# Patient Record
Sex: MALE | Race: WHITE | NOT HISPANIC OR LATINO | Employment: FULL TIME | URBAN - METROPOLITAN AREA
[De-identification: names, ages, dates, MRNs, and addresses within clinical notes are randomized per-mention and may not be internally consistent; named-entity substitution may affect disease eponyms.]

---

## 2018-04-16 ENCOUNTER — HISTORICAL (OUTPATIENT)
Dept: RADIOLOGY | Facility: HOSPITAL | Age: 38
End: 2018-04-16

## 2018-04-16 LAB
ABS NEUT (OLG): 3.68 X10(3)/MCL (ref 2.1–9.2)
ALBUMIN SERPL-MCNC: 3.8 GM/DL (ref 3.4–5)
ALBUMIN/GLOB SERPL: 1 {RATIO}
ALP SERPL-CCNC: 162 UNIT/L (ref 45–117)
ALT SERPL-CCNC: 80 UNIT/L (ref 16–61)
AST SERPL-CCNC: 50 UNIT/L (ref 15–37)
BASOPHILS NFR BLD MANUAL: 0 %
BILIRUB SERPL-MCNC: 0.5 MG/DL (ref 0.2–1)
BILIRUBIN DIRECT+TOT PNL SERPL-MCNC: 0.1 MG/DL (ref 0–0.2)
BILIRUBIN DIRECT+TOT PNL SERPL-MCNC: 0.4 MG/DL (ref 0–1)
BUN SERPL-MCNC: 22 MG/DL (ref 7–18)
CALCIUM SERPL-MCNC: 9.7 MG/DL (ref 8.5–10.1)
CHLORIDE SERPL-SCNC: 103 MMOL/L (ref 98–107)
CHOLEST SERPL-MCNC: 241 MG/DL (ref 0–200)
CHOLEST/HDLC SERPL: 4.3 {RATIO} (ref 0–5)
CO2 SERPL-SCNC: 26 MMOL/L (ref 21–32)
CREAT SERPL-MCNC: 1.34 MG/DL (ref 0.7–1.3)
CREAT UR-MCNC: 99 MG/DL
EOSINOPHIL NFR BLD MANUAL: 11 % (ref 0–5)
ERYTHROCYTE [DISTWIDTH] IN BLOOD BY AUTOMATED COUNT: 14.1 % (ref 11.5–17)
EST. AVERAGE GLUCOSE BLD GHB EST-MCNC: 232 MG/DL
GLOBULIN SER-MCNC: 4 GM/DL (ref 2–4)
GLUCOSE SERPL-MCNC: 223 MG/DL (ref 74–106)
GRANULOCYTES NFR BLD MANUAL: 39 %
HBA1C MFR BLD: 9.7 % (ref 4.2–6.3)
HCT VFR BLD AUTO: 43.1 % (ref 42–52)
HDLC SERPL-MCNC: 56 MG/DL (ref 35–60)
HGB BLD-MCNC: 14.4 GM/DL (ref 14–18)
LDLC SERPL CALC-MCNC: 150 MG/DL (ref 0–129)
LYMPHOCYTES NFR BLD MANUAL: 46 % (ref 24–44)
MAGNESIUM SERPL-MCNC: 1.6 MG/DL (ref 1.8–2.4)
MCH RBC QN AUTO: 28.2 PG (ref 27–31)
MCHC RBC AUTO-ENTMCNC: 33.4 GM/DL (ref 33–36)
MCV RBC AUTO: 84.5 FL (ref 80–94)
MICROALBUMIN UR-MCNC: 2.4 MG/DL
MICROALBUMIN/CREAT RATIO PNL UR: 24.2 MG/GM CR (ref 0–30)
MONOCYTES NFR BLD MANUAL: 4 % (ref 4–8)
PHOSPHATE SERPL-MCNC: 1.8 MG/DL (ref 2.5–4.9)
PLATELET # BLD AUTO: 252 X10(3)/MCL (ref 130–400)
PLATELET # BLD EST: ADEQUATE 10*3/UL
PMV BLD AUTO: 8.9 FL (ref 7.4–10.4)
POTASSIUM SERPL-SCNC: 3.8 MMOL/L (ref 3.5–5.1)
PROT SERPL-MCNC: 7.7 GM/DL (ref 6.4–8.2)
PTH-INTACT SERPL-MCNC: 30.6 PG/ML (ref 18.4–80.1)
RBC # BLD AUTO: 5.1 X10(6)/MCL (ref 4.7–6.1)
RBC MORPH BLD: NORMAL
SODIUM SERPL-SCNC: 138 MMOL/L (ref 136–145)
TRIGL SERPL-MCNC: 174 MG/DL (ref 30–150)
VLDLC SERPL CALC-MCNC: 35 MG/DL
WBC # SPEC AUTO: 7.9 X10(3)/MCL (ref 4.5–11.5)

## 2018-04-17 ENCOUNTER — HISTORICAL (OUTPATIENT)
Dept: LAB | Facility: HOSPITAL | Age: 38
End: 2018-04-17

## 2018-04-17 LAB
CREAT SERPL-MCNC: 1.3 MG/DL (ref 0.7–1.3)
CREAT UR-MCNC: 128 MG/DL
PROT 24H UR-MCNC: 257.4 MG/24HR (ref 0–165)

## 2018-10-19 ENCOUNTER — HISTORICAL (OUTPATIENT)
Dept: LAB | Facility: HOSPITAL | Age: 38
End: 2018-10-19

## 2018-10-19 LAB
EST. AVERAGE GLUCOSE BLD GHB EST-MCNC: 243 MG/DL
HBA1C MFR BLD: 10.1 % (ref 4.2–6.3)

## 2018-12-19 ENCOUNTER — HISTORICAL (OUTPATIENT)
Dept: ADMINISTRATIVE | Facility: HOSPITAL | Age: 38
End: 2018-12-19

## 2019-01-24 ENCOUNTER — HISTORICAL (OUTPATIENT)
Dept: LAB | Facility: HOSPITAL | Age: 39
End: 2019-01-24

## 2019-01-24 LAB
ABS NEUT (OLG): 2.13 X10(3)/MCL (ref 2.1–9.2)
ALBUMIN SERPL-MCNC: 3.8 GM/DL (ref 3.4–5)
ALBUMIN/GLOB SERPL: 1 {RATIO}
ALP SERPL-CCNC: 208 UNIT/L (ref 45–117)
ALT SERPL-CCNC: 34 UNIT/L (ref 16–61)
AST SERPL-CCNC: 18 UNIT/L (ref 15–37)
BILIRUB SERPL-MCNC: 0.5 MG/DL (ref 0.2–1)
BILIRUBIN DIRECT+TOT PNL SERPL-MCNC: 0.1 MG/DL (ref 0–0.2)
BILIRUBIN DIRECT+TOT PNL SERPL-MCNC: 0.4 MG/DL (ref 0–1)
BUN SERPL-MCNC: 25 MG/DL (ref 7–18)
CALCIUM SERPL-MCNC: 9 MG/DL (ref 8.5–10.1)
CHLORIDE SERPL-SCNC: 99 MMOL/L (ref 98–107)
CO2 SERPL-SCNC: 32 MMOL/L (ref 21–32)
CREAT SERPL-MCNC: 1.22 MG/DL (ref 0.7–1.3)
CREAT UR-MCNC: 239 MG/DL
ERYTHROCYTE [DISTWIDTH] IN BLOOD BY AUTOMATED COUNT: 13.5 % (ref 11.5–17)
GLOBULIN SER-MCNC: 4 GM/DL (ref 2–4)
GLUCOSE SERPL-MCNC: 311 MG/DL (ref 74–106)
HCT VFR BLD AUTO: 44.4 % (ref 42–52)
HGB BLD-MCNC: 14.8 GM/DL (ref 14–18)
MCH RBC QN AUTO: 26.4 PG (ref 27–31)
MCHC RBC AUTO-ENTMCNC: 33.3 GM/DL (ref 33–36)
MCV RBC AUTO: 79.1 FL (ref 80–94)
PLATELET # BLD AUTO: 285 X10(3)/MCL (ref 130–400)
PMV BLD AUTO: 9 FL (ref 7.4–10.4)
POTASSIUM SERPL-SCNC: 4.3 MMOL/L (ref 3.5–5.1)
PROT SERPL-MCNC: 7.8 GM/DL (ref 6.4–8.2)
PROT UR STRIP-MCNC: 47.5 MG/DL
RBC # BLD AUTO: 5.61 X10(6)/MCL (ref 4.7–6.1)
SODIUM SERPL-SCNC: 134 MMOL/L (ref 136–145)
WBC # SPEC AUTO: 6 X10(3)/MCL (ref 4.5–11.5)

## 2020-07-23 ENCOUNTER — HISTORICAL (OUTPATIENT)
Dept: ADMINISTRATIVE | Facility: HOSPITAL | Age: 40
End: 2020-07-23

## 2020-07-23 LAB
ABS NEUT (OLG): 3.41 X10(3)/MCL (ref 2.1–9.2)
ALBUMIN SERPL-MCNC: 3.2 GM/DL (ref 3.4–5)
ALBUMIN/GLOB SERPL: 0.8 RATIO (ref 1.1–2)
ALP SERPL-CCNC: 168 UNIT/L (ref 45–117)
ALT SERPL-CCNC: 52 UNIT/L (ref 12–78)
AST SERPL-CCNC: 38 UNIT/L (ref 15–37)
BASOPHILS # BLD AUTO: 0.1 X10(3)/MCL (ref 0–0.2)
BASOPHILS NFR BLD AUTO: 2 %
BILIRUB SERPL-MCNC: 0.4 MG/DL (ref 0.2–1)
BILIRUBIN DIRECT+TOT PNL SERPL-MCNC: 0.2 MG/DL
BILIRUBIN DIRECT+TOT PNL SERPL-MCNC: 0.2 MG/DL (ref 0–0.2)
BUN SERPL-MCNC: 24 MG/DL (ref 7–18)
CALCIUM SERPL-MCNC: 9.1 MG/DL (ref 8.5–10.1)
CHLORIDE SERPL-SCNC: 101 MMOL/L (ref 98–107)
CHOLEST SERPL-MCNC: 188 MG/DL
CHOLEST/HDLC SERPL: 3.1 {RATIO} (ref 0–5)
CO2 SERPL-SCNC: 28 MMOL/L (ref 21–32)
CREAT SERPL-MCNC: 1.1 MG/DL (ref 0.6–1.3)
EOSINOPHIL # BLD AUTO: 1 X10(3)/MCL (ref 0–0.9)
EOSINOPHIL NFR BLD AUTO: 13 %
ERYTHROCYTE [DISTWIDTH] IN BLOOD BY AUTOMATED COUNT: 13.5 % (ref 11.5–14.5)
EST. AVERAGE GLUCOSE BLD GHB EST-MCNC: 249 MG/DL
GLOBULIN SER-MCNC: 3.8 GM/ML (ref 2.3–3.5)
GLUCOSE SERPL-MCNC: 353 MG/DL (ref 74–106)
HBA1C MFR BLD: 10.3 % (ref 4.2–6.3)
HCT VFR BLD AUTO: 41.3 % (ref 40–51)
HDLC SERPL-MCNC: 60 MG/DL (ref 40–59)
HGB BLD-MCNC: 13.5 GM/DL (ref 13.5–17.5)
IMM GRANULOCYTES # BLD AUTO: 0.01 10*3/UL
IMM GRANULOCYTES NFR BLD AUTO: 0 %
LDLC SERPL CALC-MCNC: 88 MG/DL
LYMPHOCYTES # BLD AUTO: 2.5 X10(3)/MCL (ref 0.6–4.6)
LYMPHOCYTES NFR BLD AUTO: 34 %
MCH RBC QN AUTO: 29.2 PG (ref 26–34)
MCHC RBC AUTO-ENTMCNC: 32.7 GM/DL (ref 31–37)
MCV RBC AUTO: 89.2 FL (ref 80–100)
MONOCYTES # BLD AUTO: 0.4 X10(3)/MCL (ref 0.1–1.3)
MONOCYTES NFR BLD AUTO: 5 %
NEUTROPHILS # BLD AUTO: 3.41 X10(3)/MCL (ref 2.1–9.2)
NEUTROPHILS NFR BLD AUTO: 47 %
PLATELET # BLD AUTO: 247 X10(3)/MCL (ref 130–400)
PMV BLD AUTO: 9.7 FL (ref 7.4–10.4)
POTASSIUM SERPL-SCNC: 5 MMOL/L (ref 3.5–5.1)
PROT SERPL-MCNC: 7 GM/DL (ref 6.4–8.2)
RBC # BLD AUTO: 4.63 X10(6)/MCL (ref 4.5–5.9)
SODIUM SERPL-SCNC: 135 MMOL/L (ref 136–145)
TRIGL SERPL-MCNC: 199 MG/DL
VLDLC SERPL CALC-MCNC: 40 MG/DL
WBC # SPEC AUTO: 7.3 X10(3)/MCL (ref 4.5–11)

## 2021-03-02 ENCOUNTER — HISTORICAL (OUTPATIENT)
Dept: ADMINISTRATIVE | Facility: HOSPITAL | Age: 41
End: 2021-03-02

## 2021-03-02 LAB
ABS NEUT (OLG): 3.16 X10(3)/MCL (ref 2.1–9.2)
ALBUMIN SERPL-MCNC: 3.2 GM/DL (ref 3.5–5)
ALBUMIN/GLOB SERPL: 1 RATIO (ref 1.1–2)
ALP SERPL-CCNC: 126 UNIT/L (ref 40–150)
ALT SERPL-CCNC: 48 UNIT/L (ref 0–55)
APPEARANCE, UA: CLEAR
AST SERPL-CCNC: 34 UNIT/L (ref 5–34)
BACTERIA #/AREA URNS AUTO: ABNORMAL /HPF
BASOPHILS # BLD AUTO: 0.1 X10(3)/MCL (ref 0–0.2)
BASOPHILS NFR BLD AUTO: 1 %
BILIRUB SERPL-MCNC: 0.4 MG/DL
BILIRUB UR QL STRIP: NEGATIVE
BILIRUBIN DIRECT+TOT PNL SERPL-MCNC: 0.1 MG/DL (ref 0–0.5)
BILIRUBIN DIRECT+TOT PNL SERPL-MCNC: 0.3 MG/DL (ref 0–0.8)
BUN SERPL-MCNC: 21.3 MG/DL (ref 8.9–20.6)
CALCIUM SERPL-MCNC: 8.6 MG/DL (ref 8.4–10.2)
CHLORIDE SERPL-SCNC: 100 MMOL/L (ref 98–107)
CHOLEST SERPL-MCNC: 192 MG/DL
CHOLEST/HDLC SERPL: 3 {RATIO} (ref 0–5)
CO2 SERPL-SCNC: 27 MMOL/L (ref 22–29)
COLOR UR: ABNORMAL
CREAT SERPL-MCNC: 1.07 MG/DL (ref 0.73–1.18)
CREAT UR-MCNC: 149.1 MG/DL (ref 58–161)
DEPRECATED CALCIDIOL+CALCIFEROL SERPL-MC: 13.8 NG/ML (ref 30–80)
EOSINOPHIL # BLD AUTO: 1 X10(3)/MCL (ref 0–0.9)
EOSINOPHIL NFR BLD AUTO: 15 %
ERYTHROCYTE [DISTWIDTH] IN BLOOD BY AUTOMATED COUNT: 13.7 % (ref 11.5–14.5)
EST. AVERAGE GLUCOSE BLD GHB EST-MCNC: 292 MG/DL
GLOBULIN SER-MCNC: 3.1 GM/DL (ref 2.4–3.5)
GLUCOSE (UA): >1000 MG/DL
GLUCOSE SERPL-MCNC: 357 MG/DL (ref 74–100)
HAV IGM SERPL QL IA: NONREACTIVE
HBA1C MFR BLD: 11.8 %
HBV CORE IGM SERPL QL IA: NONREACTIVE
HBV SURFACE AG SERPL QL IA: NONREACTIVE
HCT VFR BLD AUTO: 40.1 % (ref 40–51)
HCV AB SERPL QL IA: NONREACTIVE
HDLC SERPL-MCNC: 59 MG/DL (ref 35–60)
HGB BLD-MCNC: 13 GM/DL (ref 13.5–17.5)
HGB UR QL STRIP: NEGATIVE
HYALINE CASTS #/AREA URNS LPF: ABNORMAL /LPF
IMM GRANULOCYTES # BLD AUTO: 0.02 10*3/UL
IMM GRANULOCYTES NFR BLD AUTO: 0 %
KETONES UR QL STRIP: NEGATIVE
LDLC SERPL CALC-MCNC: 101 MG/DL (ref 50–140)
LEUKOCYTE ESTERASE UR QL STRIP: NEGATIVE
LYMPHOCYTES # BLD AUTO: 2.3 X10(3)/MCL (ref 0.6–4.6)
LYMPHOCYTES NFR BLD AUTO: 33 %
MCH RBC QN AUTO: 28.8 PG (ref 26–34)
MCHC RBC AUTO-ENTMCNC: 32.4 GM/DL (ref 31–37)
MCV RBC AUTO: 88.7 FL (ref 80–100)
MONOCYTES # BLD AUTO: 0.4 X10(3)/MCL (ref 0.1–1.3)
MONOCYTES NFR BLD AUTO: 6 %
NEUTROPHILS # BLD AUTO: 3.16 X10(3)/MCL (ref 2.1–9.2)
NEUTROPHILS NFR BLD AUTO: 45 %
NITRITE UR QL STRIP: NEGATIVE
PH UR STRIP: 5.5 [PH] (ref 4.5–8)
PLATELET # BLD AUTO: 233 X10(3)/MCL (ref 130–400)
PMV BLD AUTO: 9.6 FL (ref 7.4–10.4)
POTASSIUM SERPL-SCNC: 5.3 MMOL/L (ref 3.5–5.1)
PROT SERPL-MCNC: 6.3 GM/DL (ref 6.4–8.3)
PROT UR QL STRIP: 20 MG/DL
PROT UR STRIP-MCNC: 16.3 MG/DL
PROT/CREAT UR-RTO: 109.3 MG/GM CR
RBC # BLD AUTO: 4.52 X10(6)/MCL (ref 4.5–5.9)
RBC #/AREA URNS AUTO: ABNORMAL /HPF
SODIUM SERPL-SCNC: 136 MMOL/L (ref 136–145)
SP GR UR STRIP: 1.03 (ref 1–1.03)
SQUAMOUS #/AREA URNS LPF: ABNORMAL /LPF
TRIGL SERPL-MCNC: 158 MG/DL (ref 34–140)
UROBILINOGEN UR STRIP-ACNC: NORMAL
VLDLC SERPL CALC-MCNC: 32 MG/DL
WBC # SPEC AUTO: 7 X10(3)/MCL (ref 4.5–11)
WBC #/AREA URNS AUTO: ABNORMAL /HPF

## 2021-11-30 ENCOUNTER — HOSPITAL ENCOUNTER (OUTPATIENT)
Dept: INTENSIVE CARE | Facility: HOSPITAL | Age: 41
End: 2021-12-02
Attending: INTERNAL MEDICINE | Admitting: INTERNAL MEDICINE

## 2021-11-30 LAB
% SATURATION: 82.2 %
ABS NEUT (OLG): 8.22 X10(3)/MCL (ref 2.1–9.2)
ALBUMIN SERPL-MCNC: 4 GM/DL (ref 3.5–5)
ALBUMIN/GLOB SERPL: 1 RATIO (ref 1.1–2)
ALP SERPL-CCNC: 225 UNIT/L (ref 40–150)
ALT SERPL-CCNC: 81 UNIT/L (ref 0–55)
ANION GAP SERPL CALC-SCNC: 25 MMOL/L
APPEARANCE, UA: CLEAR
AST SERPL-CCNC: 36 UNIT/L (ref 5–34)
B-OH-BUTYR SERPL-MCNC: 5.02 MMOL/L
BACTERIA #/AREA URNS AUTO: ABNORMAL /HPF
BASOPHILS # BLD AUTO: 0.1 X10(3)/MCL (ref 0–0.2)
BASOPHILS NFR BLD AUTO: 1 %
BILIRUB SERPL-MCNC: 0.7 MG/DL
BILIRUB UR QL STRIP: NEGATIVE
BILIRUBIN DIRECT+TOT PNL SERPL-MCNC: 0.3 MG/DL (ref 0–0.5)
BILIRUBIN DIRECT+TOT PNL SERPL-MCNC: 0.4 MG/DL (ref 0–0.8)
BUN SERPL-MCNC: 18 MG/DL (ref 8.9–20.6)
BUN SERPL-MCNC: 18.7 MG/DL (ref 8.9–20.6)
BUN SERPL-MCNC: 23.5 MG/DL (ref 8.9–20.6)
BUN SERPL-MCNC: 31.3 MG/DL (ref 8.9–20.6)
BUN SERPL-MCNC: 38 MG/DL (ref 8–26)
CALCIUM SERPL-MCNC: 11.5 MG/DL (ref 8.7–10.5)
CALCIUM SERPL-MCNC: 8.8 MG/DL (ref 8.7–10.5)
CALCIUM SERPL-MCNC: 8.9 MG/DL (ref 8.7–10.5)
CALCIUM SERPL-MCNC: 9 MG/DL (ref 8.7–10.5)
CHLORIDE SERPL-SCNC: 105 MMOL/L (ref 98–107)
CHLORIDE SERPL-SCNC: 106 MMOL/L (ref 98–107)
CHLORIDE SERPL-SCNC: 106 MMOL/L (ref 98–107)
CHLORIDE SERPL-SCNC: 95 MMOL/L (ref 98–107)
CHLORIDE SERPL-SCNC: 97 MMOL/L (ref 98–109)
CO HGB VEN: 2.9 % (ref 0.5–1.5)
CO2 SERPL-SCNC: 15 MMOL/L (ref 22–29)
CO2 SERPL-SCNC: 23 MMOL/L (ref 22–29)
CO2 SERPL-SCNC: 24 MMOL/L (ref 22–29)
CO2 SERPL-SCNC: 25 MMOL/L (ref 22–29)
COLOR UR: ABNORMAL
CREAT SERPL-MCNC: 1 MG/DL (ref 0.73–1.18)
CREAT SERPL-MCNC: 1 MG/DL (ref 0.73–1.18)
CREAT SERPL-MCNC: 1.17 MG/DL (ref 0.73–1.18)
CREAT SERPL-MCNC: 1.2 MG/DL (ref 0.6–1.3)
CREAT SERPL-MCNC: 1.95 MG/DL (ref 0.73–1.18)
CREAT/UREA NIT SERPL: 18
CREAT/UREA NIT SERPL: 19
CREAT/UREA NIT SERPL: 20
D BASE VENOUS: 0.3 (ref -2–2)
EOSINOPHIL # BLD AUTO: 0.1 X10(3)/MCL (ref 0–0.9)
EOSINOPHIL NFR BLD AUTO: 1 %
ERYTHROCYTE [DISTWIDTH] IN BLOOD BY AUTOMATED COUNT: 13.5 % (ref 11.5–14.5)
FLUAV AG UPPER RESP QL IA.RAPID: NEGATIVE
FLUBV AG UPPER RESP QL IA.RAPID: NEGATIVE
GLOBULIN SER-MCNC: 3.9 GM/DL (ref 2.4–3.5)
GLUCOSE (UA): >1000 MG/DL
GLUCOSE SERPL-MCNC: 114 MG/DL (ref 74–100)
GLUCOSE SERPL-MCNC: 159 MG/DL (ref 74–100)
GLUCOSE SERPL-MCNC: 168 MG/DL (ref 74–100)
GLUCOSE SERPL-MCNC: 665 MG/DL (ref 70–105)
GLUCOSE SERPL-MCNC: 685 MG/DL (ref 74–100)
HCO3 VENOUS: 25 MMOL/L (ref 25–40)
HCT VFR BLD AUTO: 48.2 % (ref 40–51)
HCT VFR BLD CALC: 52 % (ref 38–51)
HGB BLD-MCNC: 15.6 GM/DL (ref 13.5–17.5)
HGB BLD-MCNC: 17.7 MG/DL (ref 12–17)
HGB UR QL STRIP: NEGATIVE
HYALINE CASTS #/AREA URNS LPF: ABNORMAL /LPF
IMM GRANULOCYTES # BLD AUTO: 0.04 10*3/UL
IMM GRANULOCYTES NFR BLD AUTO: 0 %
KETONES UR QL STRIP: 60 MG/DL
LEUKOCYTE ESTERASE UR QL STRIP: NEGATIVE
LYMPHOCYTES # BLD AUTO: 1.2 X10(3)/MCL (ref 0.6–4.6)
LYMPHOCYTES NFR BLD AUTO: 12 %
MAGNESIUM SERPL-MCNC: 1.5 MG/DL (ref 1.6–2.6)
MAGNESIUM SERPL-MCNC: 1.7 MG/DL (ref 1.6–2.6)
MAGNESIUM SERPL-MCNC: 1.9 MG/DL (ref 1.6–2.6)
MAGNESIUM SERPL-MCNC: 2.1 MG/DL (ref 1.6–2.6)
MAGNESIUM SERPL-MCNC: 2.9 MG/DL (ref 1.6–2.6)
MCH RBC QN AUTO: 28.6 PG (ref 26–34)
MCHC RBC AUTO-ENTMCNC: 32.4 GM/DL (ref 31–37)
MCV RBC AUTO: 88.3 FL (ref 80–100)
MET HGB VEN: 0.8 % (ref 0–1.5)
MONOCYTES # BLD AUTO: 0.2 X10(3)/MCL (ref 0.1–1.3)
MONOCYTES NFR BLD AUTO: 2 %
NEUTROPHILS # BLD AUTO: 8.22 X10(3)/MCL (ref 2.1–9.2)
NEUTROPHILS NFR BLD AUTO: 84 %
NITRITE UR QL STRIP: NEGATIVE
NRBC BLD AUTO-RTO: 0 % (ref 0–0.2)
O2 HGB VENOUS: 79.7 % (ref 40–85)
PCO2 VENOUS: 42 MMHG (ref 41–51)
PH UR STRIP: 5 [PH] (ref 4.5–8)
PH VENOUS: 7.39 (ref 7.32–7.42)
PHOSPHATE SERPL-MCNC: 2.5 MG/DL (ref 2.3–4.7)
PHOSPHATE SERPL-MCNC: 2.6 MG/DL (ref 2.3–4.7)
PHOSPHATE SERPL-MCNC: 2.6 MG/DL (ref 2.3–4.7)
PHOSPHATE SERPL-MCNC: 2.9 MG/DL (ref 2.3–4.7)
PHOSPHATE SERPL-MCNC: 3.3 MG/DL (ref 2.3–4.7)
PLATELET # BLD AUTO: 381 X10(3)/MCL (ref 130–400)
PMV BLD AUTO: 10 FL (ref 7.4–10.4)
PO2 VENOUS: 43 MMHG (ref 30–100)
POC IONIZED CALCIUM: 1.28 MMOL/L (ref 1.12–1.32)
POC TCO2: 17 MMOL/L (ref 24–29)
POTASSIUM BLD-SCNC: 4.6 MMOL/L (ref 3.5–4.9)
POTASSIUM SERPL-SCNC: 3.9 MMOL/L (ref 3.5–5.1)
POTASSIUM SERPL-SCNC: 4 MMOL/L (ref 3.5–5.1)
POTASSIUM SERPL-SCNC: 4 MMOL/L (ref 3.5–5.1)
POTASSIUM SERPL-SCNC: 4.5 MMOL/L (ref 3.5–5.1)
PROT SERPL-MCNC: 7.9 GM/DL (ref 6.4–8.3)
PROT UR QL STRIP: NEGATIVE
RBC # BLD AUTO: 5.46 X10(6)/MCL (ref 4.5–5.9)
RBC #/AREA URNS AUTO: ABNORMAL /HPF
SAMPLE VEN: ABNORMAL
SARS-COV-2 RNA RESP QL NAA+PROBE: NOT DETECTED
SITE VEN: ABNORMAL
SODIUM BLD-SCNC: 133 MMOL/L (ref 138–146)
SODIUM SERPL-SCNC: 130 MMOL/L (ref 136–145)
SODIUM SERPL-SCNC: 135 MMOL/L (ref 136–145)
SODIUM SERPL-SCNC: 137 MMOL/L (ref 136–145)
SODIUM SERPL-SCNC: 137 MMOL/L (ref 136–145)
SP GR UR STRIP: 1.03 (ref 1–1.03)
SQUAMOUS #/AREA URNS LPF: <1 /LPF
THB VEN: 13.1 GM/DL (ref 12–18)
TREATMENT VEN: ABNORMAL
TROPONIN I SERPL-MCNC: <0.01 NG/ML (ref 0–0.04)
TROPONIN I SERPL-MCNC: <0.01 NG/ML (ref 0–0.04)
UROBILINOGEN UR STRIP-ACNC: NORMAL
WBC # SPEC AUTO: 9.8 X10(3)/MCL (ref 4.5–11)
WBC #/AREA URNS AUTO: ABNORMAL /HPF

## 2021-12-01 LAB
ABS NEUT (OLG): 4.96 X10(3)/MCL (ref 2.1–9.2)
ALBUMIN SERPL-MCNC: 2.7 GM/DL (ref 3.5–5)
ALBUMIN/GLOB SERPL: 1 RATIO (ref 1.1–2)
ALP SERPL-CCNC: 136 UNIT/L (ref 40–150)
ALT SERPL-CCNC: 46 UNIT/L (ref 0–55)
AST SERPL-CCNC: 23 UNIT/L (ref 5–34)
BASOPHILS # BLD AUTO: 0.1 X10(3)/MCL (ref 0–0.2)
BASOPHILS NFR BLD AUTO: 1 %
BILIRUB SERPL-MCNC: 0.4 MG/DL
BILIRUBIN DIRECT+TOT PNL SERPL-MCNC: 0.2 MG/DL (ref 0–0.5)
BILIRUBIN DIRECT+TOT PNL SERPL-MCNC: 0.2 MG/DL (ref 0–0.8)
BUN SERPL-MCNC: 16.5 MG/DL (ref 8.9–20.6)
CALCIUM SERPL-MCNC: 8.5 MG/DL (ref 8.7–10.5)
CHLORIDE SERPL-SCNC: 106 MMOL/L (ref 98–107)
CO2 SERPL-SCNC: 25 MMOL/L (ref 22–29)
CREAT SERPL-MCNC: 0.99 MG/DL (ref 0.73–1.18)
EOSINOPHIL # BLD AUTO: 0.8 X10(3)/MCL (ref 0–0.9)
EOSINOPHIL NFR BLD AUTO: 8 %
ERYTHROCYTE [DISTWIDTH] IN BLOOD BY AUTOMATED COUNT: 13.4 % (ref 11.5–14.5)
EST. AVERAGE GLUCOSE BLD GHB EST-MCNC: 286.2 MG/DL
FLUAV AG UPPER RESP QL IA.RAPID: NEGATIVE
FLUBV AG UPPER RESP QL IA.RAPID: NEGATIVE
GLOBULIN SER-MCNC: 2.6 GM/DL (ref 2.4–3.5)
GLUCOSE SERPL-MCNC: 103 MG/DL (ref 74–100)
HBA1C MFR BLD: 11.6 %
HCT VFR BLD AUTO: 36.9 % (ref 40–51)
HGB BLD-MCNC: 12.2 GM/DL (ref 13.5–17.5)
IMM GRANULOCYTES # BLD AUTO: 0.03 10*3/UL
IMM GRANULOCYTES NFR BLD AUTO: 0 %
LYMPHOCYTES # BLD AUTO: 3.5 X10(3)/MCL (ref 0.6–4.6)
LYMPHOCYTES NFR BLD AUTO: 35 %
MAGNESIUM SERPL-MCNC: 1.8 MG/DL (ref 1.6–2.6)
MCH RBC QN AUTO: 28.9 PG (ref 26–34)
MCHC RBC AUTO-ENTMCNC: 33.1 GM/DL (ref 31–37)
MCV RBC AUTO: 87.4 FL (ref 80–100)
MONOCYTES # BLD AUTO: 0.5 X10(3)/MCL (ref 0.1–1.3)
MONOCYTES NFR BLD AUTO: 5 %
NEUTROPHILS # BLD AUTO: 4.96 X10(3)/MCL (ref 2.1–9.2)
NEUTROPHILS NFR BLD AUTO: 50 %
NRBC BLD AUTO-RTO: 0 % (ref 0–0.2)
PHOSPHATE SERPL-MCNC: 2.4 MG/DL (ref 2.3–4.7)
PLATELET # BLD AUTO: 306 X10(3)/MCL (ref 130–400)
PMV BLD AUTO: 9.9 FL (ref 7.4–10.4)
POTASSIUM SERPL-SCNC: 3.9 MMOL/L (ref 3.5–5.1)
PROT SERPL-MCNC: 5.3 GM/DL (ref 6.4–8.3)
RBC # BLD AUTO: 4.22 X10(6)/MCL (ref 4.5–5.9)
SODIUM SERPL-SCNC: 137 MMOL/L (ref 136–145)
WBC # SPEC AUTO: 10 X10(3)/MCL (ref 4.5–11)

## 2021-12-02 LAB
ABS NEUT (OLG): 2.54 X10(3)/MCL (ref 2.1–9.2)
ALBUMIN SERPL-MCNC: 2.5 GM/DL (ref 3.5–5)
ALBUMIN/GLOB SERPL: 1 RATIO (ref 1.1–2)
ALP SERPL-CCNC: 135 UNIT/L (ref 40–150)
ALT SERPL-CCNC: 54 UNIT/L (ref 0–55)
AST SERPL-CCNC: 54 UNIT/L (ref 5–34)
BASOPHILS # BLD AUTO: 0.1 X10(3)/MCL (ref 0–0.2)
BASOPHILS NFR BLD AUTO: 1 %
BILIRUB SERPL-MCNC: 0.3 MG/DL
BILIRUBIN DIRECT+TOT PNL SERPL-MCNC: 0.1 MG/DL (ref 0–0.5)
BILIRUBIN DIRECT+TOT PNL SERPL-MCNC: 0.2 MG/DL (ref 0–0.8)
BUN SERPL-MCNC: 10.9 MG/DL (ref 8.9–20.6)
CALCIUM SERPL-MCNC: 8.3 MG/DL (ref 8.7–10.5)
CHLORIDE SERPL-SCNC: 102 MMOL/L (ref 98–107)
CHOLEST SERPL-MCNC: 137 MG/DL
CHOLEST/HDLC SERPL: 3 {RATIO} (ref 0–5)
CO2 SERPL-SCNC: 24 MMOL/L (ref 22–29)
CREAT SERPL-MCNC: 0.96 MG/DL (ref 0.73–1.18)
EOSINOPHIL # BLD AUTO: 0.5 X10(3)/MCL (ref 0–0.9)
EOSINOPHIL NFR BLD AUTO: 8 %
ERYTHROCYTE [DISTWIDTH] IN BLOOD BY AUTOMATED COUNT: 13.3 % (ref 11.5–14.5)
EST CREAT CLEARANCE SER (OHS): 115.05 ML/MIN
GLOBULIN SER-MCNC: 2.6 GM/DL (ref 2.4–3.5)
GLUCOSE SERPL-MCNC: 133 MG/DL (ref 74–100)
HCT VFR BLD AUTO: 35.3 % (ref 40–51)
HDLC SERPL-MCNC: 46 MG/DL (ref 35–60)
HGB BLD-MCNC: 11.6 GM/DL (ref 13.5–17.5)
IMM GRANULOCYTES # BLD AUTO: 0.01 10*3/UL
IMM GRANULOCYTES NFR BLD AUTO: 0 %
LDLC SERPL CALC-MCNC: 61 MG/DL (ref 50–140)
LYMPHOCYTES # BLD AUTO: 3 X10(3)/MCL (ref 0.6–4.6)
LYMPHOCYTES NFR BLD AUTO: 46 %
MAGNESIUM SERPL-MCNC: 1.7 MG/DL (ref 1.6–2.6)
MCH RBC QN AUTO: 29.3 PG (ref 26–34)
MCHC RBC AUTO-ENTMCNC: 32.9 GM/DL (ref 31–37)
MCV RBC AUTO: 89.1 FL (ref 80–100)
MONOCYTES # BLD AUTO: 0.3 X10(3)/MCL (ref 0.1–1.3)
MONOCYTES NFR BLD AUTO: 5 %
NEUTROPHILS # BLD AUTO: 2.54 X10(3)/MCL (ref 2.1–9.2)
NEUTROPHILS NFR BLD AUTO: 39 %
NRBC BLD AUTO-RTO: 0 % (ref 0–0.2)
PHOSPHATE SERPL-MCNC: 2.1 MG/DL (ref 2.3–4.7)
PLATELET # BLD AUTO: 246 X10(3)/MCL (ref 130–400)
PMV BLD AUTO: 9.9 FL (ref 7.4–10.4)
POTASSIUM SERPL-SCNC: 4.1 MMOL/L (ref 3.5–5.1)
PROT SERPL-MCNC: 5.1 GM/DL (ref 6.4–8.3)
RBC # BLD AUTO: 3.96 X10(6)/MCL (ref 4.5–5.9)
SODIUM SERPL-SCNC: 133 MMOL/L (ref 136–145)
TRIGL SERPL-MCNC: 152 MG/DL (ref 34–140)
VLDLC SERPL CALC-MCNC: 30 MG/DL
WBC # SPEC AUTO: 6.4 X10(3)/MCL (ref 4.5–11)

## 2021-12-03 ENCOUNTER — HISTORICAL (OUTPATIENT)
Dept: RADIOLOGY | Facility: HOSPITAL | Age: 41
End: 2021-12-03

## 2021-12-05 LAB
FINAL CULTURE: NORMAL
FINAL CULTURE: NORMAL

## 2022-04-04 ENCOUNTER — HISTORICAL (OUTPATIENT)
Dept: ADMINISTRATIVE | Facility: HOSPITAL | Age: 42
End: 2022-04-04

## 2022-04-10 ENCOUNTER — HISTORICAL (OUTPATIENT)
Dept: ADMINISTRATIVE | Facility: HOSPITAL | Age: 42
End: 2022-04-10
Payer: MEDICAID

## 2022-04-28 VITALS
SYSTOLIC BLOOD PRESSURE: 129 MMHG | HEIGHT: 73 IN | BODY MASS INDEX: 24.08 KG/M2 | DIASTOLIC BLOOD PRESSURE: 82 MMHG | WEIGHT: 181.69 LBS | OXYGEN SATURATION: 98 %

## 2022-04-29 NOTE — H&P
Patient:   Jose Francisco Romero            MRN: [id number]            FIN: [id number]               Age:   41 years     Sex:  Male     :  1980   Associated Diagnoses:   None   Author:   Lang Husain MD      Subjective    Background:  Mr. Jose Francisco Romero is a 41-year-old male with a PMH of DM 1, asthma, chronic lower back pain who presents with symptoms of fatigue, myalgias for about 1 day.  Says today around time of coming to ED he also had some chest pain, SOB that he usually gets when he goes into DKA. Chest pain has resolved. Also has had a slight cough since coming to ED. Denies any fever/chills, persistent cough, runny nose, sore throat, nausea, abdominal pain, diarrhea, numbness, weakness, dysuria/urgency/frequency.  He has type 1 diabetes and takes long-acting and mealtime insulin at home and does not skip doses other than yesterday evening when he skipped 1 dose.  He has frequent admissions for DKA, about once every 5 months or so.  Glucose usually runs 200-250 at home.    PMH: Asthma, type I DM.  Patient endorses having a massive MI about 5 years ago 2/2 drug use and admitted to [facility], however upon chart review it looks like he had sustained V. tach in the 2016 requiring intubation and cardioversion.  No mention of prior MI seen in chart.  Home Meds reviewed with patient: Sliding scale lispro (12 to 16 units 3 times daily with meals), Lantus 45, trazodone, buspirone  Past social history: Is a single father with 5 children.  Has a history of prior meth and heroin abuse but not currently.  Occasionally drinks alcohol but not heavily.  Smokes 1 pack/day of cigarettes (28-pack-year history).  PFH: Heart problems, diabetes, HTN    Review of Systems:  Constitutional: no fever/chills  Eye: no vision loss, eye redness, drainage, or pain  ENMT: no sore throat, ear pain, sinus pain/congestion, nasal congestion/drainage  Respiratory: no cough, no wheezing, endorses shortness of  breath  Cardiovascular: endorsed chest pain, no palpitations, no edema  Gastrointestinal: no nausea, vomiting, or diarrhea. No abdominal pain  Genitourinary: no dysuria, no urinary frequency or urgency, no hematuria  Hema/Lymph: no abnormal bruising or bleeding  Endocrine: no heat or cold intolerance, no excessive thirst or excessive urination  Integumentary: no skin rash or abnormal lesion  Neurologic: no headache, no dizziness, no weakness or numbness      Objective:    Vitals:   on ED presentation.  Currently 94  RR 18-24  Otherwise vitals normal    Physical Exam  General - Appears comfortable, appropriatley conversive, somewhat sleepy  Mental Status - alert and oriented x 3, speaking in logical, relevant sentences  HEENT - No pre/post-auricular, anterior/posterior cervical, or supraclavicular lymph nodes appreciated; no rhinorrhea  Cardiac - RRR, no murmurs, rubs, or gallops; no edema in LE  Respiratory - breathing comfortably; clear to ascultation bilaterally; good air movement; minimal expiratory wheezing  Abdominal - nondistended, soft, nontender to palpation  Extremities - LE, UE, and joints are nonerythematous and nonswollen  Skin - no rashes or bruises seen on skin     Labs:    HCO3 15  Anion gap 20  BHOB 5    UAketones, glucose  Glucose greater than 600 on presentation  Troponin x1 negative    EKGnormal      Assessment/Plan    Diabetic Ketoacidosis  -There are no obvious inciting factors to cause his DKA. Mentions SOB, chest pain that he usually gets when going into DKA but suspicion is low for ACS. Chest pain resolved. SOB likely due to acidosis. Unlikely that missing one dose of insulin yesterday caused the DKA.  -Will order second troponin.  -placed on insulin gtt by ED. Currently gap has closed and HCO3 is >18. Will give Lantus 30U now (less than home dose b/c current glucose is in 90's) and 12U Lispro with upcoming dinner. Stop insulin gtt in 2 hours. Will resume his home Lantus 45U  tomorrow night.  -continue NS @ 150ml/h after insulin gtt  -last A1C 11. Ordering repeat    Hx Asthma  -will give prn DuoNeb  -mentions mild SOB but has good air movement. Minimal wheeze. Does not appear to be in asthma exacerbation    History of sustained V-tach, 4/2016  -mentions history of massive MI due to drug use and being admitted to [facility] several years ago. No hx MI found in chart, but found hx of sustained V-tach in 2016 in setting of DKA-was admitted to [facility] and required intubation, cardioversion  -currently in NSR  -had CP, troponin negative, ordering repeat troponin    Hx polysubstance abuse  -only currently using marijuana, cigarettes  -no longer using heroin or methamphetamine     Anxiety  -continue home trazodone, buspirone    Hx lower back pain  -radiating pain down legs  -will give prn tylenol, Toradol    Can likely be downgraded in AM    Lang Husain PGY 2  Staff addendum: Patient seen this a.m. and agree with above.  Anion gap has closed he stable for downgrade to the floor.  He states he sees Dr. Heath who is his internal medicine doctor.  Hemoglobin A1c is 11 so he has had very poor control of his diabetes.  I counseled and strongly stressed to him the need for close follow-up and close tight control of his diabetes to avoid endorgan damage.  He voiced understanding

## 2022-04-29 NOTE — ED PROVIDER NOTES
"   Patient:   Jose Francisco Romero            MRN: [id number]            FIN: [id number]               Age:   41 years     Sex:  Male     :  1980   Associated Diagnoses:   DKA (diabetic ketoacidosis)   Author:   Lang Barbour MD      Basic Information   Time seen: Immediately upon arrival.   Additional information: Chief Complaint from Nursing Triage Note : Chief Complaint   2021 10:08 CST     Chief Complaint           PT TYPE 1 DM W REPORT OF "HI" GLUCOSE THIS AM. CO DEHYDRATION,  BODY ACHES W CP/SOB/ABD PAIN.  DENIES NV.  EKG OBTAINED.  CBG IN TRIAGE "HI" X 2  EM MORROW.  .   Provider/Visit info:   Time Seen:  Lang Barbour MD / 2021 10:16  .   History of Present Illness   41-year-old male with past medical history of diabetes presenting today with hyperglycemia.  Says he has been feeling generally unwell for the past week.  When he arrived his blood sugar read as high.  He has a history of DKA and is supposed to be taking insulin at home.  He denies any preceding symptoms of this weakness and general unwell feeling.   The patient presents with hyperglycemia.  The onset was unknown.  The course/duration of symptoms is constant.  The blood sugar was 600 mg/dL, today and method of detection: doctor's office.  Risk factors consist of diabetes mellitus.  Prior episodes: rare.  Therapy today: none.  Associated symptoms: none.        Review of Systems   Constitutional symptoms:  Weakness, fatigue, no fever, no chills.    Skin symptoms:  No jaundice,    Eye symptoms:  Vision unchanged.   Respiratory symptoms:  No shortness of breath, no cough.    Cardiovascular symptoms:  No chest pain, no syncope.    Gastrointestinal symptoms:  No abdominal pain, no nausea, no vomiting, no diarrhea.    Genitourinary symptoms:  No dysuria,    Neurologic symptoms:  No altered level of consciousness,              Additional review of systems information: All other systems reviewed and otherwise negative.    "   Health Status   Allergies:    Allergic Reactions (Selected)  Severity Not Documented  Penicillins- No reactions were documented.  Sulfa drugs- No reactions were documented.,    Allergies (2) Active Reaction  penicillins None Documented  sulfa drugs None Documented  .   Medications:  (Selected)   Inpatient Medications  Ordered  Dextrose 5% with 0.45% NaCl intravenous solution 1,000 mL: 1,000 mL, 1,000 mL, IV, 150 mL/hr, start date 11/30/21 11:50:00 CST, administer as maintenance fluid when CBG < 200 mg/dL and discontinue previous maintenance fluid until further orders, 1.98, m2  Dextrose 50% abboject syringe: 25 mL, 12.5 gm =, form: Injection, IV Push, q15min PRN for blood glucose, first dose 11/30/21 11:50:00 CST, refer to hypoglycemia guideline listed in associated titration scale  Lactated Ringers 1000ml 1,000 mL: 1,000 mL, 1,000 mL, IV, 1,000 mL/hr, start date 11/30/21 10:09:00 CST  Magnesium Sulfate 2gm/50ml IVPB (Premix): 2 gm, form: Infusion, IV Piggyback, Once PRN for other (see comment), Infuse over: 1 hr, first dose 11/30/21 11:50:00 CST, STAT, for magensium level < 1.8 mEq/L  Milk of Magnesia: 30 mL, form: Susp, Oral, Daily PRN for constipation, first dose 11/30/21 11:50:00 CST  Sodium Chloride 0.45% intravenous solution 1,000 mL: 1,000 mL, 1,000 mL, IV, 150 mL/hr, start date 11/30/21 11:50:00 CST, administer as maintenance fluid if serum sodium > 150 mEq until CBG < 200 mg/dL, then administer Dextrose 5% and 0.45% Sodium Chloride @ 150 mL/hr, 1.98, m2  Sodium Chloride 0.9% intravenous solution 1,000 mL: 1,000 mL, 1,000 mL, IV, 1,000 mL/hr, order duration: 2 dose(s), start date 11/30/21 11:50:00 CST, stop date 11/30/21 13:49:00 CST, Initial fluid dosing, followed by Sodium Chloride 0.9% @ 500 mL/hr X1 dose, 1.98, m2  Sodium Chloride 0.9% intravenous solution 1,000 mL: 1,000 mL, 1,000 mL, IV, 150 mL/hr, start date 11/30/21 11:50:00 CST, administer as maintenance fluid if serum sodium < 150 mEq until CBG <  200 mg/dL, then administer Dextrose 5% and 0.45% Sodium Chloride @ 150 mL/hr, 1.98, m2  Sodium Chloride 0.9% intravenous solution 1,000 mL: 1,000 mL, 1,000 mL, IV, 500 mL/hr, order duration: 1 dose(s), start date 11/30/21 11:50:00 CST, stop date 11/30/21 13:49:00 CST, to be administered following intial fluid dosing X1 dose, then administer maintenance fluid based on stated protocol, 1.98...  Tylenol: 650 mg, form: Tab, Oral, q4hr PRN for fever, first dose 11/30/21 11:50:00 CST, STAT, temp > 38.6 degrees Celsius  Tylenol: 650 mg, form: Tab, Oral, q4hr PRN for headache, first dose 11/30/21 11:50:00 CST, STAT  Zofran: 4 mg, form: Injection, IV Push, q4hr PRN for nausea, first dose 11/30/21 11:50:00 CST, STAT, mild  Zofran: 8 mg, form: Injection, IV Push, q4hr PRN for nausea, first dose 11/30/21 11:50:00 CST, STAT, severe  insulin regular 100 units/mL human recombinant injectable solution 100 units + Sodium Chloride 0.9%...: 100 mL, 100 mL, IV, Per Protocol, start date 11/30/21 11:50:00 CST, titration instructions: Start infusion and titrate per associated algorithm., 1.98, m2  insulin regular: 7.5 units, form: Injection, IV Push, Once, first dose 11/30/21 11:50:00 CST, stop date 11/30/21 11:50:00 CST, STAT, as IV bolus prior to initiation of insulin infusion  sodium phosphate (for IVPB) + Sodium Chloride 0.9% 250ml 250 mL: 20 mmol, form: Injection, IV Piggyback, As Directed PRN for other (see comment), Infuse over: 4.1 hr, first dose 11/30/21 11:50:00 CST, for phosphorous level 1.5 to 2 mg/dL  sodium phosphate (for IVPB) + Sodium Chloride 0.9% 250ml 250 mL: 30 mmol, form: Injection, IV Piggyback, As Directed PRN for other (see comment), Infuse over: 6.2 hr, first dose 11/30/21 11:50:00 CST, for phosphorous level 1 to 1.4 mg/dL  Prescriptions  Prescribed  Diabetic syringe: Diabetic syringe, See Instructions, Use with your NovoLog 3 times a day based on  sliding scale, # 1 box(es), 3 Refill(s), Pharmacy: [facility] [id number], 186, cm, Height/Length Dosing, 07/19/21 10:35:00 CDT, 76.2, kg, Weight Dosing, 07/19/21 10:35:0...  Glucometer: Glucometer, See Instructions, Glucometer   Dx: Diabetes (E11.9)    Use to check blood sugar TIDAC, # 1 EA, 0 Refill(s), 185.42, cm, Height/Length Dosing, 03/02/21 7:47:00 CST, 90.9, kg, Weight Dosing, 03/02/21 7:47:00 CST  Insulin needles: Insulin needles, See Instructions, to use to draw up insulin    dx: diabetes mellitus, # 100 EA, 11 Refill(s), Pharmacy: [place] [facility] [address] [facility], 186, cm, Height/Length Dosing, 07/19/21 10:35:00 CDT, 76.2, kg, Weight Dosing, 07/19/21 10:35:00 CDT  Insulin syringes: Insulin syringes, See Instructions, Insulin syringes  Dx: Diabetes (E11.9)    Use to administer insulin TIDAC, # 100 EA, 11 Refill(s), Pharmacy: [place] [facility] [id number] [facility], 186, cm, Height/Length Dosing, 07/19/21 10:35:00 CDT, 76.2, kg, Weight Dosing, 07/1...  Lancets and test strips: Lancets and test strips, See Instructions, Lancetes and Test Strips  Dx: Diabetes (E11.9)  for testing CBGs 4 times per day, # 150 EA, 11 Refill(s), Pharmacy: [facility] [id number] [facility], 186, cm, Height/Length Dosing, 07/19/21 10:35:00 CDT, 76.2, kg, Weigh...  NovoLOG 100 units/mL injectable solution: 8 units, Subcutaneous, TIDAC, # 15 mL, 11 Refill(s), 185.42, cm, Height/Length Dosing, 03/02/21 7:47:00 CST, 90.9, kg, Weight Dosing, 03/02/21 7:47:00 CST  busPIRone 10 mg oral tablet: 10 mg = 1 tab(s), Oral, BID, # 60 tab(s), 11 Refill(s), Pharmacy: [place] [facility] [address] [facility], 186, cm, Height/Length Dosing, 07/19/21 10:35:00 CDT, 76.2, kg, Weight Dosing, 07/19/21 10:35:00 CDT  escitalopram 20 mg oral tablet: 20 mg = 1 tab(s), Oral, Daily, # 30 tab(s), 6 Refill(s), Pharmacy: [place] [facility] [address] [facility], 186, cm, Height/Length Dosing, 07/19/21 10:35:00 CDT, 76.2, kg, Weight Dosing, 07/19/21 10:35:00 CDT  insulin glargine 100 units/mL subcutaneous solution: 45 units, Subcutaneous, Once a day (at bedtime), # 12 mL, 11 Refill(s), 186, cm,  Height/Length Dosing, 07/19/21 10:35:00 CDT, 76.2, kg, Weight Dosing, 07/19/21 10:35:00 CDT  lansoprazole 30 mg oral DR capsule (do not sub.): 30 mg = 1 cap(s), Oral, Daily, # 30 cap(s), 4 Refill(s), Pharmacy: [place] [facility] [id number] [facility], 186, cm, Height/Length Dosing, 07/19/21 10:35:00 CDT, 76.2, kg, Weight Dosing, 07/19/21 10:35:00 CDT  traZODONE 50 mg oral tablet ( Desyrel ): 50 mg = 1 tab(s), Oral, Once a day (at bedtime), # 30 tab(s), 6 Refill(s), Pharmacy: [place] [facility] [id number] [facility], 186, cm, Height/Length Dosing, 07/19/21 10:35:00 CDT, 76.2, kg, Weight Dosing, 07/19/21 10:35:00 CDT.      Past Medical/ Family/ Social History   Medical history: Reviewed as documented in chart.   Surgical history: Reviewed as documented in chart.   Family history: Not significant.   Social history: Not significant.      Physical Examination               Vital Signs   Vital Signs   11/30/2021 10:54 CST     Peripheral Pulse Rate     108 bpm  HI                             Respiratory Rate          19 br/min                             SpO2                      100 %                             Systolic Blood Pressure   126 mmHg                             Diastolic Blood Pressure  104 mmHg  HI                             Mean Arterial Pressure, Cuff              111 mmHg    11/30/2021 10:08 CST     Temperature Oral          36.6 DegC                             Temperature Oral (calculated)             97.88 DegF                             Peripheral Pulse Rate     124 bpm  HI                             Respiratory Rate          18 br/min                             SpO2                      100 %                             Oxygen Therapy            Room air                             Systolic Blood Pressure   138 mmHg                             Diastolic Blood Pressure  88 mmHg  .   Measurements   11/30/2021 10:08 CST     Weight Dosing             75 kg                             Weight Measured           75 kg                              Weight Measured and Calculated in Lbs     165.34 lb                             Height/Length Dosing      185 cm                             Height/Length Measured    185 cm                             Body Mass Index Measured  21.91 kg/m2  .   Basic Oxygen Information   11/30/2021 10:54 CST     SpO2                      100 %    11/30/2021 10:08 CST     SpO2                      100 %                             Oxygen Therapy            Room air  .   General:  Alert, moderate distress.    Skin:  Warm, dry.    Head:  Normocephalic, atraumatic.    Neck:  Supple, no tenderness.    Cardiovascular:  Regular rate and rhythm, No murmur, Tachycardia.    Respiratory:  Lungs are clear to auscultation, Respirations: Tachypneic.    Back:  Nontender, Normal range of motion.    Musculoskeletal:  Normal ROM.   Chest wall   Gastrointestinal:  Soft, Nontender, Non distended.    Neurological:  Alert and oriented to person, place, time, and situation.   Lymphatics   Psychiatric:  Cooperative.      Medical Decision Making   Pt presents to the ED with complaints of High blood sugar     Differential considerations include: DKA, HHS, Medication noncompliance, hyperglycemia   Evaluated the patient in the emergency department.  He was in mild distress and was tachycardic.  He appeared incredibly dehydrated.  His blood sugar was 660.  He had an anion gap of 20.  His beta hydroxybutyrate was 5.  He was started on insulin infusion in the emergency department.  Spoke with hospital medicine at this point for admission.  He is being admitted to the hospital with DKA in a serious condition.   Electrocardiogram:  Time 11/30/2021 10:17:00, rate 123, No ST-T changes, no ectopy, normal [place] & QRS intervals, EP Interp, The Rhythm is sinus tachycardia.  .       Procedure   Critical care note   Total time: 45 minutes spent engaged in work directly related to patient care and/ or available for direct patient care.   Critical condition(s) addressed  for impending deterioration include: metabolic.   Associated risk factors: dysrhythmia, metabolic changes, acidosis.   Management: Interpretation electrocardiogram, Interventions Insulin management..   Performed by: self.      Impression and Plan   Diagnosis   DKA (diabetic ketoacidosis) ([id number] E11.10)      Calls-Consults   -  11/30/2021 14:32:00 , hospital medicine, phone call.    Plan   Condition: Guarded.    Disposition: Admit time  11/30/2021 14:31:00, Admit to Intensive Care Unit.

## 2022-05-03 NOTE — HISTORICAL [facility]ER
This is a historical note converted from Cersukhjinder. Formatting and pictures may have been removed.  Please reference Cersukhjinder for original formatting and attached multimedia. Chief Complaint  Estab. PCP, needs refills on DM supplies, c/o rash to BLE x months, nodule behind R ear x 2 days believes to be insect bite  History of Present Illness  Mr Romero is a 40yo WM with PMH DM1, HTN, diabetic neuropathy, depression,?history of MI?who comes to clinic today to establish care. Pt has not seen primary care doctor in some time. He reports stopping Lantus 18u BID around 5 months ago and only taking Novolog sliding scale. He stopped due to  and below and feeling bad. He also reports previous episodes of DKA. He also has history of HTN but has stopped Lisinopril 30mg due to making him feel dizzy. He c/o pain in his feet as well as numbness/tingling in his toes as well. He also reports depression lately due to his babys mom being incarcerated and having to take care of his?3 children as well as working. ?He also states his mother?has cancer and they are trying to deal with that as well.? He?reports a good appetite, but is fatigued, having trouble sleeping, has lost interest in things he used to like to do.? 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 He also tells me he has?history of IV drug use?in which he had an?MI?3 years ago, but has been?sober?since then. ?Reports a 0.5 pack/day cigarettes?but denies alcohol use and illicit drug use.? He works as a ?and is?outside very often.? He does also get some muscle cramps at times?and has a drink pickle juice which does seem to help.  Review of Systems  Constitutional:?no fever, fatigue, weakness  Eye:?no vision loss, eye redness, drainage, or pain  ENMT:?no sore throat, ear pain, sinus pain/congestion, nasal  congestion/drainage  Respiratory:?no cough, no wheezing, no shortness of breath  Cardiovascular:?no chest pain, no palpitations, no edema  Gastrointestinal:?no nausea, vomiting, or diarrhea. No abdominal pain  Genitourinary:?no dysuria, no urinary frequency or urgency, no hematuria  Hema/Lymph:?no abnormal bruising or bleeding  Endocrine:?no heat or cold intolerance, no excessive thirst or excessive urination  Musculoskeletal:?no muscle or joint pain, no joint swelling  Integumentary:?no skin rash or abnormal lesion  Neurologic: no headache, no dizziness, no weakness or numbness  Physical Exam  Vitals & Measurements  T:?36.8? ?C (Oral)? HR:?76(Peripheral)? RR:?18? BP:?124/85?  HT:?182?cm? WT:?87.7?kg? BMI:?26.48?  General: well-nourished, well-developed in no acute distress  HEENT: Atraumatic, normocephalic.  Neck: No JVD or carotid bruits. No lymphadenopathy.  Heart: RRR, no murmurs, gallops, clicks or rubs. S1, S2 present.?  Lungs: CTAB without rales, wheezes or rhonchi. Normal work of breathing. Chest rise symmetrical on inspiration.  Abd: Soft, non-tender, non-distended and without guarding. Bowel sounds present.  Extremities: Radial and pedal pulses 2+ bilaterally, no LE edema.  MSK: Moves all extremities purposefully.  Skin: Warm, dry and without rashes.  Assessment/Plan  Diabetes Mellitus, type 1  -Last A1c 11.1 in 2018. Will recheck today  -Has not taken Lantus in 5 months. only taking Novolog sliding scale and requiring 10-20u with sugars running consistently in 200s  -Will prescribe Lantus 12u at bedtime?and?NovoLog 5 units 3 times daily AC  -Asked patient to keep?blood sugar log and bring to next visit  ?  HTN  -/85 today.  -Has not taken lisinopril 30 mg due to feeling dizzy.?Prescribed lisinopril 5 mg?today?for?kidney protection  ?  Diabetic neuropathy  -Has tried Gabapentin in the past with no relief  -Prescribed Duloxetine 30mg daily. Instructed patient can increase if pain is not  better  ?  Depression  -Prescribed Duloxetine 30mg daily. Educated patient that this may not work right away but does need around 4 weeks to take effect. We also can increase if depression is not better  ?  Rash on legs  -likely due to bug bites that he has scratched  -Prescribed mupirocin cream as needed for spots  ?  H/o MI  -Pt states 2/2 drug use  -Currently not on statin  -Ordered lipid panel, will likely start statin after getting results  ?  ?  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  )  DKA, type 1  Fall from ladder(  Confirmed  )  Procedure/Surgical History  Fluoroscopy of Left Toe(s) (11/24/2018)  Pinning Percutaneous Foot (Left) (11/24/2018)  Reposition Left Toe Phalangeal Joint with Internal Fixation Device, Percutaneous Approach (11/24/2018)  Reposition Left Toe Phalanx with Internal Fixation Device, Percutaneous Approach (11/24/2018)  Drainage of Left Pleural Cavity with Drainage Device, Percutaneous Approach (11/23/2018)  Detoxification Services for Substance Abuse Treatment (10/12/2017)  Individual Counseling for Substance Abuse Treatment, Continuing Care (07/11/2016)  Drainage of Spinal Canal, Percutaneous Approach, Diagnostic (05/07/2016)  Insertion of Endotracheal Airway into Trachea, Via Natural or Artificial Opening Endoscopic  (05/07/2016)  Respiratory Ventilation, 24-96 Consecutive Hours (05/07/2016)  Insertion of Endotracheal Airway into Trachea, Via Natural or Artificial Opening (04/25/2016)  Insertion of Infusion Device into Superior Vena Cava, Percutaneous Approach (04/25/2016)  Respiratory Ventilation, Less than 24 Consecutive Hours (04/25/2016)  Restoration of Cardiac Rhythm, Single (04/25/2016)  Ultrasonography of Superior Vena Cava, Guidance (04/25/2016)  Clavicle fracture  collar bone sx   Medications  Diabetic syringe, See Instructions, 3 refills  DULoxetine 30 mg oral delayed release capsule, 30 mg= 1 cap(s), Oral, Daily, 4 refills  insulin glargine 100 units/mL subcutaneous solution, 12 units, Subcutaneous, Once a day (at bedtime), 6 refills  Insulin needles, See Instructions, 1 refills  Insulin syringes, See Instructions  Lancets and test strips, See Instructions, 11 refills  lisinopril 5 mg oral tablet, 5 mg= 1 tab(s), Oral, Daily, 6 refills  mupirocin 2% topical oint, 1 roni, TOP, TID, 1 refills  NovoLOG 100 units/mL injectable solution, 5 units, Subcutaneous, TIDAC, 6 refills  Allergies  penicillins  sulfa drugs  Social History  Abuse/Neglect  No, No, Yes, 07/23/2020  Alcohol - Denies Alcohol Use, 06/01/2014  Past, 03/05/2018  Employment/School  Unemployed, 10/11/2017  Part time, 08/19/2016  Exercise  Home/Environment  Lives with Father, Mother. Living situation: Home/Independent. Home equipment: Glucose monitoring. Alcohol abuse in household: No. Substance abuse in household: No. Smoker in household: No. Injuries/Abuse/Neglect in household: No. Feels unsafe at home: No. Safe place to go: Yes. Family/Friends available for support: Yes. Concern for family members at home: No. Major illness in household: No. Financial concerns: No., 08/04/2016  Nutrition/Health  Regular, Wants to lose weight: No. Sleeping concerns: Yes. Feels highly stressed: Yes., 08/04/2016  Spiritual/Cultural  Anglican, Yes, 07/23/2020  Substance Use -  Denies Substance Abuse, 06/01/2014  Past, Amphetamines, Cocaine, Heroin, Started age 17 Years. Stopped age 36 Years. IV drug use: Yes. Drug use interferes with work/home: Yes., 03/05/2018  Tobacco - High Risk, 12/28/2013  10 or more cigarettes (1/2 pack or more)/day in last 30 days, Cigarettes, No, 10 per day. Household tobacco concerns: No. Smokeless Tobacco Use: Never., 07/23/2020  Family History  Diabetes mellitus type 2: Mother and Father.  Primary malignant neoplasm of prostate: Father.  Seizure: Mother.  Immunizations  Vaccine Date Status   tetanus/diphtheria/pertussis, acel(Tdap) 11/22/2018 Given   pneumococcal vacc 02/25/2014 Recorded   influenza virus vaccine, inactivated 02/25/2014 Recorded   pneumococcal 23-polyvalent vaccine 12/30/2013 Given   influenza virus vaccine, inactivated 12/30/2013 Given   influenza virus vaccine, inactivated 02/01/2011 Recorded   hepatitis A-hepatitis B vaccine 02/01/2011 Recorded   tetanus/diphtheria/pertussis, acel(Tdap) 02/01/2011 Recorded   Health Maintenance  Health Maintenance  ???Pending?(in the next year)  ??? ??OverDue  ??? ? ? ?Diabetes Maintenance-Microalbumin due??and every?  ??? ? ? ?Diabetes Maintenance-Fasting Lipid Profile due??04/16/19??and every 1??year(s)  ??? ? ? ?Diabetes Maintenance-HgbA1c due??11/23/19??and every 1??year(s)  ??? ? ? ?Diabetes Maintenance-Foot Exam due??11/28/19??and every 1??year(s)  ??? ? ? ?Smoking Cessation due??01/01/20??and every 1??year(s)  ??? ? ? ?Alcohol Misuse Screening due??01/02/20??and every 1??year(s)  ??? ??Due?  ??? ? ? ?Diabetes Maintenance-Eye Exam due??07/23/20??and every?  ??? ? ? ?Hypertension Management-Education due??07/23/20??and every 1??year(s)  ??? ? ? ?Influenza Vaccine due??07/23/20??and every?  ??? ??Due In Future?  ??? ? ? ?Obesity Screening not due until??01/01/21??and every 1??year(s)  ??? ? ? ?Hypertension Management-BMP not due until??04/06/21??and every 1??year(s)  ??? ? ? ?Diabetes Maintenance-Serum  Creatinine not due until??04/06/21??and every 1??year(s)  ???Satisfied?(in the past 1 year)  ??? ??Satisfied?  ??? ? ? ?ADL Screening on??07/23/20.??Satisfied by Jessica Vincent LPN  ??? ? ? ?Blood Pressure Screening on??07/23/20.??Satisfied by Jessica Vincent LPN  ??? ? ? ?Body Mass Index Check on??07/23/20.??Satisfied by Jessica Vincent LPN  ??? ? ? ?Depression Screening on??07/23/20.??Satisfied by Jessica Vincent LPN  ??? ? ? ?Diabetes Maintenance-Serum Creatinine on??04/06/20.??Satisfied by Lupe Hernandez  ??? ? ? ?Diabetes Screening on??04/06/20.??Satisfied by Lupe Hernandez  ??? ? ? ?Hypertension Management-Blood Pressure on??07/23/20.??Satisfied by Jessica Vincent LPN  ??? ? ? ?Influenza Vaccine on??02/18/20.??Satisfied by Aicha Vaughn LPN  ??? ? ? ?Obesity Screening on??07/23/20.??Satisfied by Jessica Vincent LPN  ?      Discussed in clinic with medicine resident  Care provided is appropriate  Well continue to follow blood sugar levels closely-agree with long and short acting insulin  Agree with remaining assessment and plan

## 2022-05-03 NOTE — HISTORICAL [facility]
This is a historical note converted from Eva. Formatting and pictures may have been removed.  Please reference Eva for original formatting and attached multimedia. Chief Complaint  4 week s/p CRPP Lt foot and Left scapula Non- Op here for eval and Xrays. Doing good having some pain, sx 11/24/18 gl. 2/22/19.  History of Present Illness  11/24/2018: Close reduction pertains pinning of the left great toe and?second toe.? Left scapular fracture, nonoperative treatment  ?  He returns today.? His been angling [place] shoe.? His pin sites are clean and dry.? He does have some continued numbness and tingling to bilateral lower extremities which is associated with his diabetes. ?He feels like he did have good control of his diabetes nor appropriate care.  Physical Exam  Vitals & Measurements  BP:?128/86?  HT:?185.4?cm? HT:?185.4?cm? HT:?185.4?cm? WT:?88?kg? WT:?88?kg? WT:?88?kg? BMI:?25.6?  His foot?pin sites are clean and dry. ?There is no signs of infection. ?Is got some expected stiffness. ?He is got some tenderness over his scapula with pretty good range of motion forward flexion 130 degrees and abduction to 100 degrees.  Assessment/Plan  Fracture of scapula?S42.109A  Doing well status post above. ?He can remove the sling and begin range of motion. ?He can return to work as tolerated  Ordered:  Post-Op follow-up visit [id number] PC, Fracture of scapula  Toe dislocation, [facility], 12/19/18 8:50:00 CST  ?  Toe dislocation?S93.106A  We will get rid of the pins today. ?He can work on range of motion. ?Weight-bear as tolerated. ?I get him over to a diabetic clinic at [facility] for a treatment.  Ordered:  Post-Op follow-up visit [id number] PC, Fracture of scapula  Toe dislocation, [facility], 12/19/18 8:50:00 CST  ?  Orders:  Clinic Follow-up PRN, 12/19/18 8:50:00 CST, Future Order, [facility]   Problem List/Past Medical History  Ongoing  Anxiety state(  Confirmed  )  Constipation(  Confirmed   )  Depressive disorder(  Confirmed  )  Diabetes(  Confirmed  )  DKA, type 1  Heroin addiction  HTN (hypertension)(  Confirmed  )  Hyperlipemia(  Confirmed  )  Idiopathic scoliosis(  Confirmed  )  IVDU - Intravenous drug user  Methamphetamine abuse  Pure hypercholesterolemia(  Confirmed  )  Tobacco use disorder(  Confirmed  )  Tobacco user  Tobacco user  Historical  Accident in home(  Confirmed  )  Asthma  Back contusion(  Confirmed  )  Cannabis abuse(  Confirmed  )  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(  Confirmed  )  DKA, type 1  Fall from ladder(  Confirmed  )  Procedure/Surgical History  Pinning Percutaneous Foot (Left) (11/24/2018)  Clavicle fracture  collar bone sx   Medications  beclomethasone 0.042 mg/inh nasal spray, 2 puff(s), Nasal, BID  Carisoprodol 350 Mg Tab, 350 mg= 1 tab(s), Oral, qPM  CYCLOBENZAPR TAB 10MG  DICLOFENAC TAB 75MG DR, 75 mg= 1 tab(s), Oral, BID  ESCITALOPRAM TAB 10MG, 10 mg= 1 tab(s), Oral, Daily  GLYBURIDE TAB 5MG  insulin glargine 100 U/mL (per 1 unit), 15units, BID  lisinopril 30 mg oral tablet, 30 mg= 1 tab(s), Oral, Daily, 11 refills  Norco 5 mg-325 mg oral tablet, 1 tab(s), Oral, q6hr  NOVOLOG /ML, See Instructions  NOVOLOG MIX INJ 70/30  OXYCOD/APAP TAB 10-325MG  ProAir HFA 90 mcg/inh inhalation aerosol with adapter, 2 puff(s), INH, q4hr, PRN  traZODONE 150 mg oral tab ( Desyrel ), 150 mg= 1 tab(s), Oral, Once a day (at bedtime)  Allergies  penicillins  sulfa drugs  Social History  Alcohol - Denies Alcohol Use, 06/01/2014  Past, 12/19/2018  Past, 03/05/2018  Employment/School  Unemployed, 10/11/2017  Part time, 08/19/2016  Exercise  Home/Environment  Lives with Father, Mother. Living situation: Home/Independent. Home equipment: Glucose monitoring. Alcohol abuse in household: No. Substance abuse in household: No. Smoker in household: No. Injuries/Abuse/Neglect in household: No. Feels unsafe at home: No. Safe place to go:  Yes. Family/Friends available for support: Yes. Concern for family members at home: No. Major illness in household: No. Financial concerns: No., 08/04/2016  Nutrition/Health  Regular, Wants to lose weight: No. Sleeping concerns: Yes. Feels highly stressed: Yes., 08/04/2016  Substance Abuse - Denies Substance Abuse, 06/01/2014  Past, Heroin, Methamphetamines, Several times per day, IV drug use: Yes., 03/14/2018  Past, Amphetamines, Cocaine, Heroin, Started age 17 Years. Stopped age 36 Years. IV drug use: Yes. Drug use interferes with work/home: Yes., 03/05/2018  Past, Marijuana, 1-2 times per week, 08/04/2016  Past, Cocaine, Marijuana, 06/02/2016  Tobacco - High Risk, 12/28/2013  Current every day smoker, Cigarettes, Yes, 10 per day., 12/19/2018  Current every day smoker, Cigarettes, No, 1 per day. Started age 17 Years. Stopped age 38 Years. Previous treatment: None. Ready to change: No. Household tobacco concerns: No., 11/23/2018  Current every day smoker, Cigarettes, 10 per day. Started age 16.0 Years. Ready to change: No., 08/04/2016  Family History  Diabetes mellitus type 2: Mother and Father.  Primary malignant neoplasm of prostate: Father.  Seizure: Mother.  Immunizations  Vaccine Date Status   tetanus/diphtheria/pertussis, acel(Tdap) 11/22/2018 Given   pneumococcal vacc 02/25/2014 Recorded   influenza virus vaccine, inactivated 02/25/2014 Recorded   pneumococcal 23-polyvalent vaccine 12/30/2013 Given   influenza virus vaccine, inactivated 12/30/2013 Given   Health Maintenance  Health Maintenance  ???Pending?(in the next year)  ??? ??OverDue  ??? ? ? ?Diabetes Maintenance-Eye Exam due??09/15/17??and every 1??year(s)  ??? ? ? ?Blood Pressure Screening due??01/05/18??and every 1??year(s)  ??? ? ? ?Hypertension Management-Blood Pressure due??01/05/18??and every 1??year(s)  ??? ? ? ?ADL Screening due??10/17/18??and every 1??year(s)  ??? ??Due?  ??? ? ? ?Alcohol Misuse Screening due??12/19/18??and every  1??year(s)  ??? ? ? ?Coronary Artery Disease Maintenance-Antiplatelet Agent Prescribed due??12/19/18??and every?  ??? ? ? ?Coronary Artery Disease Maintenance-Lipid Lowering Therapy due??12/19/18??and every?  ??? ? ? ?Diabetes Maintenance-Foot Exam due??12/19/18??and every?  ??? ? ? ?Hypertension Management-Education due??12/19/18??and every 1??year(s)  ??? ? ? ?Influenza Vaccine due??12/19/18??and every?  ??? ??Due In Future?  ??? ? ? ?Smoking Cessation not due until??03/14/19??and every 1??year(s)  ??? ? ? ?Diabetes Maintenance-Fasting Lipid Profile not due until??04/16/19??and every 1??year(s)  ??? ? ? ?Diabetes Maintenance-Microalbumin not due until??04/16/19??and every 1??year(s)  ??? ? ? ?Diabetes Maintenance-Urine Dipstick not due until??11/22/19??and every 1??year(s)  ??? ? ? ?Body Mass Index Check not due until??11/23/19??and every 1??year(s)  ??? ? ? ?Diabetes Maintenance-HgbA1c not due until??11/23/19??and every 1??year(s)  ??? ? ? ?Hypertension Management-BMP not due until??11/28/19??and every 1??year(s)  ??? ? ? ?Diabetes Maintenance-Serum Creatinine not due until??11/28/19??and every 1??year(s)  ???Satisfied?(in the past 1 year)  ??? ??Satisfied?  ??? ? ? ?Blood Pressure Screening on??12/19/18.??Satisfied by Maribeth Jovel L. L.  ??? ? ? ?Body Mass Index Check on??12/19/18.??Satisfied by Maribeth Jovel L. L.  ??? ? ? ?Depression Screening on??12/19/18.??Satisfied by Maribeth Jovel L. L.  ??? ? ? ?Diabetes Maintenance-Serum Creatinine on??11/28/18.??Satisfied by Duc Hunter.  ??? ? ? ?Diabetes Screening on??11/28/18.??Satisfied by Duc Hunter.  ??? ? ? ?Hypertension Management-Blood Pressure on??12/19/18.??Satisfied by Maribeth Jovel L. L.  ??? ? ? ?Influenza Vaccine on??12/19/18.??Satisfied by Maribeth Jovel L. L.  ??? ? ? ?Lipid Screening on??04/16/18.??Satisfied by Amanda Swain MT  ??? ? ? ?Obesity Screening on??12/19/18.??Satisfied by Maribeth Jovel L. L.  ??? ? ? ?Smoking Cessation  on??03/14/18.??Satisfied by Jc BLOOM, [place]  ??? ? ? ?Tetanus Vaccine on??11/22/18.??Satisfied by Magda BLOOM, Karen SRINIVASAN  ?  ?  Diagnostic Results  Foot radiographs 12/19/2018:?3 views of the foot show a reduced?great toe?and some interval healing of her second toe fracture.  Scapular radiographs 12/19/2018: 2 views of the scapula show interval healing

## 2022-05-03 NOTE — HISTORICAL [facility] CERNER
This is a historical note converted from [employer]. Formatting and pictures may have been removed.  Please reference [employer] for original formatting and attached multimedia. Admit and Discharge Dates  Admit Date: 11/30/2021  Discharge Date: 12/02/2021  Physicians  Attending Physician - Nohemy ARNOLD, AJAY Branham  Admitting Physician - Nohemy ARNOLD, AJAY Branham  Primary Care Physician - Kumar ARNOLD, Chad MCGEE  Primary Care Physician - Neisha ARNOLD, Adam  Discharge Diagnosis  Anxiety?F41.9  Asthma?J45.909  Chronic chest pain?R07.9  DKA (diabetic ketoacidosis)?E11.10  Hyperglycemia?[id number]  Surgical Procedures  No procedures recorded for this visit.  Immunizations  No immunizations recorded for this visit.  Admission Information  Mr. Jose Francisco Romero is a 41-year-old male with a PMH of DM 1, asthma, chronic lower back pain, hx V-tach (patient mentions?hx massive MI 2/2 drugs in past but this isnt seen in chart; just hospitalization for V-tach in setting of DKA 4/2016)?who presents with symptoms of fatigue, myalgias for about 1 day. ?Says today around time of coming to ED he also had some chest pain, SOB that he usually gets when he goes into DKA. Chest pain has resolved. Also has had a slight cough since coming to ED. Denies any fever/chills, persistent cough, runny nose, sore throat, nausea, abdominal pain, diarrhea, numbness, weakness, dysuria/urgency/frequency. ?He has type 1 diabetes and takes long-acting and mealtime insulin at home and does not skip doses other than yesterday evening when he skipped 1 dose. ?He has frequent admissions for DKA, about once every 5 months or so. ?Glucose usually runs 200-250 at home.  ?  ?  Hospital Course  He was admitted to the ICU with DKA, and was placed on the insulin drip. There was no obvious inciting factor for his DKA, other than an upper respiratory viral infection that he seemed to be developing (soar throat, stuffy nose). He also mentioned some chest pain on  initial ED presentation which he ussually gets when he goes into DKA, but troponins and EKG were unremarkable.?His gap closed and insulin gtt discontinued that same night of admission. His glucose the following day was well-controlled on his home insulin regimen of Lantus 45U nightly and Lispro 12U TIDWM (at home was taking sliding scale Lispro 12-16U with meals), which leads me to suspect either medication noncompliance at home or poor diet. He insists he takes his insulin regularly which I believe, so encouraged him to work on his diet. Discharged without any changes to home insulin regimen. Referred to cardiology for his chronic chest pain and outpatient lexiscan was ordered. He also mentions chronic?low back pain with cramping and radiculopathy down legs and also mentions new hand numbness/swelling. Ordering outpatient?XRs and MRI of lumbar and cervical spine. His hand swelling may have just been from the IV fluids he was given. Also switched his buspirone to venlafaxine for his anxiety and neuropathy/radiculopathy.  Time Spent on discharge  > 45 minutes  Objective  Physical Exam  General - Appears comfortable, appropriately conversive  Mental Status - alert and oriented x 3, speaking in logical, relevant sentences  HEENT - no rhinorrhea  Cardiac - RRR, no murmurs, rubs, or gallops; no edema in LE  Respiratory - breathing comfortably; clear to auscultation bilaterally  Abdominal - nondistended,?soft, nontender to palpation  Extremities - LE, UE, and joints are nonerythematous and non-swollen  Skin - no rashes or bruises seen on skin  Patient Discharge Condition  stable  Discharge Disposition  home   Discharge Medication Reconciliation  Prescribed  lisinopril (lisinopril 5 mg oral tablet)?5 mg, Oral, Daily  venlafaxine (venlafaxine 75 mg oral capsule, extended release)?75 mg, Oral, Daily  Continue  Misc Prescription (Diabetic syringe)?See Instructions  Misc Prescription (Glucometer)?See Instructions  Misc  Prescription (Insulin needles)?See Instructions  Misc Prescription (Insulin syringes)?See Instructions  Misc Prescription (Lancets and test strips)?See Instructions  insulin aspart (NovoLOG 100 units/mL injectable solution)?12 units, Subcutaneous, TIDAC  insulin aspart (NovoLOG 100 units/mL injectable solution)?8 units, Subcutaneous, TIDAC  insulin glargine (insulin glargine 100 units/mL subcutaneous solution)?45 units, Subcutaneous, Once a day (at bedtime)  lansoprazole (lansoprazole 30 mg oral DR capsule (do not sub.))?30 mg, Oral, Daily  traZODone (traZODONE 50 mg oral tablet ( Desyrel ))?50 mg, Oral, Once a day (at bedtime)  Discontinue  busPIRone (busPIRone 10 mg oral tablet)?10 mg, Oral, BID  escitalopram (escitalopram 20 mg oral tablet)?20 mg, Oral, Daily  losartan (losartan 25 mg oral tablet)?25 mg, Oral, Daily  Education and Orders Provided  Diabetes Mellitus and Nutrition, Adult  Diabetes Mellitus and Foot Care  Blood Glucose Monitoring, Adult  Discharge - 12/02/21 18:23:00 CST, Home?  Follow up  Report to Emergency Department if symptoms return or worsen  Follow up with Dr. Heath in [facility], Monday 12/6/21  Referred patient to [facility]  Car Seat Challenge  No Qualifying Data      Patient seen and examined,?reviewed with the resident,?agree with?assessment?and?plan.

## 2022-05-03 NOTE — HISTORICAL [facility]ER
This is a historical note converted from Eva. Formatting and pictures may have been removed.  Please reference Eva for original formatting and attached multimedia. Chief Complaint  f/u, needs refills on all meds, new glucometer, all DM testing supplies, labs , Urology Referral pending, refused Flu vaccine  History of Present Illness  Mr Romero is a 41yo WM with PMH DM1, HTN, diabetic neuropathy, depression,?history of MI?who comes to clinic today for follow up appt. patient?states he has been?doing pretty well overall however still feels slightly depressed. ?He did?get custody of his kids recently?however and is very happy about that. ?He also?states he recently had his stool stolen?and has not been working for the last 2 weeks.? He states his blood sugars run around 250 with a high into the 300s.? He reports compliance all his medications. ?He denies chest pain, shortness of breath, abdominal pain, hematochezia,?melena,?dizziness, lightheadedness,?dysuria, hematuria.? He also reports?that he has been having an ingrown toenail?that is been bothering him with some pain he is unable to cut it because it is so thick.  Review of Systems  12 point review of systems negative unless otherwise stated in HPI  Physical Exam  Vitals & Measurements  T:?36.7? ?C (Oral)? HR:?71(Peripheral)? RR:?18? BP:?122/77?  HT:?185.42?cm? WT:?90.900?kg? BMI:?26.44?  General: well-nourished, well-developed in no acute distress  HEENT: Atraumatic, normocephalic.  Neck: No JVD or carotid bruits. No lymphadenopathy.  Heart: RRR, no murmurs, gallops, clicks or rubs. S1, S2 present.?  Lungs: CTAB without rales, wheezes or rhonchi.  Abd: Soft, non-tender, non-distended and without guarding. Bowel sounds present.  Extremities: Radial and pedal pulses 2+ bilaterally, no LE edema.  MSK: Moves all extremities purposefully.  Skin: Warm, dry and without rashes.? Ingrown, thick toenail on?right great toe  Assessment/Plan  Diabetes Mellitus,  type 1  -POC A1c 11.6 today. Will order A1c on labs  -Starting Lantus?40 units at bedtime today?and?NovoLog?8 units 3 times daily AC (up from 5u)  -Asked patient to keep?blood sugar log and bring to next visit  ?   Ingrown toenail/onychomycosis  -We will send referral to podiatry today  ?   HTN  -/77 today.  -Continue lisinopril 5 mg for?kidney protection. ?Will order urine protein/creatinine  ?   Diabetic neuropathy  -Has tried Gabapentin in the past with no relief  -Neuropathy should improve with better?glycemic control  ?   Depression/Anxiety  -Has not been taking?duloxetine. ?Starting escitalopram 10 mg?today and?asked patient to?let me know how it is working in?4-6 weeks, there is room to?increase as needed.  -Increased?Buspar to?10mg BID today.  ?   H/o MI  -Pt states 2/2 drug use  -Continue atorvastatin 40 mg daily  -Ordered lipid panel today  ?   Vasectomy?request  -Will refer to?urology?today  ?   Health Maintenance/ Wellness  Pneumococcal vaccine:?UTD  TDAP:?UTD  Influenza vaccine:?refused  Hepatitis Panel:?Ordered  ?  ?  Counseling:  - Patient instructed to limit alcohol use  - Patient counselled on smoking cessation  - Educated on diet (portion control) and exercise (at least 30 minutes per day)  - Relevant educational materials provided  ?  Labs ordered: CMP, CBC, A1c, Vit D, UA, Urine Prot/Creat  Imaging: none  Medications: reconciled, discussed and refills given.  RTC in?1 month.   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  )  DKA, type 1  Fall from ladder(  Confirmed  )  Procedure/Surgical History  Fluoroscopy of Left Toe(s) (11/24/2018)  Pinning Percutaneous Foot (Left) (11/24/2018)  Reposition Left Toe Phalangeal Joint with Internal Fixation Device, Percutaneous Approach (11/24/2018)  Reposition Left Toe Phalanx with Internal Fixation Device, Percutaneous Approach (11/24/2018)  Drainage of Left Pleural Cavity with Drainage Device, Percutaneous Approach (11/23/2018)  Detoxification Services for Substance Abuse Treatment (10/12/2017)  Individual Counseling for Substance Abuse Treatment, Continuing Care (07/11/2016)  Drainage of Spinal Canal, Percutaneous Approach, Diagnostic (05/07/2016)  Insertion of Endotracheal Airway into Trachea, Via Natural or Artificial Opening Endoscopic (05/07/2016)  Respiratory Ventilation, 24-96 Consecutive Hours (05/07/2016)  Insertion of Endotracheal Airway into Trachea, Via Natural or Artificial Opening (04/25/2016)  Insertion of Infusion Device into Superior Vena Cava, Percutaneous Approach (04/25/2016)  Respiratory Ventilation, Less than 24 Consecutive Hours (04/25/2016)  Restoration of Cardiac Rhythm, Single (04/25/2016)  Ultrasonography of Superior Vena Cava, Guidance (04/25/2016)  Clavicle fracture  collar bone sx   Medications  atorvastatin 40 mg oral tablet, 40 mg= 1 tab(s), Oral, Daily, 6 refills  BuSpar 5 mg oral tablet, 5 mg= 1 tab(s), Oral, BID, 6 refills  Diabetic syringe, See Instructions, 3 refills  DULoxetine 60 mg oral delayed release capsule, 60 mg= 1 cap(s), Oral, Daily,? ?Not taking  insulin glargine 100 units/mL subcutaneous solution, 18 units, Subcutaneous, BID, 11 refills  Insulin needles, See Instructions, 11 refills  Insulin syringes, See Instructions, 11 refills  Lancets and test strips, See Instructions, 11  refills  lisinopril 5 mg oral tablet, 5 mg= 1 tab(s), Oral, Daily, 6 refills,? ?Still taking, not as prescribed  NovoLOG 100 units/mL injectable solution, 5 units, Subcutaneous, TIDAC, 6 refills  Allergies  penicillins  sulfa drugs  Social History  Abuse/Neglect  No, No, Yes, 07/23/2020  Alcohol - Denies Alcohol Use, 06/01/2014  Current, daiquiri, 1-2 times per month, Alcohol use interferes with work or home: No. Others hurt by drinking: No. Household alcohol concerns: No., 11/09/2020  Employment/School  Unemployed, 10/11/2017  Part time, 08/19/2016  Exercise  Exercise duration: 120. Exercise frequency: 5-6 times/week. Exercise type: Walking., 11/09/2020  Home/Environment  Lives with Alone, Children, Significant other. Living situation: Home/Independent. Glucose monitoring, Single family house, 03/02/2021    Never in , 03/02/2021  Nutrition/Health  Regular, Wants to lose weight: No. Sleeping concerns: Yes. Feels highly stressed: Yes., 08/04/2016  Spiritual/Cultural  Latter-day, Yes, 07/23/2020  Substance Use - Denies Substance Abuse, 06/01/2014  Past, Amphetamines, Cocaine, Heroin, Started age 17 Years. Stopped age 36 Years. IV drug use: Yes. Drug use interferes with work/home: Yes., 03/05/2018  Tobacco - High Risk, 12/28/2013  Former smoker, quit more than 30 days ago, Cigarettes, No, 03/02/2021  Family History  Diabetes mellitus type 2: Mother and Father.  Primary malignant neoplasm of prostate: Father.  Seizure: Mother.  Immunizations  Vaccine Date Status   tetanus/diphtheria/pertussis, acel(Tdap) 11/22/2018 Given   pneumococcal vacc 02/25/2014 Recorded   influenza virus vaccine, inactivated 02/25/2014 Recorded   pneumococcal 23-polyvalent vaccine 12/30/2013 Given   influenza virus vaccine, inactivated 12/30/2013 Given   tetanus/diphtheria/pertussis, acel(Tdap) 02/01/2011 Recorded   influenza virus vaccine, inactivated 02/01/2011 Recorded   hepatitis A-hepatitis B vaccine 02/01/2011 Recorded    Health Maintenance  Health Maintenance  ???Pending?(in the next year)  ??? ??OverDue  ??? ? ? ?Smoking Cessation due??01/01/21??and every 1??year(s)  ??? ??Due?  ??? ? ? ?Alcohol Misuse Screening due??01/02/21??and every 1??year(s)  ??? ??Due In Future?  ??? ? ? ?Diabetes Maintenance-Fasting Lipid Profile not due until??07/23/21??and every 1??year(s)  ??? ? ? ?Hypertension Management-Education not due until??07/23/21??and every 1??year(s)  ??? ? ? ?Diabetes Maintenance-HgbA1c not due until??10/27/21??and every 1??year(s)  ??? ? ? ?Hypertension Management-BMP not due until??10/28/21??and every 1??year(s)  ??? ? ? ?Diabetes Maintenance-Serum Creatinine not due until??10/28/21??and every 1??year(s)  ??? ? ? ?Diabetes Maintenance-Foot Exam not due until??11/09/21??and every 1??year(s)  ??? ? ? ?Obesity Screening not due until??01/01/22??and every 1??year(s)  ???Satisfied?(in the past 1 year)  ??? ??Satisfied?  ??? ? ? ?ADL Screening on??03/02/21.??Satisfied by Jessica Vincent LPN  ??? ? ? ?Alcohol Misuse Screening on??07/23/20.??Satisfied by Chad Heath MD  ??? ? ? ?Blood Pressure Screening on??03/02/21.??Satisfied by Jessica Vincent LPN  ??? ? ? ?Body Mass Index Check on??03/02/21.??Satisfied by Jessica Vincent LPN  ??? ? ? ?Coronary Artery Disease Maintenance-Lipid Lowering Therapy on??11/09/20.??Satisfied by Chad Heath MD  ??? ? ? ?Depression Screening on??03/02/21.??Satisfied by Jessica Vincent LPN  ??? ? ? ?Diabetes Maintenance-Foot Exam on??11/09/20.??Satisfied by Kumar ARNOLD, Chad MCGEE  ??? ? ? ?Diabetes Maintenance-Serum Creatinine on??10/28/20.??Satisfied by Anita Wolf  ??? ? ? ?Diabetes Maintenance-HgbA1c on??10/26/20.??Satisfied by Mandie Lopez  ??? ? ? ?Diabetes Maintenance-Eye Exam on??07/25/20.??Satisfied by Manny ARNOLD, Alex DELGADO  ??? ? ? ?Diabetes Maintenance-Fasting Lipid Profile on??07/23/20.??Satisfied by Shanthi Goddard  ??? ? ?  ?Diabetes Screening on??10/28/20.??Satisfied by Anita Wolf  ??? ? ? ?Hypertension Management-Blood Pressure on??03/02/21.??Satisfied by Jessica Vincent LPN  ??? ? ? ?Hypertension Management-BMP on??10/28/20.??Satisfied by Anita Wolf  ??? ? ? ?Hypertension Management-Education on??07/23/20.??Satisfied by Chad Heath MD  ??? ? ? ?Influenza Vaccine on??03/02/21.??Satisfied by Jessica Vincent LPN  ??? ? ? ?Lipid Screening on??07/23/20.??Satisfied by Shanthi Goddard  ??? ? ? ?Obesity Screening on??03/02/21.??Satisfied by Jessica Vincent LPN  ??? ? ? ?Smoking Cessation on??07/23/20.??Satisfied by Chad Heath MD  ?      Patient seen and discussed with medicine resident in clinic  Unfortunately his blood sugar levels continue?to be poorly controlled  We will change his insulin regimen and do a quick follow-up in one month  Agree with referral to podiatry  Agree with above assessment and plan  ?  ?  ?

## 2022-08-19 ENCOUNTER — HOSPITAL ENCOUNTER (EMERGENCY)
Facility: HOSPITAL | Age: 42
Discharge: HOME | End: 2022-08-20
Attending: FAMILY MEDICINE
Payer: MEDICAID

## 2022-08-19 DIAGNOSIS — R60.0 BILATERAL LOWER EXTREMITY EDEMA: Primary | ICD-10-CM

## 2022-08-19 DIAGNOSIS — R06.02 SOB (SHORTNESS OF BREATH): ICD-10-CM

## 2022-08-19 DIAGNOSIS — E10.65 UNCONTROLLED TYPE 1 DIABETES MELLITUS WITH HYPERGLYCEMIA: ICD-10-CM

## 2022-08-19 DIAGNOSIS — R74.01 TRANSAMINITIS: ICD-10-CM

## 2022-08-19 PROCEDURE — 84443 ASSAY THYROID STIM HORMONE: CPT | Performed by: PHYSICIAN ASSISTANT

## 2022-08-19 PROCEDURE — 36415 COLL VENOUS BLD VENIPUNCTURE: CPT | Performed by: PHYSICIAN ASSISTANT

## 2022-08-19 PROCEDURE — 82550 ASSAY OF CK (CPK): CPT | Performed by: PHYSICIAN ASSISTANT

## 2022-08-19 PROCEDURE — 82553 CREATINE MB FRACTION: CPT | Performed by: PHYSICIAN ASSISTANT

## 2022-08-19 PROCEDURE — 83735 ASSAY OF MAGNESIUM: CPT | Performed by: PHYSICIAN ASSISTANT

## 2022-08-19 PROCEDURE — 84484 ASSAY OF TROPONIN QUANT: CPT | Performed by: PHYSICIAN ASSISTANT

## 2022-08-19 PROCEDURE — 83880 ASSAY OF NATRIURETIC PEPTIDE: CPT | Performed by: PHYSICIAN ASSISTANT

## 2022-08-19 PROCEDURE — 80053 COMPREHEN METABOLIC PANEL: CPT | Performed by: PHYSICIAN ASSISTANT

## 2022-08-19 PROCEDURE — 93005 ELECTROCARDIOGRAM TRACING: CPT

## 2022-08-19 PROCEDURE — 94761 N-INVAS EAR/PLS OXIMETRY MLT: CPT

## 2022-08-19 PROCEDURE — 99285 EMERGENCY DEPT VISIT HI MDM: CPT | Mod: 25

## 2022-08-19 PROCEDURE — 85025 COMPLETE CBC W/AUTO DIFF WBC: CPT | Performed by: PHYSICIAN ASSISTANT

## 2022-08-19 PROCEDURE — 85379 FIBRIN DEGRADATION QUANT: CPT | Performed by: PHYSICIAN ASSISTANT

## 2022-08-19 PROCEDURE — 87636 SARSCOV2 & INF A&B AMP PRB: CPT | Performed by: PHYSICIAN ASSISTANT

## 2022-08-20 VITALS
TEMPERATURE: 98 F | DIASTOLIC BLOOD PRESSURE: 78 MMHG | BODY MASS INDEX: 24.49 KG/M2 | HEART RATE: 87 BPM | OXYGEN SATURATION: 99 % | RESPIRATION RATE: 18 BRPM | SYSTOLIC BLOOD PRESSURE: 132 MMHG | WEIGHT: 184.75 LBS | HEIGHT: 73 IN

## 2022-08-20 LAB
ALBUMIN SERPL-MCNC: 3 GM/DL (ref 3.5–5)
ALBUMIN/GLOB SERPL: 1 RATIO (ref 1.1–2)
ALP SERPL-CCNC: 299 UNIT/L (ref 40–150)
ALT SERPL-CCNC: 177 UNIT/L (ref 0–55)
AST SERPL-CCNC: 118 UNIT/L (ref 5–34)
B-OH-BUTYR SERPL-MCNC: 0.17 MMOL/L
BASOPHILS # BLD AUTO: 0.1 X10(3)/MCL (ref 0–0.2)
BASOPHILS NFR BLD AUTO: 1.5 %
BILIRUBIN DIRECT+TOT PNL SERPL-MCNC: 0.3 MG/DL
BNP BLD-MCNC: 32.2 PG/ML
BUN SERPL-MCNC: 28.6 MG/DL (ref 8.9–20.6)
CALCIUM SERPL-MCNC: 9.6 MG/DL (ref 8.4–10.2)
CHLORIDE SERPL-SCNC: 96 MMOL/L (ref 98–107)
CK MB SERPL-MCNC: 2.2 NG/ML
CK SERPL-CCNC: 92 U/L (ref 30–200)
CO2 SERPL-SCNC: 24 MMOL/L (ref 22–29)
CREAT SERPL-MCNC: 1.43 MG/DL (ref 0.73–1.18)
D DIMER PPP IA.FEU-MCNC: 0.52 UG/ML FEU (ref 0–0.5)
EOSINOPHIL # BLD AUTO: 0.53 X10(3)/MCL (ref 0–0.9)
EOSINOPHIL NFR BLD AUTO: 8.1 %
ERYTHROCYTE [DISTWIDTH] IN BLOOD BY AUTOMATED COUNT: 13.1 % (ref 11.5–17)
FLUAV AG UPPER RESP QL IA.RAPID: NOT DETECTED
FLUBV AG UPPER RESP QL IA.RAPID: NOT DETECTED
GFR SERPLBLD CREATININE-BSD FMLA CKD-EPI: >60 MLS/MIN/1.73/M2
GLOBULIN SER-MCNC: 3.1 GM/DL (ref 2.4–3.5)
GLUCOSE SERPL-MCNC: 398 MG/DL (ref 74–100)
HAV IGM SERPL QL IA: NONREACTIVE
HBV CORE IGM SERPL QL IA: NONREACTIVE
HBV SURFACE AG SERPL QL IA: NONREACTIVE
HCT VFR BLD AUTO: 37 % (ref 42–52)
HCV AB SERPL QL IA: NONREACTIVE
HGB BLD-MCNC: 12.5 GM/DL (ref 14–18)
IMM GRANULOCYTES # BLD AUTO: 0.02 X10(3)/MCL (ref 0–0.04)
IMM GRANULOCYTES NFR BLD AUTO: 0.3 %
LYMPHOCYTES # BLD AUTO: 2.82 X10(3)/MCL (ref 0.6–4.6)
LYMPHOCYTES NFR BLD AUTO: 42.9 %
MAGNESIUM SERPL-MCNC: 1.7 MG/DL (ref 1.6–2.6)
MCH RBC QN AUTO: 30.4 PG (ref 27–31)
MCHC RBC AUTO-ENTMCNC: 33.8 MG/DL (ref 33–36)
MCV RBC AUTO: 90 FL (ref 80–94)
MONOCYTES # BLD AUTO: 0.4 X10(3)/MCL (ref 0.1–1.3)
MONOCYTES NFR BLD AUTO: 6.1 %
NEUTROPHILS # BLD AUTO: 2.7 X10(3)/MCL (ref 2.1–9.2)
NEUTROPHILS NFR BLD AUTO: 41.1 %
NRBC BLD AUTO-RTO: 0 %
PLATELET # BLD AUTO: 266 X10(3)/MCL (ref 130–400)
PMV BLD AUTO: 10.1 FL (ref 7.4–10.4)
POCT GLUCOSE: 145 MG/DL (ref 70–110)
POTASSIUM SERPL-SCNC: 4.1 MMOL/L (ref 3.5–5.1)
PROT SERPL-MCNC: 6.1 GM/DL (ref 6.4–8.3)
RBC # BLD AUTO: 4.11 X10(6)/MCL (ref 4.7–6.1)
SARS-COV-2 RNA RESP QL NAA+PROBE: NOT DETECTED
SODIUM SERPL-SCNC: 135 MMOL/L (ref 136–145)
TROPONIN I SERPL-MCNC: <0.01 NG/ML (ref 0–0.04)
TSH SERPL-ACNC: 1.89 UIU/ML (ref 0.35–4.94)
WBC # SPEC AUTO: 6.6 X10(3)/MCL (ref 4.5–11.5)

## 2022-08-20 PROCEDURE — 63600175 PHARM REV CODE 636 W HCPCS: Performed by: PHYSICIAN ASSISTANT

## 2022-08-20 PROCEDURE — 96374 THER/PROPH/DIAG INJ IV PUSH: CPT | Mod: 59

## 2022-08-20 PROCEDURE — 80074 ACUTE HEPATITIS PANEL: CPT | Performed by: PHYSICIAN ASSISTANT

## 2022-08-20 PROCEDURE — 82962 GLUCOSE BLOOD TEST: CPT

## 2022-08-20 PROCEDURE — 36415 COLL VENOUS BLD VENIPUNCTURE: CPT | Performed by: PHYSICIAN ASSISTANT

## 2022-08-20 PROCEDURE — 94761 N-INVAS EAR/PLS OXIMETRY MLT: CPT

## 2022-08-20 PROCEDURE — 96361 HYDRATE IV INFUSION ADD-ON: CPT

## 2022-08-20 PROCEDURE — 25500020 PHARM REV CODE 255: Performed by: PHYSICIAN ASSISTANT

## 2022-08-20 PROCEDURE — 82010 KETONE BODYS QUAN: CPT | Performed by: PHYSICIAN ASSISTANT

## 2022-08-20 RX ORDER — GABAPENTIN 100 MG/1
100 CAPSULE ORAL 3 TIMES DAILY
Qty: 90 CAPSULE | Refills: 0 | OUTPATIENT
Start: 2022-08-20 | End: 2023-06-04

## 2022-08-20 RX ORDER — LISINOPRIL 30 MG/1
30 TABLET ORAL DAILY
Qty: 30 TABLET | Refills: 0 | Status: SHIPPED | OUTPATIENT
Start: 2022-08-20 | End: 2023-07-26 | Stop reason: ALTCHOICE

## 2022-08-20 RX ADMIN — HUMAN INSULIN 4 UNITS: 100 INJECTION, SOLUTION SUBCUTANEOUS at 01:08

## 2022-08-20 RX ADMIN — SODIUM CHLORIDE, POTASSIUM CHLORIDE, SODIUM LACTATE AND CALCIUM CHLORIDE 1000 ML: 600; 310; 30; 20 INJECTION, SOLUTION INTRAVENOUS at 01:08

## 2022-08-20 RX ADMIN — IOPAMIDOL 100 ML: 755 INJECTION, SOLUTION INTRAVENOUS at 01:08

## 2022-08-20 NOTE — ED PROVIDER NOTES
Encounter Date: 8/19/2022       History     Chief Complaint   Patient presents with    Leg Swelling     SOB, BLE edema.      42 yo M w/ PMHx significant for CAD, MI, HTN, T1DM, smoking & distant hx of IVDU presents to ED c/o 2 day hx of edema to b/l feet & RICO. Patient reports SOB always improve w/ rest. Does reports CP intermittently w/ episodes of RICO, but this also improves w/ rest. Reports returned from road trip to [place] 1 week ago. Denies palpitations, diaphoresis, syncope, orthopnea, cough, hemoptysis, calf pain, unilateral LE swelling, wheezing, abdominal distension, jaundice. Reports compliance w/ DM meds, but reports he recently ran out of HTN meds. VSS on arrival w/ NAD.        Review of patient's allergies indicates:   Allergen Reactions    Penicillins     Sulfa (sulfonamide antibiotics)      Past Medical History:   Diagnosis Date    Diabetes mellitus type I     Hypertension      No past surgical history on file.  No family history on file.     Review of Systems   Constitutional: Negative for chills, diaphoresis, fatigue and fever.   HENT: Negative for congestion, rhinorrhea and sore throat.    Eyes: Negative for visual disturbance.   Respiratory: Positive for shortness of breath. Negative for cough, wheezing and stridor.    Cardiovascular: Positive for chest pain and leg swelling. Negative for palpitations.   Gastrointestinal: Negative for abdominal distention, abdominal pain, blood in stool, constipation, diarrhea, nausea and vomiting.   Endocrine: Negative for polydipsia, polyphagia and polyuria.   Genitourinary: Negative for dysuria, hematuria and scrotal swelling.   Musculoskeletal: Negative for arthralgias, back pain, joint swelling, myalgias and neck pain.   Skin: Negative for color change, pallor, rash and wound.   Allergic/Immunologic: Negative for immunocompromised state.   Neurological: Negative for dizziness, syncope, weakness, light-headedness, numbness and headaches.   Hematological:  Does not bruise/bleed easily.   Psychiatric/Behavioral: Negative for confusion.   All other systems reviewed and are negative.      Physical Exam     Initial Vitals [22 2258]   BP Pulse Resp Temp SpO2   (!) 156/90 102 20 97.5 °F (36.4 °C) 99 %      MAP       --         Physical Exam    Nursing note and vitals reviewed.  Constitutional: He appears well-developed and well-nourished. He is not diaphoretic. No distress.   HENT:   Head: Normocephalic and atraumatic.   Eyes: Conjunctivae are normal. Pupils are equal, round, and reactive to light.   Neck: Neck supple. No thyromegaly present. No tracheal deviation present. No JVD present.   Normal range of motion.  Cardiovascular: Normal rate, regular rhythm, normal heart sounds and intact distal pulses. Exam reveals no gallop and no friction rub.    No murmur heard.  Pulmonary/Chest: Breath sounds normal. No respiratory distress. He has no wheezes. He has no rhonchi. He has no rales.   Abdominal: Abdomen is soft. Bowel sounds are normal. He exhibits no distension. There is no abdominal tenderness. There is no rebound and no guarding.   Musculoskeletal:         General: No tenderness. Normal range of motion.      Cervical back: Normal range of motion and neck supple.      Right lower le+ Pitting Edema present.      Left lower le+ Pitting Edema present.      Right foot: Normal range of motion and normal capillary refill. Swelling present. No tenderness. Normal pulse.      Left foot: Normal range of motion and normal capillary refill. Swelling present. No tenderness. Normal pulse.      Comments: No calf TTP, negative krish's sign b/l. No unilateral size discrepancy b/w LEs.     Neurological: He is alert and oriented to person, place, and time. No cranial nerve deficit or sensory deficit.   Skin: Skin is warm and dry. Capillary refill takes less than 2 seconds. No rash noted. No erythema. No pallor.   Psychiatric: He has a normal mood and affect.         ED  Course   Procedures  Labs Reviewed   COMPREHENSIVE METABOLIC PANEL - Abnormal; Notable for the following components:       Result Value    Sodium Level 135 (*)     Chloride 96 (*)     Glucose Level 398 (*)     Blood Urea Nitrogen 28.6 (*)     Creatinine 1.43 (*)     Protein Total 6.1 (*)     Albumin Level 3.0 (*)     Albumin/Globulin Ratio 1.0 (*)     Alkaline Phosphatase 299 (*)     Alanine Aminotransferase 177 (*)     Aspartate Aminotransferase 118 (*)     All other components within normal limits   CBC WITH DIFFERENTIAL - Abnormal; Notable for the following components:    RBC 4.11 (*)     Hgb 12.5 (*)     Hct 37.0 (*)     All other components within normal limits   D DIMER, QUANTITATIVE - Abnormal; Notable for the following components:    D-Dimer 0.52 (*)     All other components within normal limits   B-TYPE NATRIURETIC PEPTIDE - Normal   CK - Normal   CK-MB - Normal   MAGNESIUM - Normal   TROPONIN I - Normal   TSH - Normal   COVID/FLU A&B PCR - Normal   BETA - HYDROXYBUTYRATE, SERUM - Normal   HEPATITIS PANEL, ACUTE - Normal   CBC W/ AUTO DIFFERENTIAL    Narrative:     The following orders were created for panel order CBC Auto Differential.  Procedure                               Abnormality         Status                     ---------                               -----------         ------                     CBC with Differential[[id number]]        Abnormal            Final result                 Please view results for these tests on the individual orders.   EXTRA TUBES    Narrative:     The following orders were created for panel order EXTRA TUBES.  Procedure                               Abnormality         Status                     ---------                               -----------         ------                     Light Blue Top Hold[[id number]]                              In process                 Red Top Hold[[id number]]                                     In process                   Please view  results for these tests on the individual orders.   LIGHT BLUE TOP HOLD   RED TOP HOLD   PATHOLOGIST INTERPRETATION     EKG Readings: (Independently Interpreted)   Initial Reading: No STEMI. Rhythm: Normal Sinus Rhythm. Heart Rate: 95. Ectopy: No Ectopy. Conduction: Normal. ST Segments: Normal ST Segments. T Waves: Normal. Axis: Normal. Clinical Impression: Normal Sinus Rhythm       Imaging Results          X-Ray Chest PA And Lateral (In process)                CT Abdomen Pelvis With Contrast (Preliminary result)  Result time 08/20/22 01:23:34    Preliminary result by Interface, Rad Results In (08/20/22 01:23:34)                 Narrative:    START OF REPORT:  Technique: CT of the abdomen and pelvis was performed with axial images as well as sagittal and coronal reconstruction images with intravenous contrast.    Comparison: None available.    Clinical History: SOB, BLE edema, lft elevation.    Dosage Information: Automated Exposure Control was utilized.    Findings:  Thorax:  Lungs: The visualized lung bases appear unremarkable.  Pleura: No effusions or thickening.  Heart: The heart size is within normal limits.  Abdomen:  Abdominal Wall: There is mild diffuse stranding of the subcutaneous fat suggesting an element of anasarca edema of uncertain etiology.  Liver: There is pronounced enlargement of the liver. The liver otherwise appears unremarkable.  Biliary System: No intrahepatic or extrahepatic biliary duct dilatation is seen.  Gallbladder: The gallbladder appears unremarkable.  Pancreas: The pancreas appears unremarkable.  Spleen: The spleen appears unremarkable.  Adrenals: The adrenal glands appear unremarkable.  Kidneys: The kidneys appear unremarkable with no stones cysts masses or hydronephrosis.  Aorta: The abdominal aorta appears unremarkable.  IVC: Unremarkable.  Bowel:  Esophagus: The visualized esophagus appears unremarkable.  Stomach: The stomach appears unremarkable.  Duodenum: Unremarkable appearing  duodenum.  Small Bowel: The small bowel appears unremarkable.  Colon: Nondistended.  Appendix: The appendix appears unremarkable (series 2, images 64-67) and is partially seen on âImage 49, Series 6â âImage 59, Series 6â.  Peritoneum: No intraperitoneal free air or ascites is seen.    Pelvis:  Bladder: The bladder is nondistended however the bladder wall appears thickened after considering state of nondistension.  Male:  Prostate gland: The prostate gland appears unremarkable.    Bony structures: The bones are mildly osteopenic. There is mild levoscoliosis of the lumbar spine.  Dorsal Spine: The bones are mildly osteopenic.      Impression:  1. There is mild diffuse stranding of the subcutaneous fat suggesting an element of anasarca edema of uncertain etiology. Correlate with clinical and laboratory findings.  2. There is pronounced enlargement of the liver. The liver otherwise appears unremarkable.  3. The bladder is nondistended however the bladder wall appears thickened after considering state of nondistension which could reflect an element of cystitis.  4. No acute intraabdominal or pelvic solid organ or bowel pathology identified. Details and other findings as discussed above.                      Preliminary result by Abdi Aviles MD (08/20/22 01:23:34)                 Narrative:    START OF REPORT:  Technique: CT of the abdomen and pelvis was performed with axial images as well as sagittal and coronal reconstruction images with intravenous contrast.    Comparison: None available.    Clinical History: SOB, BLE edema, lft elevation.    Dosage Information: Automated Exposure Control was utilized.    Findings:  Thorax:  Lungs: The visualized lung bases appear unremarkable.  Pleura: No effusions or thickening.  Heart: The heart size is within normal limits.  Abdomen:  Abdominal Wall: There is mild diffuse stranding of the subcutaneous fat suggesting an element of anasarca edema of uncertain  etiology.  Liver: There is pronounced enlargement of the liver. The liver otherwise appears unremarkable.  Biliary System: No intrahepatic or extrahepatic biliary duct dilatation is seen.  Gallbladder: The gallbladder appears unremarkable.  Pancreas: The pancreas appears unremarkable.  Spleen: The spleen appears unremarkable.  Adrenals: The adrenal glands appear unremarkable.  Kidneys: The kidneys appear unremarkable with no stones cysts masses or hydronephrosis.  Aorta: The abdominal aorta appears unremarkable.  IVC: Unremarkable.  Bowel:  Esophagus: The visualized esophagus appears unremarkable.  Stomach: The stomach appears unremarkable.  Duodenum: Unremarkable appearing duodenum.  Small Bowel: The small bowel appears unremarkable.  Colon: Nondistended.  Appendix: The appendix appears unremarkable (series 2, images 64-67) and is partially seen on âImage 49, Series 6â âImage 59, Series 6â.  Peritoneum: No intraperitoneal free air or ascites is seen.    Pelvis:  Bladder: The bladder is nondistended however the bladder wall appears thickened after considering state of nondistension.  Male:  Prostate gland: The prostate gland appears unremarkable.    Bony structures: The bones are mildly osteopenic. There is mild levoscoliosis of the lumbar spine.  Dorsal Spine: The bones are mildly osteopenic.      Impression:  1. There is mild diffuse stranding of the subcutaneous fat suggesting an element of anasarca edema of uncertain etiology. Correlate with clinical and laboratory findings.  2. There is pronounced enlargement of the liver. The liver otherwise appears unremarkable.  3. The bladder is nondistended however the bladder wall appears thickened after considering state of nondistension which could reflect an element of cystitis.  4. No acute intraabdominal or pelvic solid organ or bowel pathology identified. Details and other findings as discussed above.                                 X-Ray Chest 1 View (In  process)                  Medications   lactated ringers bolus 1,000 mL (0 mLs Intravenous Stopped 8/20/22 0202)   insulin regular injection 4 Units 0.04 mL (4 Units Intravenous Given 8/20/22 0103)   iopamidoL (ISOVUE-370) injection 100 mL (100 mLs Intravenous Given 8/20/22 0127)     Medical Decision Making:   Clinical Tests:   Lab Tests: Ordered and Reviewed  Radiological Study: Ordered and Reviewed  Medical Tests: Ordered and Reviewed  Blood glucose elevated w/ AG of 15. BHOB wnl, VBG pending. Patient given 1 L IVF & 4 U of insulin. D-dimer just barely above upper limit of normal w/ low suspicion for DVT, no indication for US at this time. Cardiac workup unremarkable w/ normal EKG, Mirian & BNP. No evidence of widened mediastinum, effusion, pneumothorax or infectious process on CXR. CMP reveals mild RODOLFO & transaminitis. CT abdomen & hepatitis panel pending.              ED Course as of 08/20/22 0324   Sat Aug 20, 2022   0158 Patient care transitioned to Dr. Kendrick [NB]   0321 Patient currently no acute distress.  Discussed with lab again regarding the patient's hepatitis panel, report that unsure how long it will take as they are not familiar with running this test.  Patient stable for discharge to home.  Bilateral lower extremity swelling extended into the dorsal aspect of the feet, the patient is a poorly controlled diabetic with peripheral neuropathy.  Will refer to endocrine clinic as well as Internal Medicine Clinic.  Will begin on gabapentin.  Stable for discharge to home. [MW]   0323 Hep A IgM: Nonreactive [MW]   0323 Hep B C IgM: Nonreactive [MW]   0323 Hepatitis B Surface Antigen: Nonreactive [MW]   0323 Hepatitis C Ab: Nonreactive  Hepatitis Panel just reported and WNL.   [MW]      ED Course User Index  [MW] Sushil Kendrick MD  [NB] EM Ambrocio             Clinical Impression:   Final diagnoses:  [R06.02] SOB (shortness of breath)  [R06.02] SOB (shortness of breath) - only need lateral  view  [R60.0] Bilateral lower extremity edema (Primary)  [E10.65] Uncontrolled type 1 diabetes mellitus with hyperglycemia  [R74.01] Transaminitis          ED Disposition Condition    Discharge Stable        ED Prescriptions     Medication Sig Dispense Start Date End Date Auth. Provider    lisinopriL (PRINIVIL,ZESTRIL) 30 MG tablet Take 1 tablet (30 mg total) by mouth once daily. 30 tablet 8/20/2022 9/19/2022 EM Ambrocio    gabapentin (NEURONTIN) 100 MG capsule Take 1 capsule (100 mg total) by mouth 3 (three) times daily. 90 capsule 8/20/2022 9/19/2022 EM Ambrocio        Follow-up Information     Follow up With Specialties Details Why Contact Info Additional Information    [facility] Medicine Internal Medicine In 2 weeks  [address] [facility] [address] [phone] Internal Medicine Clinic Entrance #1    [facility] Endocrinology Endocrinology In 2 weeks  [address] [facility] [address] [phone]     [facility] Emergency Dept Emergency Medicine  If symptoms worsen [facility] [address] [phone]            Sushil Kendrick MD  08/20/22 0323       Sushil Kendrick MD  08/20/22 0324

## 2022-08-23 LAB — PATH REV: NORMAL

## 2022-08-31 ENCOUNTER — HOSPITAL ENCOUNTER (EMERGENCY)
Facility: HOSPITAL | Age: 42
Discharge: HOME | End: 2022-08-31
Attending: STUDENT IN AN ORGANIZED HEALTH CARE EDUCATION/TRAINING PROGRAM
Payer: MEDICAID

## 2022-08-31 VITALS
TEMPERATURE: 98 F | WEIGHT: 171.06 LBS | BODY MASS INDEX: 22.67 KG/M2 | OXYGEN SATURATION: 98 % | DIASTOLIC BLOOD PRESSURE: 86 MMHG | HEIGHT: 73 IN | RESPIRATION RATE: 18 BRPM | SYSTOLIC BLOOD PRESSURE: 132 MMHG | HEART RATE: 88 BPM

## 2022-08-31 DIAGNOSIS — R73.9 HYPERGLYCEMIA: ICD-10-CM

## 2022-08-31 DIAGNOSIS — U07.1 COVID-19: Primary | ICD-10-CM

## 2022-08-31 LAB
ALBUMIN SERPL-MCNC: 3.3 GM/DL (ref 3.5–5)
ALBUMIN/GLOB SERPL: 0.9 RATIO (ref 1.1–2)
ALP SERPL-CCNC: 297 UNIT/L (ref 40–150)
ALT SERPL-CCNC: 250 UNIT/L (ref 0–55)
AST SERPL-CCNC: 207 UNIT/L (ref 5–34)
B-OH-BUTYR SERPL-MCNC: 1 MMOL/L
BASOPHILS # BLD AUTO: 0.07 X10(3)/MCL (ref 0–0.2)
BASOPHILS NFR BLD AUTO: 1.8 %
BILIRUBIN DIRECT+TOT PNL SERPL-MCNC: 0.4 MG/DL
BUN SERPL-MCNC: 20.4 MG/DL (ref 8.9–20.6)
CALCIUM SERPL-MCNC: 9.7 MG/DL (ref 8.4–10.2)
CHLORIDE SERPL-SCNC: 94 MMOL/L (ref 98–107)
CK SERPL-CCNC: 77 U/L (ref 30–200)
CO2 SERPL-SCNC: 23 MMOL/L (ref 22–29)
CORRECTED TEMPERATURE (PCO2): 40 MMHG (ref 35–45)
CORRECTED TEMPERATURE (PH): 7.36 (ref 7.35–7.45)
CORRECTED TEMPERATURE (PO2): 71 MMHG (ref 75–100)
CREAT SERPL-MCNC: 1.31 MG/DL (ref 0.73–1.18)
EOSINOPHIL # BLD AUTO: 0.15 X10(3)/MCL (ref 0–0.9)
EOSINOPHIL NFR BLD AUTO: 3.8 %
ERYTHROCYTE [DISTWIDTH] IN BLOOD BY AUTOMATED COUNT: 13.1 % (ref 11.5–17)
FLUAV AG UPPER RESP QL IA.RAPID: NOT DETECTED
FLUBV AG UPPER RESP QL IA.RAPID: NOT DETECTED
GFR SERPLBLD CREATININE-BSD FMLA CKD-EPI: >60 MLS/MIN/1.73/M2
GLOBULIN SER-MCNC: 3.6 GM/DL (ref 2.4–3.5)
GLUCOSE SERPL-MCNC: 498 MG/DL (ref 74–100)
HCO3 UR-SCNC: 22.6 MMOL/L (ref 22–26)
HCT VFR BLD AUTO: 41.8 % (ref 42–52)
HGB BLD-MCNC: 11.5 G/DL (ref 12–18)
HGB BLD-MCNC: 14.3 GM/DL (ref 14–18)
IMM GRANULOCYTES # BLD AUTO: 0.01 X10(3)/MCL (ref 0–0.04)
IMM GRANULOCYTES NFR BLD AUTO: 0.3 %
LYMPHOCYTES # BLD AUTO: 1.57 X10(3)/MCL (ref 0.6–4.6)
LYMPHOCYTES NFR BLD AUTO: 40.2 %
MCH RBC QN AUTO: 29.7 PG (ref 27–31)
MCHC RBC AUTO-ENTMCNC: 34.2 MG/DL (ref 33–36)
MCV RBC AUTO: 86.7 FL (ref 80–94)
MONOCYTES # BLD AUTO: 0.36 X10(3)/MCL (ref 0.1–1.3)
MONOCYTES NFR BLD AUTO: 9.2 %
NEUTROPHILS # BLD AUTO: 1.8 X10(3)/MCL (ref 2.1–9.2)
NEUTROPHILS NFR BLD AUTO: 44.7 %
NRBC BLD AUTO-RTO: 0 %
PCO2 BLDA: 40 MMHG (ref 35–45)
PH SMN: 7.36 [PH] (ref 7.35–7.45)
PLATELET # BLD AUTO: 260 X10(3)/MCL (ref 130–400)
PMV BLD AUTO: 9.8 FL (ref 7.4–10.4)
PO2 BLDA: 71 MMHG (ref 75–100)
POC BASE DEFICIT: -2.7 MMOL/L (ref -2–2)
POC COHB: 4.8 % (ref 0.5–1.5)
POC METHB: 0.4 % (ref 0–1.5)
POC O2HB: 91 % (ref 94–100)
POC PERFORMED BY: ABNORMAL
POC SATURATED O2: 93.3 % (ref 90–100)
POC TEMPERATURE: 37 C
POCT GLUCOSE: 276 MG/DL (ref 70–110)
POCT GLUCOSE: 462 MG/DL (ref 70–110)
POTASSIUM SERPL-SCNC: 4.9 MMOL/L (ref 3.5–5.1)
PROT SERPL-MCNC: 6.9 GM/DL (ref 6.4–8.3)
RBC # BLD AUTO: 4.82 X10(6)/MCL (ref 4.7–6.1)
RSV A 5' UTR RNA NPH QL NAA+PROBE: NOT DETECTED
SARS-COV-2 RNA RESP QL NAA+PROBE: DETECTED
SODIUM SERPL-SCNC: 132 MMOL/L (ref 136–145)
SPECIMEN SOURCE: ABNORMAL
STREP A PCR (OHS): NOT DETECTED
WBC # SPEC AUTO: 3.9 X10(3)/MCL (ref 4.5–11.5)

## 2022-08-31 PROCEDURE — 82962 GLUCOSE BLOOD TEST: CPT | Mod: 59

## 2022-08-31 PROCEDURE — 80053 COMPREHEN METABOLIC PANEL: CPT | Performed by: PHYSICIAN ASSISTANT

## 2022-08-31 PROCEDURE — 99284 EMERGENCY DEPT VISIT MOD MDM: CPT | Mod: 25

## 2022-08-31 PROCEDURE — 87636 SARSCOV2 & INF A&B AMP PRB: CPT | Mod: 59 | Performed by: PHYSICIAN ASSISTANT

## 2022-08-31 PROCEDURE — 36415 COLL VENOUS BLD VENIPUNCTURE: CPT | Performed by: STUDENT IN AN ORGANIZED HEALTH CARE EDUCATION/TRAINING PROGRAM

## 2022-08-31 PROCEDURE — 87631 RESP VIRUS 3-5 TARGETS: CPT | Performed by: PHYSICIAN ASSISTANT

## 2022-08-31 PROCEDURE — 82803 BLOOD GASES ANY COMBINATION: CPT

## 2022-08-31 PROCEDURE — 82550 ASSAY OF CK (CPK): CPT | Performed by: PHYSICIAN ASSISTANT

## 2022-08-31 PROCEDURE — 36415 COLL VENOUS BLD VENIPUNCTURE: CPT | Performed by: PHYSICIAN ASSISTANT

## 2022-08-31 PROCEDURE — 85025 COMPLETE CBC W/AUTO DIFF WBC: CPT | Performed by: PHYSICIAN ASSISTANT

## 2022-08-31 PROCEDURE — 96374 THER/PROPH/DIAG INJ IV PUSH: CPT

## 2022-08-31 PROCEDURE — 63600175 PHARM REV CODE 636 W HCPCS: Performed by: PHYSICIAN ASSISTANT

## 2022-08-31 PROCEDURE — 99900035 HC TECH TIME PER 15 MIN (STAT)

## 2022-08-31 PROCEDURE — 82010 KETONE BODYS QUAN: CPT | Performed by: STUDENT IN AN ORGANIZED HEALTH CARE EDUCATION/TRAINING PROGRAM

## 2022-08-31 PROCEDURE — 96361 HYDRATE IV INFUSION ADD-ON: CPT

## 2022-08-31 PROCEDURE — 25000003 PHARM REV CODE 250: Performed by: PHYSICIAN ASSISTANT

## 2022-08-31 RX ORDER — IBUPROFEN 400 MG/1
800 TABLET ORAL
Status: COMPLETED | OUTPATIENT
Start: 2022-08-31 | End: 2022-08-31

## 2022-08-31 RX ADMIN — HUMAN INSULIN 10 UNITS: 100 INJECTION, SOLUTION SUBCUTANEOUS at 08:08

## 2022-08-31 RX ADMIN — IBUPROFEN 800 MG: 400 TABLET ORAL at 07:08

## 2022-08-31 RX ADMIN — SODIUM CHLORIDE 1000 ML: 9 INJECTION, SOLUTION INTRAVENOUS at 07:08

## 2022-08-31 RX ADMIN — SODIUM CHLORIDE 1000 ML: 9 INJECTION, SOLUTION INTRAVENOUS at 08:08

## 2022-08-31 RX ADMIN — SODIUM CHLORIDE, POTASSIUM CHLORIDE, SODIUM LACTATE AND CALCIUM CHLORIDE 1000 ML: 600; 310; 30; 20 INJECTION, SOLUTION INTRAVENOUS at 06:08

## 2022-08-31 NOTE — Clinical Note
"Jose Francisco Romero (Kyle) was seen and treated in our emergency department on 8/31/2022.  He may return to work on 09/05/2022.       If you have any questions or concerns, please don't hesitate to call.      Leann Fernandes RN RN    "

## 2022-08-31 NOTE — ED PROVIDER NOTES
Encounter Date: 8/31/2022       History     Chief Complaint   Patient presents with    COVID-19 Concerns     Sore throat, cough  body aches     Patient is a 41 year old male who presents to the ED with complaints of sore throat, cough, body aches x 2 days.  He has concerns for COVID and requesting testing.  He denies vomiting, abdominal pain, SOB, CP, dizziness.      The history is provided by the patient. No  was used.   Cough  Associated symptoms include sore throat and myalgias. Pertinent negatives include no chest pain and no chills.   Review of patient's allergies indicates:   Allergen Reactions    Penicillins     Sulfa (sulfonamide antibiotics)      Past Medical History:   Diagnosis Date    Diabetes mellitus type I     Hypertension      History reviewed. No pertinent surgical history.  History reviewed. No pertinent family history.     Review of Systems   Constitutional:  Negative for chills and fever.   HENT:  Positive for sore throat.    Eyes: Negative.    Respiratory:  Positive for cough.    Cardiovascular:  Negative for chest pain and palpitations.   Gastrointestinal:  Negative for abdominal pain, nausea and vomiting.   Genitourinary: Negative.    Musculoskeletal:  Positive for myalgias.   Skin: Negative.    Hematological: Negative.      Physical Exam     Initial Vitals [08/31/22 1531]   BP Pulse Resp Temp SpO2   132/83 104 18 98.4 °F (36.9 °C) 98 %      MAP       --         Physical Exam    Nursing note and vitals reviewed.  Constitutional: He appears well-developed and well-nourished.   HENT:   Nose: Nose normal.   Mouth/Throat: Uvula is midline. Posterior oropharyngeal erythema present.   Eyes: Conjunctivae are normal.   Neck: Neck supple.   Normal range of motion.  Cardiovascular:  Normal rate, normal heart sounds and intact distal pulses.           Pulmonary/Chest: Breath sounds normal.   Abdominal: Abdomen is soft. Bowel sounds are normal.   Musculoskeletal:         General:  Normal range of motion.      Cervical back: Normal range of motion and neck supple.     Lymphadenopathy:     He has no cervical adenopathy.   Neurological: He is alert.   Skin: Skin is warm. Capillary refill takes less than 2 seconds.       ED Course   Procedures  Labs Reviewed   COVID/RSV/FLU A&B PCR - Abnormal; Notable for the following components:       Result Value    SARS-CoV-2 PCR Detected (*)     All other components within normal limits   COMPREHENSIVE METABOLIC PANEL - Abnormal; Notable for the following components:    Sodium Level 132 (*)     Chloride 94 (*)     Glucose Level 498 (*)     Creatinine 1.31 (*)     Albumin Level 3.3 (*)     Globulin 3.6 (*)     Albumin/Globulin Ratio 0.9 (*)     Alkaline Phosphatase 297 (*)     Alanine Aminotransferase 250 (*)     Aspartate Aminotransferase 207 (*)     All other components within normal limits   CBC WITH DIFFERENTIAL - Abnormal; Notable for the following components:    WBC 3.9 (*)     Hct 41.8 (*)     Neut # 1.8 (*)     All other components within normal limits   BETA - HYDROXYBUTYRATE, SERUM - Abnormal; Notable for the following components:    Beta Hydroxybutyrate 1.00 (*)     All other components within normal limits   POCT GLUCOSE - Abnormal; Notable for the following components:    POCT Glucose 462 (*)     All other components within normal limits   POCT GLUCOSE - Abnormal; Notable for the following components:    POCT Glucose 276 (*)     All other components within normal limits   STREP GROUP A BY PCR - Normal   CK - Normal   CBC W/ AUTO DIFFERENTIAL    Narrative:     The following orders were created for panel order CBC Auto Differential.  Procedure                               Abnormality         Status                     ---------                               -----------         ------                     CBC with Differential[[id number]]        Abnormal            Final result                 Please view results for these tests on the individual  orders.   EXTRA TUBES    Narrative:     The following orders were created for panel order EXTRA TUBES.  Procedure                               Abnormality         Status                     ---------                               -----------         ------                     Light Blue Top Hold[[id number]]                              In process                 Red Top Hold[[id number]]                                     In process                 Lavender Top Hold[[id number]]                                In process                 Gold Top Hold[[id number]]                                    In process                   Please view results for these tests on the individual orders.   LIGHT BLUE TOP HOLD   RED TOP HOLD   LAVENDER TOP HOLD   GOLD TOP HOLD          Imaging Results    None          Medications   lactated ringers bolus 1,000 mL (0 mLs Intravenous Stopped 8/31/22 1933)   sodium chloride 0.9% bolus 1,000 mL (0 mLs Intravenous Stopped 8/31/22 2037)   ibuprofen tablet 800 mg (800 mg Oral Given 8/31/22 1937)   insulin regular injection 10 Units 0.1 mL (10 Units Intravenous Given 8/31/22 2049)   sodium chloride 0.9% bolus 1,000 mL (1,000 mLs Intravenous New Bag 8/31/22 2049)     Medical Decision Making:   ED Management:  Patient was signed out to me pending repeat blood sugar.  His beta hydroxybutyrate was mildly elevated.  His pH was normal.  He was not in DKA.  His blood sugar improved with some insulin.  He was instructed to follow-up with PCP for further management of his hyperglycemia verbalized understanding.  He is being discharged home stable condition at this time.           ED Course as of 10/13/22 1405   Wed Aug 31, 2022   1741 STREP A PCR ([facility]): Not Detected [ER]   1800 SARS-CoV2 (COVID-19) Qualitative PCR(!): Detected [ER]   1820 When I gave patient diagnosis of COVID before trying to DC, patient states his glucose is 300 and states he feels weak and needs fluids.   [ER]   1928 Glucose(!!): 498  [ER]   1928 Sodium(!): 132 [ER]   1928 Chloride(!): 94 [ER]   2039 POCT Glucose(!!): 462 [ER]   2123 I transitioned this patient to Dr. Barbour at this time.  [ER]      ED Course User Index  [ER] EM Foreman             Clinical Impression:   Final diagnoses:  [U07.1] COVID-19 (Primary)  [R73.9] Hyperglycemia      ED Disposition Condition    Discharge Stable          ED Prescriptions    None       Follow-up Information       Follow up With Specialties Details Why Contact Info    [facility] Emergency Dept Emergency Medicine  As needed, If symptoms worsen [address] [place] [id number] [phone]    [facility] Internal Medicine Internal Medicine Call in 1 day  [facility] [address] [phone]             Lang Barbour MD  08/31/22 2231       EM Foreman  10/13/22 5665

## 2022-09-30 DIAGNOSIS — Z79.4 TYPE 2 DIABETES MELLITUS WITHOUT COMPLICATION, WITH LONG-TERM CURRENT USE OF INSULIN: Primary | ICD-10-CM

## 2022-09-30 DIAGNOSIS — E11.9 TYPE 2 DIABETES MELLITUS WITHOUT COMPLICATION, WITH LONG-TERM CURRENT USE OF INSULIN: Primary | ICD-10-CM

## 2022-09-30 RX ORDER — PEN NEEDLE, DIABETIC 29 G X1/2"
NEEDLE, DISPOSABLE MISCELLANEOUS
COMMUNITY
Start: 2022-04-12 | End: 2022-09-30 | Stop reason: SDUPTHER

## 2022-10-02 RX ORDER — PEN NEEDLE, DIABETIC 29 G X1/2"
1 NEEDLE, DISPOSABLE MISCELLANEOUS 2 TIMES DAILY
Qty: 100 EACH | Refills: 2 | Status: SHIPPED | OUTPATIENT
Start: 2022-10-02 | End: 2023-05-22

## 2022-10-03 NOTE — TELEPHONE ENCOUNTER
Called and spoke to patient, verifying name and . Informed patient that needles where sent to pharmacy. Patient stated understanding 100%. Call ended.

## 2022-10-11 PROBLEM — E11.9 DIABETES: Status: ACTIVE | Noted: 2022-10-11

## 2023-01-11 DIAGNOSIS — E11.9 TYPE 2 DIABETES MELLITUS WITHOUT COMPLICATION, WITH LONG-TERM CURRENT USE OF INSULIN: Primary | ICD-10-CM

## 2023-01-11 DIAGNOSIS — Z79.4 TYPE 2 DIABETES MELLITUS WITHOUT COMPLICATION, WITH LONG-TERM CURRENT USE OF INSULIN: Primary | ICD-10-CM

## 2023-01-11 RX ORDER — CALCIUM CITRATE/VITAMIN D3 200MG-6.25
1 TABLET ORAL 4 TIMES DAILY
COMMUNITY
Start: 2022-09-24 | End: 2023-01-11 | Stop reason: SDUPTHER

## 2023-01-17 DIAGNOSIS — E10.9 TYPE 1 DIABETES MELLITUS WITHOUT COMPLICATION: Primary | ICD-10-CM

## 2023-01-17 RX ORDER — INSULIN ASPART 100 [IU]/ML
INJECTION, SOLUTION INTRAVENOUS; SUBCUTANEOUS
Qty: 10 ML | Refills: 11 | Status: SHIPPED | OUTPATIENT
Start: 2023-01-17 | End: 2023-05-22

## 2023-01-26 ENCOUNTER — TELEPHONE (OUTPATIENT)
Dept: INTERNAL MEDICINE | Facility: CLINIC | Age: 43
End: 2023-01-26
Payer: MEDICAID

## 2023-01-26 NOTE — TELEPHONE ENCOUNTER
Pharmacy is requesting a sliding scale and max daily dosage for pt Novolog. Please advise. Thanks!

## 2023-01-27 NOTE — TELEPHONE ENCOUNTER
Called the patient to ask him about his Novolog regimen as I have not seen the patient in clinic at this time. He reports taking 8 units of Novolog with meals. Prior chart review of the patient shows similar orders in [facility]. Will order this dosing at this time until patient can be further evaluated by myself in clinic.    Update: Call the [facility] But they had noted the prescription had [facility] on January 17, 2023. Unsure of who filled medication at this time but will confirm with patient at his later appointment.

## 2023-02-06 DIAGNOSIS — Z79.4 TYPE 2 DIABETES MELLITUS WITHOUT COMPLICATION, WITH LONG-TERM CURRENT USE OF INSULIN: ICD-10-CM

## 2023-02-06 DIAGNOSIS — E11.9 TYPE 2 DIABETES MELLITUS WITHOUT COMPLICATION, WITH LONG-TERM CURRENT USE OF INSULIN: ICD-10-CM

## 2023-04-03 ENCOUNTER — HOSPITAL ENCOUNTER (EMERGENCY)
Facility: HOSPITAL | Age: 43
Discharge: HOME | End: 2023-04-03
Attending: STUDENT IN AN ORGANIZED HEALTH CARE EDUCATION/TRAINING PROGRAM
Payer: MEDICAID

## 2023-04-03 VITALS
TEMPERATURE: 98 F | WEIGHT: 174.19 LBS | RESPIRATION RATE: 16 BRPM | HEIGHT: 73 IN | DIASTOLIC BLOOD PRESSURE: 92 MMHG | HEART RATE: 80 BPM | SYSTOLIC BLOOD PRESSURE: 130 MMHG | OXYGEN SATURATION: 98 % | BODY MASS INDEX: 23.09 KG/M2

## 2023-04-03 DIAGNOSIS — E10.65 TYPE 1 DIABETES MELLITUS WITH HYPERGLYCEMIA: ICD-10-CM

## 2023-04-03 DIAGNOSIS — K52.9 GASTROENTERITIS: Primary | ICD-10-CM

## 2023-04-03 DIAGNOSIS — R73.9 HYPERGLYCEMIA: ICD-10-CM

## 2023-04-03 LAB
ALBUMIN SERPL-MCNC: 3.6 G/DL (ref 3.5–5)
ALBUMIN/GLOB SERPL: 0.9 RATIO (ref 1.1–2)
ALP SERPL-CCNC: 224 UNIT/L (ref 40–150)
ALT SERPL-CCNC: 41 UNIT/L (ref 0–55)
ANION GAP SERPL CALC-SCNC: 9 MEQ/L
APPEARANCE UR: CLEAR
AST SERPL-CCNC: 29 UNIT/L (ref 5–34)
B-OH-BUTYR SERPL-MCNC: 4.01 MMOL/L
BACTERIA #/AREA URNS AUTO: ABNORMAL /HPF
BASOPHILS # BLD AUTO: 0.05 X10(3)/MCL (ref 0–0.2)
BASOPHILS NFR BLD AUTO: 0.6 %
BILIRUB UR QL STRIP.AUTO: NEGATIVE MG/DL
BILIRUBIN DIRECT+TOT PNL SERPL-MCNC: 0.4 MG/DL
BUN SERPL-MCNC: 22.4 MG/DL (ref 8.9–20.6)
BUN SERPL-MCNC: 25.7 MG/DL (ref 8.9–20.6)
CALCIUM SERPL-MCNC: 10 MG/DL (ref 8.4–10.2)
CALCIUM SERPL-MCNC: 8.9 MG/DL (ref 8.4–10.2)
CHLORIDE SERPL-SCNC: 101 MMOL/L (ref 98–107)
CHLORIDE SERPL-SCNC: 97 MMOL/L (ref 98–107)
CO2 SERPL-SCNC: 19 MMOL/L (ref 22–29)
CO2 SERPL-SCNC: 21 MMOL/L (ref 22–29)
COLOR UR AUTO: ABNORMAL
CORRECTED TEMPERATURE (PCO2): 34 MMHG (ref 35–45)
CORRECTED TEMPERATURE (PH): 7.38 (ref 7.35–7.45)
CORRECTED TEMPERATURE (PO2): 107 MMHG (ref 75–100)
CREAT SERPL-MCNC: 1.01 MG/DL (ref 0.73–1.18)
CREAT SERPL-MCNC: 1.51 MG/DL (ref 0.73–1.18)
CREAT/UREA NIT SERPL: 22
EOSINOPHIL # BLD AUTO: 0.78 X10(3)/MCL (ref 0–0.9)
EOSINOPHIL NFR BLD AUTO: 9.1 %
ERYTHROCYTE [DISTWIDTH] IN BLOOD BY AUTOMATED COUNT: 12.6 % (ref 11.5–17)
FLUAV AG UPPER RESP QL IA.RAPID: NOT DETECTED
FLUBV AG UPPER RESP QL IA.RAPID: NOT DETECTED
GFR SERPLBLD CREATININE-BSD FMLA CKD-EPI: 59 MLS/MIN/1.73/M2
GFR SERPLBLD CREATININE-BSD FMLA CKD-EPI: >60 MLS/MIN/1.73/M2
GLOBULIN SER-MCNC: 4.1 GM/DL (ref 2.4–3.5)
GLUCOSE SERPL-MCNC: 418 MG/DL (ref 74–100)
GLUCOSE SERPL-MCNC: 480 MG/DL (ref 74–100)
GLUCOSE UR QL STRIP.AUTO: ABNORMAL MG/DL
HCO3 UR-SCNC: 20.1 MMOL/L (ref 22–26)
HCT VFR BLD AUTO: 44.4 % (ref 42–52)
HGB BLD-MCNC: 12.5 G/DL (ref 12–18)
HGB BLD-MCNC: 15.2 G/DL (ref 14–18)
HYALINE CASTS #/AREA URNS LPF: ABNORMAL /LPF
IMM GRANULOCYTES # BLD AUTO: 0.02 X10(3)/MCL (ref 0–0.04)
IMM GRANULOCYTES NFR BLD AUTO: 0.2 %
KETONES UR QL STRIP.AUTO: ABNORMAL MG/DL
LEUKOCYTE ESTERASE UR QL STRIP.AUTO: NEGATIVE UNIT/L
LIPASE SERPL-CCNC: 8 U/L
LYMPHOCYTES # BLD AUTO: 3.14 X10(3)/MCL (ref 0.6–4.6)
LYMPHOCYTES NFR BLD AUTO: 36.8 %
MAGNESIUM SERPL-MCNC: 1.9 MG/DL (ref 1.6–2.6)
MCH RBC QN AUTO: 29.6 PG (ref 27–31)
MCHC RBC AUTO-ENTMCNC: 34.2 G/DL (ref 33–36)
MCV RBC AUTO: 86.4 FL (ref 80–94)
MONOCYTES # BLD AUTO: 0.53 X10(3)/MCL (ref 0.1–1.3)
MONOCYTES NFR BLD AUTO: 6.2 %
MUCOUS THREADS URNS QL MICRO: ABNORMAL /LPF
NEUTROPHILS # BLD AUTO: 4.02 X10(3)/MCL (ref 2.1–9.2)
NEUTROPHILS NFR BLD AUTO: 47.1 %
NITRITE UR QL STRIP.AUTO: NEGATIVE
NRBC BLD AUTO-RTO: 0 %
PCO2 BLDA: 34 MMHG (ref 35–45)
PH SMN: 7.38 [PH] (ref 7.35–7.45)
PH UR STRIP.AUTO: 5 [PH]
PLATELET # BLD AUTO: 301 X10(3)/MCL (ref 130–400)
PMV BLD AUTO: 9.7 FL (ref 7.4–10.4)
PO2 BLDA: 107 MMHG (ref 75–100)
POC BASE DEFICIT: -4.3 MMOL/L (ref -2–2)
POC COHB: 1.4 % (ref 0.5–1.5)
POC METHB: 0.7 % (ref 0–1.5)
POC O2HB: 96.2 % (ref 94–100)
POC PERFORMED BY: ABNORMAL
POC SATURATED O2: 98 % (ref 90–100)
POC TEMPERATURE: 37 C
POCT GLUCOSE: 321 MG/DL (ref 70–110)
POCT GLUCOSE: 377 MG/DL (ref 70–110)
POCT GLUCOSE: 424 MG/DL (ref 70–110)
POCT GLUCOSE: 442 MG/DL (ref 70–110)
POTASSIUM SERPL-SCNC: 4.2 MMOL/L (ref 3.5–5.1)
POTASSIUM SERPL-SCNC: 4.6 MMOL/L (ref 3.5–5.1)
PROT SERPL-MCNC: 7.7 GM/DL (ref 6.4–8.3)
PROT UR QL STRIP.AUTO: NEGATIVE MG/DL
RBC # BLD AUTO: 5.14 X10(6)/MCL (ref 4.7–6.1)
RBC #/AREA URNS AUTO: ABNORMAL /HPF
RBC UR QL AUTO: NEGATIVE UNIT/L
SARS-COV-2 RNA RESP QL NAA+PROBE: NOT DETECTED
SODIUM SERPL-SCNC: 131 MMOL/L (ref 136–145)
SODIUM SERPL-SCNC: 132 MMOL/L (ref 136–145)
SP GR UR STRIP.AUTO: 1.03
SPECIMEN SOURCE: ABNORMAL
SQUAMOUS #/AREA URNS LPF: ABNORMAL /HPF
STREP A PCR (OHS): NOT DETECTED
UROBILINOGEN UR STRIP-ACNC: NORMAL MG/DL
WBC # SPEC AUTO: 8.5 X10(3)/MCL (ref 4.5–11.5)
WBC #/AREA URNS AUTO: ABNORMAL /HPF

## 2023-04-03 PROCEDURE — 83690 ASSAY OF LIPASE: CPT | Performed by: PHYSICIAN ASSISTANT

## 2023-04-03 PROCEDURE — 80053 COMPREHEN METABOLIC PANEL: CPT | Performed by: PHYSICIAN ASSISTANT

## 2023-04-03 PROCEDURE — 63600175 PHARM REV CODE 636 W HCPCS: Performed by: PHYSICIAN ASSISTANT

## 2023-04-03 PROCEDURE — 81001 URINALYSIS AUTO W/SCOPE: CPT | Performed by: PHYSICIAN ASSISTANT

## 2023-04-03 PROCEDURE — 82010 KETONE BODYS QUAN: CPT | Performed by: STUDENT IN AN ORGANIZED HEALTH CARE EDUCATION/TRAINING PROGRAM

## 2023-04-03 PROCEDURE — 0240U COVID/FLU A&B PCR: CPT | Performed by: PHYSICIAN ASSISTANT

## 2023-04-03 PROCEDURE — 85025 COMPLETE CBC W/AUTO DIFF WBC: CPT | Performed by: PHYSICIAN ASSISTANT

## 2023-04-03 PROCEDURE — 87651 STREP A DNA AMP PROBE: CPT | Performed by: PHYSICIAN ASSISTANT

## 2023-04-03 PROCEDURE — 36600 WITHDRAWAL OF ARTERIAL BLOOD: CPT

## 2023-04-03 PROCEDURE — 83735 ASSAY OF MAGNESIUM: CPT | Performed by: STUDENT IN AN ORGANIZED HEALTH CARE EDUCATION/TRAINING PROGRAM

## 2023-04-03 PROCEDURE — 96374 THER/PROPH/DIAG INJ IV PUSH: CPT

## 2023-04-03 PROCEDURE — 99284 EMERGENCY DEPT VISIT MOD MDM: CPT

## 2023-04-03 PROCEDURE — 99900035 HC TECH TIME PER 15 MIN (STAT)

## 2023-04-03 PROCEDURE — 82962 GLUCOSE BLOOD TEST: CPT

## 2023-04-03 PROCEDURE — 96361 HYDRATE IV INFUSION ADD-ON: CPT

## 2023-04-03 PROCEDURE — 82803 BLOOD GASES ANY COMBINATION: CPT

## 2023-04-03 PROCEDURE — 63600175 PHARM REV CODE 636 W HCPCS: Performed by: STUDENT IN AN ORGANIZED HEALTH CARE EDUCATION/TRAINING PROGRAM

## 2023-04-03 RX ADMIN — HUMAN INSULIN 5 UNITS: 100 INJECTION, SOLUTION SUBCUTANEOUS at 10:04

## 2023-04-03 RX ADMIN — SODIUM CHLORIDE, POTASSIUM CHLORIDE, SODIUM LACTATE AND CALCIUM CHLORIDE 1000 ML: 600; 310; 30; 20 INJECTION, SOLUTION INTRAVENOUS at 07:04

## 2023-04-03 RX ADMIN — SODIUM CHLORIDE, POTASSIUM CHLORIDE, SODIUM LACTATE AND CALCIUM CHLORIDE 1000 ML: 600; 310; 30; 20 INJECTION, SOLUTION INTRAVENOUS at 08:04

## 2023-04-03 NOTE — FIRST PROVIDER EVALUATION
Medical screening examination initiated.  I have conducted a focused provider triage encounter, findings are as follows:    Brief history of present illness:  3 day hx of fatigue & body aches. CBGs elevated at home & in triage. Son recently sick w/ strep throat.    There were no vitals filed for this visit.    Pertinent physical exam:  Mild oropharyngeal erythema. No significant abdominal TTP.    Brief workup plan:  Preliminary workup initiated; this workup will be continued and followed by the physician or advanced practice provider that is assigned to the patient when roomed.

## 2023-04-03 NOTE — ED PROVIDER NOTES
Encounter Date: 4/3/2023       History     Chief Complaint   Patient presents with    Hyperglycemia    Headache    Nausea    Generalized Body Aches     C/o above symptoms since Friday stating that his blood sugar has been running high. Cbg 442 this am     HPI    42-year-old male with a past medical history of type 1 diabetes presents emergency department for generalized fatigue, body aches, diarrhea and mild sinus congestion.  Patient states everything started Friday, 3 days ago.  States that CBD have been running high.  Denies any abdominal pain nausea vomiting.  States that this does not feel like a typical DKA symptom.  He states he wants to make sure that he is not dehydrated as he easily gets dehydrated with his type 1 diabetes.  Denies any shortness breath or chest pain.  States he has intermittent headaches but currently does not have a headache.    Review of patient's allergies indicates:   Allergen Reactions    Penicillins     Sulfa (sulfonamide antibiotics)      Past Medical History:   Diagnosis Date    Diabetes mellitus type I     Hypertension      History reviewed. No pertinent surgical history.  History reviewed. No pertinent family history.  Social History     Tobacco Use    Smoking status: Every Day     Packs/day: 0.50     Types: Cigarettes    Smokeless tobacco: Never   Substance Use Topics    Alcohol use: Never    Drug use: Never     Review of Systems   Constitutional:  Positive for fatigue. Negative for fever.   HENT:  Positive for congestion. Negative for sore throat.    Respiratory:  Positive for cough (mild). Negative for shortness of breath.    Cardiovascular:  Negative for chest pain.   Gastrointestinal:  Positive for diarrhea. Negative for abdominal pain, constipation, nausea and vomiting.   Endocrine: Negative for polydipsia, polyphagia and polyuria.   Genitourinary:  Negative for difficulty urinating and dysuria.   Musculoskeletal:  Positive for myalgias. Negative for arthralgias.   All  other systems reviewed and are negative.    Physical Exam     Initial Vitals [04/03/23 0721]   BP Pulse Resp Temp SpO2   128/89 (!) 113 20 97.7 °F (36.5 °C) 100 %      MAP       --         Physical Exam    Nursing note and vitals reviewed.  Constitutional: He appears well-developed and well-nourished. No distress.   Cardiovascular:  Normal rate and regular rhythm.           Pulmonary/Chest: Breath sounds normal. No respiratory distress.   Abdominal: Abdomen is soft. Bowel sounds are increased. There is no abdominal tenderness. There is no rebound and no guarding.   Musculoskeletal:         General: No tenderness. Normal range of motion.     Neurological: He is alert and oriented to person, place, and time.   Skin: Skin is warm. Capillary refill takes less than 2 seconds. No rash noted. No erythema.   Psychiatric: He has a normal mood and affect. Thought content normal.       ED Course   Procedures  Labs Reviewed   COMPREHENSIVE METABOLIC PANEL - Abnormal; Notable for the following components:       Result Value    Sodium Level 132 (*)     Chloride 97 (*)     Carbon Dioxide 19 (*)     Glucose Level 480 (*)     Blood Urea Nitrogen 25.7 (*)     Creatinine 1.51 (*)     Globulin 4.1 (*)     Albumin/Globulin Ratio 0.9 (*)     Alkaline Phosphatase 224 (*)     All other components within normal limits   URINALYSIS, REFLEX TO URINE CULTURE - Abnormal; Notable for the following components:    Glucose, UA 4+ (*)     Ketones, UA 2+ (*)     Mucous, UA Trace (*)     All other components within normal limits   BETA - HYDROXYBUTYRATE, SERUM - Abnormal; Notable for the following components:    Beta Hydroxybutyrate 4.01 (*)     All other components within normal limits   BASIC METABOLIC PANEL - Abnormal; Notable for the following components:    Sodium Level 131 (*)     Carbon Dioxide 21 (*)     Glucose Level 418 (*)     Blood Urea Nitrogen 22.4 (*)     All other components within normal limits   POCT GLUCOSE - Abnormal; Notable for  the following components:    POCT Glucose 424 (*)     All other components within normal limits   POCT GLUCOSE - Abnormal; Notable for the following components:    POCT Glucose 377 (*)     All other components within normal limits   POCT GLUCOSE - Abnormal; Notable for the following components:    POCT Glucose 321 (*)     All other components within normal limits   STREP GROUP A BY PCR - Normal    Narrative:     The Xpert Xpress Strep A test is a rapid, qualitative in vitro diagnostic test for the detection of Streptococcus pyogenes (Group A ß-hemolytic Streptococcus, Strep A) in throat swab specimens from patients with signs and symptoms of pharyngitis.     COVID/FLU A&B PCR - Normal    Narrative:     The Xpert Xpress SARS-CoV-2/FLU/RSV plus is a rapid, multiplexed real-time PCR test intended for the simultaneous qualitative detection and differentiation of SARS-CoV-2, Influenza A, Influenza B, and respiratory syncytial virus (RSV) viral RNA in either nasopharyngeal swab or nasal swab specimens.         LIPASE - Normal   MAGNESIUM - Normal   CBC W/ AUTO DIFFERENTIAL    Narrative:     The following orders were created for panel order CBC Auto Differential.  Procedure                               Abnormality         Status                     ---------                               -----------         ------                     CBC with Differential[[id number]]                            Final result                 Please view results for these tests on the individual orders.   CBC WITH DIFFERENTIAL   EXTRA TUBES    Narrative:     The following orders were created for panel order EXTRA TUBES.  Procedure                               Abnormality         Status                     ---------                               -----------         ------                     Light Blue Top Hold[[id number]]                              In process                 Gold Top Hold[[id number]]                                    In  process                 Pink Top Hold[[id number]]                                    In process                   Please view results for these tests on the individual orders.   LIGHT BLUE TOP HOLD   GOLD TOP HOLD   PINK TOP HOLD   EXTRA TUBES    Narrative:     The following orders were created for panel order EXTRA TUBES.  Procedure                               Abnormality         Status                     ---------                               -----------         ------                     Red Top Hold[[id number]]                                     In process                   Please view results for these tests on the individual orders.   RED TOP HOLD   POCT GLUCOSE, HAND-HELD DEVICE          Imaging Results    None          Medications   lactated ringers bolus 1,000 mL (0 mLs Intravenous Stopped 4/3/23 0847)   lactated ringers bolus 1,000 mL (0 mLs Intravenous Stopped 4/3/23 1005)   insulin regular injection 5 Units 0.05 mL (5 Units Intravenous Given 4/3/23 1054)     Medical Decision Making:   Differential Diagnosis:   DKA, gastroenteritis, metabolic derangement, dehydration, viral syndrome  ED Management:  Will obtain CBC, CMP, Mag, lipase, viral testing as well as strep test.  IVF initiated.  Vital stable.    Medical Decision Making  Problems Addressed:  Gastroenteritis: self-limited or minor problem  Hyperglycemia: self-limited or minor problem  Type 1 diabetes mellitus with hyperglycemia: chronic illness or injury    Amount and/or Complexity of Data Reviewed  External Data Reviewed: labs and notes.  Labs: ordered. Decision-making details documented in ED Course.    Risk  OTC drugs.  Prescription drug management.  Decision regarding hospitalization.              ED Course as of 04/03/23 1143   Mon Apr 03, 2023   0819 Sodium(!): 132 [BS]   0819 Potassium: 4.2 [BS]   0819 Chloride(!): 97 [BS]   0819 CO2(!): 19 [BS]   0819 Glucose(!!): 480 [BS]   0819 BUN(!): 25.7 [BS]   0819 Creatinine(!): 1.51 [BS]   0819  Anion gap mildly elevated at 16.  Fluids initiated.  Will get an ABG.  Low likelihood for DKA. [BS]   0820 WBC: 8.5 [BS]   0820 Hemoglobin: 15.2 [BS]   0820 Hematocrit: 44.4 [BS]   0820 Platelets: 301 [BS]   0843 Lipase: 8 [BS]   0856 POC PH: 7.38 [BS]   0856 Influenza A, Molecular: Not Detected [BS]   0856 Influenza B, Molecular: Not Detected [BS]   0856 SARS-CoV2 (COVID-19) Qualitative PCR: Not Detected [BS]   0856 STREP A PCR ([facility]): Not Detected [BS]   0856 Magnesium: 1.90 [BS]   0939 Beta-Hydroxybutyrate(!): 4.01 [BS]   1043 Repeat bmp significantly improved.  Anion gap 9 now.  Will give some insulin IV and recheck CBG. [BS]   1140 POCT Glucose(!): 321 [BS]   1142 Patient resting on the bed in no acute distress.  Vital signs stable.  CBG improving although still elevated.  Feels much better after fluids.  Wanting to be discharged.  States he will check his sugar and take insulin as needed.  A requesting work note.  States he feels 100% better.  ER precautions given. [BS]      ED Course User Index  [BS] Roe Berrios MD                 Clinical Impression:   Final diagnoses:  [K52.9] Gastroenteritis (Primary)  [R73.9] Hyperglycemia  [E10.65] Type 1 diabetes mellitus with hyperglycemia        ED Disposition Condition    Discharge Stable          ED Prescriptions    None       Follow-up Information       Follow up With Specialties Details Why Contact Info    [facility] Emergency Medicine Go to  If symptoms worsen [address] [facility] [address] [phone]    Follow-up with your primary care                 Roe Berrios MD  04/03/23 3501

## 2023-04-03 NOTE — Clinical Note
"Jose Francisco Romero (Kyle) was seen and treated in our emergency department on 4/3/2023.  He may return to work on 04/05/2023.       If you have any questions or concerns, please don't hesitate to call.      KATE Pedersen RN    "

## 2023-04-12 ENCOUNTER — OFFICE VISIT (OUTPATIENT)
Dept: ENDOCRINOLOGY | Facility: CLINIC | Age: 43
End: 2023-04-12
Payer: MEDICAID

## 2023-04-12 VITALS
BODY MASS INDEX: 25.02 KG/M2 | DIASTOLIC BLOOD PRESSURE: 80 MMHG | TEMPERATURE: 98 F | SYSTOLIC BLOOD PRESSURE: 134 MMHG | RESPIRATION RATE: 20 BRPM | HEIGHT: 73 IN | WEIGHT: 188.81 LBS | HEART RATE: 81 BPM

## 2023-04-12 DIAGNOSIS — G62.9 NEUROPATHY: Primary | ICD-10-CM

## 2023-04-12 DIAGNOSIS — H35.00 RETINOPATHY: ICD-10-CM

## 2023-04-12 DIAGNOSIS — E10.65 UNCONTROLLED TYPE 1 DIABETES MELLITUS WITH HYPERGLYCEMIA: ICD-10-CM

## 2023-04-12 LAB
ALBUMIN SERPL-MCNC: 3.1 G/DL (ref 3.5–5)
ALBUMIN/GLOB SERPL: 0.9 RATIO (ref 1.1–2)
ALP SERPL-CCNC: 172 UNIT/L (ref 40–150)
ALT SERPL-CCNC: 50 UNIT/L (ref 0–55)
AST SERPL-CCNC: 35 UNIT/L (ref 5–34)
BILIRUBIN DIRECT+TOT PNL SERPL-MCNC: 0.3 MG/DL
BUN SERPL-MCNC: 17.2 MG/DL (ref 8.9–20.6)
CALCIUM SERPL-MCNC: 8.8 MG/DL (ref 8.4–10.2)
CHLORIDE SERPL-SCNC: 99 MMOL/L (ref 98–107)
CO2 SERPL-SCNC: 29 MMOL/L (ref 22–29)
CREAT SERPL-MCNC: 1.11 MG/DL (ref 0.73–1.18)
CREAT UR-MCNC: 128.1 MG/DL (ref 63–166)
GFR SERPLBLD CREATININE-BSD FMLA CKD-EPI: >60 MLS/MIN/1.73/M2
GLOBULIN SER-MCNC: 3.3 GM/DL (ref 2.4–3.5)
GLUCOSE SERPL-MCNC: 271 MG/DL (ref 74–100)
HBA1C MFR BLD: 10.7 %
MICROALBUMIN UR-MCNC: 275.6 UG/ML
MICROALBUMIN/CREAT RATIO PNL UR: 215.1 MG/GM CR (ref 0–30)
POTASSIUM SERPL-SCNC: 4.6 MMOL/L (ref 3.5–5.1)
PROT SERPL-MCNC: 6.4 GM/DL (ref 6.4–8.3)
SODIUM SERPL-SCNC: 136 MMOL/L (ref 136–145)

## 2023-04-12 PROCEDURE — 99204 OFFICE O/P NEW MOD 45 MIN: CPT | Mod: S$PBB,,, | Performed by: NURSE PRACTITIONER

## 2023-04-12 PROCEDURE — 3008F BODY MASS INDEX DOCD: CPT | Mod: CPTII,,, | Performed by: NURSE PRACTITIONER

## 2023-04-12 PROCEDURE — 3079F PR MOST RECENT DIASTOLIC BLOOD PRESSURE 80-89 MM HG: ICD-10-PCS | Mod: CPTII,,, | Performed by: NURSE PRACTITIONER

## 2023-04-12 PROCEDURE — 83036 HEMOGLOBIN GLYCOSYLATED A1C: CPT | Mod: PBBFAC | Performed by: NURSE PRACTITIONER

## 2023-04-12 PROCEDURE — 3075F PR MOST RECENT SYSTOLIC BLOOD PRESS GE 130-139MM HG: ICD-10-PCS | Mod: CPTII,,, | Performed by: NURSE PRACTITIONER

## 2023-04-12 PROCEDURE — 99215 OFFICE O/P EST HI 40 MIN: CPT | Mod: PBBFAC | Performed by: NURSE PRACTITIONER

## 2023-04-12 PROCEDURE — 82043 UR ALBUMIN QUANTITATIVE: CPT | Performed by: NURSE PRACTITIONER

## 2023-04-12 PROCEDURE — 3046F PR MOST RECENT HEMOGLOBIN A1C LEVEL > 9.0%: ICD-10-PCS | Mod: CPTII,,, | Performed by: NURSE PRACTITIONER

## 2023-04-12 PROCEDURE — 99204 PR OFFICE/OUTPT VISIT, NEW, LEVL IV, 45-59 MIN: ICD-10-PCS | Mod: S$PBB,,, | Performed by: NURSE PRACTITIONER

## 2023-04-12 PROCEDURE — 1159F MED LIST DOCD IN RCRD: CPT | Mod: CPTII,,, | Performed by: NURSE PRACTITIONER

## 2023-04-12 PROCEDURE — 1159F PR MEDICATION LIST DOCUMENTED IN MEDICAL RECORD: ICD-10-PCS | Mod: CPTII,,, | Performed by: NURSE PRACTITIONER

## 2023-04-12 PROCEDURE — 3079F DIAST BP 80-89 MM HG: CPT | Mod: CPTII,,, | Performed by: NURSE PRACTITIONER

## 2023-04-12 PROCEDURE — 80053 COMPREHEN METABOLIC PANEL: CPT | Performed by: NURSE PRACTITIONER

## 2023-04-12 PROCEDURE — 3046F HEMOGLOBIN A1C LEVEL >9.0%: CPT | Mod: CPTII,,, | Performed by: NURSE PRACTITIONER

## 2023-04-12 PROCEDURE — 84681 ASSAY OF C-PEPTIDE: CPT | Mod: 90 | Performed by: NURSE PRACTITIONER

## 2023-04-12 PROCEDURE — 3008F PR BODY MASS INDEX (BMI) DOCUMENTED: ICD-10-PCS | Mod: CPTII,,, | Performed by: NURSE PRACTITIONER

## 2023-04-12 PROCEDURE — 36415 COLL VENOUS BLD VENIPUNCTURE: CPT | Performed by: NURSE PRACTITIONER

## 2023-04-12 PROCEDURE — 86341 ISLET CELL ANTIBODY: CPT | Mod: 90 | Performed by: NURSE PRACTITIONER

## 2023-04-12 PROCEDURE — 3075F SYST BP GE 130 - 139MM HG: CPT | Mod: CPTII,,, | Performed by: NURSE PRACTITIONER

## 2023-04-12 PROCEDURE — 1160F RVW MEDS BY RX/DR IN RCRD: CPT | Mod: CPTII,,, | Performed by: NURSE PRACTITIONER

## 2023-04-12 PROCEDURE — 1160F PR REVIEW ALL MEDS BY PRESCRIBER/CLIN PHARMACIST DOCUMENTED: ICD-10-PCS | Mod: CPTII,,, | Performed by: NURSE PRACTITIONER

## 2023-04-12 RX ORDER — BUSPIRONE HYDROCHLORIDE 10 MG/1
10 TABLET ORAL
COMMUNITY
Start: 2022-01-03 | End: 2023-07-26 | Stop reason: ALTCHOICE

## 2023-04-12 RX ORDER — ALPRAZOLAM 2 MG/1
2 TABLET ORAL DAILY PRN
Status: ON HOLD | COMMUNITY
Start: 2023-04-01 | End: 2023-07-03 | Stop reason: HOSPADM

## 2023-04-12 RX ORDER — BUPROPION HYDROCHLORIDE 150 MG/1
150 TABLET ORAL EVERY MORNING
Status: ON HOLD | COMMUNITY
Start: 2022-11-30 | End: 2023-07-03 | Stop reason: HOSPADM

## 2023-04-12 RX ORDER — SYRING-NEEDL,DISP,INSUL,0.3 ML 30 GX5/16"
SYRINGE, EMPTY DISPOSABLE MISCELLANEOUS
COMMUNITY
Start: 2023-02-14 | End: 2023-05-22

## 2023-04-12 RX ORDER — FLUOXETINE HYDROCHLORIDE 40 MG/1
40 CAPSULE ORAL
Status: ON HOLD | COMMUNITY
Start: 2023-03-07 | End: 2023-07-03 | Stop reason: HOSPADM

## 2023-04-12 RX ORDER — BUPROPION HYDROCHLORIDE 300 MG/1
300 TABLET ORAL EVERY MORNING
COMMUNITY
Start: 2023-03-07 | End: 2023-07-26

## 2023-04-12 RX ORDER — LANSOPRAZOLE 30 MG/1
30 CAPSULE, DELAYED RELEASE ORAL
COMMUNITY
Start: 2022-01-03 | End: 2023-07-26 | Stop reason: ALTCHOICE

## 2023-04-12 RX ORDER — DICLOFENAC SODIUM 10 MG/G
GEL TOPICAL
COMMUNITY
Start: 2023-04-11 | End: 2023-06-03 | Stop reason: HOSPADM

## 2023-04-12 NOTE — PROGRESS NOTES
Subjective     Patient ID: Jose Francisco Romero is a 42 y.o. male.    Chief Complaint: Diabetes (New patient referral Type 1)    Endocrine clinic note 04/12/2023:  42 year male scheduled today as new patient referral to endocrine clinic for history of uncontrolled diabetes type 1 with multiple episodes of DKA.  Patient was recently hospitalized with DKA one-week ago and reports to clinic today as new patient referral.  Patient states he was diagnosed with type 1 diabetes about 5 years ago when he went in the hospital with DKA patient has never been establish with an endocrinologist in his currently on basal insulin with a sliding scale with prandial insulin that he checks his sugar after eating and gives insulin after blood glucoses elevated.  Patient's PCP had written a Dexcom patient did not know how to use patient has not with him today Dexcom was placed on patient educated on placement by Noemí bee LPN.  Patient has not had formal education on type 1 diabetes or insulin.  Patient states he checks his CBGS 3 to 4 times a day and states his lowest blood glucoses 80 in the morning blood sugars range as high as the 400s.  He states occasionally forgets his basal insulin at night.  Discussed with patient in depth today that we will check a C-peptide and becky 65 also be referring him to Diabetes Education for insulin medication will adjust his regimen today.     Review of Systems   Constitutional:  Negative for activity change, appetite change, chills and fatigue.   HENT: Negative.  Negative for dental problem, hearing loss, rhinorrhea, sneezing and goiter.    Eyes: Negative.  Negative for photophobia and visual disturbance.   Respiratory: Negative.  Negative for cough, chest tightness and shortness of breath.    Cardiovascular:  Negative for chest pain, palpitations and leg swelling.   Gastrointestinal: Negative.  Negative for abdominal distention, abdominal pain, change in bowel habit, constipation, diarrhea,  nausea, vomiting and change in bowel habit.   Endocrine: Negative.  Negative for cold intolerance, heat intolerance, polydipsia, polyphagia and polyuria.   Genitourinary: Negative.  Negative for difficulty urinating and erectile dysfunction.   Musculoskeletal:  Negative for back pain, joint swelling, leg pain, myalgias and joint deformity.   Integumentary:  Negative for color change, pallor and rash. Negative.   Allergic/Immunologic: Negative.  Negative for environmental allergies, food allergies and frequent infections.   Neurological: Negative.  Negative for tremors, syncope, headaches, coordination difficulties and coordination difficulties.   Hematological: Negative.  Does not bruise/bleed easily.   Psychiatric/Behavioral:  Negative for agitation and behavioral problems. The patient is not nervous/anxious and is not hyperactive.         Objective     Physical Exam  Constitutional:       General: He is not in acute distress.     Appearance: Normal appearance. He is normal weight. He is not ill-appearing.   HENT:      Head: Normocephalic.      Right Ear: External ear normal.      Left Ear: External ear normal.      Nose: No congestion or rhinorrhea.      Mouth/Throat:      Mouth: Mucous membranes are moist.      Pharynx: Oropharynx is clear.   Eyes:      Conjunctiva/sclera: Conjunctivae normal.      Pupils: Pupils are equal, round, and reactive to light.   Neck:      Thyroid: No thyroid mass, thyromegaly or thyroid tenderness.   Cardiovascular:      Rate and Rhythm: Normal rate and regular rhythm.      Pulses: Normal pulses.      Heart sounds: Normal heart sounds. No murmur heard.  Pulmonary:      Effort: Pulmonary effort is normal. No respiratory distress.      Breath sounds: Normal breath sounds.   Abdominal:      General: Abdomen is flat. Bowel sounds are normal. There is no distension.      Palpations: Abdomen is soft.      Tenderness: There is no abdominal tenderness.   Genitourinary:     Testes: Normal.    Musculoskeletal:         General: Normal range of motion.      Cervical back: Normal range of motion and neck supple.      Right lower leg: No edema.      Left lower leg: No edema.   Feet:      Right foot:      Skin integrity: Skin integrity normal.      Left foot:      Skin integrity: Skin integrity normal.   Lymphadenopathy:      Cervical: No cervical adenopathy.   Skin:     General: Skin is warm and dry.      Coloration: Skin is not jaundiced or pale.   Neurological:      General: No focal deficit present.      Mental Status: He is alert and oriented to person, place, and time.      Motor: No weakness.      Coordination: Coordination normal.      Gait: Gait normal.   Psychiatric:         Mood and Affect: Mood normal.         Behavior: Behavior normal.         Thought Content: Thought content normal.         Judgment: Judgment normal.          Assessment and Plan     Problem List Items Addressed This Visit    None  Visit Diagnoses       Neuropathy    -  Primary    Uncontrolled type 1 diabetes mellitus with hyperglycemia        Relevant Orders    Hemoglobin A1C, POCT (Completed)    Microalbumin/Creatinine Ratio, Urine    Glutamic Acid Decarboxylase    C-Peptide    Comprehensive Metabolic Panel    Ambulatory referral/consult to Diabetes Education    Retinopathy                Uncontrolled type 1 diabetes mellitus with hyperglycemia  Current A1C 10.7   A1C goal <7.0   Medications: Lantus 25 units,  Novolog sliding scale after eating changes:  Continue Lantus 25 units, start NovoLog 8 units with breakfast 12 units with lunch 12 units with dinner with a correction scale 1:50 > 150, Dexcom started today, DM Education and 1-2 weeks  Follow ADA diet, avoid soda, simple sweets, and limit rice, breads and starches  Do not skip meals, eat balanced ADA approved meals   Maintain a healthy weight, exercise 4-5 times a week for 30 minutes  Diet and medication adherence discussed on visit  Call Clinic to report Hypoglycemia  (CBG's <90)  Yearly flu shot, pneumonia shot as indicated   Return to clinic one-month for insulin titration  -     Ambulatory referral/consult to Endocrinology  -     Hemoglobin A1C, POCT  -     Microalbumin/Creatinine Ratio, Urine  -     Glutamic Acid Decarboxylase; Future; Expected date: 04/12/2023  -     C-Peptide; Future; Expected date: 04/12/2023  -     Comprehensive Metabolic Panel; Future; Expected date: 04/12/2023  -     Ambulatory referral/consult to Diabetes Education; Future; Expected date: 04/19/2023    Neuropathy  Discussed with patient controlling diabetes to prevent further symptoms of neuropathy    Retinopathy  Patient is currently followed by specialists for retinopathy discussed controlling diabetes to prevent further damage to his eyes      I spent a total of 45 minutes on the day of the visit.  This includes face to face time and non-face to face time preparing to see the patient (eg, review of tests), obtaining and/or reviewing separately obtained history, documenting clinical information in the electronic or other health record, independently interpreting results and communicating results to the patient/family/caregiver, or care coordinator.               General Sunscreen Counseling: I recommended a broad spectrum sunscreen with a SPF of 30 or higher.  I explained that SPF 30 sunscreens block approximately 97 percent of the sun's harmful rays.  Sunscreens should be applied at least 15 minutes prior to expected sun exposure and then every 2 hours after that as long as sun exposure continues. If swimming or exercising sunscreen should be reapplied every 45 minutes to an hour after getting wet or sweating.  One ounce, or the equivalent of a shot glass full of sunscreen, is adequate to protect the skin not covered by a bathing suit. I also recommended a lip balm with a sunscreen as well. Sun protective clothing can be used in lieu of sunscreen but must be worn the entire time you are exposed to the sun's rays. Detail Level: Generalized

## 2023-04-12 NOTE — LETTER
April 12, 2023      [facility]  [address] [facility] [address]  Phone: [phone]       Patient: Jose Francisco Romero   YOB: 1980  Date of Visit: 04/12/2023    To Whom It May Concern:    Paco Romero  was at [facility] on 04/12/2023. The patient may return to work/school on 4/13/2023 without restrictions. If you have any questions or concerns, or if I can be of further assistance, please do not hesitate to contact me.    Sincerely,    Noemí Rodas LPN

## 2023-04-13 ENCOUNTER — TELEPHONE (OUTPATIENT)
Dept: ENDOCRINOLOGY | Facility: CLINIC | Age: 43
End: 2023-04-13
Payer: MEDICAID

## 2023-04-13 LAB — C PEPTIDE P FAST SERPL-MCNC: <0.1 NG/ML (ref 1.1–4.4)

## 2023-04-19 LAB — GAD65 AB SER-SCNC: 0.79 NMOL/L

## 2023-04-21 ENCOUNTER — TELEPHONE (OUTPATIENT)
Dept: DIABETES | Facility: CLINIC | Age: 43
End: 2023-04-21

## 2023-04-21 ENCOUNTER — CLINICAL SUPPORT (OUTPATIENT)
Dept: DIABETES | Facility: CLINIC | Age: 43
End: 2023-04-21
Payer: MEDICAID

## 2023-04-21 VITALS — BODY MASS INDEX: 24.3 KG/M2 | HEIGHT: 73 IN | WEIGHT: 183.38 LBS

## 2023-04-21 DIAGNOSIS — E10.65 UNCONTROLLED TYPE 1 DIABETES MELLITUS WITH HYPERGLYCEMIA: ICD-10-CM

## 2023-04-21 PROCEDURE — G0108 DIAB MANAGE TRN  PER INDIV: HCPCS | Mod: PBBFAC

## 2023-04-21 PROCEDURE — 99212 OFFICE O/P EST SF 10 MIN: CPT | Mod: PBBFAC

## 2023-04-21 NOTE — TELEPHONE ENCOUNTER
Janiya,    I met with Jose rFancisco nathan for diabetes education & assisted him in changing his Dexcom sensor.  We downloaded his Dexcom information & his reports is attached.  I noted significant post-prandial hyperglycemia & pt admits that he is still taking Novlog after meals.  His main goal at this time is to work on taking his Novolog before meals.      Thanks,    Vanessa Moore RD, LDN  Diabetes Care Specialist

## 2023-04-21 NOTE — PROGRESS NOTES
Diabetes Care Specialist Progress Note  Author: Vanessa Moore RD  Date: 4/21/2023    Program Intake  Reason for Diabetes Program Visit:: Initial Diabetes Assessment  Current diabetes risk level:: moderate (score 1.5)  In the last 12 months, have you:: been admitted to a hospital (multiple episodes of DKA, most recent ~ 1 month ago per pt)  Was the ER or hospital admission related to diabetes?: Yes    Lab Results   Component Value Date    HGBA1C 10.7 (H) 04/12/2023       Clinical    Clinical Assessment  Current Diabetes Treatment: Insulin (Lantus 25 units qHS (~9PM), Novolog 12 units w/ meals + correction 1:50 > 150)  Have you ever experienced hypoglycemia (low blood sugar)?: no (lowest 80s in AM, symptoms dizzy, grouchy, tired)  Have you ever experienced hyperglycemia (high blood sugar)?: yes  In the last month, how often have you experienced high blood sugar?: more than once a day  Are you able to tell when your blood sugar is high?: Yes  What are your symptoms?: blurry vision, thirst, frequent urination  Have you ever been hospitalized because your blood sugar was high?: yes (see comments) (most recently ~ 1 month ago)    Medication Information  How many days a week do you miss your medications?: 2  Medication adherence impacting ability to self-manage diabetes?: Yes    Nutritional Status  Diet: Regular (skips breakfast most days or sometimes eats just fruit, eats lunch & supper daily)    Additional Social History    Support  Does anyone support you with your diabetes care?: yes  Who supports you?: self  Who takes you to your medical appointments?: self  Does the current support meet the patient's needs?: Yes  Is Support an area impacting ability to self-manage diabetes?: No    Access to [employer]  Media or technology needs impacting ability to self-manage diabetes?: No    Cognitive/Behavioral Health  Alert and Oriented: Yes  Difficulty Thinking: No  Requires Prompting: No  Requires assistance for  routine expression?: No  Cognitive or behavioral barriers impacting ability to self-manage diabetes?: No      Communication  Language preference: English  Hearing Problems: No  Vision Problems: Yes  Vision problem type:: Decreased Vision  Vision Assistance: Glasses  Communication needs impacting ability to self-manage diabetes?: No    Health Literacy  Preferred Learning Method: Face to Face, Reading Materials  Health literacy needs impacting ability to self-manage diabetes?: No      Diabetes Self-Management Skills Assessment    Diabetes Disease Process/Treatment Options  Diabetes Disease Process/Treatment Options: Skills Assessment Completed: No  Deferred due to:: Time    Nutrition/Healthy Eating  Nutrition/Healthy Eating Skills Assessment Completed:: No  Deffered due to:: Time    Physical Activity/Exercise  Physical Activity/Exercise Skills Assessment Completed: : No  Deffered due to:: Time    Medications  Patient is able to describe current diabetes management routine.: yes  Diabetes management routine:: insulin  Patient is able to identify current diabetes medications, dosages, and appropriate timing of medications.: yes  Patient reports problems or concerns with current medication regimen.: no  Medication Skills Assessment Completed:: Yes  Assessment indicates:: Instruction Needed  Area of need?: Yes    Home Blood Glucose Monitoring  Patient states that blood sugar is checked at home daily.: yes  Monitoring Method:: personal continuous glucose monitor  Personal CGM type:: Dexcom G6 (samples)  Patient is able to use personal CGM appropriately.: yes  CGM Report reviewed?: yes    Dexcom DOWNLOAD 4/12/23 - 4/21/23: See media file for details. Noted pattern of significant post-prandial hyperglycemia followed by large dips in glucose level.  Pt admits that he has still been taking Novolog after meals.  Noted 1 episode of hypoglycemia on 4/17 early AM.  Pt was sleeping & was not aware.    Sensor usage: 71%  Average  glucose: 239mg/dL    46% CGM readings greater than 250 mg/dL  18% CGM readings between 181-250 mg/dL  35% CGM readings in target glucose range of  mg/dL   <1% CGM readings between 54-79%  0% CGM readings less than 54 mg/dL     Home Blood Glucose Monitoring Skills Assessment Completed: : Yes  Assessment indicates:: Instruction Needed  Area of need?: Yes    Acute Complications  Patient is able to identify types of acute complications: No  Acute Complications Skills Assessment Completed: : Yes  Assessment indicates:: Instruction Needed  Area of need?: Yes    Chronic Complications  Chronic Complications Skills Assessment Completed: : No  Deferred due to:: Time    Psychosocial/Coping  Psychosocial/Coping Skills Assessment Completed: : No  Deffered due to:: Time      Assessment Summary and Plan    Based on today's diabetes care assessment, the following areas of need were identified:      Social 4/21/2023   Support No   Access to [employer] No   Cognitive/Behavioral Health No   Communication No   Health Literacy No        Clinical 4/21/2023   Medication Adherence Yes - see care plan        Diabetes Self-Management Skills 4/21/2023   Medication Yes - see care plan   Home Blood Glucose Monitoring Yes - see care plan   Acute Complications Yes - Reviewed hyperglycemia/hypoglycemia/DKA, s/s, & treatment.  Reviewed use of urine ketone strips which pt states he has at home.          Today's interventions were provided through individual discussion, instruction, and written materials were provided.      Patient verbalized understanding of instruction and written materials.  Pt was able to return back demonstration of instructions today. Patient understood key points, needs reinforcement and further instruction.     Diabetes Self-Management Care Plan:    Today's Diabetes Self-Management Care Plan was developed with Jose Francisco's input. Jose Francisco has agreed to work toward the following goal(s) to improve his/her overall diabetes  control.      Care Plan: Diabetes Management   Updates made since 3/22/2023 12:00 AM        Problem: Medications         Goal: Patient Agrees to take Novolog before meals & to take Lantus at consistent time every day.    Start Date: 4/21/2023   Expected End Date: 5/23/2023   Priority: High   Barriers: No Barriers Identified   Note:    4/21/23: Discussed insulin storage, injection sites, importance of rotating sites, priming insulin pens, sharps disposal.  Pt using both insulin pens & vial & syringe.  Pt reports still taking Novolog after meals.  Explained importance of taking ~15 minutes before meals.  Encouraged pt to set alarms on phone for earliest possible meal times (11AM & 6PM) as reminders to take Novolog before meals.  Pt reports skipping Lantus ~ twice weekly.  Also encouraged pt to set alarm for 9PM as a reminder to take Lantus.  Reviewed use of insulin correction 1:50 > 150 & correction sheet provided.  Pt using correction to treat hyperglycemia.  Encouraged pt to do so no more often than once q4h.  Pt taking 12 units Novolog with each meal at this time without hypoglycemia occurring after breakfast but eats breakfast rarely.          Task: Discussed guidelines for preventing, detecting and treating hypoglycemia and hyperglycemia and reviewed the importance of meal and medication timing with diabetes mediations for prevention of hypoglycemia and maximum drug benefit. Completed 4/21/2023        Problem: Blood Glucose Self-Monitoring         Goal: Patient will maintain active Dexcom.    Start Date: 4/21/2023   Expected End Date: 5/23/2023   Priority: Medium   Barriers: No Barriers Identified   Note:    4/21/23: Pt started on Dexcom G6 sample in endo clinic on 4/12 & further samples given to last ~ 6 months.  Pt reports that his phone is not compatible with G6 roin but will be compatible with G7 roni.  G7 ordered by endo NP for after pt runs out of G6 supplies.  Pt using Dexcom G6 reciever for now.  Reviewed  setting high/low alarms.  Discussed glucose readings & trend arrows.  Reviewed how to change a sensor & instructed pt that sensor must be changed every 10 days but transmitter is good for 90 days.  Provided pt with Dexcom customer support number.  Pt changed sensor in clinic today & applied new sensor to left abdomen.             Follow Up Plan     Briefly discussed quick breakfast ideas such as protein shakes/bars with fruit such as a banana.  Provided Glucerna sample & coupons.  Follow up in about 1 month (around 5/23/2023) for review of Dexcom report, health rating, distress scale, disease process, nutrition, exercise, chronic complications, coping, & goal progress.    Today's care plan and follow up schedule was discussed with patient.  Jose Francisco verbalized understanding of the care plan, goals, and agrees to follow up plan.        The patient was encouraged to communicate with his/her health care provider/physician and care team regarding his/her condition(s) and treatment.  I provided the patient with my contact information today and encouraged to contact me via phone or [facility] Portal as needed.     Length of Visit   Total Time: 60 Minutes

## 2023-05-10 ENCOUNTER — TELEPHONE (OUTPATIENT)
Dept: ENDOCRINOLOGY | Facility: CLINIC | Age: 43
End: 2023-05-10
Payer: MEDICAID

## 2023-05-10 NOTE — TELEPHONE ENCOUNTER
----- Message from Khushboo Tran sent at 5/10/2023 11:32 AM CDT -----  Regarding: Chart Notes  TOÑO Bella from [facility] (Christie Tamayo's office) is requesting the chart notes from the visit on 4/12/23.   Fax # [phone]   J[place] # [phone]

## 2023-05-22 ENCOUNTER — OFFICE VISIT (OUTPATIENT)
Dept: ENDOCRINOLOGY | Facility: CLINIC | Age: 43
End: 2023-05-22
Payer: MEDICAID

## 2023-05-22 VITALS
RESPIRATION RATE: 16 BRPM | DIASTOLIC BLOOD PRESSURE: 89 MMHG | SYSTOLIC BLOOD PRESSURE: 130 MMHG | BODY MASS INDEX: 24.19 KG/M2 | TEMPERATURE: 98 F | HEIGHT: 73 IN | WEIGHT: 182.5 LBS | HEART RATE: 108 BPM

## 2023-05-22 DIAGNOSIS — E16.2 HYPOGLYCEMIA: ICD-10-CM

## 2023-05-22 DIAGNOSIS — E10.9 TYPE 1 DIABETES MELLITUS WITHOUT COMPLICATION: ICD-10-CM

## 2023-05-22 DIAGNOSIS — E10.65 UNCONTROLLED TYPE 1 DIABETES MELLITUS WITH HYPERGLYCEMIA: Primary | ICD-10-CM

## 2023-05-22 PROCEDURE — 3046F HEMOGLOBIN A1C LEVEL >9.0%: CPT | Mod: CPTII,,, | Performed by: NURSE PRACTITIONER

## 2023-05-22 PROCEDURE — 3066F NEPHROPATHY DOC TX: CPT | Mod: CPTII,,, | Performed by: NURSE PRACTITIONER

## 2023-05-22 PROCEDURE — 3066F PR DOCUMENTATION OF TREATMENT FOR NEPHROPATHY: ICD-10-PCS | Mod: CPTII,,, | Performed by: NURSE PRACTITIONER

## 2023-05-22 PROCEDURE — 3046F PR MOST RECENT HEMOGLOBIN A1C LEVEL > 9.0%: ICD-10-PCS | Mod: CPTII,,, | Performed by: NURSE PRACTITIONER

## 2023-05-22 PROCEDURE — 3075F PR MOST RECENT SYSTOLIC BLOOD PRESS GE 130-139MM HG: ICD-10-PCS | Mod: CPTII,,, | Performed by: NURSE PRACTITIONER

## 2023-05-22 PROCEDURE — 95251 CONT GLUC MNTR ANALYSIS I&R: CPT | Mod: ,,, | Performed by: NURSE PRACTITIONER

## 2023-05-22 PROCEDURE — 95251 PR GLUCOSE MONITOR, 72 HOUR, PHYS INTERP: ICD-10-PCS | Mod: ,,, | Performed by: NURSE PRACTITIONER

## 2023-05-22 PROCEDURE — 3079F DIAST BP 80-89 MM HG: CPT | Mod: CPTII,,, | Performed by: NURSE PRACTITIONER

## 2023-05-22 PROCEDURE — 3060F PR POS MICROALBUMINURIA RESULT DOCUMENTED/REVIEW: ICD-10-PCS | Mod: CPTII,,, | Performed by: NURSE PRACTITIONER

## 2023-05-22 PROCEDURE — 3075F SYST BP GE 130 - 139MM HG: CPT | Mod: CPTII,,, | Performed by: NURSE PRACTITIONER

## 2023-05-22 PROCEDURE — 99214 OFFICE O/P EST MOD 30 MIN: CPT | Mod: PBBFAC | Performed by: NURSE PRACTITIONER

## 2023-05-22 PROCEDURE — 1160F RVW MEDS BY RX/DR IN RCRD: CPT | Mod: CPTII,,, | Performed by: NURSE PRACTITIONER

## 2023-05-22 PROCEDURE — 1160F PR REVIEW ALL MEDS BY PRESCRIBER/CLIN PHARMACIST DOCUMENTED: ICD-10-PCS | Mod: CPTII,,, | Performed by: NURSE PRACTITIONER

## 2023-05-22 PROCEDURE — 3008F PR BODY MASS INDEX (BMI) DOCUMENTED: ICD-10-PCS | Mod: CPTII,,, | Performed by: NURSE PRACTITIONER

## 2023-05-22 PROCEDURE — 1159F MED LIST DOCD IN RCRD: CPT | Mod: CPTII,,, | Performed by: NURSE PRACTITIONER

## 2023-05-22 PROCEDURE — 99214 PR OFFICE/OUTPT VISIT, EST, LEVL IV, 30-39 MIN: ICD-10-PCS | Mod: S$PBB,,, | Performed by: NURSE PRACTITIONER

## 2023-05-22 PROCEDURE — 1159F PR MEDICATION LIST DOCUMENTED IN MEDICAL RECORD: ICD-10-PCS | Mod: CPTII,,, | Performed by: NURSE PRACTITIONER

## 2023-05-22 PROCEDURE — 99214 OFFICE O/P EST MOD 30 MIN: CPT | Mod: S$PBB,,, | Performed by: NURSE PRACTITIONER

## 2023-05-22 PROCEDURE — 3060F POS MICROALBUMINURIA REV: CPT | Mod: CPTII,,, | Performed by: NURSE PRACTITIONER

## 2023-05-22 PROCEDURE — 3079F PR MOST RECENT DIASTOLIC BLOOD PRESSURE 80-89 MM HG: ICD-10-PCS | Mod: CPTII,,, | Performed by: NURSE PRACTITIONER

## 2023-05-22 PROCEDURE — 3008F BODY MASS INDEX DOCD: CPT | Mod: CPTII,,, | Performed by: NURSE PRACTITIONER

## 2023-05-22 RX ORDER — INSULIN GLARGINE 100 [IU]/ML
30 INJECTION, SOLUTION SUBCUTANEOUS DAILY
Qty: 15 ML | Refills: 11 | Status: ON HOLD | OUTPATIENT
Start: 2023-05-22 | End: 2023-07-03 | Stop reason: SDUPTHER

## 2023-05-22 RX ORDER — LIDOCAINE 50 MG/G
2 OINTMENT TOPICAL 2 TIMES DAILY PRN
COMMUNITY
Start: 2023-05-08 | End: 2023-07-26 | Stop reason: ALTCHOICE

## 2023-05-22 RX ORDER — PEN NEEDLE, DIABETIC 30 GX3/16"
NEEDLE, DISPOSABLE MISCELLANEOUS
Qty: 120 EACH | Refills: 11 | Status: SHIPPED | OUTPATIENT
Start: 2023-05-22

## 2023-05-22 RX ORDER — INSULIN ASPART 100 [IU]/ML
INJECTION, SOLUTION INTRAVENOUS; SUBCUTANEOUS
Qty: 15 ML | Refills: 11 | Status: SHIPPED | OUTPATIENT
Start: 2023-05-22

## 2023-05-22 NOTE — LETTER
May 22, 2023      [facility]  [address] [address]  Phone: [phone]       Patient: Jose Francisco Romero   YOB: 1980  Date of Visit: 05/22/2023    To Whom It May Concern:    Paco Romero  was at [facility] on 05/22/2023. The patient may return to work on 5/23/2023 without restrictions. If you have any questions or concerns, or if I can be of further assistance, please do not hesitate to contact me.    Sincerely,    Noemí Rodas LPN

## 2023-05-22 NOTE — PROGRESS NOTES
Subjective     Patient ID: Jose Francisco Romero is a 42 y.o. male.    Chief Complaint: No chief complaint on file.    Endocrine clinic note 04/12/2023:  42 year male scheduled today as new patient referral to endocrine clinic for history of uncontrolled diabetes type 1 with multiple episodes of DKA.  Patient was recently hospitalized with DKA one-week ago and reports to clinic today as new patient referral.  Patient states he was diagnosed with type 1 diabetes about 5 years ago when he went in the hospital with DKA patient has never been establish with an endocrinologist in his currently on basal insulin with a sliding scale with prandial insulin that he checks his sugar after eating and gives insulin after blood glucoses elevated.  Patient's PCP had written a Dexcom patient did not know how to use patient has not with him today Dexcom was placed on patient educated on placement by Noemí bee LPN.  Patient has not had formal education on type 1 diabetes or insulin.  Patient states he checks his CBGS 3 to 4 times a day and states his lowest blood glucoses 80 in the morning blood sugars range as high as the 400s.  He states occasionally forgets his basal insulin at night.  Discussed with patient in depth today that we will check a C-peptide and becky 65 also be referring him to Diabetes Education for insulin medication will adjust his regimen today.     Endocrine clinic note 05/22/2023:  42 year male scheduled today as one-month follow-up for endocrine clinic for insulin titration for history of uncontrolled type 1 diabetes with multiple episodes of DKA.  Previously patient's regimen was adjusted and Dexcom was restarted.    Dexcom interpretation 05/06/2023-05/19/2023.  Days with CGM data 100% 14/14 day.  Insulin data is lacking limiting interpretation.  Previously patient was having volatile blood sugars and treating glucose after eating patient's since has been giving insulin before eating and has had more stable  blood sugars at times he has prandial spikes at night patient states he is eating higher carb meals with rice and potatoes at night.  Patient has 2 episodes of hypoglycemia around lunchtime he states he does not eat a large meal like at nighttime.      Decrease lunchtime insulin to 10 units patient has appointment with Diabetes Education tomorrow will start carb counting and will work towards a pump.     Review of Systems   Constitutional:  Negative for activity change, appetite change, chills and fatigue.   HENT: Negative.  Negative for dental problem, hearing loss, rhinorrhea, sneezing and goiter.    Eyes: Negative.  Negative for photophobia and visual disturbance.   Respiratory: Negative.  Negative for cough, chest tightness and shortness of breath.    Cardiovascular:  Negative for chest pain, palpitations and leg swelling.   Gastrointestinal: Negative.  Negative for abdominal distention, abdominal pain, change in bowel habit, constipation, diarrhea, nausea, vomiting and change in bowel habit.   Endocrine: Negative.  Negative for cold intolerance, heat intolerance, polydipsia, polyphagia and polyuria.   Genitourinary: Negative.  Negative for difficulty urinating and erectile dysfunction.   Musculoskeletal:  Negative for back pain, joint swelling, leg pain, myalgias and joint deformity.   Integumentary:  Negative for color change, pallor and rash. Negative.   Allergic/Immunologic: Negative.  Negative for environmental allergies, food allergies and frequent infections.   Neurological: Negative.  Negative for tremors, syncope, headaches, coordination difficulties and coordination difficulties.   Hematological: Negative.  Does not bruise/bleed easily.   Psychiatric/Behavioral:  Negative for agitation and behavioral problems. The patient is not nervous/anxious and is not hyperactive.         Objective     Physical Exam  Constitutional:       General: He is not in acute distress.     Appearance: Normal appearance. He  is normal weight. He is not ill-appearing.   HENT:      Head: Normocephalic.      Right Ear: External ear normal.      Left Ear: External ear normal.      Nose: No congestion or rhinorrhea.      Mouth/Throat:      Mouth: Mucous membranes are moist.      Pharynx: Oropharynx is clear.   Eyes:      Conjunctiva/sclera: Conjunctivae normal.      Pupils: Pupils are equal, round, and reactive to light.   Neck:      Thyroid: No thyroid mass, thyromegaly or thyroid tenderness.   Cardiovascular:      Rate and Rhythm: Normal rate and regular rhythm.      Pulses: Normal pulses.      Heart sounds: Normal heart sounds. No murmur heard.  Pulmonary:      Effort: Pulmonary effort is normal. No respiratory distress.      Breath sounds: Normal breath sounds.   Abdominal:      General: Abdomen is flat. Bowel sounds are normal. There is no distension.      Palpations: Abdomen is soft.      Tenderness: There is no abdominal tenderness.   Genitourinary:     Testes: Normal.   Musculoskeletal:         General: Normal range of motion.      Cervical back: Normal range of motion and neck supple.      Right lower leg: No edema.      Left lower leg: No edema.   Feet:      Right foot:      Skin integrity: Skin integrity normal.      Left foot:      Skin integrity: Skin integrity normal.   Lymphadenopathy:      Cervical: No cervical adenopathy.   Skin:     General: Skin is warm and dry.      Coloration: Skin is not jaundiced or pale.   Neurological:      General: No focal deficit present.      Mental Status: He is alert and oriented to person, place, and time.      Motor: No weakness.      Coordination: Coordination normal.      Gait: Gait normal.   Psychiatric:         Mood and Affect: Mood normal.         Behavior: Behavior normal.         Thought Content: Thought content normal.         Judgment: Judgment normal.          Assessment and Plan     Problem List Items Addressed This Visit          Endocrine    Diabetes    Relevant Medications     "insulin (LANTUS SOLOSTAR U-100 INSULIN) glargine 100 units/mL SubQ pen    insulin aspart U-100 (NOVOLOG FLEXPEN U-100 INSULIN) 100 unit/mL (3 mL) InPn pen     Other Visit Diagnoses       Uncontrolled type 1 diabetes mellitus with hyperglycemia    -  Primary    Relevant Medications    insulin (LANTUS SOLOSTAR U-100 INSULIN) glargine 100 units/mL SubQ pen    insulin aspart U-100 (NOVOLOG FLEXPEN U-100 INSULIN) 100 unit/mL (3 mL) InPn pen    pen needle, diabetic 31 gauge x 3/16" Ndle    Other Relevant Orders    Diabetic Eye Screening Photo        Uncontrolled type 1 diabetes mellitus with hyperglycemia  Current A1C 10.7   A1C goal <7.0   Medications: Lantus 25 units, NovoLog 8 units with breakfast 12 units with lunch 12 units with dinner with a correction scale 1:50 > 150 Decrease lunch insulin 10 units at lunchtime  Follow ADA diet, avoid soda, simple sweets, and limit rice, breads and starches  Do not skip meals, eat balanced ADA approved meals   Maintain a healthy weight, exercise 4-5 times a week for 30 minutes  Diet and medication adherence discussed on visit  Call Clinic to report Hypoglycemia (CBG's <90)  Yearly flu shot, pneumonia shot as indicated   Return to clinic 3 months   Component Ref Range & Units 1 mo ago   C-Peptide, S 1.1 - 4.4 ng/mL <0.1 Low     -     Diabetic Eye Screening Photo    Hypoglycemia  Decrease lunchtime insulin to 10 units    I spent a total of 30 minutes on the day of the visit.  This includes face to face time and non-face to face time preparing to see the patient (eg, review of tests), obtaining and/or reviewing separately obtained history, documenting clinical information in the electronic or other health record, independently interpreting results and communicating results to the patient/family/caregiver, or care coordinator.    "

## 2023-05-23 ENCOUNTER — CLINICAL SUPPORT (OUTPATIENT)
Dept: DIABETES | Facility: CLINIC | Age: 43
End: 2023-05-23
Payer: MEDICAID

## 2023-05-23 DIAGNOSIS — E10.65 UNCONTROLLED TYPE 1 DIABETES MELLITUS WITH HYPERGLYCEMIA: Primary | ICD-10-CM

## 2023-05-23 PROCEDURE — 99211 OFF/OP EST MAY X REQ PHY/QHP: CPT | Mod: PBBFAC

## 2023-05-23 PROCEDURE — G0108 DIAB MANAGE TRN  PER INDIV: HCPCS | Mod: PBBFAC

## 2023-05-24 ENCOUNTER — TELEPHONE (OUTPATIENT)
Dept: DIABETES | Facility: CLINIC | Age: 43
End: 2023-05-24
Payer: MEDICAID

## 2023-05-24 VITALS — BODY MASS INDEX: 24.6 KG/M2 | WEIGHT: 185.63 LBS | HEIGHT: 73 IN

## 2023-05-24 NOTE — TELEPHONE ENCOUNTER
Janiya,    I met with Jose Francisco yesterday for diabetes education & we reviewed carbohydrate counting.  I believe that he would benefit from use of an insulin to carbohydrate ratio due to variations in his meal sizes & plans for transition to insulin pump therapy in the future.  We did briefly discuss how to use an insulin to carbohydrate ratio during our visit yesterday.  Please let me know if you think this would be appropriate to start now.  Unfortunately, I do not have a Dexcom report for you since pt was off of Dexcom from 5/19-5/23.    Thanks!

## 2023-05-24 NOTE — PROGRESS NOTES
Diabetes Care Specialist Progress Note  Author: Vanessa Moore RD  Date: 5/24/2023    Program Intake  Reason for Diabetes Program Visit:: Intervention  Type of Intervention:: Individual  Individual: Education  Education: Self-Management Skill Review  Current diabetes risk level:: low (score 0.5)    Lab Results   Component Value Date    HGBA1C 10.7 (H) 04/12/2023       Clinical    Patient Health Rating  Compared to other people your age, how would you rate your health?: Fair    Problem Review  Active comorbidities affecting diabetes self-care.: yes  Comorbidities: Neuropathy, Retinopathy (per pt)    Clinical Assessment  Current Diabetes Treatment: Insulin (Lantus 25 units qHS (~9PM), Novolog 12 units w/ meals (10 units with lunch or smaller meal) + correction 1:50 > 150)  Have you ever experienced hypoglycemia (low blood sugar)?: yes  In the last month, how often have you experienced low blood sugar?: more than once a week (5 episodes in 2 weeks mostly after hyperglycemia episodes)  In the last month, how often have you experienced high blood sugar?: more than once a day    Medication Information  How many days a week do you miss your medications?: Never  Medication adherence impacting ability to self-manage diabetes?: No      Nutritional Status  Diet: Regular  Meal Plan 24 Hour Recall: Breakfast, Lunch  Meal Plan 24 Hour Recall - Breakfast: 1 [employer]lan [employer] sausage burrito (24g carb), 1 hash brown (15g carb)  Meal Plan 24 Hour Recall - Lunch: 1 esteban briseno  Recent Changes in Weight: Weight Gain (2# wt gain past month)  Current nutritional status an area of need that is impacting patient's ability to self-manage diabetes?: No    Additional Social History         Access to [employer]  Does the patient have access to any of the following devices or technologies?: Smart phone      Diabetes Self-Management Skills Assessment    Diabetes Disease Process/Treatment Options  Diabetes Disease Process/Treatment  Options: Skills Assessment Completed: No  Deferred due to:: Time    Nutrition/Healthy Eating  Method of carbohydrate measurement:: no method  Patient can identify foods that impact blood sugar.: yes  Patient-identified foods:: starches (bread, pasta, rice, cereal)  Nutrition/Healthy Eating Skills Assessment Completed:: Yes  Assessment indicates:: Instruction Needed  Area of need?: Yes    Physical Activity/Exercise  Physical Activity/Exercise Skills Assessment Completed: : No  Deffered due to:: Other (comment)    Medications  Area of need?: Yes    Home Blood Glucose Monitoring  Patient states that blood sugar is checked at home daily.: yes  Monitoring Method:: personal continuous glucose monitor  Personal CGM type:: Dexcom G6 until 5/19, started G7 sample 5/23  Patient is able to use personal CGM appropriately.: yes  CGM Report reviewed?: yes    Dexcom DOWNLOAD 5/6/23-5/19/23: See media file for details. Noted hyperglycemia occurring regularly in afternoons with persistent hyperglycemia seen on 5/6-5/8 possibly indicating that pt forgot lantus on those days.  Noted 5 episodes of hypoglycemia occurring mostly after episodes of hyperglycemia.  Discussed use of correction scale only to treat hyperglycemia no more often than once q4h.  Pt does admit to sometimes giving a few extra units of insulin if glucose is extra high.    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)  Chronic Complications Skills Assessment Completed: : Yes  Assessment indicates:: Instruction Needed  Area of need?: Yes    Psychosocial/Coping  Psychosocial/Coping Skills Assessment Completed: : No  Deffered due to:: Time    Assessment Summary and Plan    Based on today's diabetes care assessment, the following areas of need were identified:      Social 4/21/2023   Support No   Access to [employer] No   Cognitive/Behavioral Health No   Communication No   Health Literacy No        Clinical 5/23/2023   Medication Adherence No   Nutritional Status No        Diabetes Self-Management Skills 5/23/2023   Nutrition/Healthy Eating Yes - see care plan   Medication Yes - see care plan   Home Blood Glucose Monitoring Yes - see care plan   Acute Complications -   Chronic Complications Yes - Discussed chronic complications of uncontrolled diabetes.  Discussed current A1C & A1C goal.  Explained that A1C < 7% decreases risk of complications.           Today's interventions were provided through individual discussion, instruction, and written materials were provided.      Patient verbalized understanding of instruction and written materials.  Pt was able to return back demonstration of instructions today. Patient understood key points, needs reinforcement and further instruction.     Diabetes Self-Management Care Plan:    Today's Diabetes Self-Management Care Plan was developed with Jose Francisco's input. Jose Francisco has agreed to work toward the following goal(s) to improve his/her overall diabetes control.      Care Plan: Diabetes Management   Updates made since 4/24/2023 12:00 AM        Problem: Medications         Goal: Patient Agrees to take Novolog before meals & to take Lantus at consistent time every day. Completed 5/23/2023   Start Date: 4/21/2023   Expected End Date: 5/23/2023   This Visit's Progress: Met   Priority: High   Barriers: No Barriers Identified   Note:    4/21/23: Discussed insulin storage, injection  sites, importance of rotating sites, priming insulin pens, sharps disposal.  Pt using both insulin pens & vial & syringe.  Pt reports still taking Novolog after meals.  Explained importance of taking ~15 minutes before meals.  Encouraged pt to set alarms on phone for earliest possible meal times (11AM & 6PM) as reminders to take Novolog before meals.  Pt reports skipping Lantus ~ twice weekly.  Also encouraged pt to set alarm for 9PM as a reminder to take Lantus.     5/23/23: Pt reports that he has been taking Novolog 15 minutes before meals at least 85-90% of the time & has been taking immediately after meal if he forgets but this has not been often anymore.  Pt did set alarms on phone as reminders to take insulin.  Pt reports that he has been taking Lantus consistently between 8-10PM every day.       Problem: Blood Glucose Self-Monitoring         Goal: Patient will maintain active Dexcom. Completed 5/23/2023   Start Date: 4/21/2023   Expected End Date: 5/23/2023   This Visit's Progress: Met   Priority: Medium   Barriers: No Barriers Identified   Note:    4/21/23: Pt started on Dexcom G6 sample in endo clinic on 4/12 & further samples given to last ~ 6 months.  Pt reports that his phone is not compatible with G6 roni but will be compatible with G7 roni.  G7 ordered by endo NP for after pt runs out of G6 supplies.  Pt using Dexcom G6 reciever for now.  Reviewed setting high/low alarms.  Discussed glucose readings & trend arrows.  Reviewed how to change a sensor & instructed pt that sensor must be changed every 10 days but transmitter is good for 90 days.  Provided pt with Dexcom customer support number.  Pt changed sensor in clinic today & applied new sensor to left abdomen.      5/23/23: Pt maintained active Dexcom G6 until 5/19 at which point his last sensor fell off.  He had 3 days with no sensor & then presented to endo clinic 5/23 & was given a Dexcom G7 sample & loaner  as G7 roni is not compatible with  his [employer] phone.  Assisted pt in increasing high alert on new G7 to 400 since pt has been having frequent hyperglycemia in 300s.  Pt working toward insulin pump so may have to revert back to Dexcom G6 in the future.       Problem: Healthy Eating         Goal: Patient agrees to begin counting carbohydrates & attempt to keep meals </= 60g carbohydrate & snacks </= 15g carbohydrate in preparation for possibility of transitioning to pump therapy in the future.    Start Date: 5/23/2023   Expected End Date: 6/23/2023   Priority: High   Barriers: No Barriers Identified   Note:    5/23/23: Reviewed sources of carbohydrate & appropriate portion sizes.  Discussed carbohydrate counting.  Encouraged pt to read nutrition facts labels for serving size & grams of total carbohydrate.  Gave goal of </= 60g carbohydrate per meal & </= 15g carbohydrate per snack but ultimately I believe that pt would benefit from I:C ratio vs set mealtime insulin doses to allow for more flexibility in meal size.  Will discuss with endo NP.         Task: Explained how to count carbohydrates using the food label and the use of dry measuring cups for accurate carb counting. Completed 5/24/2023       Task: Review the importance of balancing carbohydrates with each meal using portion control techniques to count servings of carbohydrate and label reading to identify serving size and amount of total carbs per serving. Completed 5/24/2023         Follow Up Plan     Follow up in about 1 month (around 6/23/2023) for review of Dexcom report, disease process, exercise, coping, & goal progress.    Today's care plan and follow up schedule was discussed with patient.  Jose Francisco verbalized understanding of the care plan, goals, and agrees to follow up plan.        The patient was encouraged to communicate with his/her health care provider/physician and care team regarding his/her condition(s) and treatment.  I provided the patient with my contact information today and  encouraged to contact me via phone or [facility] Patient Portal as needed.     Length of Visit   Total Time: 30 Minutes

## 2023-06-02 ENCOUNTER — TELEPHONE (OUTPATIENT)
Dept: ENDOCRINOLOGY | Facility: CLINIC | Age: 43
End: 2023-06-02
Payer: MEDICAID

## 2023-06-02 NOTE — TELEPHONE ENCOUNTER
Attempted to reach patient, no answer, unable to leave voicemail. Will call again to let the patient know he can come  a new sensor at the .

## 2023-06-02 NOTE — TELEPHONE ENCOUNTER
----- Message from Khushboo Tran sent at 6/2/2023  9:50 AM CDT -----  Regarding: Supply mgmt  TOÑO PT      Pt called stating that his sensor fell off.   Please call @ [phone]

## 2023-06-03 ENCOUNTER — HOSPITAL ENCOUNTER (EMERGENCY)
Facility: HOSPITAL | Age: 43
Discharge: HOME | End: 2023-06-03
Attending: EMERGENCY MEDICINE
Payer: MEDICAID

## 2023-06-03 VITALS
OXYGEN SATURATION: 98 % | BODY MASS INDEX: 24.54 KG/M2 | SYSTOLIC BLOOD PRESSURE: 155 MMHG | TEMPERATURE: 98 F | RESPIRATION RATE: 20 BRPM | DIASTOLIC BLOOD PRESSURE: 80 MMHG | WEIGHT: 185.19 LBS | HEIGHT: 73 IN | HEART RATE: 87 BPM

## 2023-06-03 DIAGNOSIS — M79.673 CHRONIC FOOT PAIN, UNSPECIFIED LATERALITY: Primary | ICD-10-CM

## 2023-06-03 DIAGNOSIS — G89.29 CHRONIC FOOT PAIN, UNSPECIFIED LATERALITY: Primary | ICD-10-CM

## 2023-06-03 LAB
GLUCOSE SERPL-MCNC: 182 MG/DL (ref 70–110)
POCT GLUCOSE: 182 MG/DL (ref 70–110)

## 2023-06-03 PROCEDURE — 82962 GLUCOSE BLOOD TEST: CPT

## 2023-06-03 PROCEDURE — 99284 EMERGENCY DEPT VISIT MOD MDM: CPT

## 2023-06-03 PROCEDURE — 99284 EMERGENCY DEPT VISIT MOD MDM: CPT | Mod: 27

## 2023-06-03 RX ORDER — INDOMETHACIN 25 MG/1
25 CAPSULE ORAL
Qty: 15 CAPSULE | Refills: 0 | Status: SHIPPED | OUTPATIENT
Start: 2023-06-03 | End: 2023-06-08

## 2023-06-03 RX ORDER — CLONAZEPAM 2 MG/1
2 TABLET ORAL NIGHTLY
COMMUNITY
Start: 2023-05-22

## 2023-06-03 RX ORDER — METHOCARBAMOL 500 MG/1
500 TABLET, FILM COATED ORAL 3 TIMES DAILY
Qty: 15 TABLET | Refills: 0 | Status: SHIPPED | OUTPATIENT
Start: 2023-06-03 | End: 2023-06-08

## 2023-06-03 NOTE — ED PROVIDER NOTES
Encounter Date: 6/3/2023       History     Chief Complaint   Patient presents with    Foot Pain     C/o bilateral foot pain x a while. 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   Foot Pain  This is a chronic problem. The current episode started more than 1 week ago. The problem occurs constantly. Pertinent negatives include no chest pain, no abdominal pain, no headaches and no shortness of breath. The symptoms are aggravated by walking and standing. The symptoms are relieved by rest.   Review of patient's allergies indicates:   Allergen Reactions    Penicillins     Sulfa (sulfonamide antibiotics)      Past Medical History:   Diagnosis Date    Diabetes mellitus type I     Hypertension      History reviewed. No pertinent surgical history.  Family History   Problem Relation Age of Onset    Diabetes Mother     Diabetes Father     Heart disease Father      Social History     Tobacco Use    Smoking status: Every Day     Packs/day: 0.50     Types: Cigarettes    Smokeless tobacco: Never   Substance Use Topics    Alcohol use: Never    Drug use: Never     Review of Systems   Constitutional:  Negative for fever.   HENT:  Negative for sore throat.    Respiratory:  Negative for shortness of breath.    Cardiovascular:  Negative for chest pain.   Gastrointestinal:  Negative for abdominal pain and nausea.   Genitourinary:  Negative for dysuria.   Musculoskeletal:  Negative for back pain.   Skin:  Negative for rash.   Neurological:  Negative for weakness and headaches.   Hematological:  Does not bruise/bleed easily.   Psychiatric/Behavioral: Negative.       Physical Exam     Initial Vitals [06/03/23 1624]   BP Pulse Resp Temp SpO2   (!) 163/96 100 16 98.4 °F (36.9 °C) 99 %      MAP        --         Physical Exam    Vitals reviewed.  Constitutional: He appears well-developed and well-nourished.   HENT:   Head: Normocephalic and atraumatic.   Eyes: Conjunctivae and EOM are normal. Pupils are equal, round, and reactive to light.   Neck:   Normal range of motion.  Cardiovascular:  Normal rate, regular rhythm, normal heart sounds and intact distal pulses.           Pulmonary/Chest: Breath sounds normal. He exhibits no tenderness.   Abdominal: Abdomen is soft. Bowel sounds are normal. He exhibits no distension. There is no abdominal tenderness.   Musculoskeletal:         General: Normal range of motion.      Cervical back: Normal range of motion.      Right foot: Normal range of motion and normal capillary refill. Tenderness present. No swelling or deformity. Normal pulse.      Left foot: Normal range of motion and normal capillary refill. Tenderness present. No swelling or deformity. Normal pulse.        Feet:       Comments: No erythema, no open skin, no warmth     Neurological: He is alert and oriented to person, place, and time. He displays normal reflexes. No cranial nerve deficit or sensory deficit. GCS score is 15. GCS eye subscore is 4. GCS verbal subscore is 5. GCS motor subscore is 6.   Skin: Skin is warm. No pallor.   Psychiatric: He has a normal mood and affect. His behavior is normal. Judgment and thought content normal.       ED Course   Procedures  Labs Reviewed   POCT GLUCOSE, HAND-HELD DEVICE - Abnormal; Notable for the following components:       Result Value    POC Glucose 182 (*)     All other components within normal limits   POCT GLUCOSE - Abnormal; Notable for the following components:    POCT Glucose 182 (*)     All other components within normal limits          Imaging Results    None          Medications - No data to display                           Clinical Impression:   Final diagnoses:  [M79.673, G89.29] Chronic foot pain, unspecified laterality (Primary)        ED  Disposition Condition    Discharge Stable          ED Prescriptions       Medication Sig Dispense Start Date End Date Auth. Provider    methocarbamoL (ROBAXIN) 500 MG Tab Take 1 tablet (500 mg total) by mouth 3 (three) times daily. for 5 days 15 tablet 6/3/2023 6/8/2023 EM Flannery    indomethacin (INDOCIN) 25 MG capsule Take 1 capsule (25 mg total) by mouth 3 (three) times daily with meals. for 5 days 15 capsule 6/3/2023 6/8/2023 EM Flannery          Follow-up Information       Follow up With Specialties Details Why Contact Info    discharge followup    If your symptoms become WORSE or you DO NOT IMPROVE and you are unable to reach your health care provider, you should RETURN to the emergency department    discharge info    Discussed all pertinent ED information, results, diagnosis and treatment plan; All questions and concerns were addressed at this time. Patient voices understanding of information and instructions. Patient is comfortable with plan and discharge             EM Flannery  06/03/23 2041

## 2023-06-04 ENCOUNTER — HOSPITAL ENCOUNTER (EMERGENCY)
Facility: HOSPITAL | Age: 43
Discharge: HOME | End: 2023-06-04
Attending: STUDENT IN AN ORGANIZED HEALTH CARE EDUCATION/TRAINING PROGRAM
Payer: MEDICAID

## 2023-06-04 VITALS
RESPIRATION RATE: 18 BRPM | DIASTOLIC BLOOD PRESSURE: 90 MMHG | SYSTOLIC BLOOD PRESSURE: 155 MMHG | HEART RATE: 80 BPM | OXYGEN SATURATION: 99 % | TEMPERATURE: 98 F

## 2023-06-04 DIAGNOSIS — E10.649 UNCONTROLLED TYPE 1 DIABETES MELLITUS WITH HYPOGLYCEMIA, UNSPECIFIED HYPOGLYCEMIA COMA STATUS: ICD-10-CM

## 2023-06-04 DIAGNOSIS — G63 POLYNEUROPATHY ASSOCIATED WITH UNDERLYING DISEASE: Primary | ICD-10-CM

## 2023-06-04 LAB
ALBUMIN SERPL-MCNC: 3.4 G/DL (ref 3.5–5)
ALBUMIN/GLOB SERPL: 1.1 RATIO (ref 1.1–2)
ALP SERPL-CCNC: 108 UNIT/L (ref 40–150)
ALT SERPL-CCNC: 16 UNIT/L (ref 0–55)
AST SERPL-CCNC: 14 UNIT/L (ref 5–34)
BASOPHILS # BLD AUTO: 0.11 X10(3)/MCL
BASOPHILS NFR BLD AUTO: 1.2 %
BILIRUBIN DIRECT+TOT PNL SERPL-MCNC: 0.3 MG/DL
BUN SERPL-MCNC: 17.3 MG/DL (ref 8.9–20.6)
CALCIUM SERPL-MCNC: 9.8 MG/DL (ref 8.4–10.2)
CHLORIDE SERPL-SCNC: 101 MMOL/L (ref 98–107)
CO2 SERPL-SCNC: 28 MMOL/L (ref 22–29)
CREAT SERPL-MCNC: 1.14 MG/DL (ref 0.73–1.18)
EOSINOPHIL # BLD AUTO: 0.67 X10(3)/MCL (ref 0–0.9)
EOSINOPHIL NFR BLD AUTO: 7.5 %
ERYTHROCYTE [DISTWIDTH] IN BLOOD BY AUTOMATED COUNT: 12.4 % (ref 11.5–17)
GFR SERPLBLD CREATININE-BSD FMLA CKD-EPI: >60 MLS/MIN/1.73/M2
GLOBULIN SER-MCNC: 3.2 GM/DL (ref 2.4–3.5)
GLUCOSE SERPL-MCNC: 422 MG/DL (ref 74–100)
HCT VFR BLD AUTO: 39.9 % (ref 42–52)
HGB BLD-MCNC: 13.2 G/DL (ref 14–18)
IMM GRANULOCYTES # BLD AUTO: 0.01 X10(3)/MCL (ref 0–0.04)
IMM GRANULOCYTES NFR BLD AUTO: 0.1 %
LYMPHOCYTES # BLD AUTO: 2.99 X10(3)/MCL (ref 0.6–4.6)
LYMPHOCYTES NFR BLD AUTO: 33.4 %
MAGNESIUM SERPL-MCNC: 1.6 MG/DL (ref 1.6–2.6)
MCH RBC QN AUTO: 27.7 PG (ref 27–31)
MCHC RBC AUTO-ENTMCNC: 33.1 G/DL (ref 33–36)
MCV RBC AUTO: 83.6 FL (ref 80–94)
MONOCYTES # BLD AUTO: 0.46 X10(3)/MCL (ref 0.1–1.3)
MONOCYTES NFR BLD AUTO: 5.1 %
NEUTROPHILS # BLD AUTO: 4.7 X10(3)/MCL (ref 2.1–9.2)
NEUTROPHILS NFR BLD AUTO: 52.7 %
NRBC BLD AUTO-RTO: 0 %
PLATELET # BLD AUTO: 275 X10(3)/MCL (ref 130–400)
PMV BLD AUTO: 9.7 FL (ref 7.4–10.4)
POCT GLUCOSE: 289 MG/DL (ref 70–110)
POTASSIUM SERPL-SCNC: 4.6 MMOL/L (ref 3.5–5.1)
PROT SERPL-MCNC: 6.6 GM/DL (ref 6.4–8.3)
RBC # BLD AUTO: 4.77 X10(6)/MCL (ref 4.7–6.1)
SODIUM SERPL-SCNC: 135 MMOL/L (ref 136–145)
WBC # SPEC AUTO: 8.94 X10(3)/MCL (ref 4.5–11.5)

## 2023-06-04 PROCEDURE — 85025 COMPLETE CBC W/AUTO DIFF WBC: CPT | Performed by: STUDENT IN AN ORGANIZED HEALTH CARE EDUCATION/TRAINING PROGRAM

## 2023-06-04 PROCEDURE — 80053 COMPREHEN METABOLIC PANEL: CPT | Performed by: STUDENT IN AN ORGANIZED HEALTH CARE EDUCATION/TRAINING PROGRAM

## 2023-06-04 PROCEDURE — 83735 ASSAY OF MAGNESIUM: CPT | Performed by: STUDENT IN AN ORGANIZED HEALTH CARE EDUCATION/TRAINING PROGRAM

## 2023-06-04 RX ORDER — IBUPROFEN 200 MG
16 TABLET ORAL
Status: DISCONTINUED | OUTPATIENT
Start: 2023-06-04 | End: 2023-06-04

## 2023-06-04 RX ORDER — GABAPENTIN 100 MG/1
100 CAPSULE ORAL SEE ADMIN INSTRUCTIONS
Qty: 90 CAPSULE | Refills: 0 | Status: ON HOLD | OUTPATIENT
Start: 2023-06-04 | End: 2023-06-25 | Stop reason: HOSPADM

## 2023-06-04 RX ORDER — GLUCAGON 1 MG
1 KIT INJECTION
Status: DISCONTINUED | OUTPATIENT
Start: 2023-06-04 | End: 2023-06-04

## 2023-06-04 RX ORDER — INSULIN ASPART 100 [IU]/ML
1-10 INJECTION, SOLUTION INTRAVENOUS; SUBCUTANEOUS
Status: DISCONTINUED | OUTPATIENT
Start: 2023-06-04 | End: 2023-06-04

## 2023-06-04 RX ORDER — IBUPROFEN 200 MG
24 TABLET ORAL
Status: DISCONTINUED | OUTPATIENT
Start: 2023-06-04 | End: 2023-06-04

## 2023-06-04 NOTE — ED PROVIDER NOTES
Encounter Date: 6/3/2023       History     Chief Complaint   Patient presents with    Leg Pain     HPI    42-year-old male with a past medical history of type 1 diabetes and hypertension presents emergency department for viral lower leg/foot pain.  Patient states that he has pins and needle sensation.  States he took gabapentin in the past made him sleepy.  Unsure of the dose.  Was seen at [facility] and discharged as muscle relaxers but was [facility].  Patient concerned that his electrolytes are off.    Review of patient's allergies indicates:   Allergen Reactions    Penicillins     Sulfa (sulfonamide antibiotics)      Past Medical History:   Diagnosis Date    Diabetes mellitus type I     Hypertension      Past Surgical History:   Procedure Laterality Date    CLAVICLE SURGERY       Family History   Problem Relation Age of Onset    Diabetes Mother     Diabetes Father     Heart disease Father      Social History     Tobacco Use    Smoking status: Every Day     Packs/day: 0.50     Types: Cigarettes    Smokeless tobacco: Never   Substance Use Topics    Alcohol use: Never    Drug use: Never     Review of Systems   Constitutional:  Negative for fever.   Respiratory:  Negative for cough and shortness of breath.    Cardiovascular:  Negative for chest pain and leg swelling.   Gastrointestinal:  Negative for abdominal pain.   Neurological:         Neuropathy   All other systems reviewed and are negative.    Physical Exam     Initial Vitals [06/03/23 2349]   BP Pulse Resp Temp SpO2   (!) 154/106 85 (!) 22 97.5 °F (36.4 °C) 99 %      MAP       --         Physical Exam    Nursing note and vitals reviewed.  Constitutional: He appears well-developed and well-nourished. No distress.   Multiple tattoos   Cardiovascular:  Normal rate, regular rhythm and intact distal pulses.           Pulmonary/Chest: Breath sounds normal. No respiratory distress.   Abdominal: Abdomen is soft.   Musculoskeletal:         General: No tenderness.  Normal range of motion.     Neurological: He is alert and oriented to person, place, and time. He has normal strength. GCS score is 15. GCS eye subscore is 4. GCS verbal subscore is 5. GCS motor subscore is 6.   Skin: Skin is warm. Capillary refill takes less than 2 seconds. No rash noted. No erythema.   Psychiatric: He has a normal mood and affect. Thought content normal.       ED Course   Procedures  Labs Reviewed   COMPREHENSIVE METABOLIC PANEL - Abnormal; Notable for the following components:       Result Value    Sodium Level 135 (*)     Glucose Level 422 (*)     Albumin Level 3.4 (*)     All other components within normal limits   CBC WITH DIFFERENTIAL - Abnormal; Notable for the following components:    Hgb 13.2 (*)     Hct 39.9 (*)     All other components within normal limits   MAGNESIUM - Normal   CBC W/ AUTO DIFFERENTIAL    Narrative:     The following orders were created for panel order CBC auto differential.  Procedure                               Abnormality         Status                     ---------                               -----------         ------                     CBC with Differential[[id number]]        Abnormal            Final result                 Please view results for these tests on the individual orders.          Imaging Results    None          Medications   dextrose 10% bolus 125 mL 125 mL (has no administration in time range)   dextrose 10% bolus 250 mL 250 mL (has no administration in time range)     Medical Decision Making:   Differential Diagnosis:   Peripheral neuropathy, metabolic derangement, chronic pain   Medical Decision Making  Problems Addressed:  Polyneuropathy associated with underlying disease: chronic illness or injury  Uncontrolled type 1 diabetes mellitus with hypoglycemia, unspecified hypoglycemia coma status: chronic illness or injury    Amount and/or Complexity of Data Reviewed  Labs: ordered. Decision-making details documented in ED Course.    Risk  OTC  drugs.  Prescription drug management.              ED Course as of 06/04/23 0152   Sun Jun 04, 2023 0102 WBC: 8.94 [BS]   0103 Hemoglobin(!): 13.2 [BS]   0103 Hematocrit(!): 39.9 [BS]   0103 Platelets: 275 [BS]   0140 Glucose(!): 422  Will give patient 10 of regular insulin subQ for his normal sliding scale and discharge [BS]   0151 CABG in 200s now.  No indication for insulin [BS]      ED Course User Index  [BS] Roe Berrios MD                 Clinical Impression:   Final diagnoses:  [G63] Polyneuropathy associated with underlying disease (Primary)  [E10.649] Uncontrolled type 1 diabetes mellitus with hypoglycemia, unspecified hypoglycemia coma status        ED Disposition Condition    Discharge Stable          ED Prescriptions       Medication Sig Dispense Start Date End Date Auth. Provider    gabapentin (NEURONTIN) 100 MG capsule Take 1 capsule (100 mg total) by mouth As instructed. Take 1 pill at bedtime for a week and take a pill in the morning and a pill at night for another week then take 1 pill 3 times a day 90 capsule 6/4/2023 6/3/2024 Roe Berrios MD          Follow-up Information       Follow up With Specialties Details Why Contact Info    [facility] - Emergency Dept Emergency Medicine Go to  If symptoms worsen [id number]  [facility] [id number] [phone]    Follow-up with primary care                 Roe Berrios MD  06/04/23 0152

## 2023-06-04 NOTE — ED TRIAGE NOTES
Pt c/o being a bad diabetic and having pain in bilateral legs and feet. Symptoms started 3/4 days ago.

## 2023-06-07 NOTE — TELEPHONE ENCOUNTER
Pt will  sensors tomorrow. Pt wanted to let you know he was in the ER two days ago for leg/foot pain, was prescribed gabapentin, states symptoms are not improving.

## 2023-06-08 ENCOUNTER — HOSPITAL ENCOUNTER (EMERGENCY)
Facility: HOSPITAL | Age: 43
Discharge: LEFT BEFORE DISCHARGE | End: 2023-06-09
Payer: MEDICAID

## 2023-06-08 VITALS
DIASTOLIC BLOOD PRESSURE: 95 MMHG | RESPIRATION RATE: 16 BRPM | TEMPERATURE: 98 F | OXYGEN SATURATION: 98 % | BODY MASS INDEX: 25.18 KG/M2 | HEART RATE: 127 BPM | HEIGHT: 73 IN | SYSTOLIC BLOOD PRESSURE: 143 MMHG | WEIGHT: 190 LBS

## 2023-06-08 DIAGNOSIS — R00.2 PALPITATIONS: ICD-10-CM

## 2023-06-08 LAB
ALBUMIN SERPL-MCNC: 3.9 G/DL (ref 3.5–5)
ALBUMIN/GLOB SERPL: 1.2 RATIO (ref 1.1–2)
ALP SERPL-CCNC: 119 UNIT/L (ref 40–150)
ALT SERPL-CCNC: 17 UNIT/L (ref 0–55)
AST SERPL-CCNC: 15 UNIT/L (ref 5–34)
BASOPHILS # BLD AUTO: 0.08 X10(3)/MCL
BASOPHILS NFR BLD AUTO: 1.4 %
BILIRUBIN DIRECT+TOT PNL SERPL-MCNC: 0.3 MG/DL
BUN SERPL-MCNC: 31.8 MG/DL (ref 8.9–20.6)
CALCIUM SERPL-MCNC: 9.9 MG/DL (ref 8.4–10.2)
CHLORIDE SERPL-SCNC: 98 MMOL/L (ref 98–107)
CO2 SERPL-SCNC: 28 MMOL/L (ref 22–29)
CREAT SERPL-MCNC: 1.31 MG/DL (ref 0.73–1.18)
EOSINOPHIL # BLD AUTO: 0.22 X10(3)/MCL (ref 0–0.9)
EOSINOPHIL NFR BLD AUTO: 3.8 %
ERYTHROCYTE [DISTWIDTH] IN BLOOD BY AUTOMATED COUNT: 12.5 % (ref 11.5–17)
FLUAV AG UPPER RESP QL IA.RAPID: NOT DETECTED
FLUBV AG UPPER RESP QL IA.RAPID: NOT DETECTED
GFR SERPLBLD CREATININE-BSD FMLA CKD-EPI: >60 MLS/MIN/1.73/M2
GLOBULIN SER-MCNC: 3.3 GM/DL (ref 2.4–3.5)
GLUCOSE SERPL-MCNC: 200 MG/DL (ref 74–100)
HCT VFR BLD AUTO: 46.3 % (ref 42–52)
HGB BLD-MCNC: 15.7 G/DL (ref 14–18)
IMM GRANULOCYTES # BLD AUTO: 0.01 X10(3)/MCL (ref 0–0.04)
IMM GRANULOCYTES NFR BLD AUTO: 0.2 %
LYMPHOCYTES # BLD AUTO: 2.47 X10(3)/MCL (ref 0.6–4.6)
LYMPHOCYTES NFR BLD AUTO: 43.1 %
MCH RBC QN AUTO: 27.4 PG (ref 27–31)
MCHC RBC AUTO-ENTMCNC: 33.9 G/DL (ref 33–36)
MCV RBC AUTO: 80.9 FL (ref 80–94)
MONOCYTES # BLD AUTO: 0.55 X10(3)/MCL (ref 0.1–1.3)
MONOCYTES NFR BLD AUTO: 9.6 %
NEUTROPHILS # BLD AUTO: 2.4 X10(3)/MCL (ref 2.1–9.2)
NEUTROPHILS NFR BLD AUTO: 41.9 %
NRBC BLD AUTO-RTO: 0 %
PLATELET # BLD AUTO: 302 X10(3)/MCL (ref 130–400)
PMV BLD AUTO: 9.6 FL (ref 7.4–10.4)
POTASSIUM SERPL-SCNC: 4.5 MMOL/L (ref 3.5–5.1)
PROT SERPL-MCNC: 7.2 GM/DL (ref 6.4–8.3)
RBC # BLD AUTO: 5.72 X10(6)/MCL (ref 4.7–6.1)
SARS-COV-2 RNA RESP QL NAA+PROBE: NOT DETECTED
SODIUM SERPL-SCNC: 137 MMOL/L (ref 136–145)
STREP A PCR (OHS): NOT DETECTED
WBC # SPEC AUTO: 5.73 X10(3)/MCL (ref 4.5–11.5)

## 2023-06-08 PROCEDURE — 93005 ELECTROCARDIOGRAM TRACING: CPT

## 2023-06-08 PROCEDURE — 93010 EKG 12-LEAD: ICD-10-PCS | Mod: ,,, | Performed by: INTERNAL MEDICINE

## 2023-06-08 PROCEDURE — 99284 EMERGENCY DEPT VISIT MOD MDM: CPT

## 2023-06-08 PROCEDURE — 85025 COMPLETE CBC W/AUTO DIFF WBC: CPT | Performed by: STUDENT IN AN ORGANIZED HEALTH CARE EDUCATION/TRAINING PROGRAM

## 2023-06-08 PROCEDURE — 80053 COMPREHEN METABOLIC PANEL: CPT | Performed by: STUDENT IN AN ORGANIZED HEALTH CARE EDUCATION/TRAINING PROGRAM

## 2023-06-08 PROCEDURE — 82962 GLUCOSE BLOOD TEST: CPT

## 2023-06-08 PROCEDURE — 87651 STREP A DNA AMP PROBE: CPT | Performed by: STUDENT IN AN ORGANIZED HEALTH CARE EDUCATION/TRAINING PROGRAM

## 2023-06-08 PROCEDURE — 93010 ELECTROCARDIOGRAM REPORT: CPT | Mod: ,,, | Performed by: INTERNAL MEDICINE

## 2023-06-08 PROCEDURE — 0240U COVID/FLU A&B PCR: CPT | Performed by: STUDENT IN AN ORGANIZED HEALTH CARE EDUCATION/TRAINING PROGRAM

## 2023-06-09 LAB — POCT GLUCOSE: 183 MG/DL (ref 70–110)

## 2023-06-23 ENCOUNTER — HOSPITAL ENCOUNTER (INPATIENT)
Facility: HOSPITAL | Age: 43
LOS: 2 days | Discharge: HOME | DRG: 638 | End: 2023-06-25
Attending: EMERGENCY MEDICINE | Admitting: STUDENT IN AN ORGANIZED HEALTH CARE EDUCATION/TRAINING PROGRAM
Payer: MEDICAID

## 2023-06-23 DIAGNOSIS — E11.10 DKA (DIABETIC KETOACIDOSIS): ICD-10-CM

## 2023-06-23 DIAGNOSIS — R07.9 CHEST PAIN: ICD-10-CM

## 2023-06-23 LAB
ANION GAP SERPL CALC-SCNC: 19 MEQ/L
ANION GAP SERPL CALC-SCNC: 28 MEQ/L
APPEARANCE UR: CLEAR
B-OH-BUTYR SERPL-MCNC: 10.49 MMOL/L
BACTERIA #/AREA URNS AUTO: ABNORMAL /HPF
BASE EXCESS BLD CALC-SCNC: -10.9 MMOL/L
BASOPHILS # BLD AUTO: 0.09 X10(3)/MCL
BASOPHILS NFR BLD AUTO: 0.6 %
BILIRUB UR QL STRIP.AUTO: NEGATIVE MG/DL
BLOOD GAS SAMPLE TYPE (OHS): ABNORMAL
BNP BLD-MCNC: 12.1 PG/ML
BUN SERPL-MCNC: 28.9 MG/DL (ref 8.9–20.6)
BUN SERPL-MCNC: 33.4 MG/DL (ref 8.9–20.6)
CALCIUM SERPL-MCNC: 9 MG/DL (ref 8.4–10.2)
CALCIUM SERPL-MCNC: 9.7 MG/DL (ref 8.4–10.2)
CHLORIDE SERPL-SCNC: 100 MMOL/L (ref 98–107)
CHLORIDE SERPL-SCNC: 93 MMOL/L (ref 98–107)
CO2 BLDA-SCNC: 15.7 MMOL/L
CO2 SERPL-SCNC: 11 MMOL/L (ref 22–29)
CO2 SERPL-SCNC: 15 MMOL/L (ref 22–29)
COHGB MFR BLDA: 2.2 %
COLOR UR: ABNORMAL
CREAT SERPL-MCNC: 1.68 MG/DL (ref 0.73–1.18)
CREAT SERPL-MCNC: 1.93 MG/DL (ref 0.73–1.18)
CREAT/UREA NIT SERPL: 17
CREAT/UREA NIT SERPL: 17
DRAWN BY BLOOD GAS (OHS): ABNORMAL
EOSINOPHIL # BLD AUTO: 0.18 X10(3)/MCL (ref 0–0.9)
EOSINOPHIL NFR BLD AUTO: 1.2 %
ERYTHROCYTE [DISTWIDTH] IN BLOOD BY AUTOMATED COUNT: 12.1 % (ref 11.5–17)
GFR SERPLBLD CREATININE-BSD FMLA CKD-EPI: 44 MLS/MIN/1.73/M2
GFR SERPLBLD CREATININE-BSD FMLA CKD-EPI: 52 MLS/MIN/1.73/M2
GLUCOSE SERPL-MCNC: 339 MG/DL (ref 74–100)
GLUCOSE SERPL-MCNC: 569 MG/DL (ref 74–100)
GLUCOSE UR QL STRIP.AUTO: ABNORMAL MG/DL
HCO3 BLDA-SCNC: 14.7 MMOL/L
HCT VFR BLD AUTO: 46.7 % (ref 42–52)
HGB BLD-MCNC: 15.7 G/DL (ref 14–18)
HYALINE CASTS #/AREA URNS LPF: ABNORMAL /LPF
IMM GRANULOCYTES # BLD AUTO: 0.07 X10(3)/MCL (ref 0–0.04)
IMM GRANULOCYTES NFR BLD AUTO: 0.5 %
KETONES UR QL STRIP.AUTO: ABNORMAL MG/DL
LACTATE SERPL-SCNC: 2 MMOL/L (ref 0.5–2.2)
LACTATE SERPL-SCNC: 2.4 MMOL/L (ref 0.5–2.2)
LEUKOCYTE ESTERASE UR QL STRIP.AUTO: NEGATIVE UNIT/L
LIPASE SERPL-CCNC: 6 U/L
LYMPHOCYTES # BLD AUTO: 1.7 X10(3)/MCL (ref 0.6–4.6)
LYMPHOCYTES NFR BLD AUTO: 11.1 %
MAGNESIUM SERPL-MCNC: 1.7 MG/DL (ref 1.6–2.6)
MCH RBC QN AUTO: 27.4 PG (ref 27–31)
MCHC RBC AUTO-ENTMCNC: 33.6 G/DL (ref 33–36)
MCV RBC AUTO: 81.5 FL (ref 80–94)
METHGB MFR BLDA: 0.5 %
MONOCYTES # BLD AUTO: 0.54 X10(3)/MCL (ref 0.1–1.3)
MONOCYTES NFR BLD AUTO: 3.5 %
MUCOUS THREADS URNS QL MICRO: ABNORMAL /LPF
NEUTROPHILS # BLD AUTO: 12.74 X10(3)/MCL (ref 2.1–9.2)
NEUTROPHILS NFR BLD AUTO: 83.1 %
NITRITE UR QL STRIP.AUTO: NEGATIVE
NRBC BLD AUTO-RTO: 0 %
O2 HB BLOOD GAS (OHS): 77.5 %
OXYHGB MFR BLDA: 16.2 G/DL
PCO2 BLDA: 32 MMHG (ref 40–50)
PH BLDA: 7.27 [PH] (ref 7.3–7.4)
PH UR STRIP.AUTO: 5 [PH]
PLATELET # BLD AUTO: 360 X10(3)/MCL (ref 130–400)
PMV BLD AUTO: 10.1 FL (ref 7.4–10.4)
PO2 BLDA: 46 MMHG (ref 30–40)
POCT GLUCOSE: 330 MG/DL (ref 70–110)
POCT GLUCOSE: >500 MG/DL (ref 70–110)
POTASSIUM SERPL-SCNC: 4.7 MMOL/L (ref 3.5–5.1)
POTASSIUM SERPL-SCNC: 5.1 MMOL/L (ref 3.5–5.1)
PROT UR QL STRIP.AUTO: ABNORMAL MG/DL
RBC # BLD AUTO: 5.73 X10(6)/MCL (ref 4.7–6.1)
RBC #/AREA URNS AUTO: ABNORMAL /HPF
RBC UR QL AUTO: NEGATIVE UNIT/L
SAO2 % BLDA: 79.7 %
SODIUM SERPL-SCNC: 132 MMOL/L (ref 136–145)
SODIUM SERPL-SCNC: 134 MMOL/L (ref 136–145)
SP GR UR STRIP.AUTO: 1.03
SQUAMOUS #/AREA URNS LPF: ABNORMAL /HPF
TROPONIN I SERPL-MCNC: <0.01 NG/ML (ref 0–0.04)
UROBILINOGEN UR STRIP-ACNC: NORMAL MG/DL
WBC # SPEC AUTO: 15.32 X10(3)/MCL (ref 4.5–11.5)
WBC #/AREA URNS AUTO: ABNORMAL /HPF

## 2023-06-23 PROCEDURE — 25000003 PHARM REV CODE 250: Performed by: EMERGENCY MEDICINE

## 2023-06-23 PROCEDURE — 63600175 PHARM REV CODE 636 W HCPCS: Performed by: EMERGENCY MEDICINE

## 2023-06-23 PROCEDURE — 82962 GLUCOSE BLOOD TEST: CPT

## 2023-06-23 PROCEDURE — 87040 BLOOD CULTURE FOR BACTERIA: CPT | Performed by: EMERGENCY MEDICINE

## 2023-06-23 PROCEDURE — 93005 ELECTROCARDIOGRAM TRACING: CPT

## 2023-06-23 PROCEDURE — 25000003 PHARM REV CODE 250: Performed by: STUDENT IN AN ORGANIZED HEALTH CARE EDUCATION/TRAINING PROGRAM

## 2023-06-23 PROCEDURE — 81001 URINALYSIS AUTO W/SCOPE: CPT | Performed by: EMERGENCY MEDICINE

## 2023-06-23 PROCEDURE — 83735 ASSAY OF MAGNESIUM: CPT | Performed by: EMERGENCY MEDICINE

## 2023-06-23 PROCEDURE — 83605 ASSAY OF LACTIC ACID: CPT | Performed by: EMERGENCY MEDICINE

## 2023-06-23 PROCEDURE — 82010 KETONE BODYS QUAN: CPT | Performed by: EMERGENCY MEDICINE

## 2023-06-23 PROCEDURE — 80048 BASIC METABOLIC PNL TOTAL CA: CPT | Performed by: EMERGENCY MEDICINE

## 2023-06-23 PROCEDURE — 83880 ASSAY OF NATRIURETIC PEPTIDE: CPT | Performed by: EMERGENCY MEDICINE

## 2023-06-23 PROCEDURE — 80048 BASIC METABOLIC PNL TOTAL CA: CPT | Performed by: STUDENT IN AN ORGANIZED HEALTH CARE EDUCATION/TRAINING PROGRAM

## 2023-06-23 PROCEDURE — 83690 ASSAY OF LIPASE: CPT | Performed by: EMERGENCY MEDICINE

## 2023-06-23 PROCEDURE — 20000000 HC ICU ROOM

## 2023-06-23 PROCEDURE — 82803 BLOOD GASES ANY COMBINATION: CPT

## 2023-06-23 PROCEDURE — 63600175 PHARM REV CODE 636 W HCPCS: Performed by: STUDENT IN AN ORGANIZED HEALTH CARE EDUCATION/TRAINING PROGRAM

## 2023-06-23 PROCEDURE — 85025 COMPLETE CBC W/AUTO DIFF WBC: CPT | Performed by: EMERGENCY MEDICINE

## 2023-06-23 PROCEDURE — 84484 ASSAY OF TROPONIN QUANT: CPT | Performed by: EMERGENCY MEDICINE

## 2023-06-23 PROCEDURE — 99291 CRITICAL CARE FIRST HOUR: CPT

## 2023-06-23 RX ORDER — SODIUM CHLORIDE 0.9 % (FLUSH) 0.9 %
10 SYRINGE (ML) INJECTION
Status: DISCONTINUED | OUTPATIENT
Start: 2023-06-23 | End: 2023-06-23

## 2023-06-23 RX ORDER — FLUOXETINE HYDROCHLORIDE 20 MG/1
40 CAPSULE ORAL DAILY
Status: CANCELLED | OUTPATIENT
Start: 2023-06-24

## 2023-06-23 RX ORDER — BUSPIRONE HYDROCHLORIDE 10 MG/1
10 TABLET ORAL 2 TIMES DAILY
Status: DISCONTINUED | OUTPATIENT
Start: 2023-06-23 | End: 2023-06-25 | Stop reason: HOSPADM

## 2023-06-23 RX ORDER — SODIUM CHLORIDE 9 MG/ML
1000 INJECTION, SOLUTION INTRAVENOUS CONTINUOUS
Status: DISCONTINUED | OUTPATIENT
Start: 2023-06-23 | End: 2023-06-24

## 2023-06-23 RX ORDER — DEXTROSE MONOHYDRATE 100 MG/ML
INJECTION, SOLUTION INTRAVENOUS
Status: DISCONTINUED | OUTPATIENT
Start: 2023-06-23 | End: 2023-06-24

## 2023-06-23 RX ORDER — PANTOPRAZOLE SODIUM 40 MG/1
40 TABLET, DELAYED RELEASE ORAL DAILY
Status: CANCELLED | OUTPATIENT
Start: 2023-06-24

## 2023-06-23 RX ORDER — PANTOPRAZOLE SODIUM 40 MG/1
40 TABLET, DELAYED RELEASE ORAL DAILY
Status: DISCONTINUED | OUTPATIENT
Start: 2023-06-24 | End: 2023-06-25 | Stop reason: HOSPADM

## 2023-06-23 RX ORDER — HYDROCODONE BITARTRATE AND ACETAMINOPHEN 5; 325 MG/1; MG/1
1 TABLET ORAL EVERY 6 HOURS PRN
Status: DISCONTINUED | OUTPATIENT
Start: 2023-06-23 | End: 2023-06-25 | Stop reason: HOSPADM

## 2023-06-23 RX ORDER — CLONAZEPAM 1 MG/1
2 TABLET ORAL DAILY
Status: DISCONTINUED | OUTPATIENT
Start: 2023-06-23 | End: 2023-06-25 | Stop reason: HOSPADM

## 2023-06-23 RX ORDER — SODIUM CHLORIDE 9 MG/ML
1000 INJECTION, SOLUTION INTRAVENOUS
Status: COMPLETED | OUTPATIENT
Start: 2023-06-23 | End: 2023-06-23

## 2023-06-23 RX ORDER — ACETAMINOPHEN 325 MG/1
650 TABLET ORAL EVERY 4 HOURS PRN
Status: DISCONTINUED | OUTPATIENT
Start: 2023-06-23 | End: 2023-06-25 | Stop reason: HOSPADM

## 2023-06-23 RX ORDER — FLUOXETINE HYDROCHLORIDE 20 MG/1
40 CAPSULE ORAL DAILY
Status: DISCONTINUED | OUTPATIENT
Start: 2023-06-24 | End: 2023-06-25 | Stop reason: HOSPADM

## 2023-06-23 RX ORDER — SODIUM CHLORIDE 0.9 % (FLUSH) 0.9 %
10 SYRINGE (ML) INJECTION
Status: DISCONTINUED | OUTPATIENT
Start: 2023-06-23 | End: 2023-06-25 | Stop reason: HOSPADM

## 2023-06-23 RX ORDER — ONDANSETRON 4 MG/1
4 TABLET, ORALLY DISINTEGRATING ORAL EVERY 6 HOURS PRN
Status: DISCONTINUED | OUTPATIENT
Start: 2023-06-23 | End: 2023-06-25 | Stop reason: HOSPADM

## 2023-06-23 RX ORDER — MAGNESIUM SULFATE HEPTAHYDRATE 40 MG/ML
2 INJECTION, SOLUTION INTRAVENOUS ONCE
Status: COMPLETED | OUTPATIENT
Start: 2023-06-23 | End: 2023-06-23

## 2023-06-23 RX ORDER — BUPROPION HYDROCHLORIDE 150 MG/1
150 TABLET ORAL EVERY MORNING
Status: DISCONTINUED | OUTPATIENT
Start: 2023-06-24 | End: 2023-06-25 | Stop reason: HOSPADM

## 2023-06-23 RX ORDER — MUPIROCIN 20 MG/G
OINTMENT TOPICAL 2 TIMES DAILY
Status: DISCONTINUED | OUTPATIENT
Start: 2023-06-23 | End: 2023-06-25 | Stop reason: HOSPADM

## 2023-06-23 RX ORDER — DEXTROSE MONOHYDRATE AND SODIUM CHLORIDE 5; .45 G/100ML; G/100ML
INJECTION, SOLUTION INTRAVENOUS CONTINUOUS PRN
Status: DISCONTINUED | OUTPATIENT
Start: 2023-06-23 | End: 2023-06-24

## 2023-06-23 RX ADMIN — SODIUM CHLORIDE 1000 ML: 9 INJECTION, SOLUTION INTRAVENOUS at 08:06

## 2023-06-23 RX ADMIN — MAGNESIUM SULFATE HEPTAHYDRATE 2 G: 40 INJECTION, SOLUTION INTRAVENOUS at 09:06

## 2023-06-23 RX ADMIN — BUSPIRONE HYDROCHLORIDE 10 MG: 10 TABLET ORAL at 09:06

## 2023-06-23 RX ADMIN — SODIUM CHLORIDE 1000 ML: 9 INJECTION, SOLUTION INTRAVENOUS at 09:06

## 2023-06-23 RX ADMIN — CEFTRIAXONE SODIUM 2 G: 2 INJECTION, POWDER, FOR SOLUTION INTRAMUSCULAR; INTRAVENOUS at 09:06

## 2023-06-23 RX ADMIN — CLONAZEPAM 2 MG: 1 TABLET ORAL at 09:06

## 2023-06-23 RX ADMIN — MUPIROCIN: 20 OINTMENT TOPICAL at 09:06

## 2023-06-23 RX ADMIN — INSULIN HUMAN 0.1 UNITS/KG/HR: 1 INJECTION, SOLUTION INTRAVENOUS at 09:06

## 2023-06-23 RX ADMIN — HYDROCODONE BITARTRATE AND ACETAMINOPHEN 1 TABLET: 5; 325 TABLET ORAL at 09:06

## 2023-06-24 LAB
ANION GAP SERPL CALC-SCNC: 12 MEQ/L
ANION GAP SERPL CALC-SCNC: 12 MEQ/L
BASOPHILS # BLD AUTO: 0.1 X10(3)/MCL
BASOPHILS NFR BLD AUTO: 0.7 %
BUN SERPL-MCNC: 23.7 MG/DL (ref 8.9–20.6)
BUN SERPL-MCNC: 26.9 MG/DL (ref 8.9–20.6)
CALCIUM SERPL-MCNC: 8.5 MG/DL (ref 8.4–10.2)
CALCIUM SERPL-MCNC: 8.7 MG/DL (ref 8.4–10.2)
CHLORIDE SERPL-SCNC: 103 MMOL/L (ref 98–107)
CHLORIDE SERPL-SCNC: 103 MMOL/L (ref 98–107)
CO2 SERPL-SCNC: 19 MMOL/L (ref 22–29)
CO2 SERPL-SCNC: 21 MMOL/L (ref 22–29)
CREAT SERPL-MCNC: 1.3 MG/DL (ref 0.73–1.18)
CREAT SERPL-MCNC: 1.49 MG/DL (ref 0.73–1.18)
CREAT/UREA NIT SERPL: 18
CREAT/UREA NIT SERPL: 18
EOSINOPHIL # BLD AUTO: 0.38 X10(3)/MCL (ref 0–0.9)
EOSINOPHIL NFR BLD AUTO: 2.6 %
ERYTHROCYTE [DISTWIDTH] IN BLOOD BY AUTOMATED COUNT: 12.1 % (ref 11.5–17)
EST. AVERAGE GLUCOSE BLD GHB EST-MCNC: 251.8 MG/DL
GFR SERPLBLD CREATININE-BSD FMLA CKD-EPI: 60 MLS/MIN/1.73/M2
GFR SERPLBLD CREATININE-BSD FMLA CKD-EPI: >60 MLS/MIN/1.73/M2
GLUCOSE SERPL-MCNC: 114 MG/DL (ref 74–100)
GLUCOSE SERPL-MCNC: 214 MG/DL (ref 74–100)
HBA1C MFR BLD: 10.4 %
HCT VFR BLD AUTO: 39.8 % (ref 42–52)
HGB BLD-MCNC: 13.7 G/DL (ref 14–18)
IMM GRANULOCYTES # BLD AUTO: 0.06 X10(3)/MCL (ref 0–0.04)
IMM GRANULOCYTES NFR BLD AUTO: 0.4 %
LYMPHOCYTES # BLD AUTO: 3.38 X10(3)/MCL (ref 0.6–4.6)
LYMPHOCYTES NFR BLD AUTO: 23.3 %
MAGNESIUM SERPL-MCNC: 1.8 MG/DL (ref 1.6–2.6)
MCH RBC QN AUTO: 27.2 PG (ref 27–31)
MCHC RBC AUTO-ENTMCNC: 34.4 G/DL (ref 33–36)
MCV RBC AUTO: 79.1 FL (ref 80–94)
MONOCYTES # BLD AUTO: 0.8 X10(3)/MCL (ref 0.1–1.3)
MONOCYTES NFR BLD AUTO: 5.5 %
NEUTROPHILS # BLD AUTO: 9.77 X10(3)/MCL (ref 2.1–9.2)
NEUTROPHILS NFR BLD AUTO: 67.5 %
NRBC BLD AUTO-RTO: 0 %
PHOSPHATE SERPL-MCNC: 3.2 MG/DL (ref 2.3–4.7)
PLATELET # BLD AUTO: 318 X10(3)/MCL (ref 130–400)
PMV BLD AUTO: 10.4 FL (ref 7.4–10.4)
POCT GLUCOSE: 100 MG/DL (ref 70–110)
POCT GLUCOSE: 142 MG/DL (ref 70–110)
POCT GLUCOSE: 150 MG/DL (ref 70–110)
POCT GLUCOSE: 153 MG/DL (ref 70–110)
POCT GLUCOSE: 183 MG/DL (ref 70–110)
POCT GLUCOSE: 210 MG/DL (ref 70–110)
POCT GLUCOSE: 220 MG/DL (ref 70–110)
POCT GLUCOSE: 449 MG/DL (ref 70–110)
POTASSIUM SERPL-SCNC: 4 MMOL/L (ref 3.5–5.1)
POTASSIUM SERPL-SCNC: 4.1 MMOL/L (ref 3.5–5.1)
RBC # BLD AUTO: 5.03 X10(6)/MCL (ref 4.7–6.1)
SODIUM SERPL-SCNC: 134 MMOL/L (ref 136–145)
SODIUM SERPL-SCNC: 136 MMOL/L (ref 136–145)
WBC # SPEC AUTO: 14.49 X10(3)/MCL (ref 4.5–11.5)

## 2023-06-24 PROCEDURE — 25000003 PHARM REV CODE 250: Performed by: STUDENT IN AN ORGANIZED HEALTH CARE EDUCATION/TRAINING PROGRAM

## 2023-06-24 PROCEDURE — 63600175 PHARM REV CODE 636 W HCPCS: Performed by: STUDENT IN AN ORGANIZED HEALTH CARE EDUCATION/TRAINING PROGRAM

## 2023-06-24 PROCEDURE — 85025 COMPLETE CBC W/AUTO DIFF WBC: CPT | Performed by: STUDENT IN AN ORGANIZED HEALTH CARE EDUCATION/TRAINING PROGRAM

## 2023-06-24 PROCEDURE — 83036 HEMOGLOBIN GLYCOSYLATED A1C: CPT | Performed by: STUDENT IN AN ORGANIZED HEALTH CARE EDUCATION/TRAINING PROGRAM

## 2023-06-24 PROCEDURE — 84100 ASSAY OF PHOSPHORUS: CPT | Performed by: STUDENT IN AN ORGANIZED HEALTH CARE EDUCATION/TRAINING PROGRAM

## 2023-06-24 PROCEDURE — S5010 5% DEXTROSE AND 0.45% SALINE: HCPCS | Performed by: EMERGENCY MEDICINE

## 2023-06-24 PROCEDURE — 83735 ASSAY OF MAGNESIUM: CPT | Performed by: STUDENT IN AN ORGANIZED HEALTH CARE EDUCATION/TRAINING PROGRAM

## 2023-06-24 PROCEDURE — 80048 BASIC METABOLIC PNL TOTAL CA: CPT | Performed by: STUDENT IN AN ORGANIZED HEALTH CARE EDUCATION/TRAINING PROGRAM

## 2023-06-24 PROCEDURE — 11000001 HC ACUTE MED/SURG PRIVATE ROOM

## 2023-06-24 PROCEDURE — 25000003 PHARM REV CODE 250: Performed by: EMERGENCY MEDICINE

## 2023-06-24 RX ORDER — LISINOPRIL 10 MG/1
20 TABLET ORAL DAILY
Status: DISCONTINUED | OUTPATIENT
Start: 2023-06-24 | End: 2023-06-25 | Stop reason: HOSPADM

## 2023-06-24 RX ORDER — SODIUM CHLORIDE, SODIUM LACTATE, POTASSIUM CHLORIDE, CALCIUM CHLORIDE 600; 310; 30; 20 MG/100ML; MG/100ML; MG/100ML; MG/100ML
INJECTION, SOLUTION INTRAVENOUS CONTINUOUS
Status: DISCONTINUED | OUTPATIENT
Start: 2023-06-24 | End: 2023-06-25 | Stop reason: HOSPADM

## 2023-06-24 RX ORDER — IBUPROFEN 200 MG
24 TABLET ORAL
Status: DISCONTINUED | OUTPATIENT
Start: 2023-06-24 | End: 2023-06-25 | Stop reason: HOSPADM

## 2023-06-24 RX ORDER — IBUPROFEN 200 MG
16 TABLET ORAL
Status: DISCONTINUED | OUTPATIENT
Start: 2023-06-24 | End: 2023-06-25 | Stop reason: HOSPADM

## 2023-06-24 RX ORDER — GLUCAGON 1 MG
1 KIT INJECTION
Status: DISCONTINUED | OUTPATIENT
Start: 2023-06-24 | End: 2023-06-25 | Stop reason: HOSPADM

## 2023-06-24 RX ORDER — INSULIN ASPART 100 [IU]/ML
1-10 INJECTION, SOLUTION INTRAVENOUS; SUBCUTANEOUS
Status: DISCONTINUED | OUTPATIENT
Start: 2023-06-24 | End: 2023-06-25 | Stop reason: HOSPADM

## 2023-06-24 RX ADMIN — SODIUM CHLORIDE, POTASSIUM CHLORIDE, SODIUM LACTATE AND CALCIUM CHLORIDE: 600; 310; 30; 20 INJECTION, SOLUTION INTRAVENOUS at 06:06

## 2023-06-24 RX ADMIN — SODIUM CHLORIDE, POTASSIUM CHLORIDE, SODIUM LACTATE AND CALCIUM CHLORIDE: 600; 310; 30; 20 INJECTION, SOLUTION INTRAVENOUS at 04:06

## 2023-06-24 RX ADMIN — INSULIN DETEMIR 20 UNITS: 100 INJECTION, SOLUTION SUBCUTANEOUS at 01:06

## 2023-06-24 RX ADMIN — INSULIN DETEMIR 25 UNITS: 100 INJECTION, SOLUTION SUBCUTANEOUS at 08:06

## 2023-06-24 RX ADMIN — CLONAZEPAM 2 MG: 1 TABLET ORAL at 09:06

## 2023-06-24 RX ADMIN — BUPROPION HYDROCHLORIDE 150 MG: 150 TABLET, FILM COATED, EXTENDED RELEASE ORAL at 06:06

## 2023-06-24 RX ADMIN — MUPIROCIN: 20 OINTMENT TOPICAL at 08:06

## 2023-06-24 RX ADMIN — DEXTROSE AND SODIUM CHLORIDE: 5; 450 INJECTION, SOLUTION INTRAVENOUS at 12:06

## 2023-06-24 RX ADMIN — BUSPIRONE HYDROCHLORIDE 10 MG: 10 TABLET ORAL at 08:06

## 2023-06-24 RX ADMIN — LISINOPRIL 20 MG: 10 TABLET ORAL at 09:06

## 2023-06-24 RX ADMIN — PANTOPRAZOLE 40 MG: 40 TABLET, DELAYED RELEASE ORAL at 09:06

## 2023-06-24 RX ADMIN — FLUOXETINE 40 MG: 20 CAPSULE ORAL at 09:06

## 2023-06-24 RX ADMIN — BUSPIRONE HYDROCHLORIDE 10 MG: 10 TABLET ORAL at 09:06

## 2023-06-24 RX ADMIN — HYDROCODONE BITARTRATE AND ACETAMINOPHEN 1 TABLET: 5; 325 TABLET ORAL at 06:06

## 2023-06-24 RX ADMIN — MUPIROCIN: 20 OINTMENT TOPICAL at 09:06

## 2023-06-24 RX ADMIN — INSULIN ASPART 4 UNITS: 100 INJECTION, SOLUTION INTRAVENOUS; SUBCUTANEOUS at 04:06

## 2023-06-24 RX ADMIN — HYDROCODONE BITARTRATE AND ACETAMINOPHEN 1 TABLET: 5; 325 TABLET ORAL at 08:06

## 2023-06-24 NOTE — ED PROVIDER NOTES
ED PROVIDER NOTE  6/23/2023    CHIEF COMPLAINT:   Chief Complaint   Patient presents with    Chest Pain    Shortness of Breath    Hyperglycemia     Patient  has  been  feeling  ill  for  the  last  few  days.  Unable  to  eat   or  drink       HISTORY OF PRESENT ILLNESS:   Jose Francisco Romero is a 42 y.o. male who presents with chief complaint Elevated blood sugar.  Reports blood sugar has been running high come and he is not been feeling well.  Reports having chest pain and low back pain for a couple of days along with diffuse abdominal pain, nausea, vomiting, and generalized weakness that has been constant.  States he just stayed in bed all day yesterday.  States symptoms feel like when he has been in DKA in the past.  Also complaining of chronic lower leg pain secondary to his peripheral neuropathy.    The history is provided by the patient.       REVIEW OF SYSTEMS: as noted in the HPI.  NURSING NOTES REVIEWED      PAST MEDICAL/SURGICAL HISTORY:   Past Medical History:   Diagnosis Date    Diabetes mellitus type I     Hypertension       Past Surgical History:   Procedure Laterality Date    CLAVICLE SURGERY         FAMILY HISTORY:   Family History   Problem Relation Age of Onset    Diabetes Mother     Diabetes Father     Heart disease Father        SOCIAL HISTORY:   Social History     Tobacco Use    Smoking status: Every Day     Packs/day: 0.50     Types: Cigarettes    Smokeless tobacco: Never   Substance Use Topics    Alcohol use: Never    Drug use: Never       ALLERGIES:   Review of patient's allergies indicates:   Allergen Reactions    Penicillins     Sulfa (sulfonamide antibiotics)        PHYSICAL EXAM:  Initial Vitals [06/23/23 1959]   BP Pulse Resp Temp SpO2   (!) 157/64 (!) 132 20 97.9 °F (36.6 °C) 100 %      MAP       --         Physical Exam    Nursing note and vitals reviewed.  Constitutional: He appears well-developed and well-nourished. He appears lethargic. He appears ill.   HENT:   Head: Normocephalic  and atraumatic.   Nose: Nose normal.   Mouth/Throat: Mucous membranes are dry.   Eyes: Conjunctivae and EOM are normal. Pupils are equal, round, and reactive to light.   Neck: Neck supple. No tracheal deviation present.   Cardiovascular:  Regular rhythm, normal heart sounds, intact distal pulses and normal pulses.   Tachycardia present.         Pulmonary/Chest: Effort normal and breath sounds normal. No respiratory distress.   Abdominal: Abdomen is soft. There is generalized abdominal tenderness. There is no rebound and no guarding.   Musculoskeletal:         General: Normal range of motion.      Cervical back: Neck supple. No bony tenderness.      Thoracic back: No bony tenderness.      Lumbar back: Tenderness present. No bony tenderness.        Back:      Neurological: He is oriented to person, place, and time. He appears lethargic. GCS eye subscore is 4. GCS verbal subscore is 5. GCS motor subscore is 6.   CN II-XII intact. Moves all extremities. No gross sensory or motor deficits.   Skin: Skin is warm, dry and intact.   Psychiatric: He has a normal mood and affect. His speech is normal and behavior is normal. Judgment and thought content normal. Cognition and memory are normal.       RESULTS:  Labs Reviewed   CBC WITH DIFFERENTIAL - Abnormal; Notable for the following components:       Result Value    WBC 15.32 (*)     Neut # 12.74 (*)     IG# 0.07 (*)     All other components within normal limits   BASIC METABOLIC PANEL - Abnormal; Notable for the following components:    Sodium Level 132 (*)     Chloride 93 (*)     Carbon Dioxide 11 (*)     Glucose Level 569 (*)     Blood Urea Nitrogen 33.4 (*)     Creatinine 1.93 (*)     All other components within normal limits   BLOOD GAS - Abnormal; Notable for the following components:    pH, Blood gas 7.270 (*)     pCO2, Blood gas 32.0 (*)     pO2, Blood gas 46.0 (*)     All other components within normal limits   BETA - HYDROXYBUTYRATE, SERUM - Abnormal; Notable for the  following components:    Beta Hydroxybutyrate 10.49 (*)     All other components within normal limits   LACTIC ACID, PLASMA - Abnormal; Notable for the following components:    Lactic Acid Level 2.4 (*)     All other components within normal limits   POCT GLUCOSE - Abnormal; Notable for the following components:    POCT Glucose >500 (*)     All other components within normal limits   B-TYPE NATRIURETIC PEPTIDE - Normal   LIPASE - Normal   MAGNESIUM - Normal   TROPONIN I - Normal   BLOOD CULTURE [facility]   BLOOD CULTURE [facility]   CBC W/ AUTO DIFFERENTIAL    Narrative:     The following orders were created for panel order CBC auto differential.  Procedure                               Abnormality         Status                     ---------                               -----------         ------                     CBC with Differential[[id number]]        Abnormal            Final result                 Please view results for these tests on the individual orders.   POCT GLUCOSE MONITORING CONTINUOUS     Imaging Results              X-Ray Chest AP Portable (Final result)  Result time 06/23/23 21:17:05      Final result by Anirudh Freire MD (06/23/23 21:17:05)                   Impression:      NO ACUTE CARDIOPULMONARY PROCESS IDENTIFIED.      Electronically signed by: Anirudh Freire  Date:    06/23/2023  Time:    21:17               Narrative:    EXAMINATION:  XR CHEST AP PORTABLE    CLINICAL HISTORY:  Type 2 diabetes mellitus with ketoacidosis without coma    TECHNIQUE:  One view    COMPARISON:  August 20, 2022.    FINDINGS:  Cardiopericardial silhouette is within normal limits.  Old deformities of the left upper ribs and chronic pleural thickening.  No acute dense focal or segmental consolidation, congestive process, pleural effusions or pneumothorax.  Old transverse fracture left clavicle.                                      PROCEDURES:  Procedures    ECG:  EKG Readings: (Independently Interpreted)   Initial Reading: No  STEMI. Rhythm: Sinus Tachycardia. Heart Rate: 137. Conduction: Normal. Axis: Right Axis Deviation.     ED COURSE AND MEDICAL DECISION MAKING:  Medications   sodium chloride 0.9% bolus 2,178 mL 2,178 mL (2,178 mLs Intravenous Not Given 6/23/23 2030)   0.9%  NaCl infusion (0 mLs Intravenous Stopped 6/24/23 0000)   dextrose 5 % and 0.45 % NaCl infusion ( Intravenous New Bag 6/24/23 0008)   dextrose 10 % infusion (has no administration in time range)   dextrose 10 % infusion (has no administration in time range)   dextrose 10% bolus 125 mL 125 mL (has no administration in time range)   dextrose 10% bolus 250 mL 250 mL (has no administration in time range)   insulin regular in 0.9 % NaCl 100 unit/100 mL (1 unit/mL) infusion (0.05 Units/kg/hr × 72.6 kg Intravenous Rate/Dose Change 6/24/23 0000)   sodium chloride 0.9% flush 10 mL (has no administration in time range)   ondansetron disintegrating tablet 4 mg (has no administration in time range)   acetaminophen tablet 650 mg (has no administration in time range)   busPIRone tablet 10 mg (10 mg Oral Given 6/23/23 2147)   buPROPion TB24 tablet 150 mg (has no administration in time range)   clonazePAM tablet 2 mg (2 mg Oral Given 6/23/23 2147)   FLUoxetine capsule 40 mg (has no administration in time range)   pantoprazole EC tablet 40 mg (has no administration in time range)   HYDROcodone-acetaminophen 5-325 mg per tablet 1 tablet (1 tablet Oral Given 6/23/23 2147)   mupirocin 2 % ointment ( Nasal Given 6/23/23 2147)   0.9%  NaCl infusion (0 mLs Intravenous Stopped 6/23/23 2110)   cefTRIAXone (ROCEPHIN) 2 g in dextrose 5 % in water (D5W) 5 % 100 mL IVPB (MB+) (0 g Intravenous Stopped 6/23/23 2137)   insulin regular bolus from bag/infusion 7.26 Units 7.26 mL (7.26 Units Intravenous Bolus from Bag 6/23/23 2105)   magnesium sulfate 2g in water 50mL IVPB (premix) (0 g Intravenous Stopped 6/23/23 2347)   insulin detemir U-100 injection 20 Units (20 Units Subcutaneous Given 6/24/23  0109)           Medical Decision Making  42-year-old male presents with generalized weakness having diffuse abdominal pain, nausea, vomiting, chest pain, back pain along with worsening of his chronic lower extremity pain over the past couple of days stating feels like when he has been in DKA in the past.  DKA workup initiated.  CBC shows leukocytosis of 15.32, so now meeting to sirs criteria sepsis orders initiated and he was given IV Rocephin.  BMP shows glucose 569, creatinine 1.93 which is up from 1.31 earlier this month, bicarb of 11, anion gap 28, K 5.1.  Troponin and BNP normal.  Lactic acid 2.4.  Beta hydroxybutyrate 10.49.  VBG pH 7.27.  Given 30 milliliter/kg fluid bolus of normal saline along with insulin bolus and drip.  Chest x-ray shows no acute intrathoracic process.  We will admit for further medical evaluation or treatment for DKA.  I have spoken with the patient and/or caregivers. I have explained the patient's condition, diagnoses and treatment plan based on the information available to me at this time. I have answered the patient's and/or caregiver's questions and addressed any concerns. The patient and/or caregivers have as good an understanding of the patient's diagnosis, condition and treatment plan as can be expected at this point. The patient has been stabilized within the capability of the emergency department. The patient will be transported for further care and management or will be moved to an observation or inpatient service. I have communicated with the staff or medical practitioner taking over this patient's care. I attest a sepsis perfusion exam was performed within 6 hours of sepsis, severe sepsis, or septic shock presentation, following fluid resuscitation.     I have personally provided 35 minutes of critical care time exclusive of time spent on separately billable procedures. Time includes review of laboratory data, radiology results, discussion with consultants, and monitoring  for potential decompensation. Interventions were performed as documented above.     Amount and/or Complexity of Data Reviewed  External Data Reviewed: labs.  Labs: ordered. Decision-making details documented in ED Course.  Radiology: ordered. Decision-making details documented in ED Course.    Risk  Drug therapy requiring intensive monitoring for toxicity.  Decision regarding hospitalization.    Critical Care  Total time providing critical care: 35 minutes      CLINICAL IMPRESSION:  1. Chest pain    2. DKA (diabetic ketoacidosis)        DISPOSITION:   ED Disposition Condition    Admit Stable                    Adalid Man DO  06/24/23 8575

## 2023-06-24 NOTE — H&P
[facility] - ICU  Pulmonary Critical Care Note    Patient Name: Jose Francisco Romero  MRN: [id number]  Admission Date: 6/23/2023  Hospital Length of Stay: 1 days  Code Status: Full Code  Attending Provider: Jose Espino MD  Primary Care Provider: Munira Linn MD     Subjective:     HPI:   Patient is a 42-year-old male with past medical history of type 1 diabetes, hypertension, unspecified psychiatric disorder, severe diabetic neuropathy who presents to [facility] ED for nausea, vomiting, generalized weakness.  Patient states he has been feeling fairly poorly for a few weeks now.  Presented to outside ER on 06/09 and was sent home and told to continue taking insulin.  Patient has continued to suffer since then, has had very limited oral intake and has been getting weaker by the day.  Patient states he has not been vomiting much recently, but remains nauseous and with generalized abdominal pain.  Continues to make good urine.  Patient is normally compliant with insulin, has not been taking as frequently with current lack of oral intake.  Patient states he is not been admitted for DKA in quite some time.  Recently started to see NP in [facility] endocrinology clinic, last A1c 10.7.  Patient currently taking 25 units of Lantus nightly, sliding scale during the day.  Has also had multiple ED visits for chronic bilateral leg pain that seems to be associated with diabetic neuropathy.  Was previously on gabapentin but he states this made him drowsy and did not help at all.  Patient with some mild associated shortness of breath, no chest pain, no dysuria, no other acute concerns at this time.  Patient will be admitted to ICU for DKA protocol    Past Medical History:   Diagnosis Date    Diabetes mellitus type I     Hypertension      Past Surgical History:   Procedure Laterality Date    CLAVICLE SURGERY       Social History     Socioeconomic History    Marital status: Single   Tobacco Use    Smoking status: Every Day     Packs/day: 0.50  "    Types: Cigarettes    Smokeless tobacco: Never   Substance and Sexual Activity    Alcohol use: Never    Drug use: Never    Sexual activity: Yes     Birth control/protection: Condom     Current Outpatient Medications   Medication Instructions    ALPRAZolam (XANAX) 2 mg, Oral, Daily PRN    blood sugar diagnostic (TRUE METRIX GLUCOSE TEST STRIP) Strp 1 strip, Misc.(Non-Drug; Combo Route), 4 times daily    buPROPion (WELLBUTRIN XL) 150 mg, Oral, Every morning    buPROPion (WELLBUTRIN XL) 300 mg, Oral, Every morning    busPIRone (BUSPAR) 10 mg, Oral    clonazePAM (KLONOPIN) 2 mg, Oral, Daily PRN    cyclobenzaprine (FLEXERIL) 10 mg, Oral, 3 times daily PRN    FLUoxetine 40 mg, Oral    gabapentin (NEURONTIN) 100 mg, Oral, See admin instructions, Take 1 pill at bedtime for a week and take a pill in the morning and a pill at night for another week then take 1 pill 3 times a day    insulin (LANTUS SOLOSTAR U-100 INSULIN) 30 Units, Subcutaneous, Daily    insulin aspart U-100 (NOVOLOG FLEXPEN U-100 INSULIN) 100 unit/mL (3 mL) InPn pen 12 units TID with meals with correction 1:50 >150 Max dose 50 units    lansoprazole (PREVACID) 30 mg, Oral    LIDOcaine (XYLOCAINE) 2 g, Topical (Top), 2 times daily PRN    lisinopriL (PRINIVIL,ZESTRIL) 30 mg, Oral, Daily    nicotine (NICODERM CQ) 7 mg/24 hr 1 patch, Transdermal, Every 24 hours (non-standard times)    pen needle, diabetic 31 gauge x 3/16" Ndle Use 4 times a day for insulin injection (BD Mini)   Current Inpatient Medications   buPROPion  150 mg Oral QAM    busPIRone  10 mg Oral BID    clonazePAM  2 mg Oral Daily    FLUoxetine  40 mg Oral Daily    mupirocin   Nasal BID    pantoprazole  40 mg Oral Daily    sodium chloride 0.9%  30 mL/kg Intravenous Once   Current Intravenous Infusions   sodium chloride 0.9% 1,000 mL (06/23/23 2104)    dextrose 5 % and 0.45 % NaCl      insulin regular 1 units/mL infusion orderable (DKA) 0.1 Units/kg/hr (06/23/23 2300)     Review of Systems "   Constitutional:  Positive for malaise/fatigue. Negative for chills and fever.   Respiratory:  Positive for shortness of breath. Negative for cough.    Cardiovascular:  Negative for chest pain and leg swelling.   Gastrointestinal:  Positive for abdominal pain, nausea and vomiting.   Genitourinary:  Negative for dysuria.   Neurological:  Positive for weakness. Negative for headaches.      Objective:     Vital Signs (Most Recent):  Temp: 97.9 °F (36.6 °C) (06/23/23 1959)  Pulse: (!) 127 (06/23/23 2100)  Resp: (!) 22 (06/23/23 2147)  BP: (!) 167/103 (06/23/23 2100)  SpO2: 100 % (06/23/23 2100)  Body mass index is 21.11 kg/m².  Weight: 72.6 kg (160 lb) Vital Signs (24h Range):  Temp:  [97.9 °F (36.6 °C)] 97.9 °F (36.6 °C)  Pulse:  [118-132] 127  Resp:  [17-26] 22  SpO2:  [100 %] 100 %  BP: (157-167)/() 167/103     Physical Exam  Constitutional:       Appearance: He is ill-appearing.   HENT:      Head: Normocephalic and atraumatic.      Mouth/Throat:      Mouth: Mucous membranes are dry.      Pharynx: Oropharynx is clear.   Cardiovascular:      Rate and Rhythm: Regular rhythm. Tachycardia present.      Heart sounds: Normal heart sounds.   Pulmonary:      Effort: Pulmonary effort is normal.      Breath sounds: Normal breath sounds.   Abdominal:      Palpations: Abdomen is soft.      Tenderness: There is abdominal tenderness (Diffusely).   Musculoskeletal:         General: Tenderness (Tenderness to palpation, bilaterally below knees) present.      Right lower leg: No edema.      Left lower leg: No edema.   Skin:     Capillary Refill: Capillary refill takes 2 to 3 seconds.   Neurological:      General: No focal deficit present.      Mental Status: He is alert and oriented to person, place, and time.     Lines/Drains/Airways       Peripheral Intravenous Line  Duration                  Peripheral IV - Single Lumen 06/23/23 16 G Right Antecubital 1 day         Peripheral IV - Single Lumen 06/23/23 2011 18 G  Anterior;Right Forearm <1 day                Significant Labs:  Lab Results   Component Value Date    WBC 15.32 (H) 2023    HGB 15.7 2023    HCT 46.7 2023    MCV 81.5 2023     2023   BMP  Lab Results   Component Value Date     (L) 2023    K 4.7 2023    CO2 15 (L) 2023    BUN 28.9 (H) 2023    CREATININE 1.68 (H) 2023    CALCIUM 9.0 2023    EGFRNONAA >60 2022   ABG  Recent Labs   Lab 23   PH 7.270*   PO2 46.0*   HCO3 14.7   Significant Imaging:  I have reviewed the pertinent imaging within the past 24 hours.  Assessment/Plan:     Assessment  Diabetic ketoacidosis  Uncontrolled type 1 diabetes (A1c 10.7)   Severe peripheral neuropathy   Anxiety/depression   Acute kidney injury, likely secondary to poor p.o. intake superimposed on GI losses    Plan  -Admitted to ICU with cardiac monitoring, DKA Protochol initiated  -On arrival: CB  Urine Ketones: 3+  Bicarb: 11  Gap: 28  BHB: 10.4  pH: 7.27  -HbA1c on :  10.7.   -Likely 2/2 noncompliance versus viral gastroenteritis  -Home DM regimen:  25 units Lantus nightly, sliding scale during day  -Start insulin gtt per protocol with q1h accuchecks while on the drip.    -Transition to SQ insulin and continue the drip for additional 2 hours if able to tolerate PO w/o N/V  Bicarb > 18  Anion gap < 12  Glucose < 250 on 2 consecutive readings.    -Monitor electrolytes with BMP q4h, replete per DKA protocol. Keep K > 4, Mg > 2, Phos > 3  -Continue close ICU monitoring. Once off drip and stable, downgrade to floor  -continuing psych medications including Klonopin  -will continue gabapentin for neuropathy, can increase additional pain medicine as needed  -patient given ceftriaxone 2 g x 1 in ED, no signs of infection on chest x-ray or urine.  Will not order further antibiotics at this time     33 minutes of critical care was time spent personally by me on the following  activities: development of treatment plan with patient or surrogate and bedside caregivers, discussions with consultants, evaluation of patient's response to treatment, examination of patient, ordering and performing treatments and interventions, ordering and review of laboratory studies, ordering and review of radiographic studies, pulse oximetry, re-evaluation of patient's condition.  This critical care time did not overlap with that of any other provider or involve time for any procedures.     Nic Hewitt, DO  Pulmonary Critical Care Medicine  [facility] - ICU

## 2023-06-25 VITALS
OXYGEN SATURATION: 98 % | WEIGHT: 160 LBS | SYSTOLIC BLOOD PRESSURE: 145 MMHG | RESPIRATION RATE: 18 BRPM | HEIGHT: 73 IN | DIASTOLIC BLOOD PRESSURE: 90 MMHG | TEMPERATURE: 98 F | HEART RATE: 81 BPM | BODY MASS INDEX: 21.2 KG/M2

## 2023-06-25 PROBLEM — E11.42 DIABETIC POLYNEUROPATHY: Chronic | Status: ACTIVE | Noted: 2023-06-25

## 2023-06-25 PROBLEM — E11.10 DIABETIC KETOACIDOSIS WITHOUT COMA: Status: ACTIVE | Noted: 2023-06-25

## 2023-06-25 LAB
ALBUMIN SERPL-MCNC: 3 G/DL (ref 3.5–5)
ALBUMIN/GLOB SERPL: 1.1 RATIO (ref 1.1–2)
ALP SERPL-CCNC: 102 UNIT/L (ref 40–150)
ALT SERPL-CCNC: 30 UNIT/L (ref 0–55)
AST SERPL-CCNC: 19 UNIT/L (ref 5–34)
BASOPHILS # BLD AUTO: 0.04 X10(3)/MCL
BASOPHILS NFR BLD AUTO: 0.5 %
BILIRUBIN DIRECT+TOT PNL SERPL-MCNC: 0.4 MG/DL
BUN SERPL-MCNC: 11.7 MG/DL (ref 8.9–20.6)
CALCIUM SERPL-MCNC: 9 MG/DL (ref 8.4–10.2)
CHLORIDE SERPL-SCNC: 104 MMOL/L (ref 98–107)
CO2 SERPL-SCNC: 27 MMOL/L (ref 22–29)
CREAT SERPL-MCNC: 0.89 MG/DL (ref 0.73–1.18)
EOSINOPHIL # BLD AUTO: 0.52 X10(3)/MCL (ref 0–0.9)
EOSINOPHIL NFR BLD AUTO: 6.9 %
ERYTHROCYTE [DISTWIDTH] IN BLOOD BY AUTOMATED COUNT: 12 % (ref 11.5–17)
GFR SERPLBLD CREATININE-BSD FMLA CKD-EPI: >60 MLS/MIN/1.73/M2
GLOBULIN SER-MCNC: 2.8 GM/DL (ref 2.4–3.5)
GLUCOSE SERPL-MCNC: 56 MG/DL (ref 74–100)
HCT VFR BLD AUTO: 39.6 % (ref 42–52)
HGB BLD-MCNC: 13.3 G/DL (ref 14–18)
IMM GRANULOCYTES # BLD AUTO: 0.02 X10(3)/MCL (ref 0–0.04)
IMM GRANULOCYTES NFR BLD AUTO: 0.3 %
IRON SATN MFR SERPL: 47 % (ref 20–50)
IRON SERPL-MCNC: 89 UG/DL (ref 65–175)
LYMPHOCYTES # BLD AUTO: 3.83 X10(3)/MCL (ref 0.6–4.6)
LYMPHOCYTES NFR BLD AUTO: 50.7 %
MCH RBC QN AUTO: 26.7 PG (ref 27–31)
MCHC RBC AUTO-ENTMCNC: 33.6 G/DL (ref 33–36)
MCV RBC AUTO: 79.5 FL (ref 80–94)
MONOCYTES # BLD AUTO: 0.4 X10(3)/MCL (ref 0.1–1.3)
MONOCYTES NFR BLD AUTO: 5.3 %
NEUTROPHILS # BLD AUTO: 2.75 X10(3)/MCL (ref 2.1–9.2)
NEUTROPHILS NFR BLD AUTO: 36.3 %
NRBC BLD AUTO-RTO: 0 %
PLATELET # BLD AUTO: 282 X10(3)/MCL (ref 130–400)
PMV BLD AUTO: 9.9 FL (ref 7.4–10.4)
POCT GLUCOSE: 106 MG/DL (ref 70–110)
POCT GLUCOSE: 144 MG/DL (ref 70–110)
POTASSIUM SERPL-SCNC: 3.6 MMOL/L (ref 3.5–5.1)
PROT SERPL-MCNC: 5.8 GM/DL (ref 6.4–8.3)
RBC # BLD AUTO: 4.98 X10(6)/MCL (ref 4.7–6.1)
SODIUM SERPL-SCNC: 137 MMOL/L (ref 136–145)
TIBC SERPL-MCNC: 188 UG/DL (ref 250–450)
TIBC SERPL-MCNC: 99 UG/DL (ref 69–240)
TRANSFERRIN SERPL-MCNC: 160 MG/DL (ref 174–364)
WBC # SPEC AUTO: 7.56 X10(3)/MCL (ref 4.5–11.5)

## 2023-06-25 PROCEDURE — 63600175 PHARM REV CODE 636 W HCPCS: Performed by: STUDENT IN AN ORGANIZED HEALTH CARE EDUCATION/TRAINING PROGRAM

## 2023-06-25 PROCEDURE — 25000003 PHARM REV CODE 250: Performed by: STUDENT IN AN ORGANIZED HEALTH CARE EDUCATION/TRAINING PROGRAM

## 2023-06-25 PROCEDURE — 85025 COMPLETE CBC W/AUTO DIFF WBC: CPT | Performed by: STUDENT IN AN ORGANIZED HEALTH CARE EDUCATION/TRAINING PROGRAM

## 2023-06-25 PROCEDURE — 83550 IRON BINDING TEST: CPT | Performed by: STUDENT IN AN ORGANIZED HEALTH CARE EDUCATION/TRAINING PROGRAM

## 2023-06-25 PROCEDURE — 80053 COMPREHEN METABOLIC PANEL: CPT | Performed by: STUDENT IN AN ORGANIZED HEALTH CARE EDUCATION/TRAINING PROGRAM

## 2023-06-25 RX ORDER — ROPINIROLE 0.5 MG/1
0.5 TABLET, FILM COATED ORAL 3 TIMES DAILY
Qty: 90 TABLET | Refills: 1 | Status: SHIPPED | OUTPATIENT
Start: 2023-06-25 | End: 2023-07-26 | Stop reason: ALTCHOICE

## 2023-06-25 RX ORDER — CAPSAICIN 0 G/G
1 CREAM TOPICAL 3 TIMES DAILY PRN
Qty: 56.6 G | Refills: 1 | Status: SHIPPED | OUTPATIENT
Start: 2023-06-25 | End: 2023-07-26 | Stop reason: ALTCHOICE

## 2023-06-25 RX ADMIN — MUPIROCIN: 20 OINTMENT TOPICAL at 09:06

## 2023-06-25 RX ADMIN — BUPROPION HYDROCHLORIDE 150 MG: 150 TABLET, FILM COATED, EXTENDED RELEASE ORAL at 07:06

## 2023-06-25 RX ADMIN — LISINOPRIL 20 MG: 10 TABLET ORAL at 09:06

## 2023-06-25 RX ADMIN — SODIUM CHLORIDE, POTASSIUM CHLORIDE, SODIUM LACTATE AND CALCIUM CHLORIDE: 600; 310; 30; 20 INJECTION, SOLUTION INTRAVENOUS at 07:06

## 2023-06-25 RX ADMIN — BUSPIRONE HYDROCHLORIDE 10 MG: 10 TABLET ORAL at 09:06

## 2023-06-25 RX ADMIN — CLONAZEPAM 2 MG: 1 TABLET ORAL at 09:06

## 2023-06-25 RX ADMIN — HYDROCODONE BITARTRATE AND ACETAMINOPHEN 1 TABLET: 5; 325 TABLET ORAL at 09:06

## 2023-06-25 RX ADMIN — PANTOPRAZOLE 40 MG: 40 TABLET, DELAYED RELEASE ORAL at 09:06

## 2023-06-25 RX ADMIN — FLUOXETINE 40 MG: 20 CAPSULE ORAL at 09:06

## 2023-06-25 NOTE — DISCHARGE SUMMARY
[facility] Medicine  Discharge Summary      Patient Name: Jose Francisco Romero  MRN: [id number]  Admission Date: 6/23/2023  Hospital Length of Stay: 2 days  Discharge Date and Time:  06/25/2023 8:49 AM  Attending Physician: Jose Espino MD   Discharging Provider: Jf Quiroz MD  Discharge Provider Team: Networked reference to record PCT   Primary Care Provider: Munira Linn MD        HPI: Patient is a 42-year-old male with past medical history of type 1 diabetes, hypertension, unspecified psychiatric disorder, severe diabetic neuropathy who presents to [facility] ED for nausea, vomiting, generalized weakness.  Patient states he has been feeling fairly poorly for a few weeks now.  Presented to outside ER on 06/09 and was sent home and told to continue taking insulin.  Patient has continued to suffer since then, has had very limited oral intake and has been getting weaker by the day.  Patient states he has not been vomiting much recently, but remains nauseous and with generalized abdominal pain.  Continues to make good urine.  Patient is normally compliant with insulin, has not been taking as frequently with current lack of oral intake.  Patient states he is not been admitted for DKA in quite some time.  Recently started to see NP in [facility] endocrinology clinic, last A1c 10.7.  Patient currently taking 25 units of Lantus nightly, sliding scale during the day.  Has also had multiple ED visits for chronic bilateral leg pain that seems to be associated with diabetic neuropathy.  Was previously on gabapentin but he states this made him drowsy and did not help at all.  Patient with some mild associated shortness of breath, no chest pain, no dysuria, no other acute concerns at this time.  Patient will be admitted to ICU for DKA protocol    * No surgery found *      Hospital Course: Patient downgraded without issue. No longer required insulin drip. Tolerated po intake, though not much of an  appetite at dinner, though still received levemir 25U. AM sugars were 56, which responded to crackers and milk. He denied any worsening physical complaints.     Patient medically cleared for discharge. He will continue home insulin regimen. Will start trial of Requip and Capsaicin topical for his peripheral neuropathy. Will avoid Elavil given chance of lower seizure threshold on SSRIs and Welbutrin. All questions were answered at that time.    Consults:     Final Active Diagnoses:    Diagnosis Date Noted POA    PRINCIPAL PROBLEM:  Diabetes [E11.9] 10/11/2022 Yes    Diabetic ketoacidosis without coma [E11.10] 06/25/2023 Yes    Diabetic polyneuropathy [E11.42] 06/25/2023 Yes     Chronic      Problems Resolved During this Admission:      Discharged Condition: good    Disposition: Home or Self Care    Follow Up:   Follow-up Information       Munira Linn MD. Schedule an appointment as soon as possible for a visit in 1 week(s).    Specialty: Internal Medicine  Contact information:  [address] [place], [place] [id number] [phone]                           Patient Instructions:   No discharge procedures on file.  Medications:  Reconciled Home Medications:      Medication List        START taking these medications      capsaicin 0.1 % Crea  Apply 1 application topically 3 (three) times daily as needed (Neuropathy).     rOPINIRole 0.5 MG tablet  Commonly known as: REQUIP  Take 1 tablet (0.5 mg total) by mouth 3 (three) times daily. Start 1/2 tab (0.25mg x 2 days). Then take one tab (0.5mg) daily x 5 days. Increase dose to 1mg nightly after first week.            CONTINUE taking these medications      ALPRAZolam 2 MG Tab  Commonly known as: XANAX  Take 2 mg by mouth daily as needed.     blood sugar diagnostic Strp  Commonly known as: TRUE METRIX GLUCOSE TEST STRIP  1 strip by Misc.(Non-Drug; Combo Route) route 4 (four) times daily.     * buPROPion 150 MG TB24 tablet  Commonly known as: WELLBUTRIN XL  Take 150 mg by  "mouth every morning.     * buPROPion 300 MG 24 hr tablet  Commonly known as: WELLBUTRIN XL  Take 300 mg by mouth every morning.     busPIRone 10 MG tablet  Commonly known as: BUSPAR  Take 10 mg by mouth.     clonazePAM 2 MG Tab  Commonly known as: KlonoPIN  Take 2 mg by mouth daily as needed.     cyclobenzaprine 10 MG tablet  Commonly known as: FLEXERIL  Take 10 mg by mouth 3 (three) times daily as needed for Muscle spasms.     FLUoxetine 40 MG capsule  Take 40 mg by mouth.     insulin aspart U-100 100 unit/mL (3 mL) Inpn pen  Commonly known as: NovoLOG FlexPen U-100 Insulin  12 units TID with meals with correction 1:50 >150 Max dose 50 units     insulin glargine 100 units/mL SubQ pen  Commonly known as: LANTUS SOLOSTAR U-100 INSULIN  Inject 30 Units into the skin once daily.     lansoprazole 30 MG capsule  Commonly known as: PREVACID  Take 30 mg by mouth.     LIDOcaine 5 % Oint ointment  Commonly known as: XYLOCAINE  Apply 2 g topically 2 (two) times daily as needed.     lisinopriL 30 MG tablet  Commonly known as: PRINIVIL,ZESTRIL  Take 1 tablet (30 mg total) by mouth once daily.     nicotine 7 mg/24 hr  Commonly known as: NICODERM CQ  Place 1 patch onto the skin every 24 hours.     pen needle, diabetic 31 gauge x 3/16" Ndle  Use 4 times a day for insulin injection (BD Mini)           * This list has 2 medication(s) that are the same as other medications prescribed for you. Read the directions carefully, and ask your doctor or other care provider to review them with you.                STOP taking these medications      gabapentin 100 MG capsule  Commonly known as: NEURONTIN              Significant Diagnostic Studies: Labs: CMP   Recent Labs   Lab 06/24/23  0006 06/24/23  0411 06/25/23  0347   * 136 137   K 4.1 4.0 3.6   CO2 19* 21* 27   BUN 26.9* 23.7* 11.7   CREATININE 1.49* 1.30* 0.89   CALCIUM 8.5 8.7 9.0   ALBUMIN  --   --  3.0*   BILITOT  --   --  0.4   ALKPHOS  --   --  102   AST  --   --  19   ALT  " --   --  30    and CBC   Recent Labs   Lab 06/23/23 2006 06/24/23  0411 06/25/23  0347   WBC 15.32* 14.49* 7.56   HGB 15.7 13.7* 13.3*   HCT 46.7 39.8* 39.6*    318 282       Pending Diagnostic Studies:       Procedure Component Value Units Date/Time    Iron and TIBC [[id number]]     Order Status: Sent Lab Status: No result     Specimen: Blood           Indwelling Lines/Drains at time of discharge:   Lines/Drains/Airways       None                   Time spent on the discharge of patient: 20 minutes         Jf Quiroz MD  Department of Hospital Medicine  [facility] -  [facility]

## 2023-06-26 ENCOUNTER — PATIENT OUTREACH (OUTPATIENT)
Dept: ADMINISTRATIVE | Facility: CLINIC | Age: 43
End: 2023-06-26
Payer: MEDICAID

## 2023-06-26 NOTE — PROGRESS NOTES
C3 nurse spoke with Jose Francisco Romero for a TCC post hospital discharge follow up call. The patient does not have a scheduled HOSFU appointment with Munira Linn MD within 5-7 days post hospital discharge date 6/25/23. C3 nurse was unable to schedule HOSFU appointment in [facility].  Patient has a non [facility] PCP.  Patient stated he tried to call earlier but did not receive an answer and he will try again now.

## 2023-06-29 ENCOUNTER — HOSPITAL ENCOUNTER (INPATIENT)
Facility: HOSPITAL | Age: 43
LOS: 4 days | Discharge: HOME | DRG: 637 | End: 2023-07-03
Attending: STUDENT IN AN ORGANIZED HEALTH CARE EDUCATION/TRAINING PROGRAM | Admitting: INTERNAL MEDICINE
Payer: MEDICAID

## 2023-06-29 DIAGNOSIS — E10.65 UNCONTROLLED TYPE 1 DIABETES MELLITUS WITH HYPERGLYCEMIA: ICD-10-CM

## 2023-06-29 DIAGNOSIS — R11.2 NAUSEA AND VOMITING, UNSPECIFIED VOMITING TYPE: ICD-10-CM

## 2023-06-29 DIAGNOSIS — E83.39 HYPERPHOSPHATEMIA: ICD-10-CM

## 2023-06-29 DIAGNOSIS — D72.829 LEUKOCYTOSIS, UNSPECIFIED TYPE: ICD-10-CM

## 2023-06-29 DIAGNOSIS — E87.5 HYPERKALEMIA: ICD-10-CM

## 2023-06-29 DIAGNOSIS — E86.0 DEHYDRATION: ICD-10-CM

## 2023-06-29 DIAGNOSIS — E10.11 DIABETIC KETOACIDOSIS WITH COMA ASSOCIATED WITH TYPE 1 DIABETES MELLITUS: Primary | ICD-10-CM

## 2023-06-29 DIAGNOSIS — N17.9 AKI (ACUTE KIDNEY INJURY): ICD-10-CM

## 2023-06-29 LAB
A-ADO2 BLOOD GAS (OHS): 7 MMHG
ABS NEUT CALC (OHS): 29.8 X10(3)/MCL (ref 2.1–9.2)
ALBUMIN SERPL-MCNC: 3.3 G/DL (ref 3.5–5)
ALBUMIN/GLOB SERPL: 1.2 RATIO (ref 1.1–2)
ALLENS TEST BLOOD GAS (OHS): YES
ALP SERPL-CCNC: 138 UNIT/L (ref 40–150)
ALT SERPL-CCNC: 40 UNIT/L (ref 0–55)
ANION GAP SERPL CALC-SCNC: 34 MEQ/L
ANION GAP SERPL CALC-SCNC: 36 MEQ/L
ANION GAP SERPL CALC-SCNC: >37 MEQ/L
ANISOCYTOSIS BLD QL SMEAR: ABNORMAL
APAP SERPL-MCNC: <17.4 UG/ML (ref 17.4–30)
AST SERPL-CCNC: 16 UNIT/L (ref 5–34)
B-OH-BUTYR SERPL-MCNC: 13.21 MMOL/L
BACTERIA BLD CULT: NORMAL
BACTERIA BLD CULT: NORMAL
BASE EXCESS BLD CALC-SCNC: -6.7 MMOL/L (ref -2–2)
BILIRUBIN DIRECT+TOT PNL SERPL-MCNC: 0.4 MG/DL
BIPAP(E) BLOOD GAS (OHS): 5 CM H2O
BIPAP(I) BLOOD GAS (OHS): 10 CM H2O
BLOOD GAS SAMPLE TYPE (OHS): ABNORMAL
BUN SERPL-MCNC: 39 MG/DL (ref 8.9–20.6)
BUN SERPL-MCNC: 39.8 MG/DL (ref 8.9–20.6)
BUN SERPL-MCNC: 40.5 MG/DL (ref 8.9–20.6)
BUN SERPL-MCNC: 41.1 MG/DL (ref 8.9–20.6)
CALCIUM SERPL-MCNC: 8.6 MG/DL (ref 8.4–10.2)
CALCIUM SERPL-MCNC: 8.8 MG/DL (ref 8.4–10.2)
CALCIUM SERPL-MCNC: 8.9 MG/DL (ref 8.4–10.2)
CALCIUM SERPL-MCNC: 8.9 MG/DL (ref 8.4–10.2)
CHLORIDE SERPL-SCNC: 89 MMOL/L (ref 98–107)
CHLORIDE SERPL-SCNC: 91 MMOL/L (ref 98–107)
CHLORIDE SERPL-SCNC: 92 MMOL/L (ref 98–107)
CHLORIDE SERPL-SCNC: 95 MMOL/L (ref 98–107)
CO2 BLDA-SCNC: 17.3 MMOL/L (ref 22–26)
CO2 SERPL-SCNC: 7 MMOL/L (ref 22–29)
CO2 SERPL-SCNC: 9 MMOL/L (ref 22–29)
CO2 SERPL-SCNC: <5 MMOL/L (ref 22–29)
CO2 SERPL-SCNC: <5 MMOL/L (ref 22–29)
COHGB MFR BLDA: 1.3 % (ref 0.5–1.5)
COHGB MFR BLDA: 1.3 % (ref 0.5–1.5)
COHGB MFR BLDA: 1.6 % (ref 0.5–1.5)
CREAT SERPL-MCNC: 3.5 MG/DL (ref 0.73–1.18)
CREAT SERPL-MCNC: 3.52 MG/DL (ref 0.73–1.18)
CREAT SERPL-MCNC: 3.69 MG/DL (ref 0.73–1.18)
CREAT SERPL-MCNC: 3.73 MG/DL (ref 0.73–1.18)
CREAT/UREA NIT SERPL: 11
DRAWN BY BLOOD GAS (OHS): ABNORMAL
ERYTHROCYTE [DISTWIDTH] IN BLOOD BY AUTOMATED COUNT: 12.6 % (ref 11.5–17)
ETHANOL SERPL-MCNC: <10 MG/DL
FIO2 BLOOD GAS (OHS): 21 %
FIO2 BLOOD GAS (OHS): 21 %
FLUAV AG UPPER RESP QL IA.RAPID: NOT DETECTED
FLUBV AG UPPER RESP QL IA.RAPID: NOT DETECTED
GFR SERPLBLD CREATININE-BSD FMLA CKD-EPI: 20 MLS/MIN/1.73/M2
GFR SERPLBLD CREATININE-BSD FMLA CKD-EPI: 20 MLS/MIN/1.73/M2
GFR SERPLBLD CREATININE-BSD FMLA CKD-EPI: 21 MLS/MIN/1.73/M2
GFR SERPLBLD CREATININE-BSD FMLA CKD-EPI: 22 MLS/MIN/1.73/M2
GLOBULIN SER-MCNC: 2.8 GM/DL (ref 2.4–3.5)
GLUCOSE SERPL-MCNC: 1001 MG/DL (ref 74–100)
GLUCOSE SERPL-MCNC: 666 MG/DL (ref 74–100)
GLUCOSE SERPL-MCNC: 833 MG/DL (ref 74–100)
GLUCOSE SERPL-MCNC: 935 MG/DL (ref 74–100)
HCO3 BLDA-SCNC: 16.5 MMOL/L (ref 22–26)
HCT VFR BLD AUTO: 42.7 % (ref 42–52)
HGB BLD-MCNC: 13.2 G/DL (ref 14–18)
LIPASE SERPL-CCNC: 11 U/L
LYMPHOCYTES NFR BLD MANUAL: 15 % (ref 13–40)
LYMPHOCYTES NFR BLD MANUAL: 5.73 X10(3)/MCL
MAGNESIUM SERPL-MCNC: 1.7 MG/DL (ref 1.6–2.6)
MAGNESIUM SERPL-MCNC: 1.8 MG/DL (ref 1.6–2.6)
MCH RBC QN AUTO: 27.2 PG (ref 27–31)
MCHC RBC AUTO-ENTMCNC: 30.9 G/DL (ref 33–36)
MCV RBC AUTO: 88 FL (ref 80–94)
METHGB MFR BLDA: 0.7 % (ref 0–1.5)
METHGB MFR BLDA: 0.9 % (ref 0–1.5)
METHGB MFR BLDA: 1 % (ref 0–1.5)
MODE (OHS): ABNORMAL
MONOCYTES NFR BLD MANUAL: 2.67 X10(3)/MCL (ref 0.1–1.3)
MONOCYTES NFR BLD MANUAL: 7 % (ref 2–11)
MRSA PCR SCRN (OHS): NOT DETECTED
NEUTROPHILS NFR BLD MANUAL: 77 % (ref 47–80)
NEUTS BAND NFR BLD MANUAL: 1 % (ref 0–11)
NRBC BLD AUTO-RTO: 0 %
O2 HB BLOOD GAS (OHS): 97.1 % (ref 94–100)
O2 HB BLOOD GAS (OHS): 97.2 % (ref 94–100)
O2 HB BLOOD GAS (OHS): 97.4 % (ref 94–100)
OSMOLALITY SERPL: 344 MOSM/KG (ref 280–300)
OXYHGB MFR BLDA: 12.3 G/DL (ref 12–18)
OXYHGB MFR BLDA: 13.3 G/DL (ref 12–18)
OXYHGB MFR BLDA: 14 G/DL (ref 12–18)
PAO2 BLOOD GAS (OHS): 117 MMHG
PCO2 BLDA: 19 MMHG (ref 35–45)
PCO2 BLDA: 19 MMHG (ref 35–45)
PCO2 BLDA: 26 MMHG (ref 35–45)
PH BLDA: 7 [PH] (ref 7.35–7.45)
PH BLDA: 7.11 [PH] (ref 7.35–7.45)
PH BLDA: 7.41 [PH] (ref 7.35–7.45)
PHOSPHATE SERPL-MCNC: 11 MG/DL (ref 2.3–4.7)
PHOSPHATE SERPL-MCNC: 4.2 MG/DL (ref 2.3–4.7)
PLATELET # BLD AUTO: 377 X10(3)/MCL (ref 130–400)
PLATELET # BLD EST: ADEQUATE 10*3/UL
PMV BLD AUTO: 10.8 FL (ref 7.4–10.4)
PO2 BLDA: 110 MMHG (ref 75–100)
PO2 BLDA: 130 MMHG (ref 75–100)
PO2 BLDA: 140 MMHG (ref 75–100)
POCT GLUCOSE: 352 MG/DL (ref 70–110)
POCT GLUCOSE: 414 MG/DL (ref 70–110)
POCT GLUCOSE: >500 MG/DL (ref 70–110)
POCT GLUCOSE: >500 MG/DL (ref 70–110)
POTASSIUM SERPL-SCNC: 4.5 MMOL/L (ref 3.5–5.1)
POTASSIUM SERPL-SCNC: 5.3 MMOL/L (ref 3.5–5.1)
POTASSIUM SERPL-SCNC: 6.4 MMOL/L (ref 3.5–5.1)
POTASSIUM SERPL-SCNC: 6.9 MMOL/L (ref 3.5–5.1)
PROT SERPL-MCNC: 6.1 GM/DL (ref 6.4–8.3)
RBC # BLD AUTO: 4.85 X10(6)/MCL (ref 4.7–6.1)
RBC MORPH BLD: ABNORMAL
RSV A 5' UTR RNA NPH QL NAA+PROBE: NOT DETECTED
SALICYLATES SERPL-MCNC: <5 MG/DL
SAMPLE SITE BLOOD GAS (OHS): ABNORMAL
SAO2 % BLDA: 99 %
SAO2 % BLDA: 99.5 %
SAO2 % BLDA: 99.8 %
SARS-COV-2 RNA RESP QL NAA+PROBE: NOT DETECTED
SODIUM SERPL-SCNC: 133 MMOL/L (ref 136–145)
SODIUM SERPL-SCNC: 133 MMOL/L (ref 136–145)
SODIUM SERPL-SCNC: 135 MMOL/L (ref 136–145)
SODIUM SERPL-SCNC: 138 MMOL/L (ref 136–145)
TROPONIN I SERPL-MCNC: <0.01 NG/ML (ref 0–0.04)
TSH SERPL-ACNC: 4.01 UIU/ML (ref 0.35–4.94)
WBC # SPEC AUTO: 38.21 X10(3)/MCL (ref 4.5–11.5)

## 2023-06-29 PROCEDURE — 20000000 HC ICU ROOM

## 2023-06-29 PROCEDURE — S0030 INJECTION, METRONIDAZOLE: HCPCS | Performed by: STUDENT IN AN ORGANIZED HEALTH CARE EDUCATION/TRAINING PROGRAM

## 2023-06-29 PROCEDURE — 94660 CPAP INITIATION&MGMT: CPT

## 2023-06-29 PROCEDURE — 96376 TX/PRO/DX INJ SAME DRUG ADON: CPT

## 2023-06-29 PROCEDURE — 96375 TX/PRO/DX INJ NEW DRUG ADDON: CPT

## 2023-06-29 PROCEDURE — 84484 ASSAY OF TROPONIN QUANT: CPT | Performed by: STUDENT IN AN ORGANIZED HEALTH CARE EDUCATION/TRAINING PROGRAM

## 2023-06-29 PROCEDURE — 80143 DRUG ASSAY ACETAMINOPHEN: CPT | Performed by: STUDENT IN AN ORGANIZED HEALTH CARE EDUCATION/TRAINING PROGRAM

## 2023-06-29 PROCEDURE — 80053 COMPREHEN METABOLIC PANEL: CPT | Performed by: STUDENT IN AN ORGANIZED HEALTH CARE EDUCATION/TRAINING PROGRAM

## 2023-06-29 PROCEDURE — 0241U COVID/RSV/FLU A&B PCR: CPT | Performed by: STUDENT IN AN ORGANIZED HEALTH CARE EDUCATION/TRAINING PROGRAM

## 2023-06-29 PROCEDURE — 96372 THER/PROPH/DIAG INJ SC/IM: CPT | Performed by: STUDENT IN AN ORGANIZED HEALTH CARE EDUCATION/TRAINING PROGRAM

## 2023-06-29 PROCEDURE — 83735 ASSAY OF MAGNESIUM: CPT | Performed by: STUDENT IN AN ORGANIZED HEALTH CARE EDUCATION/TRAINING PROGRAM

## 2023-06-29 PROCEDURE — 83735 ASSAY OF MAGNESIUM: CPT | Mod: 91 | Performed by: STUDENT IN AN ORGANIZED HEALTH CARE EDUCATION/TRAINING PROGRAM

## 2023-06-29 PROCEDURE — G0378 HOSPITAL OBSERVATION PER HR: HCPCS

## 2023-06-29 PROCEDURE — 63600175 PHARM REV CODE 636 W HCPCS: Performed by: STUDENT IN AN ORGANIZED HEALTH CARE EDUCATION/TRAINING PROGRAM

## 2023-06-29 PROCEDURE — 87641 MR-STAPH DNA AMP PROBE: CPT | Performed by: STUDENT IN AN ORGANIZED HEALTH CARE EDUCATION/TRAINING PROGRAM

## 2023-06-29 PROCEDURE — 25000003 PHARM REV CODE 250: Performed by: STUDENT IN AN ORGANIZED HEALTH CARE EDUCATION/TRAINING PROGRAM

## 2023-06-29 PROCEDURE — 82010 KETONE BODYS QUAN: CPT | Performed by: STUDENT IN AN ORGANIZED HEALTH CARE EDUCATION/TRAINING PROGRAM

## 2023-06-29 PROCEDURE — 96360 HYDRATION IV INFUSION INIT: CPT | Mod: 59

## 2023-06-29 PROCEDURE — 27000190 HC CPAP FULL FACE MASK W/VALVE

## 2023-06-29 PROCEDURE — 87040 BLOOD CULTURE FOR BACTERIA: CPT | Performed by: STUDENT IN AN ORGANIZED HEALTH CARE EDUCATION/TRAINING PROGRAM

## 2023-06-29 PROCEDURE — 84100 ASSAY OF PHOSPHORUS: CPT | Mod: 91 | Performed by: STUDENT IN AN ORGANIZED HEALTH CARE EDUCATION/TRAINING PROGRAM

## 2023-06-29 PROCEDURE — 25000242 PHARM REV CODE 250 ALT 637 W/ HCPCS: Performed by: STUDENT IN AN ORGANIZED HEALTH CARE EDUCATION/TRAINING PROGRAM

## 2023-06-29 PROCEDURE — 96365 THER/PROPH/DIAG IV INF INIT: CPT

## 2023-06-29 PROCEDURE — 83930 ASSAY OF BLOOD OSMOLALITY: CPT | Performed by: STUDENT IN AN ORGANIZED HEALTH CARE EDUCATION/TRAINING PROGRAM

## 2023-06-29 PROCEDURE — 94640 AIRWAY INHALATION TREATMENT: CPT

## 2023-06-29 PROCEDURE — 99900035 HC TECH TIME PER 15 MIN (STAT)

## 2023-06-29 PROCEDURE — 83690 ASSAY OF LIPASE: CPT | Performed by: STUDENT IN AN ORGANIZED HEALTH CARE EDUCATION/TRAINING PROGRAM

## 2023-06-29 PROCEDURE — 80179 DRUG ASSAY SALICYLATE: CPT | Performed by: STUDENT IN AN ORGANIZED HEALTH CARE EDUCATION/TRAINING PROGRAM

## 2023-06-29 PROCEDURE — 93005 ELECTROCARDIOGRAM TRACING: CPT

## 2023-06-29 PROCEDURE — 99291 CRITICAL CARE FIRST HOUR: CPT

## 2023-06-29 PROCEDURE — 82962 GLUCOSE BLOOD TEST: CPT

## 2023-06-29 PROCEDURE — 84443 ASSAY THYROID STIM HORMONE: CPT | Performed by: STUDENT IN AN ORGANIZED HEALTH CARE EDUCATION/TRAINING PROGRAM

## 2023-06-29 PROCEDURE — 96361 HYDRATE IV INFUSION ADD-ON: CPT

## 2023-06-29 PROCEDURE — 84100 ASSAY OF PHOSPHORUS: CPT | Performed by: STUDENT IN AN ORGANIZED HEALTH CARE EDUCATION/TRAINING PROGRAM

## 2023-06-29 PROCEDURE — 82077 ASSAY SPEC XCP UR&BREATH IA: CPT | Performed by: STUDENT IN AN ORGANIZED HEALTH CARE EDUCATION/TRAINING PROGRAM

## 2023-06-29 PROCEDURE — 51798 US URINE CAPACITY MEASURE: CPT

## 2023-06-29 PROCEDURE — 85027 COMPLETE CBC AUTOMATED: CPT | Performed by: STUDENT IN AN ORGANIZED HEALTH CARE EDUCATION/TRAINING PROGRAM

## 2023-06-29 RX ORDER — SODIUM CHLORIDE 9 MG/ML
1000 INJECTION, SOLUTION INTRAVENOUS CONTINUOUS
Status: DISCONTINUED | OUTPATIENT
Start: 2023-06-29 | End: 2023-06-29

## 2023-06-29 RX ORDER — POTASSIUM CHLORIDE 7.45 MG/ML
10 INJECTION INTRAVENOUS ONCE
Status: DISCONTINUED | OUTPATIENT
Start: 2023-06-29 | End: 2023-06-29

## 2023-06-29 RX ORDER — IPRATROPIUM BROMIDE AND ALBUTEROL SULFATE 2.5; .5 MG/3ML; MG/3ML
9 SOLUTION RESPIRATORY (INHALATION) ONCE
Status: COMPLETED | OUTPATIENT
Start: 2023-06-29 | End: 2023-06-29

## 2023-06-29 RX ORDER — DEXTROSE MONOHYDRATE 100 MG/ML
INJECTION, SOLUTION INTRAVENOUS
Status: DISCONTINUED | OUTPATIENT
Start: 2023-06-29 | End: 2023-07-03 | Stop reason: HOSPADM

## 2023-06-29 RX ORDER — SODIUM CHLORIDE 0.9 % (FLUSH) 0.9 %
10 SYRINGE (ML) INJECTION
Status: DISCONTINUED | OUTPATIENT
Start: 2023-06-29 | End: 2023-07-03 | Stop reason: HOSPADM

## 2023-06-29 RX ORDER — CALCIUM GLUCONATE 20 MG/ML
1 INJECTION, SOLUTION INTRAVENOUS ONCE
Status: COMPLETED | OUTPATIENT
Start: 2023-06-29 | End: 2023-06-29

## 2023-06-29 RX ORDER — POTASSIUM CHLORIDE 7.45 MG/ML
60 INJECTION INTRAVENOUS
Status: DISCONTINUED | OUTPATIENT
Start: 2023-06-29 | End: 2023-06-30

## 2023-06-29 RX ORDER — METRONIDAZOLE 500 MG/100ML
500 INJECTION, SOLUTION INTRAVENOUS
Status: DISCONTINUED | OUTPATIENT
Start: 2023-06-29 | End: 2023-06-30

## 2023-06-29 RX ORDER — POTASSIUM CHLORIDE 7.45 MG/ML
80 INJECTION INTRAVENOUS
Status: DISCONTINUED | OUTPATIENT
Start: 2023-06-29 | End: 2023-06-30

## 2023-06-29 RX ORDER — DEXTROSE MONOHYDRATE 100 MG/ML
INJECTION, SOLUTION INTRAVENOUS
Status: DISCONTINUED | OUTPATIENT
Start: 2023-06-29 | End: 2023-06-29

## 2023-06-29 RX ORDER — DEXTROSE MONOHYDRATE AND SODIUM CHLORIDE 5; .45 G/100ML; G/100ML
INJECTION, SOLUTION INTRAVENOUS CONTINUOUS PRN
Status: DISCONTINUED | OUTPATIENT
Start: 2023-06-29 | End: 2023-07-03 | Stop reason: HOSPADM

## 2023-06-29 RX ORDER — HEPARIN SODIUM 5000 [USP'U]/ML
5000 INJECTION, SOLUTION INTRAVENOUS; SUBCUTANEOUS EVERY 12 HOURS
Status: DISCONTINUED | OUTPATIENT
Start: 2023-06-29 | End: 2023-07-03 | Stop reason: HOSPADM

## 2023-06-29 RX ORDER — POTASSIUM CHLORIDE 7.45 MG/ML
40 INJECTION INTRAVENOUS
Status: DISCONTINUED | OUTPATIENT
Start: 2023-06-29 | End: 2023-06-30

## 2023-06-29 RX ORDER — LORAZEPAM 2 MG/ML
0.5 INJECTION INTRAMUSCULAR 2 TIMES DAILY PRN
Status: DISCONTINUED | OUTPATIENT
Start: 2023-06-29 | End: 2023-07-03 | Stop reason: HOSPADM

## 2023-06-29 RX ORDER — DEXTROSE MONOHYDRATE AND SODIUM CHLORIDE 5; .45 G/100ML; G/100ML
INJECTION, SOLUTION INTRAVENOUS CONTINUOUS PRN
Status: DISCONTINUED | OUTPATIENT
Start: 2023-06-29 | End: 2023-06-29

## 2023-06-29 RX ORDER — DEXAMETHASONE SODIUM PHOSPHATE 4 MG/ML
8 INJECTION, SOLUTION INTRA-ARTICULAR; INTRALESIONAL; INTRAMUSCULAR; INTRAVENOUS; SOFT TISSUE
Status: DISCONTINUED | OUTPATIENT
Start: 2023-06-29 | End: 2023-06-29

## 2023-06-29 RX ORDER — FAMOTIDINE 10 MG/ML
20 INJECTION INTRAVENOUS DAILY
Status: DISCONTINUED | OUTPATIENT
Start: 2023-06-30 | End: 2023-07-01

## 2023-06-29 RX ORDER — MAGNESIUM SULFATE 1 G/100ML
1 INJECTION INTRAVENOUS ONCE
Status: COMPLETED | OUTPATIENT
Start: 2023-06-29 | End: 2023-06-29

## 2023-06-29 RX ADMIN — SODIUM CHLORIDE 1000 ML: 9 INJECTION, SOLUTION INTRAVENOUS at 01:06

## 2023-06-29 RX ADMIN — IPRATROPIUM BROMIDE AND ALBUTEROL SULFATE 9 ML: .5; 3 SOLUTION RESPIRATORY (INHALATION) at 03:06

## 2023-06-29 RX ADMIN — VANCOMYCIN HYDROCHLORIDE 1750 MG: 1 INJECTION, POWDER, LYOPHILIZED, FOR SOLUTION INTRAVENOUS at 03:06

## 2023-06-29 RX ADMIN — INSULIN HUMAN 0.2 UNITS/KG/HR: 1 INJECTION, SOLUTION INTRAVENOUS at 04:06

## 2023-06-29 RX ADMIN — INSULIN HUMAN 0.2 UNITS/KG/HR: 1 INJECTION, SOLUTION INTRAVENOUS at 08:06

## 2023-06-29 RX ADMIN — SODIUM CHLORIDE 1000 ML: 9 INJECTION, SOLUTION INTRAVENOUS at 02:06

## 2023-06-29 RX ADMIN — HUMAN INSULIN 10 UNITS: 100 INJECTION, SOLUTION SUBCUTANEOUS at 05:06

## 2023-06-29 RX ADMIN — INSULIN HUMAN 0.1 UNITS/KG/HR: 1 INJECTION, SOLUTION INTRAVENOUS at 02:06

## 2023-06-29 RX ADMIN — MAGNESIUM SULFATE IN DEXTROSE 1 G: 10 INJECTION, SOLUTION INTRAVENOUS at 09:06

## 2023-06-29 RX ADMIN — SODIUM BICARBONATE: 84 INJECTION, SOLUTION INTRAVENOUS at 04:06

## 2023-06-29 RX ADMIN — METRONIDAZOLE 500 MG: 500 INJECTION, SOLUTION INTRAVENOUS at 08:06

## 2023-06-29 RX ADMIN — HUMAN INSULIN 7 UNITS: 100 INJECTION, SOLUTION SUBCUTANEOUS at 02:06

## 2023-06-29 RX ADMIN — HEPARIN SODIUM 5000 UNITS: 5000 INJECTION, SOLUTION INTRAVENOUS; SUBCUTANEOUS at 09:06

## 2023-06-29 RX ADMIN — CEFEPIME 1 G: 1 INJECTION, POWDER, FOR SOLUTION INTRAMUSCULAR; INTRAVENOUS at 08:06

## 2023-06-29 RX ADMIN — CALCIUM GLUCONATE 1 G: 20 INJECTION, SOLUTION INTRAVENOUS at 04:06

## 2023-06-29 RX ADMIN — SODIUM CHLORIDE 500 ML: 9 INJECTION, SOLUTION INTRAVENOUS at 02:06

## 2023-06-29 RX ADMIN — SODIUM BICARBONATE: 84 INJECTION, SOLUTION INTRAVENOUS at 11:06

## 2023-06-29 NOTE — H&P
[facility] - Emergency Dept  Pulmonary Critical Care Note    Patient Name: Jose Francisco Romero  MRN: [id number]  Admission Date: 6/29/2023  Hospital Length of Stay: 0 days  Code Status: Full Code  Attending Provider: Dustin Patiño MD  Primary Care Provider: Primary Doctor No     Subjective:     HPI:   Mr. Romero is a 42 y.o. male with PMH significant for 1 diabetes, hypertension, unspecified psychiatric disorder, severe diabetic neuropathy who presents to [facility] ED for nausea, vomiting, chills, generalized weakness, which has progressively worsened since he was discharged for short 2-day hospitalization last week for DKA on 6/25/23 and has had poor PO intake for the last 3 days. Also noted to have slowed/slurred speech but no confusion, and had diarrhea x1 day, and SOB/respiratory distress x1 day. He denies fever, headache, neck pain, chest pain, palpitations, syncope, diaphoresis, increased cough/sputum, dysuria, melena, hematochezia, rash, or any new wounds.     In the ED, patient noted to be afebrile, tachycardic in 130s, tachypnic, in respiratory distress breathing 30 breaths per minute maintaining adequate O2 sats, normotensive. Labs significant for leukocytosis with left shift WBC 38.2, H&H at baseline, severe metabolic acidosis with CO2 undetectable, pseudohyponatremia corrected to 148, anion gap of > 37, hyperkalemia with K 6.9 shifted x1 in ED, RODOLFO with BUN/Cr 39.8/3.5 up from baseline of 11.7/0.89, hyperglycemia with glucose 1001, beta hydroxybutarate of 13.2. UDS, UA pending. IM consulted for DKA, severe sepsis with unknown source requiring ICU admission.     Hospital Course/Significant events:  N/a    24 Hour Interval History:  N/a    Past Medical History:   Diagnosis Date    Diabetes mellitus type I     Hypertension        Past Surgical History:   Procedure Laterality Date    CLAVICLE SURGERY         Social History     Socioeconomic History    Marital status: Single   Tobacco Use    Smoking status:  "Every Day     Packs/day: 0.50     Types: Cigarettes    Smokeless tobacco: Never   Substance and Sexual Activity    Alcohol use: Never    Drug use: Never    Sexual activity: Yes     Birth control/protection: Condom           Current Outpatient Medications   Medication Instructions    ALPRAZolam (XANAX) 2 mg, Oral, Daily PRN    blood sugar diagnostic (TRUE METRIX GLUCOSE TEST STRIP) Strp 1 strip, Misc.(Non-Drug; Combo Route), 4 times daily    buPROPion (WELLBUTRIN XL) 150 mg, Oral, Every morning    buPROPion (WELLBUTRIN XL) 300 mg, Oral, Every morning    busPIRone (BUSPAR) 10 mg, Oral    capsaicin 0.1 % Crea 1 application, Topical (Top), 3 times daily PRN    clonazePAM (KLONOPIN) 2 mg, Oral, Daily PRN    cyclobenzaprine (FLEXERIL) 10 mg, Oral, 3 times daily PRN    FLUoxetine 40 mg, Oral    insulin (LANTUS SOLOSTAR U-100 INSULIN) 30 Units, Subcutaneous, Daily    insulin aspart U-100 (NOVOLOG FLEXPEN U-100 INSULIN) 100 unit/mL (3 mL) InPn pen 12 units TID with meals with correction 1:50 >150 Max dose 50 units    lansoprazole (PREVACID) 30 mg, Oral    LIDOcaine (XYLOCAINE) 2 g, Topical (Top), 2 times daily PRN    lisinopriL (PRINIVIL,ZESTRIL) 30 mg, Oral, Daily    nicotine (NICODERM CQ) 7 mg/24 hr 1 patch, Transdermal, Every 24 hours (non-standard times)    pen needle, diabetic 31 gauge x 3/16" Ndle Use 4 times a day for insulin injection (BD Mini)    rOPINIRole (REQUIP) 0.5 mg, Oral, 3 times daily, Start 1/2 tab (0.25mg x 2 days). Then take one tab (0.5mg) daily x 5 days. Increase dose to 1mg nightly after first week.       Current Inpatient Medications   calcium gluconate IVPB  1 g Intravenous Once    ceFEPime (MAXIPIME) IVPB  2 g Intravenous ED 1 Time    vancomycin (VANCOCIN) IV (PEDS and ADULTS)  1,750 mg Intravenous ED 1 Time       Current Intravenous Infusions   dextrose 5 % and 0.45 % NaCl      insulin regular 1 units/mL infusion orderable (DKA) 0.1 Units/kg/hr (06/29/23 6978)    sodium bicarbonate drip   "         Review of Systems   Constitutional:  Positive for chills and malaise/fatigue. Negative for diaphoresis and fever.   Respiratory:  Positive for shortness of breath. Negative for cough, hemoptysis, sputum production and wheezing.    Cardiovascular:  Negative for chest pain, palpitations and leg swelling.   Gastrointestinal:  Positive for abdominal pain, nausea and vomiting. Negative for blood in stool, constipation, diarrhea, heartburn and melena.   Genitourinary:  Negative for dysuria, flank pain, frequency, hematuria and urgency.   Skin:  Negative for rash.   Neurological:  Negative for dizziness, sensory change, speech change, focal weakness, seizures, loss of consciousness, weakness and headaches.        Objective:     No intake or output data in the 24 hours ending 06/29/23 1619      Vital Signs (Most Recent):  Temp: 99.8 °F (37.7 °C) (06/29/23 1327)  Pulse: (!) 132 (06/29/23 1600)  Resp: (!) 27 (06/29/23 1600)  BP: 111/62 (06/29/23 1600)  SpO2: 100 % (06/29/23 1600)  Body mass index is 20.3 kg/m².  Weight: 69.8 kg (153 lb 14.1 oz) Vital Signs (24h Range):  Temp:  [99.8 °F (37.7 °C)] 99.8 °F (37.7 °C)  Pulse:  [131-157] 132  Resp:  [27-36] 27  SpO2:  [97 %-100 %] 100 %  BP: (106-120)/(55-64) 111/62     Physical Exam  HENT:      Head: Normocephalic and atraumatic.      Mouth/Throat:      Mouth: Mucous membranes are dry.      Pharynx: No oropharyngeal exudate or posterior oropharyngeal erythema.   Eyes:      Extraocular Movements: Extraocular movements intact.      Pupils: Pupils are equal, round, and reactive to light.   Cardiovascular:      Rate and Rhythm: Regular rhythm. Tachycardia present.      Pulses: Normal pulses.      Heart sounds: Normal heart sounds. No murmur heard.    No friction rub. No gallop.   Pulmonary:      Effort: Respiratory distress present.      Breath sounds: Normal breath sounds. No stridor. No wheezing, rhonchi or rales.   Chest:      Chest wall: No tenderness.   Abdominal:       General: Abdomen is flat. Bowel sounds are normal. There is no distension.      Palpations: Abdomen is soft.      Tenderness: There is abdominal tenderness. There is no right CVA tenderness, left CVA tenderness or guarding.   Musculoskeletal:      Right lower leg: No edema.      Left lower leg: No edema.   Skin:     General: Skin is warm and dry.      Findings: No lesion or rash.      Comments: Dry hyperpigmented thick skin to BLE ankles/calves consistent with chronic venous insufficiency   Neurological:      General: No focal deficit present.      Mental Status: He is oriented to person, place, and time. Mental status is at baseline.      Cranial Nerves: No cranial nerve deficit.      Sensory: No sensory deficit.      Motor: No weakness.      Coordination: Coordination normal.      Comments: Slurred/slowed speech noted         Lines/Drains/Airways       Peripheral Intravenous Line  Duration                  Peripheral IV - Single Lumen 06/29/23 1300 18 G Right Antecubital <1 day         Peripheral IV - Single Lumen 06/29/23 1300 20 G Left;Posterior Hand <1 day                    Significant Labs:    Lab Results   Component Value Date    WBC 38.21 (H) 06/29/2023    HGB 13.2 (L) 06/29/2023    HCT 42.7 06/29/2023    MCV 88.0 06/29/2023     06/29/2023           BMP  Lab Results   Component Value Date     (L) 06/29/2023    K 6.9 (HH) 06/29/2023    CHLORIDE 89 (L) 06/29/2023    CO2 <5 (LL) 06/29/2023    BUN 39.8 (H) 06/29/2023    CREATININE 3.50 (H) 06/29/2023    CALCIUM 8.8 06/29/2023    AGAP 12.0 06/24/2023    EGFRNONAA >60 04/04/2022         ABG  Recent Labs   Lab 06/23/23 2058 06/29/23  1335   PH 7.270* 7.000*   PO2 46.0* 130.0*   HCO3 14.7  --    POCBASEDEF -10.90  --        Mechanical Ventilation Support:  Oxygen Concentration (%): 21 (06/29/23 1600)      Significant Imaging:  CT head showed no obvious intracranial abnormalities. CXR was unremarkable with no consolidations, normal cardiac  silhouette.        Assessment/Plan:     Assessment  DKA  Severe metabolic acidosis pH 7, pCO2 19, pO2 130, HCO3 undetectable, CO2 undetectable, CBG 1001, BOHB 13.2, UA pending  SIRS 3/4  , WBC 38.2, RR >20; afebrile  No obvious source of infection, blood culture and UA pending  RODOLFO  BUN/Cr 39.8/3.5 up from baseline of 11.7/0.89  Respiratory Distress 2/2 severe metabolic acidosis  Anxiety/depression  Peripheral neuropathy      Plan  -Admitted to ICU with cardiac monitoring, DKA protocol initiated  -Pending serum osm, urine osm, urine Na, K, Cl  -HbA1c on 5/23:  10.7.   -Likely 2/2 noncompliance versus viral gastroenteritis  -Home DM regimen:  25 units Lantus nightly, sliding scale during day    -Transition to SQ insulin and continue the drip for additional 2 hours if able to tolerate PO w/o N/V  Bicarb > 18  Anion gap < 12  Glucose < 250 on 2 consecutive readings.    -Monitor electrolytes with BMP q4h, replete per DKA protocol. Keep K > 4, Mg > 2, Phos > 3  -Continue close ICU monitoring. Once off drip and stable, downgrade to floor  -BiPAP 10/5 on 21% O2 for now as pt tiring out, repeat ABG pending at 2200 can reassess if okay to switch to RA  -Hold gabapentin given severely decreased GFR  -Blood cultures pending, UA pending, will attempt to obtain resp culture as well; C. diff pending as well  -Broad spectrum abx coverage with vanc, cefepime, flagyl pending cultures can deescalate  -Will hold home PO psych meds at this time, atiavn 0.5 IV BID PRN anxiety/agitation      DVT Prophylaxis: Heparin 5K BID  GI Prophylaxis: Pepcid  Code Status: Full     45 minutes of critical care was time spent personally by me on the following activities: development of treatment plan with patient or surrogate and bedside caregivers, discussions with consultants, evaluation of patient's response to treatment, examination of patient, ordering and performing treatments and interventions, ordering and review of laboratory studies,  ordering and review of radiographic studies, pulse oximetry, re-evaluation of patient's condition.  This critical care time did not overlap with that of any other provider or involve time for any procedures.     Julio Cesar Esposito MD  U IM PGY II  Pulmonary Critical Care Medicine  [facility] - ICU      AM addendum:    Pt's gap nearly closed at 14 this morning, labs and vital signs greatly improved currently on D5W and 1/2 NS, BMP this morning shows gap closed at 12, CO2 27, . Will transition to home long acting insulin and moderate dose SSI and feed this morning, likely downgrade this afternoon. Blood cultures still pending, UA shows no sign of infection; MRSA PCR negative will stop vanc once blood cultures negative x24h, likely stop abx alltogether if negative x48h as leukocytosis improving and may have been reactive leukocytosis due to DKA as he's been afebrile and no indication of prior/active infection.  Patient should downgrade this afternoon, will confirm with ICU staff.    Julio Cesar Esposito MD  U IM PGY II  6/30/23 @ 08:16

## 2023-06-29 NOTE — ED PROVIDER NOTES
Encounter Date: 6/29/2023       History     Chief Complaint   Patient presents with    Hyperglycemia     PT brought in via [facility] for altered mental status. CBG > 500. GCS 13. Dr. Mcconnell at bedside. Verbal order for ABG.     42-year-old male presents to ED for altered mental status.  Recent admission to the ICU for diabetic ketoacidosis about 1 week ago.  Father present bedside.  States the patient after getting home has not eaten anything and has gradually become more confused.  States this presentations is essentially the same as 1 week ago.  No reported trauma.  Unsure if the patient has taken any of his medication including insulin.  Known type 1 diabetic.  No other complaints or concerns at this time.  Patient unable to voice complaints given current medical condition.    Review of patient's allergies indicates:   Allergen Reactions    Penicillins Shortness Of Breath    Sulfa (sulfonamide antibiotics)      Past Medical History:   Diagnosis Date    Diabetes mellitus type I     Hypertension      Past Surgical History:   Procedure Laterality Date    CLAVICLE SURGERY       Family History   Problem Relation Age of Onset    Diabetes Mother     Diabetes Father     Heart disease Father      Social History     Tobacco Use    Smoking status: Every Day     Packs/day: 0.50     Types: Cigarettes    Smokeless tobacco: Never   Substance Use Topics    Alcohol use: Never    Drug use: Never     Review of Systems   Unable to perform ROS: Mental status change     Physical Exam     Initial Vitals [06/29/23 1327]   BP Pulse Resp Temp SpO2   106/63 (!) 157 (!) 28 99.8 °F (37.7 °C) 97 %      MAP       --         Physical Exam    Constitutional: He appears well-developed and well-nourished. He is not diaphoretic. He appears distressed.   HENT:   Head: Normocephalic and atraumatic.   Eyes: Conjunctivae and EOM are normal. Pupils are equal, round, and reactive to light.   Neck: Neck supple. No tracheal deviation present.   Normal range of  motion.  Cardiovascular:  Regular rhythm and normal heart sounds.   Tachycardia present.         Pulmonary/Chest: Breath sounds normal. Tachypnea noted. No respiratory distress.   Abdominal: Abdomen is soft. There is no abdominal tenderness. There is no rebound, no guarding, no tenderness at McBurney's point and negative Brennan's sign. negative Rovsing's sign  Musculoskeletal:         General: No tenderness. Normal range of motion.      Cervical back: Normal range of motion and neck supple.     Neurological: He is alert and oriented to person, place, and time. He has normal strength. He displays no atrophy and no tremor. No sensory deficit. He displays no seizure activity. GCS score is 15. GCS eye subscore is 4. GCS verbal subscore is 4. GCS motor subscore is 5.   Skin: Skin is warm and dry. Capillary refill takes less than 2 seconds. No rash noted.   Psychiatric: He has a normal mood and affect. His behavior is normal. Judgment and thought content normal.       ED Course   Critical Care    Date/Time: 6/29/2023 1:48 PM  Performed by: Davide Mcconnell MD  Authorized by: Davide Mcconnell MD   Total critical care time (exclusive of procedural time) : 60 minutes  Critical care time was exclusive of separately billable procedures and treating other patients.  Critical care was necessary to treat or prevent imminent or life-threatening deterioration of the following conditions: CNS failure or compromise and metabolic crisis.  Critical care was time spent personally by me on the following activities: evaluation of patient's response to treatment, obtaining history from patient or surrogate, ordering and review of laboratory studies, pulse oximetry, review of old charts, development of treatment plan with patient or surrogate, examination of patient, ordering and performing treatments and interventions, ordering and review of radiographic studies and re-evaluation of patient's condition.      Labs Reviewed   BLOOD GAS - Abnormal;  Notable for the following components:       Result Value    pH, Blood gas 7.000 (*)     pCO2, Blood gas 19.0 (*)     pO2, Blood gas 130.0 (*)     All other components within normal limits   COMPREHENSIVE METABOLIC PANEL - Abnormal; Notable for the following components:    Sodium Level 133 (*)     Potassium Level 6.9 (*)     Chloride 89 (*)     Carbon Dioxide <5 (*)     Glucose Level 1,001 (*)     Blood Urea Nitrogen 39.8 (*)     Creatinine 3.50 (*)     Protein Total 6.1 (*)     Albumin Level 3.3 (*)     All other components within normal limits   BETA - HYDROXYBUTYRATE, SERUM - Abnormal; Notable for the following components:    Beta Hydroxybutyrate 13.21 (*)     All other components within normal limits   CBC WITH DIFFERENTIAL - Abnormal; Notable for the following components:    WBC 38.21 (*)     Hgb 13.2 (*)     MCHC 30.9 (*)     MPV 10.8 (*)     All other components within normal limits   PHOSPHORUS - Abnormal; Notable for the following components:    Phosphorus Level 11.0 (*)     All other components within normal limits   MANUAL DIFFERENTIAL - Abnormal; Notable for the following components:    Neutrophils Abs Calc 29.8038 (*)     Lymphs Abs 5.7315 (*)     Monocytes Abs 2.6747 (*)     RBC Morph Abnormal (*)     Anisocytosis 1+ (*)     All other components within normal limits   ACETAMINOPHEN LEVEL - Abnormal; Notable for the following components:    Acetaminophen Level <17.4 (*)     All other components within normal limits   BASIC METABOLIC PANEL - Abnormal; Notable for the following components:    Sodium Level 133 (*)     Potassium Level 6.4 (*)     Chloride 91 (*)     Carbon Dioxide <5 (*)     Glucose Level 935 (*)     Blood Urea Nitrogen 39.0 (*)     Creatinine 3.52 (*)     All other components within normal limits   BLOOD GAS - Abnormal; Notable for the following components:    pH, Blood gas 7.110 (*)     pCO2, Blood gas 19.0 (*)     pO2, Blood gas 140.0 (*)     CO Hgb 1.6 (*)     All other components within  normal limits    Narrative:     Hco3 incalculable   TROPONIN I - Normal   MAGNESIUM - Normal   COVID/RSV/FLU A&B PCR - Normal    Narrative:     The Xpert Xpress SARS-CoV-2/FLU/RSV plus is a rapid, multiplexed real-time PCR test intended for the simultaneous qualitative detection and differentiation of SARS-CoV-2, Influenza A, Influenza B, and respiratory syncytial virus (RSV) viral RNA in either nasopharyngeal swab or nasal swab specimens.         TSH - Normal   LIPASE - Normal   ALCOHOL,MEDICAL (ETHANOL) - Normal   BLOOD CULTURE OLG   BLOOD CULTURE OLG   CBC W/ AUTO DIFFERENTIAL    Narrative:     The following orders were created for panel order CBC auto differential.  Procedure                               Abnormality         Status                     ---------                               -----------         ------                     CBC with Differential[[id number]]        Abnormal            Final result               Manual Differential[[id number]]          Abnormal            Final result                 Please view results for these tests on the individual orders.   SALICYLATE LEVEL   EXTRA TUBES    Narrative:     The following orders were created for panel order EXTRA TUBES.  Procedure                               Abnormality         Status                     ---------                               -----------         ------                     Light Blue Top Hold[[id number]]                              In process                 Pink Top Hold[[id number]]                                    In process                   Please view results for these tests on the individual orders.   LIGHT BLUE TOP HOLD   PINK TOP HOLD   EXTRA TUBES    Narrative:     The following orders were created for panel order EXTRA TUBES.  Procedure                               Abnormality         Status                     ---------                               -----------         ------                     Lavender Top  Hold[[id number]]                                In process                   Please view results for these tests on the individual orders.   LAVENDER TOP HOLD   EXTRA TUBES    Narrative:     The following orders were created for panel order EXTRA TUBES.  Procedure                               Abnormality         Status                     ---------                               -----------         ------                     Light Green Top Hold[[id number]]                             In process                 Lavender Top Hold[[id number]]                                In process                   Please view results for these tests on the individual orders.   LIGHT GREEN TOP HOLD   LAVENDER TOP HOLD   POCT GLUCOSE, HAND-HELD DEVICE   ISTAT CHEM8     EKG Readings: (Independently Interpreted)   Initial Reading: No STEMI. Rhythm: Sinus Tachycardia. Ectopy: No Ectopy. Conduction: Normal. Axis: Right Axis Deviation. Clinical Impression: Sinus Tachycardia Other Impression: diffuse peaked T waves.   ECG Results              EKG 12-lead (Final result)  Result time 06/29/23 22:28:10      Final result by Interface, Lab In [facility] (06/29/23 22:28:10)                   Narrative:    Test Reason : R73.9,    Vent. Rate : 156 BPM     Atrial Rate : 156 BPM     P-R Int : 104 ms          QRS Dur : 122 ms      QT Int : 302 ms       P-R-T Axes : 000 242 068 degrees     QTc Int : 486 ms    Sinus tachycardia with short MA  Right superior axis deviation  Nonspecific intraventricular conduction delay  Junctional ST depression, probably normal  Abnormal ECG  When compared with ECG of 23-JUN-2023 19:59,  Questionable change in QRS duration  Confirmed by Sonia ARNOLD, Faisal ([id number]) on 6/29/2023 10:28:01 PM    Referred By: [email or web address]   SELF           Confirmed By:Faisal Scott MD                                  Imaging Results              CT Head Without Contrast (Final result)  Result time 06/29/23 15:22:06      Final result by Josie Ruff,  MD (06/29/23 15:22:06)                   Impression:      No acute intracranial abnormality.      Electronically signed by: Josie Ruff  Date:    06/29/2023  Time:    15:22               Narrative:    EXAMINATION:  CT HEAD WITHOUT CONTRAST    CLINICAL HISTORY:  Mental status change, unknown cause;    TECHNIQUE:  Axial scans were obtained from skull base to the vertex.    Coronal and sagittal reconstructions obtained from the axial data.    Automatic exposure control was utilized to limit radiation dose.    Contrast: None    Radiation Dose:    Total DLP: 2592 mGy*cm    COMPARISON:  CT head dated 11/22/2018    FINDINGS:  There is no acute intracranial hemorrhage or edema. The gray-white matter differentiation is preserved.    There is no mass effect or midline shift. The ventricles and sulci are normal in size. The basal cisterns are patent. There is no abnormal extra-axial fluid collection.    The calvarium and skull base are intact.  There is mild right maxillary sinus mucosal thickening.                                       X-Ray Chest AP Portable (Final result)  Result time 06/29/23 14:05:37      Final result by Trey Smith MD (06/29/23 14:05:37)                   Impression:      No acute findings.      Electronically signed by: Trey Smith  Date:    06/29/2023  Time:    14:05               Narrative:    EXAMINATION:  XR CHEST AP PORTABLE    CLINICAL HISTORY:  hyperglycemia;    COMPARISON:  23 June 2023    FINDINGS:  Portable frontal view of the chest was obtained. The heart is not enlarged.  Lungs are clear.  No pneumothorax seen.  Old left rib deformities.  Plate and screw fixation left clavicle.                                       Medications   sodium chloride 0.9% flush 10 mL (has no administration in time range)   sodium chloride 0.9% flush 10 mL (has no administration in time range)   sodium chloride 0.9% flush 10 mL (has no administration in time range)   dextrose 5 % and 0.45 % NaCl infusion  (0 mL/hr Intravenous Stopped 6/30/23 1133)   dextrose 10 % infusion (has no administration in time range)   dextrose 10 % infusion (has no administration in time range)   dextrose 10% bolus 125 mL 125 mL (has no administration in time range)   dextrose 10% bolus 250 mL 250 mL (has no administration in time range)   heparin (porcine) injection 5,000 Units (5,000 Units Subcutaneous Given 6/30/23 0806)   famotidine (PF) injection 20 mg (20 mg Intravenous Given 6/30/23 0806)   LORazepam injection 0.5 mg (has no administration in time range)   HYDROcodone-acetaminophen 5-325 mg per tablet 1 tablet (1 tablet Oral Given 6/30/23 0437)   mupirocin 2 % ointment ( Nasal Given 6/30/23 0848)   buPROPion TB24 tablet 300 mg (300 mg Oral Given 6/30/23 0912)   insulin detemir U-100 injection 30 Units (30 Units Subcutaneous Given 6/30/23 0843)   glucose chewable tablet 16 g (has no administration in time range)   glucose chewable tablet 24 g (has no administration in time range)   glucagon (human recombinant) injection 1 mg (has no administration in time range)   dextrose 10% bolus 125 mL 125 mL (has no administration in time range)   dextrose 10% bolus 250 mL 250 mL (has no administration in time range)   insulin aspart U-100 injection 1-10 Units (4 Units Subcutaneous Given 6/30/23 1124)   sodium chloride 0.9% bolus 1,000 mL 1,000 mL (0 mLs Intravenous Stopped 6/29/23 1415)   sodium chloride 0.9% bolus 1,000 mL 1,000 mL (0 mLs Intravenous Stopped 6/29/23 1416)   sodium chloride 0.9% bolus 500 mL 500 mL (0 mLs Intravenous Stopped 6/29/23 1533)   vancomycin (VANCOCIN) 1,750 mg in dextrose 5 % (D5W) 500 mL IVPB (0 mg Intravenous Stopped 6/29/23 1731)   insulin regular injection 7 Units 0.07 mL (7 Units Intravenous Given 6/29/23 1442)   albuterol-ipratropium 2.5 mg-0.5 mg/3 mL nebulizer solution 9 mL (9 mLs Nebulization Given 6/29/23 1548)   calcium gluconate 1 g in NS IVPB (premixed) (0 g Intravenous Stopped 6/29/23 9675)   insulin  regular injection 10 Units 0.1 mL (10 Units Intravenous Given 6/29/23 9603)   magnesium sulfate in dextrose IVPB (premix) 1 g (0 g Intravenous Stopped 6/29/23 2208)   magnesium sulfate 2g in water 50mL IVPB (premix) (0 g Intravenous Stopped 6/30/23 1048)   potassium bicarbonate disintegrating tablet 50 mEq (50 mEq Oral Given 6/30/23 0848)     Medical Decision Making:   History:   Old Medical Records: I decided to obtain old medical records.  Initial Assessment:   42-year-old male recently released for DKA presents for altered mentation with elevated blood sugar  Differential Diagnosis:   Diabetic ketoacidosis  HHS  Hyperglycemia  Overdose  Medication noncompliance  Polysubstance abuse  Withdrawal  Trauma  Seizure  Electrolyte derangement  Renal failure  Clinical Tests:   Lab Tests: Reviewed and Ordered  Radiological Study: Ordered and Reviewed  Medical Tests: Reviewed and Ordered  ED Management:  Patient upon arrival was tachycardic tachypneic and altered.  Bedside glucose read high and i-STAT greater than 700.  Was immediately started with aggressive fluid resuscitation.  Had a gradual reduction in heart rate.  There was a significant delay in laboratory analysis including CMP.  Ultimately returned greater than 1000 (required to start insulin drip for pump entry).  Was given a single pushed dose of insulin 0.1 units/kg followed by drip.  Has gap, elevated blood sugar, beta hydroxybutyrate and acidotic.  As patient's blood sugar trended downward mentation improved however also began to slow his respiratory rate.  Concern was patient tiring out so placed on BiPAP.  Family bedside and aware of situation.  Remaining workup demonstrated multiple electrolyte derangements. Had a significant leukocytosis so head CT and chest x-ray both performed and negative acute. No cerebral edema either. No fever or nuchal rigidity suggestive of CNS infection.  Significant time was spent in isolation managing this patient's care.   Medicine formally notified and care transitioned.  Admitted critical to ICU. (Jayesh)           ED Course as of 06/30/23 1545   Thu Jun 29, 2023   1342 Potassium came back on i-STAT at 2.3.  Will began aggressive IV replacement [RZ]   1344 Informed that likely the labs were dilute since fluids were not stopped.  Will rechecked labs and treat accordingly. Potassium repletion held.  EKG demonstrates peaked T-waves and glucose on i-STAT was only 400.  [RZ]   1402 Repeat i-STAT demonstrated potassium of 6.5 which correlates with peaked T-waves on EKG.  Glucose over 700.  Insulin drip protocol initiated.  Patient's mentation has mildly improved.  Heart rate down to 133.   [RZ]   1422 Called lab for add ons. Said 2 more minutes for CMP due to sugar dilution  [RZ]   1435 Lab has still not released the glucose level.  Unable to initiate bolus.  Will give IV push in the meantime. [RZ]   1517 Mentation significantly improved. RR remains elevated. Elected for BiPAP to decrease work of breathing.  [RZ]      ED Course User Index  [RZ] Davide Mcconnell MD                   Clinical Impression:   Final diagnoses:  [E10.11] Diabetic ketoacidosis with coma associated with type 1 diabetes mellitus (Primary)  [D72.829] Leukocytosis, unspecified type  [E87.5] Hyperkalemia  [R11.2] Nausea and vomiting, unspecified vomiting type  [E83.39] Hyperphosphatemia  [N17.9] RODOLFO (acute kidney injury)  [E86.0] Dehydration        ED Disposition Condition    Observation                 Davide Mcconnell MD  06/30/23 7966

## 2023-06-30 PROBLEM — E43 SEVERE MALNUTRITION: Status: ACTIVE | Noted: 2023-06-30

## 2023-06-30 LAB
ALBUMIN SERPL-MCNC: 3 G/DL (ref 3.5–5)
ALBUMIN/GLOB SERPL: 1.2 RATIO (ref 1.1–2)
ALP SERPL-CCNC: 110 UNIT/L (ref 40–150)
ALT SERPL-CCNC: 32 UNIT/L (ref 0–55)
AMORPH URATE CRY URNS QL MICRO: ABNORMAL /UL
AMPHET UR QL SCN: NEGATIVE
ANION GAP SERPL CALC-SCNC: 12 MEQ/L
ANION GAP SERPL CALC-SCNC: 16 MEQ/L
ANION GAP SERPL CALC-SCNC: 19 MEQ/L
APPEARANCE UR: CLEAR
AST SERPL-CCNC: 14 UNIT/L (ref 5–34)
BACTERIA #/AREA URNS AUTO: ABNORMAL /HPF
BARBITURATE SCN PRESENT UR: NEGATIVE
BASOPHILS # BLD AUTO: 0.05 X10(3)/MCL
BASOPHILS NFR BLD AUTO: 0.2 %
BENZODIAZ UR QL SCN: NEGATIVE
BILIRUB UR QL STRIP.AUTO: NEGATIVE MG/DL
BILIRUBIN DIRECT+TOT PNL SERPL-MCNC: 0.2 MG/DL
BUN SERPL-MCNC: 37.9 MG/DL (ref 8.9–20.6)
BUN SERPL-MCNC: 38.6 MG/DL (ref 8.9–20.6)
BUN SERPL-MCNC: 39.6 MG/DL (ref 8.9–20.6)
BUN SERPL-MCNC: 39.7 MG/DL (ref 8.9–20.6)
CALCIUM SERPL-MCNC: 8.5 MG/DL (ref 8.4–10.2)
CALCIUM SERPL-MCNC: 8.7 MG/DL (ref 8.4–10.2)
CALCIUM SERPL-MCNC: 8.8 MG/DL (ref 8.4–10.2)
CALCIUM SERPL-MCNC: 8.8 MG/DL (ref 8.4–10.2)
CANNABINOIDS UR QL SCN: NEGATIVE
CHLORIDE SERPL-SCNC: 100 MMOL/L (ref 98–107)
CHLORIDE SERPL-SCNC: 100 MMOL/L (ref 98–107)
CHLORIDE SERPL-SCNC: 101 MMOL/L (ref 98–107)
CHLORIDE SERPL-SCNC: 101 MMOL/L (ref 98–107)
CHLORIDE UR-SCNC: 33.8 MMOL/L
CK SERPL-CCNC: 32 U/L (ref 30–200)
CO2 SERPL-SCNC: 21 MMOL/L (ref 22–29)
CO2 SERPL-SCNC: 23 MMOL/L (ref 22–29)
CO2 SERPL-SCNC: 26 MMOL/L (ref 22–29)
CO2 SERPL-SCNC: 27 MMOL/L (ref 22–29)
COCAINE UR QL SCN: NEGATIVE
COLOR UR: YELLOW
CREAT SERPL-MCNC: 2.21 MG/DL (ref 0.73–1.18)
CREAT SERPL-MCNC: 2.6 MG/DL (ref 0.73–1.18)
CREAT SERPL-MCNC: 3.01 MG/DL (ref 0.73–1.18)
CREAT SERPL-MCNC: 3.02 MG/DL (ref 0.73–1.18)
CREAT/UREA NIT SERPL: 13
CREAT/UREA NIT SERPL: 13
CREAT/UREA NIT SERPL: 17
EOSINOPHIL # BLD AUTO: 0.03 X10(3)/MCL (ref 0–0.9)
EOSINOPHIL NFR BLD AUTO: 0.1 %
ERYTHROCYTE [DISTWIDTH] IN BLOOD BY AUTOMATED COUNT: 12.5 % (ref 11.5–17)
FENTANYL UR QL SCN: NEGATIVE
GFR SERPLBLD CREATININE-BSD FMLA CKD-EPI: 26 MLS/MIN/1.73/M2
GFR SERPLBLD CREATININE-BSD FMLA CKD-EPI: 26 MLS/MIN/1.73/M2
GFR SERPLBLD CREATININE-BSD FMLA CKD-EPI: 31 MLS/MIN/1.73/M2
GFR SERPLBLD CREATININE-BSD FMLA CKD-EPI: 37 MLS/MIN/1.73/M2
GLOBULIN SER-MCNC: 2.5 GM/DL (ref 2.4–3.5)
GLUCOSE SERPL-MCNC: 118 MG/DL (ref 74–100)
GLUCOSE SERPL-MCNC: 119 MG/DL (ref 74–100)
GLUCOSE SERPL-MCNC: 246 MG/DL (ref 74–100)
GLUCOSE SERPL-MCNC: 247 MG/DL (ref 74–100)
GLUCOSE UR QL STRIP.AUTO: ABNORMAL MG/DL
GRAN CASTS #/AREA URNS LPF: ABNORMAL /LPF
HAV IGM SERPL QL IA: NONREACTIVE
HBV CORE IGM SERPL QL IA: NONREACTIVE
HBV SURFACE AG SERPL QL IA: NONREACTIVE
HCT VFR BLD AUTO: 34.8 % (ref 42–52)
HCV AB SERPL QL IA: NONREACTIVE
HGB BLD-MCNC: 12 G/DL (ref 14–18)
HIV 1+2 AB+HIV1 P24 AG SERPL QL IA: NONREACTIVE
HYALINE CASTS #/AREA URNS LPF: ABNORMAL /LPF
IMM GRANULOCYTES # BLD AUTO: 0.2 X10(3)/MCL (ref 0–0.04)
IMM GRANULOCYTES NFR BLD AUTO: 0.8 %
KETONES UR QL STRIP.AUTO: ABNORMAL MG/DL
LEUKOCYTE ESTERASE UR QL STRIP.AUTO: NEGATIVE UNIT/L
LYMPHOCYTES # BLD AUTO: 2.59 X10(3)/MCL (ref 0.6–4.6)
LYMPHOCYTES NFR BLD AUTO: 10.5 %
MAGNESIUM SERPL-MCNC: 1.6 MG/DL (ref 1.6–2.6)
MCH RBC QN AUTO: 26.9 PG (ref 27–31)
MCHC RBC AUTO-ENTMCNC: 34.5 G/DL (ref 33–36)
MCV RBC AUTO: 78 FL (ref 80–94)
MDMA UR QL SCN: NEGATIVE
MONOCYTES # BLD AUTO: 1.86 X10(3)/MCL (ref 0.1–1.3)
MONOCYTES NFR BLD AUTO: 7.6 %
MUCOUS THREADS URNS QL MICRO: ABNORMAL /LPF
NEUTROPHILS # BLD AUTO: 19.87 X10(3)/MCL (ref 2.1–9.2)
NEUTROPHILS NFR BLD AUTO: 80.8 %
NITRITE UR QL STRIP.AUTO: NEGATIVE
NRBC BLD AUTO-RTO: 0 %
OPIATES UR QL SCN: NEGATIVE
OSMOLALITY UR: 619 MOSM/KG (ref 300–1300)
PCP UR QL: NEGATIVE
PH UR STRIP.AUTO: 5.5 [PH]
PH UR: 5.5 [PH] (ref 3–11)
PHOSPHATE SERPL-MCNC: 2.2 MG/DL (ref 2.3–4.7)
PHOSPHATE SERPL-MCNC: 2.4 MG/DL (ref 2.3–4.7)
PHOSPHATE SERPL-MCNC: 3.2 MG/DL (ref 2.3–4.7)
PLATELET # BLD AUTO: 267 X10(3)/MCL (ref 130–400)
PMV BLD AUTO: 9.7 FL (ref 7.4–10.4)
POCT GLUCOSE: 106 MG/DL (ref 70–110)
POCT GLUCOSE: 109 MG/DL (ref 70–110)
POCT GLUCOSE: 114 MG/DL (ref 70–110)
POCT GLUCOSE: 121 MG/DL (ref 70–110)
POCT GLUCOSE: 128 MG/DL (ref 70–110)
POCT GLUCOSE: 131 MG/DL (ref 70–110)
POCT GLUCOSE: 132 MG/DL (ref 70–110)
POCT GLUCOSE: 175 MG/DL (ref 70–110)
POCT GLUCOSE: 245 MG/DL (ref 70–110)
POCT GLUCOSE: 249 MG/DL (ref 70–110)
POCT GLUCOSE: 81 MG/DL (ref 70–110)
POCT GLUCOSE: 92 MG/DL (ref 70–110)
POCT GLUCOSE: 96 MG/DL (ref 70–110)
POCT GLUCOSE: >500 MG/DL (ref 70–110)
POTASSIUM SERPL-SCNC: 3.4 MMOL/L (ref 3.5–5.1)
POTASSIUM SERPL-SCNC: 3.7 MMOL/L (ref 3.5–5.1)
POTASSIUM SERPL-SCNC: 3.7 MMOL/L (ref 3.5–5.1)
POTASSIUM SERPL-SCNC: 4 MMOL/L (ref 3.5–5.1)
POTASSIUM UR-SCNC: 84 MMOL/L
PROT SERPL-MCNC: 5.5 GM/DL (ref 6.4–8.3)
PROT UR QL STRIP.AUTO: ABNORMAL MG/DL
RBC # BLD AUTO: 4.46 X10(6)/MCL (ref 4.7–6.1)
RBC #/AREA URNS AUTO: ABNORMAL /HPF
RBC UR QL AUTO: NEGATIVE UNIT/L
SODIUM SERPL-SCNC: 139 MMOL/L (ref 136–145)
SODIUM SERPL-SCNC: 140 MMOL/L (ref 136–145)
SODIUM SERPL-SCNC: 140 MMOL/L (ref 136–145)
SODIUM SERPL-SCNC: 141 MMOL/L (ref 136–145)
SODIUM UR-SCNC: 28.6 MMOL/L
SP GR UR STRIP.AUTO: 1.02
SQUAMOUS #/AREA URNS LPF: ABNORMAL /HPF
T PALLIDUM AB SER QL: NONREACTIVE
T3FREE SERPL-MCNC: 1.64 PG/ML (ref 1.57–3.91)
T4 FREE SERPL-MCNC: 1.15 NG/DL (ref 0.7–1.48)
TSH SERPL-ACNC: 1.24 UIU/ML (ref 0.35–4.94)
UROBILINOGEN UR STRIP-ACNC: NORMAL MG/DL
WBC # SPEC AUTO: 24.6 X10(3)/MCL (ref 4.5–11.5)
WBC #/AREA URNS AUTO: ABNORMAL /HPF

## 2023-06-30 PROCEDURE — 87389 HIV-1 AG W/HIV-1&-2 AB AG IA: CPT | Performed by: STUDENT IN AN ORGANIZED HEALTH CARE EDUCATION/TRAINING PROGRAM

## 2023-06-30 PROCEDURE — 25000003 PHARM REV CODE 250: Performed by: FAMILY MEDICINE

## 2023-06-30 PROCEDURE — 80053 COMPREHEN METABOLIC PANEL: CPT | Performed by: STUDENT IN AN ORGANIZED HEALTH CARE EDUCATION/TRAINING PROGRAM

## 2023-06-30 PROCEDURE — 84443 ASSAY THYROID STIM HORMONE: CPT | Performed by: STUDENT IN AN ORGANIZED HEALTH CARE EDUCATION/TRAINING PROGRAM

## 2023-06-30 PROCEDURE — 84100 ASSAY OF PHOSPHORUS: CPT | Performed by: STUDENT IN AN ORGANIZED HEALTH CARE EDUCATION/TRAINING PROGRAM

## 2023-06-30 PROCEDURE — 84300 ASSAY OF URINE SODIUM: CPT | Performed by: STUDENT IN AN ORGANIZED HEALTH CARE EDUCATION/TRAINING PROGRAM

## 2023-06-30 PROCEDURE — 84481 FREE ASSAY (FT-3): CPT | Performed by: STUDENT IN AN ORGANIZED HEALTH CARE EDUCATION/TRAINING PROGRAM

## 2023-06-30 PROCEDURE — 99223 PR INITIAL HOSPITAL CARE,LEVL III: ICD-10-PCS | Mod: ,,, | Performed by: INTERNAL MEDICINE

## 2023-06-30 PROCEDURE — 99223 1ST HOSP IP/OBS HIGH 75: CPT | Mod: ,,, | Performed by: INTERNAL MEDICINE

## 2023-06-30 PROCEDURE — 84133 ASSAY OF URINE POTASSIUM: CPT | Performed by: STUDENT IN AN ORGANIZED HEALTH CARE EDUCATION/TRAINING PROGRAM

## 2023-06-30 PROCEDURE — S0030 INJECTION, METRONIDAZOLE: HCPCS | Performed by: STUDENT IN AN ORGANIZED HEALTH CARE EDUCATION/TRAINING PROGRAM

## 2023-06-30 PROCEDURE — 85025 COMPLETE CBC W/AUTO DIFF WBC: CPT | Performed by: STUDENT IN AN ORGANIZED HEALTH CARE EDUCATION/TRAINING PROGRAM

## 2023-06-30 PROCEDURE — 51798 US URINE CAPACITY MEASURE: CPT

## 2023-06-30 PROCEDURE — 25000003 PHARM REV CODE 250: Performed by: STUDENT IN AN ORGANIZED HEALTH CARE EDUCATION/TRAINING PROGRAM

## 2023-06-30 PROCEDURE — 25000003 PHARM REV CODE 250: Performed by: HOSPITALIST

## 2023-06-30 PROCEDURE — 83735 ASSAY OF MAGNESIUM: CPT | Performed by: HOSPITALIST

## 2023-06-30 PROCEDURE — 82436 ASSAY OF URINE CHLORIDE: CPT | Performed by: STUDENT IN AN ORGANIZED HEALTH CARE EDUCATION/TRAINING PROGRAM

## 2023-06-30 PROCEDURE — 83935 ASSAY OF URINE OSMOLALITY: CPT | Performed by: STUDENT IN AN ORGANIZED HEALTH CARE EDUCATION/TRAINING PROGRAM

## 2023-06-30 PROCEDURE — 83735 ASSAY OF MAGNESIUM: CPT | Performed by: STUDENT IN AN ORGANIZED HEALTH CARE EDUCATION/TRAINING PROGRAM

## 2023-06-30 PROCEDURE — 84100 ASSAY OF PHOSPHORUS: CPT | Performed by: HOSPITALIST

## 2023-06-30 PROCEDURE — 80307 DRUG TEST PRSMV CHEM ANLYZR: CPT | Performed by: STUDENT IN AN ORGANIZED HEALTH CARE EDUCATION/TRAINING PROGRAM

## 2023-06-30 PROCEDURE — 81001 URINALYSIS AUTO W/SCOPE: CPT | Performed by: STUDENT IN AN ORGANIZED HEALTH CARE EDUCATION/TRAINING PROGRAM

## 2023-06-30 PROCEDURE — 86780 TREPONEMA PALLIDUM: CPT | Performed by: STUDENT IN AN ORGANIZED HEALTH CARE EDUCATION/TRAINING PROGRAM

## 2023-06-30 PROCEDURE — S5010 5% DEXTROSE AND 0.45% SALINE: HCPCS | Performed by: STUDENT IN AN ORGANIZED HEALTH CARE EDUCATION/TRAINING PROGRAM

## 2023-06-30 PROCEDURE — 94761 N-INVAS EAR/PLS OXIMETRY MLT: CPT

## 2023-06-30 PROCEDURE — 80048 BASIC METABOLIC PNL TOTAL CA: CPT | Performed by: FAMILY MEDICINE

## 2023-06-30 PROCEDURE — 84439 ASSAY OF FREE THYROXINE: CPT | Performed by: STUDENT IN AN ORGANIZED HEALTH CARE EDUCATION/TRAINING PROGRAM

## 2023-06-30 PROCEDURE — 63600175 PHARM REV CODE 636 W HCPCS: Performed by: STUDENT IN AN ORGANIZED HEALTH CARE EDUCATION/TRAINING PROGRAM

## 2023-06-30 PROCEDURE — 11000001 HC ACUTE MED/SURG PRIVATE ROOM

## 2023-06-30 PROCEDURE — 97161 PT EVAL LOW COMPLEX 20 MIN: CPT

## 2023-06-30 PROCEDURE — 82550 ASSAY OF CK (CPK): CPT | Performed by: STUDENT IN AN ORGANIZED HEALTH CARE EDUCATION/TRAINING PROGRAM

## 2023-06-30 PROCEDURE — 80074 ACUTE HEPATITIS PANEL: CPT | Performed by: STUDENT IN AN ORGANIZED HEALTH CARE EDUCATION/TRAINING PROGRAM

## 2023-06-30 RX ORDER — IBUPROFEN 200 MG
16 TABLET ORAL
Status: DISCONTINUED | OUTPATIENT
Start: 2023-06-30 | End: 2023-07-03 | Stop reason: HOSPADM

## 2023-06-30 RX ORDER — IBUPROFEN 200 MG
24 TABLET ORAL
Status: DISCONTINUED | OUTPATIENT
Start: 2023-06-30 | End: 2023-07-03 | Stop reason: HOSPADM

## 2023-06-30 RX ORDER — BUPROPION HYDROCHLORIDE 150 MG/1
300 TABLET ORAL EVERY MORNING
Status: DISCONTINUED | OUTPATIENT
Start: 2023-06-30 | End: 2023-07-03 | Stop reason: HOSPADM

## 2023-06-30 RX ORDER — HYDROCODONE BITARTRATE AND ACETAMINOPHEN 5; 325 MG/1; MG/1
1 TABLET ORAL EVERY 6 HOURS PRN
Status: DISCONTINUED | OUTPATIENT
Start: 2023-06-30 | End: 2023-07-03 | Stop reason: HOSPADM

## 2023-06-30 RX ORDER — MUPIROCIN 20 MG/G
OINTMENT TOPICAL 2 TIMES DAILY
Status: DISCONTINUED | OUTPATIENT
Start: 2023-06-30 | End: 2023-07-03 | Stop reason: HOSPADM

## 2023-06-30 RX ORDER — INSULIN ASPART 100 [IU]/ML
1-10 INJECTION, SOLUTION INTRAVENOUS; SUBCUTANEOUS
Status: DISCONTINUED | OUTPATIENT
Start: 2023-06-30 | End: 2023-07-03 | Stop reason: HOSPADM

## 2023-06-30 RX ORDER — MAGNESIUM SULFATE HEPTAHYDRATE 40 MG/ML
2 INJECTION, SOLUTION INTRAVENOUS ONCE
Status: COMPLETED | OUTPATIENT
Start: 2023-06-30 | End: 2023-06-30

## 2023-06-30 RX ORDER — GLUCAGON 1 MG
1 KIT INJECTION
Status: DISCONTINUED | OUTPATIENT
Start: 2023-06-30 | End: 2023-07-03 | Stop reason: HOSPADM

## 2023-06-30 RX ADMIN — HEPARIN SODIUM 5000 UNITS: 5000 INJECTION, SOLUTION INTRAVENOUS; SUBCUTANEOUS at 08:06

## 2023-06-30 RX ADMIN — POTASSIUM CHLORIDE 40 MEQ: 7.46 INJECTION, SOLUTION INTRAVENOUS at 07:06

## 2023-06-30 RX ADMIN — METRONIDAZOLE 500 MG: 500 INJECTION, SOLUTION INTRAVENOUS at 04:06

## 2023-06-30 RX ADMIN — DEXTROSE AND SODIUM CHLORIDE: 5; 450 INJECTION, SOLUTION INTRAVENOUS at 01:06

## 2023-06-30 RX ADMIN — HYDROCODONE BITARTRATE AND ACETAMINOPHEN 1 TABLET: 5; 325 TABLET ORAL at 04:06

## 2023-06-30 RX ADMIN — MAGNESIUM SULFATE HEPTAHYDRATE 2 G: 40 INJECTION, SOLUTION INTRAVENOUS at 08:06

## 2023-06-30 RX ADMIN — FAMOTIDINE 20 MG: 10 INJECTION, SOLUTION INTRAVENOUS at 08:06

## 2023-06-30 RX ADMIN — MUPIROCIN: 20 OINTMENT TOPICAL at 08:06

## 2023-06-30 RX ADMIN — CEFEPIME 1 G: 1 INJECTION, POWDER, FOR SOLUTION INTRAMUSCULAR; INTRAVENOUS at 08:06

## 2023-06-30 RX ADMIN — INSULIN ASPART 4 UNITS: 100 INJECTION, SOLUTION INTRAVENOUS; SUBCUTANEOUS at 11:06

## 2023-06-30 RX ADMIN — INSULIN DETEMIR 30 UNITS: 100 INJECTION, SOLUTION SUBCUTANEOUS at 08:06

## 2023-06-30 RX ADMIN — HYDROCODONE BITARTRATE AND ACETAMINOPHEN 1 TABLET: 5; 325 TABLET ORAL at 08:06

## 2023-06-30 RX ADMIN — BUPROPION HYDROCHLORIDE 300 MG: 150 TABLET, FILM COATED, EXTENDED RELEASE ORAL at 09:06

## 2023-06-30 RX ADMIN — POTASSIUM BICARBONATE 50 MEQ: 978 TABLET, EFFERVESCENT ORAL at 08:06

## 2023-06-30 NOTE — PT/OT/SLP EVAL
Physical Therapy Evaluation    Patient Name:  Jose Francisco Romero   MRN:  [id number]    Recommendations:     Discharge Recommendations:  home, outpatient OT   Discharge Equipment Recommendations: walker, rolling   Barriers to discharge: fall risk and decreased caregiver support    Assessment:     Jose Francisco Romero is a 42 y.o. male admitted with a medical diagnosis of DKA, SIRS, RODOLFO, respiratory distress  He presents with the following impairments/functional limitations:  weakness, gait instability, decreased lower extremity function.    Patient Active Problem List   Diagnosis    Diabetes    Diabetic ketoacidosis without coma    Diabetic polyneuropathy    Severe malnutrition         Rehab Prognosis: Good.    Patient would benefit from continued skilled acute PT services to: address above listed impairments/functional limitations; receive patient/caregiver education; reduce fall risk; and maximize independency/safety with functional mobility.    Recent Surgery: * No surgery found *      Plan:     During this hospitalization, patient to be seen 5 x/week to address the identified impairments/functional limitations via gait training, therapeutic activities, therapeutic exercises, neuromuscular re-education and progress toward the established goals.    Plan of Care Expires:  07/28/23    Subjective     Communicated with patient's nurse Kevin prior to session.    Patient agreeable to participate in evaluation.     Chief Complaint: B leg pain  Patient/Family Comments/goals: To get a walker to help with his walking  Pain/Comfort:  Pain Rating 1: 8/10  Location - Side 1: Bilateral  Location 1: leg  Pain Addressed 1: Nurse notified, Cessation of Activity  Pain Rating Post-Intervention 1: 9/10  Location - Side 2: Bilateral  Location 2: leg  Pain Addressed 2: Nurse notified    Patients cultural, spiritual, Evangelical conflicts given the current situation: yes    Social History  Living Environment: Patient lives alone in a mobile home,  with  8 steps outside, with walk-in shower.  Functional Level: Prior to admission patient was employed, was driving, and ambulated without assistive device.  Equipment Used at Home: none  Equipment owned (not currently used):   Assistance Upon Discharge:  Pt lives at home with 3 young kids .    Objective:     Patient found supine in bed with blood pressure cuff, pulse ox (continuous), telemetry  upon PT entry to room.    General Precautions: Standard, fall   Orthopedic Precautions:    Braces:    Respiratory Status: room air    Vitals   At Rest (pre-session)  BP  121/68   HR  Not taken   O2 Sat %  99      With Activity (post-session)  BP  131/83   HR  Not taken   O2 Sat %  99     Exams:  Orientation: Patient is oriented to Person, Place, Time, Situation  Commands: Patient follows commands consistently  BILAT UE ROM/strength - defer to OT - see OT note for details  RLE ROM: WNL  RLE Strength: Deficits: tibialis anterior, quad, hip flexor 3-/5  LLE ROM: WNL  LLE Strength: Deficits: ibialis anterior, quad, hip flexor 3-/5    Functional Mobility:    Bed Mobility:  Supine to Sit: modified independence    Transfers:  Sit to Stand: contact guard assistance with rolling walker    Gait:  Patient ambulated 130ft with rolling walker and contact guard assistance.  Patient demonstrates no loss of balance, no mis-steps, decreased darcie, decreased step length, and wide base of support.    Other Mobility:  not assessed    Balance:  Sit  Static: NORMAL: No deviations seen in posture held statically  Dynamic: NORMAL: No deviations seen in posture held dynamically  Stand  Static: FAIR: Maintains without assist but unable to take challenges  Dynamic: FAIR: Needs CONTACT GUARD during gait    Patient left supine in bed with all lines intact and RN notified.    Education     Patient was instructed to utilize staff assistance for mobility/transfers.  White board updated regarding patient's safest level of mobility with staff  assistance.    Goals     Multidisciplinary Problems       Physical Therapy Goals          Problem: Physical Therapy    Goal Priority Disciplines Outcome Goal Variances Interventions   Physical Therapy Goal     PT, PT/OT Ongoing, Progressing     Description: Goals to be met by: Discharge     Patient will increase functional independence with mobility by performin. Sit to stand transfer with Modified Freedom  2. Bed to chair transfer with Modified Freedom using No Assistive Device  3. Gait  x 500 feet with Modified Freedom using LRAD.                       History:     Past Medical History:   Diagnosis Date    Diabetes mellitus type I     Hypertension      Past Surgical History:   Procedure Laterality Date    CLAVICLE SURGERY       Time Tracking:     PT Received On: 23  PT Start Time: 1355     PT Stop Time: 1413  PT Total Time (min): 18 min     Billable Minutes: Evaluation LOW 18 mins     2023

## 2023-06-30 NOTE — PLAN OF CARE
06/30/23 1056   Discharge Assessment   Assessment Type Discharge Planning Assessment   Confirmed/corrected address, phone number and insurance Yes   Confirmed Demographics Correct on Facesheet   Source of Information patient   When was your last doctors appointment?   (Pt sees NP for primary care, but could not recall name)   Reason For Admission Dehydration, Hyperkalemia, Hyperphosphatemia, RODOLFO, Diabetic ketoacidosis, Leukocytosis, Nausea & vomiting   People in Home child(jacquelyn), dependent   Facility Arrived From: Home   Do you expect to return to your current living situation? Yes   Do you have help at home or someone to help you manage your care at home? Yes   Who are your caregiver(s) and their phone number(s)? Jake Romero (Father)   [phone]; Mother, Danelle Romero ([phone])   Prior to hospitilization cognitive status: Alert/Oriented   Current cognitive status: Alert/Oriented   Equipment Currently Used at Home cane, straight   Readmission within 30 days? No   Patient currently being followed by outpatient case management? No   Do you currently have service(s) that help you manage your care at home? No   Do you take prescription medications? Yes  (Pavith[facility] [place])   Do you have prescription coverage? Yes   Coverage [facility] M/D   Do you have any problems affording any of your prescribed medications? No   Is the patient taking medications as prescribed? yes   Who is going to help you get home at discharge? Family   How do you get to doctors appointments? car, drives self   Are you on dialysis? No   Discharge Plan A Home with family   DME Needed Upon Discharge  none   Discharge Plan discussed with: Patient   Transition of Care Barriers None   Physical Activity   On average, how many days per week do you engage in moderate to strenuous exercise (like a brisk walk)? 0 days   On average, how many minutes do you engage in exercise at this level? 0 min   Financial Resource Strain   How hard is  it for you to pay for the very basics like food, housing, medical care, and heating? Hard   Housing Stability   In the last 12 months, was there a time when you were not able to pay the mortgage or rent on time? N   In the last 12 months, how many places have you lived? 1   In the last 12 months, was there a time when you did not have a steady place to sleep or slept in a shelter (including now)? N   Transportation Needs   In the past 12 months, has lack of transportation kept you from medical appointments or from getting medications? no   In the past 12 months, has lack of transportation kept you from meetings, work, or from getting things needed for daily living? No   Food Insecurity   Within the past 12 months, you worried that your food would run out before you got the money to buy more. Sometimes   Within the past 12 months, the food you bought just didn't last and you didn't have money to get more. Never true   Stress   Do you feel stress - tense, restless, nervous, or anxious, or unable to sleep at night because your mind is troubled all the time - these days? To some exte   Social Connections   In a typical week, how many times do you talk on the phone with family, friends, or neighbors? Three   How often do you get together with friends or relatives? Three times   How often do you attend Mosque or Church services? Never   Do you belong to any clubs or organizations such as Mosque groups, unions, fraternal or athletic groups, or school groups? No   How often do you attend meetings of the clubs or organizations you belong to? Never   Are you , , , , never , or living with a partner? Never marrie   Alcohol Use   Q1: How often do you have a drink containing alcohol? Never   Q2: How many drinks containing alcohol do you have on a typical day when you are drinking? None   Q3: How often do you have six or more drinks on one occasion? Never   OTHER   Name(s) of People in  Home 3 minor children (3, 6, 9 yrs)     Pt single with 4 children (3, 6, 9, 16) Oldest son resides with relatives; Children were reportedly taken from their mother due to substance abuse-Custody awarded to pt; Pt unemployed- Parents, Danelle & Jake, provide financial support and assistance as needed; Independent with ADL's; CM to follow for discharge planning needs.

## 2023-06-30 NOTE — ASSESSMENT & PLAN NOTE
"Patient meets ASPEN criteria for severe malnutrition of acute illness or injury per RD assessment as evidenced by:  Energy Intake (Malnutrition): less than or equal to 50% for greater than or equal to 5 days  Weight Loss (Malnutrition): greater than 5% in 1 month                 A minimum of two characteristics is recommended for diagnosis of either severe or non-severe malnutrition.    Ht Readings from Last 1 Encounters:   06/23/23 6' 1" (1.854 m)     Wt Readings from Last 1 Encounters:   06/30/23 0600 69 kg (152 lb 1.9 oz)   06/29/23 1917 69.8 kg (153 lb 14.1 oz)   06/29/23 1327 69.8 kg (153 lb 14.1 oz)     Body mass index is 20.07 kg/m².    Patient has been screened and assessed by RD. See nutrition recommendations documented in inpatient nutrition assessment. RD will continue to follow patient throughout hospitalization.  "

## 2023-06-30 NOTE — PLAN OF CARE
Problem: Physical Therapy  Goal: Physical Therapy Goal  Description: Goals to be met by: Discharge     Patient will increase functional independence with mobility by performin. Sit to stand transfer with Modified [place]  2. Bed to chair transfer with Modified [place] using No Assistive Device  3. Gait  x 500 feet with Modified [place] using LRAD.    2023 1417 by Mariano Amaya, PT  Outcome: Ongoing, Progressing  2023 1415 by Mariano Amaya, PT  Outcome: Ongoing, Progressing

## 2023-06-30 NOTE — PROGRESS NOTES
Pharmacokinetic Initial Assessment: IV Vancomycin    Assessment/Plan:    Initiate intravenous vancomycin with loading dose of 1750 mg once with subsequent doses when random concentrations are less than 20 mcg/mL  Desired empiric serum trough concentration is 15 to 20 mcg/mL  Draw vancomycin trough level 60 min prior to 24 hours after first dose on 6/30 at approximately 1400 due to no labs in ed when first ordered and now with labs creatinine is high  Pharmacy will continue to follow and monitor vancomycin.      Please contact pharmacy at extension [phone] with any questions regarding this assessment.     Thank you for the consult,   Marion Diaz       Patient brief summary:  Jose Francisco Romero is a 42 y.o. male initiated on antimicrobial therapy with IV Vancomycin for treatment of suspected sepsis    Drug Allergies:   Review of patient's allergies indicates:   Allergen Reactions    Penicillins Shortness Of Breath    Sulfa (sulfonamide antibiotics)        Actual Body Weight:   69.8kg    Renal Function:   Estimated Creatinine Clearance: 25.7 mL/min (A) (based on SCr of 3.73 mg/dL (H)).,     Dialysis Method (if applicable):  N/A    CBC (last 72 hours):  Recent Labs   Lab Result Units 06/29/23  1357   WBC x10(3)/mcL 38.21*   Hgb g/dL 13.2*   Hct % 42.7   Platelet x10(3)/mcL 377   Monocytes % % 7       Metabolic Panel (last 72 hours):  Recent Labs   Lab Result Units 06/29/23  1354 06/29/23  1541 06/29/23  1746   Sodium Level mmol/L 133* 133* 135*   Potassium Level mmol/L 6.9* 6.4* 5.3*   Chloride mmol/L 89* 91* 92*   Carbon Dioxide mmol/L <5* <5* 7*   Glucose Level mg/dL 1,001* 935* 833*   Blood Urea Nitrogen mg/dL 39.8* 39.0* 40.5*   Creatinine mg/dL 3.50* 3.52* 3.73*   Albumin Level g/dL 3.3*  --   --    Bilirubin Total mg/dL 0.4  --   --    Alkaline Phosphatase unit/L 138  --   --    Aspartate Aminotransferase unit/L 16  --   --    Alanine Aminotransferase unit/L 40  --   --    Magnesium Level mg/dL 1.80  --   --     Phosphorus Level mg/dL 11.0*  --   --        Drug levels (last 3 results):  No results for input(s): VANCOMYCINRA, VANCORANDOM, VANCOMYCINPE, VANCOPEAK, VANCOMYCINTR, VANCOTROUGH in the last 72 hours.    Microbiologic Results:  Microbiology Results (last 7 days)       Procedure Component Value Units Date/Time    Clostridium Diff Toxin, A & B, EIA [[id number]]     Order Status: Sent Specimen: Stool     Blood Culture #1 **CANNOT BE ORDERED STAT** [[id number]] Collected: 06/29/23 1451    Order Status: Resulted Specimen: Blood from Forearm, Right Updated: 06/29/23 1516    Blood Culture #2 **CANNOT BE ORDERED STAT** [[id number]] Collected: 06/29/23 1451    Order Status: Resulted Specimen: Blood from Hand, Right Updated: 06/29/23 1516

## 2023-06-30 NOTE — PROGRESS NOTES
Inpatient Nutrition Assessment    Admit Date: 6/29/2023   Total duration of encounter: 1 day     Nutrition Recommendation/Prescription     Continue diabetic diet   Will order boost glucose vanilla-tid; Boost Glucose Control (provides 190 kcal, 16 g protein per serving)   Pt education on diet/complete  MVI/fe  Biweekly wt  Will monitor nutrition status     Communication of Recommendations: reviewed with nurse and reviewed with patient    Nutrition Assessment     Malnutrition Assessment/Nutrition-Focused Physical Exam    Malnutrition Context: acute illness or injury  Malnutrition Level: severe  Energy Intake (Malnutrition): less than or equal to 50% for greater than or equal to 5 days  Weight Loss (Malnutrition): greater than 5% in 1 month      A minimum of two characteristics is recommended for diagnosis of either severe or non-severe malnutrition.    Chart Review    Reason Seen: continuous nutrition monitoring and malnutrition screening tool (MST)    Malnutrition Screening Tool Results   Have you recently lost weight without trying?: Unsure  Have you been eating poorly because of a decreased appetite?: Yes   MST Score: 3     Diagnosis:  DKA, SIRS, RODOLFO, respiratory distress/metabolic acidosis, anxiety/depression, peripheral neuropathy     Relevant Medical History: DM, HTN, psychiatric disorder, diabetic neuropathy     Nutrition-Related Medications: insulin, maxipime , famotidine, MgSO4, K bicarbonate   Calorie Containing IV Medications: no significant kcals from medications at this time    Nutrition-Related Labs:  (6-30) H/H 12.0/34.8(L) Gluc 119 Bun 37.9(H) Cr 2.2(H) K 3.4(L)     Diet/PN Order: Diet diabetic  Oral Supplement Order: none  Tube Feeding Order: none  Appetite/Oral Intake: poor/25-50% of meals  Factors Affecting Nutritional Intake: decreased appetite, nausea, shortness of breath, and vomiting  Food/Alevism/Cultural Preferences: none reported  Food Allergies: none reported    Skin Integrity:  "intact  Wound(s):   none reported     Comments    () Pt reported he has not been feeling well approx 3 weeks; was [facility]x week ago; poor appetite --50% or less x 3 weeks; + wt loss--approx 28# over 2-3 months. Pt willing to try oral supplement. Abnormal labs noted: Bun/Cr (H)--RODOLFO.     Anthropometrics  HT 6'1"       Last Weight: 69 kg (152 lb 1.9 oz) (23 0600) Weight Method: Bed Scale   BMI 20.0  #  BMI Classification: normal (BMI 18.5-24.9)         Usual Body Weight (UBW), k.8 kg  % Usual Body Weight: 84.53     Usual Weight Provided By: patient and EMR weight history    Wt Readings from Last 5 Encounters:   23 69 kg (152 lb 1.9 oz)   23 72.6 kg (160 lb)   23 86.2 kg (190 lb)   23 84 kg (185 lb 3 oz)   23 84.2 kg (185 lb 9.6 oz)     Weight Change(s) Since Admission:  Admit Weight: 69.8 kg (153 lb 14.1 oz) (23 1327)  Pt reported #; approx 16% wt loss over 2-3 months     Estimated Needs    Weight Used For Calorie Calculations: 69 kg (152 lb 1.9 oz)  Energy Calorie Requirements (kcal): 2070 kcal/d; 30 yanick/kg  Energy Need Method: Kcal/kg  Weight Used For Protein Calculations: 69 kg (152 lb 1.9 oz)  Protein Requirements: 96 gm protein/d ; 1.4 gm/kg  Fluid Requirements (mL): 2070 ml/d; 1ml/yanick  Temp (24hrs), Av.1 °F (36.7 °C), Min:96.8 °F (36 °C), Max:99.8 °F (37.7 °C)       Enteral Nutrition    Patient not receiving enteral nutrition at this time.    Parenteral Nutrition    Patient not receiving parenteral nutrition support at this time.    Evaluation of Received Nutrient Intake    Calories: not meeting estimated needs  Protein: not meeting estimated needs    Patient Education    Education Provided: diabetic diet  Teaching Method: explanation and printed materials  Comprehension: verbalizes understanding  Barriers to Learning: none evident  Expected Compliance: fair  Comments: All questions were answered and dietitian's " contact information was provided.     Nutrition Diagnosis     PES: Malnutrition related to acute illness as evidenced by eating 50% or less 3 weeks; + 16% wt loss 2-3 months . (new)    Interventions/Goals     Intervention(s): modified composition of meals/snacks, commercial beverage, multivitamin/mineral supplement therapy, purpose of nutrition education, and collaboration with other providers  Goal: Meet greater than 75% of nutritional needs by follow-up. (new)    Monitoring & Evaluation     Dietitian will monitor food and beverage intake, weight, food/nutrition knowledge skill, and glucose/endocrine profile.  Nutrition Risk/Follow-Up: high (follow-up in 1-4 days)   Please consult if re-assessment needed sooner.

## 2023-07-01 LAB
ALBUMIN SERPL-MCNC: 2.8 G/DL (ref 3.5–5)
ALBUMIN/GLOB SERPL: 1.1 RATIO (ref 1.1–2)
ALP SERPL-CCNC: 96 UNIT/L (ref 40–150)
ALT SERPL-CCNC: 24 UNIT/L (ref 0–55)
AST SERPL-CCNC: 14 UNIT/L (ref 5–34)
BASOPHILS # BLD AUTO: 0.02 X10(3)/MCL
BASOPHILS NFR BLD AUTO: 0.2 %
BILIRUBIN DIRECT+TOT PNL SERPL-MCNC: 0.3 MG/DL
BUN SERPL-MCNC: 25.7 MG/DL (ref 8.9–20.6)
CALCIUM SERPL-MCNC: 8.8 MG/DL (ref 8.4–10.2)
CHLORIDE SERPL-SCNC: 99 MMOL/L (ref 98–107)
CO2 SERPL-SCNC: 28 MMOL/L (ref 22–29)
CREAT SERPL-MCNC: 1.03 MG/DL (ref 0.73–1.18)
EOSINOPHIL # BLD AUTO: 0.1 X10(3)/MCL (ref 0–0.9)
EOSINOPHIL NFR BLD AUTO: 1 %
ERYTHROCYTE [DISTWIDTH] IN BLOOD BY AUTOMATED COUNT: 12.5 % (ref 11.5–17)
GFR SERPLBLD CREATININE-BSD FMLA CKD-EPI: >60 MLS/MIN/1.73/M2
GLOBULIN SER-MCNC: 2.5 GM/DL (ref 2.4–3.5)
GLUCOSE SERPL-MCNC: 96 MG/DL (ref 74–100)
HCT VFR BLD AUTO: 33.7 % (ref 42–52)
HGB BLD-MCNC: 11.6 G/DL (ref 14–18)
IMM GRANULOCYTES # BLD AUTO: 0.03 X10(3)/MCL (ref 0–0.04)
IMM GRANULOCYTES NFR BLD AUTO: 0.3 %
LYMPHOCYTES # BLD AUTO: 1.96 X10(3)/MCL (ref 0.6–4.6)
LYMPHOCYTES NFR BLD AUTO: 20.1 %
MCH RBC QN AUTO: 27.2 PG (ref 27–31)
MCHC RBC AUTO-ENTMCNC: 34.4 G/DL (ref 33–36)
MCV RBC AUTO: 79.1 FL (ref 80–94)
MONOCYTES # BLD AUTO: 0.34 X10(3)/MCL (ref 0.1–1.3)
MONOCYTES NFR BLD AUTO: 3.5 %
NEUTROPHILS # BLD AUTO: 7.31 X10(3)/MCL (ref 2.1–9.2)
NEUTROPHILS NFR BLD AUTO: 74.9 %
NRBC BLD AUTO-RTO: 0 %
PLATELET # BLD AUTO: 209 X10(3)/MCL (ref 130–400)
PMV BLD AUTO: 10.2 FL (ref 7.4–10.4)
POCT GLUCOSE: 154 MG/DL (ref 70–110)
POCT GLUCOSE: 175 MG/DL (ref 70–110)
POCT GLUCOSE: 208 MG/DL (ref 70–110)
POCT GLUCOSE: 95 MG/DL (ref 70–110)
POTASSIUM SERPL-SCNC: 3.7 MMOL/L (ref 3.5–5.1)
PROT SERPL-MCNC: 5.3 GM/DL (ref 6.4–8.3)
RBC # BLD AUTO: 4.26 X10(6)/MCL (ref 4.7–6.1)
SODIUM SERPL-SCNC: 134 MMOL/L (ref 136–145)
VIT B12 SERPL-MCNC: 1053 PG/ML (ref 213–816)
WBC # SPEC AUTO: 9.76 X10(3)/MCL (ref 4.5–11.5)

## 2023-07-01 PROCEDURE — 25000003 PHARM REV CODE 250: Performed by: HOSPITALIST

## 2023-07-01 PROCEDURE — 11000001 HC ACUTE MED/SURG PRIVATE ROOM

## 2023-07-01 PROCEDURE — 94761 N-INVAS EAR/PLS OXIMETRY MLT: CPT

## 2023-07-01 PROCEDURE — 82607 VITAMIN B-12: CPT | Performed by: STUDENT IN AN ORGANIZED HEALTH CARE EDUCATION/TRAINING PROGRAM

## 2023-07-01 PROCEDURE — 63600175 PHARM REV CODE 636 W HCPCS: Performed by: STUDENT IN AN ORGANIZED HEALTH CARE EDUCATION/TRAINING PROGRAM

## 2023-07-01 PROCEDURE — 85025 COMPLETE CBC W/AUTO DIFF WBC: CPT | Performed by: STUDENT IN AN ORGANIZED HEALTH CARE EDUCATION/TRAINING PROGRAM

## 2023-07-01 PROCEDURE — 25000003 PHARM REV CODE 250: Performed by: FAMILY MEDICINE

## 2023-07-01 PROCEDURE — 25000003 PHARM REV CODE 250: Performed by: STUDENT IN AN ORGANIZED HEALTH CARE EDUCATION/TRAINING PROGRAM

## 2023-07-01 PROCEDURE — 80053 COMPREHEN METABOLIC PANEL: CPT | Performed by: STUDENT IN AN ORGANIZED HEALTH CARE EDUCATION/TRAINING PROGRAM

## 2023-07-01 PROCEDURE — 97116 GAIT TRAINING THERAPY: CPT

## 2023-07-01 PROCEDURE — 25000003 PHARM REV CODE 250

## 2023-07-01 RX ORDER — TALC
6 POWDER (GRAM) TOPICAL NIGHTLY PRN
Status: DISCONTINUED | OUTPATIENT
Start: 2023-07-01 | End: 2023-07-03 | Stop reason: HOSPADM

## 2023-07-01 RX ORDER — LISINOPRIL 10 MG/1
30 TABLET ORAL DAILY
Status: DISCONTINUED | OUTPATIENT
Start: 2023-07-01 | End: 2023-07-03 | Stop reason: HOSPADM

## 2023-07-01 RX ORDER — FAMOTIDINE 20 MG/1
20 TABLET, FILM COATED ORAL DAILY
Status: DISCONTINUED | OUTPATIENT
Start: 2023-07-01 | End: 2023-07-03 | Stop reason: HOSPADM

## 2023-07-01 RX ORDER — ALPRAZOLAM 0.5 MG/1
1 TABLET ORAL ONCE AS NEEDED
Status: COMPLETED | OUTPATIENT
Start: 2023-07-01 | End: 2023-07-01

## 2023-07-01 RX ADMIN — BUPROPION HYDROCHLORIDE 300 MG: 150 TABLET, FILM COATED, EXTENDED RELEASE ORAL at 09:07

## 2023-07-01 RX ADMIN — HEPARIN SODIUM 5000 UNITS: 5000 INJECTION, SOLUTION INTRAVENOUS; SUBCUTANEOUS at 08:07

## 2023-07-01 RX ADMIN — HEPARIN SODIUM 5000 UNITS: 5000 INJECTION, SOLUTION INTRAVENOUS; SUBCUTANEOUS at 09:07

## 2023-07-01 RX ADMIN — INSULIN ASPART 4 UNITS: 100 INJECTION, SOLUTION INTRAVENOUS; SUBCUTANEOUS at 05:07

## 2023-07-01 RX ADMIN — INSULIN DETEMIR 30 UNITS: 100 INJECTION, SOLUTION SUBCUTANEOUS at 09:07

## 2023-07-01 RX ADMIN — LISINOPRIL 30 MG: 10 TABLET ORAL at 09:07

## 2023-07-01 RX ADMIN — MUPIROCIN: 20 OINTMENT TOPICAL at 09:07

## 2023-07-01 RX ADMIN — Medication 6 MG: at 08:07

## 2023-07-01 RX ADMIN — HYDROCODONE BITARTRATE AND ACETAMINOPHEN 1 TABLET: 5; 325 TABLET ORAL at 08:07

## 2023-07-01 RX ADMIN — ALPRAZOLAM 1 MG: 0.5 TABLET ORAL at 08:07

## 2023-07-01 NOTE — PROGRESS NOTES
[facility] Medicine Wards Progress Note     Resident Team: [facility] Medicine List 3  Attending Physician: Jayne Ireland MD  Resident: Vaibhav  Intern: Dominik       Subjective:      Brief HPI:  Mr. Romero is a 42 y.o. male with PMH significant for 1 diabetes, hypertension, unspecified psychiatric disorder, severe diabetic neuropathy who presents to [facility] ED for nausea, vomiting, chills, generalized weakness, which has progressively worsened since he was discharged for short 2-day hospitalization last week for DKA on 6/25/23 and has had poor PO intake for the last 3 days. Also noted to have slowed/slurred speech but no confusion, and had diarrhea x1 day, and SOB/respiratory distress x1 day. He denies fever, headache, neck pain, chest pain, palpitations, syncope, diaphoresis, increased cough/sputum, dysuria, melena, hematochezia, rash, or any new wounds.      In the ED, patient noted to be afebrile, tachycardic in 130s, tachypnic, in respiratory distress breathing 30 breaths per minute maintaining adequate O2 sats, normotensive. Labs significant for leukocytosis with left shift WBC 38.2, H&H at baseline, severe metabolic acidosis with CO2 undetectable, pseudohyponatremia corrected to 148, anion gap of > 37, hyperkalemia with K 6.9 shifted x1 in ED, RODOLFO with BUN/Cr 39.8/3.5 up from baseline of 11.7/0.89, hyperglycemia with glucose 1001, beta hydroxybutarate of 13.2. UDS, UA pending. IM consulted for DKA, severe sepsis with unknown source requiring ICU admission.     Interval History:   NAEO. Downgraded yesterday after gap closed, DKA resolved. RODOLFO resolved today, BUN/Cr and eGFR back to baseline. Pt remains weak/fatigued, did ambulate with PT yeserday. Spoke with neuro yesterday who recommended LP to rule out CIDP. Pt still considering LP which was offered yesterday as workup for fatigue/lower extremity weakness.       Review of Systems:  ROS completed and negative except as indicated above.     Objective:     Last 24 Hour  Vital Signs:  BP  Min: 120/82  Max: 157/90  Temp  Av.9 °F (36.6 °C)  Min: 97.7 °F (36.5 °C)  Max: 98.4 °F (36.9 °C)  Pulse  Av.2  Min: 84  Max: 101  Resp  Av.2  Min: 14  Max: 22  SpO2  Av.7 %  Min: 97 %  Max: 100 %  I/O last 3 completed shifts:  In: 5749.4 [P.O.:450; I.V.:2406.7; IV Piggyback:2892.7]  Out: 1030 [Urine:1030]    Physical Examination:  Physical Exam  Vitals reviewed.   Constitutional:       General: He is not in acute distress.     Appearance: He is normal weight. He is ill-appearing. He is not toxic-appearing or diaphoretic.   HENT:      Head: Normocephalic and atraumatic.      Mouth/Throat:      Mouth: Mucous membranes are moist.      Pharynx: Oropharynx is clear.   Eyes:      General: No visual field deficit.     Extraocular Movements: Extraocular movements intact.      Pupils: Pupils are equal, round, and reactive to light.   Cardiovascular:      Rate and Rhythm: Normal rate and regular rhythm.      Pulses: Normal pulses.      Heart sounds: Normal heart sounds. No murmur heard.    No friction rub. No gallop.   Pulmonary:      Effort: No respiratory distress.      Breath sounds: Normal breath sounds. No wheezing, rhonchi or rales.   Abdominal:      General: Abdomen is flat. Bowel sounds are normal. There is no distension.      Palpations: Abdomen is soft.      Tenderness: There is no abdominal tenderness.   Musculoskeletal:      Right lower leg: No edema.      Left lower leg: No edema.   Skin:     General: Skin is warm and dry.      Capillary Refill: Capillary refill takes less than 2 seconds.   Neurological:      General: No focal deficit present.      Mental Status: He is alert and oriented to person, place, and time. Mental status is at baseline.      GCS: GCS eye subscore is 4. GCS verbal subscore is 5. GCS motor subscore is 6.      Cranial Nerves: Cranial nerves 2-12 are intact. No cranial nerve deficit, dysarthria or facial asymmetry.      Sensory: Sensory deficit (mild  defecit in BLE) present.      Motor: Weakness (slightly decreased LLE strength 4/5; remaining limbs 5/5 strength) present. No tremor, atrophy, abnormal muscle tone, seizure activity or pronator drift.      Coordination: Coordination is intact. Coordination normal. Finger-Nose-Finger Test and Heel to Shin Test normal. Rapid alternating movements normal.      Deep Tendon Reflexes: Babinski sign absent on the right side. Babinski sign absent on the left side.      Reflex Scores:       Tricep reflexes are 2+ on the right side and 2+ on the left side.       Bicep reflexes are 2+ on the right side and 2+ on the left side.       Brachioradialis reflexes are 2+ on the right side and 2+ on the left side.       Patellar reflexes are 2+ on the right side and 2+ on the left side.       Achilles reflexes are 2+ on the right side and 2+ on the left side.        Laboratory:  Most Recent Data:  CBC:   Lab Results   Component Value Date    WBC 9.76 07/01/2023    HGB 11.6 (L) 07/01/2023    HCT 33.7 (L) 07/01/2023     07/01/2023    MCV 79.1 (L) 07/01/2023    RDW 12.5 07/01/2023     WBC Differential:   Recent Labs   Lab 06/25/23  0347 06/29/23  1357 06/30/23  0243 07/01/23  0440   WBC 7.56 38.21* 24.60* 9.76   HGB 13.3* 13.2* 12.0* 11.6*   HCT 39.6* 42.7 34.8* 33.7*    377 267 209   MCV 79.5* 88.0 78.0* 79.1*     BMP:   Lab Results   Component Value Date     (L) 07/01/2023    K 3.7 07/01/2023    CO2 28 07/01/2023    BUN 25.7 (H) 07/01/2023    CREATININE 1.03 07/01/2023    CALCIUM 8.8 07/01/2023    MG 1.60 06/30/2023    PHOS 3.2 06/30/2023     LFTs:   Lab Results   Component Value Date    ALBUMIN 2.8 (L) 07/01/2023    BILITOT 0.3 07/01/2023    AST 14 07/01/2023    ALKPHOS 96 07/01/2023    ALT 24 07/01/2023     Coags:   Lab Results   Component Value Date    INR 1.1 11/23/2022    PROTIME 14.3 11/24/2018    PTT 30.9 11/24/2018     FLP:   Lab Results   Component Value Date    CHOL 137 12/02/2021    HDL 46 12/02/2021     TRIG 152 (H) 12/02/2021     DM:   Lab Results   Component Value Date    HGBA1C 10.4 (H) 06/24/2023    HGBA1C 11.6 (H) 12/01/2021    HGBA1C 11.8 (H) 03/02/2021    CREATININE 1.03 07/01/2023     Thyroid:   Lab Results   Component Value Date    TSH 1.238 06/30/2023     Anemia:   Lab Results   Component Value Date    IRON 89 06/25/2023    TIBC 188 (L) 06/25/2023    FERRITIN 140.2 04/06/2020     Cardiac:   Lab Results   Component Value Date    TROPONINI <0.010 06/29/2023    BNP 12.1 06/23/2023       Microbiology Data Reviewed: yes  Pertinent Findings:  N/a    Other Results:  EKG (my interpretation): no new EKG.    Radiology:  Imaging Results              CT Head Without Contrast (Final result)  Result time 06/29/23 15:22:06      Final result by Josie Ruff MD (06/29/23 15:22:06)                   Impression:      No acute intracranial abnormality.      Electronically signed by: Josie Ruff  Date:    06/29/2023  Time:    15:22               Narrative:    EXAMINATION:  CT HEAD WITHOUT CONTRAST    CLINICAL HISTORY:  Mental status change, unknown cause;    TECHNIQUE:  Axial scans were obtained from skull base to the vertex.    Coronal and sagittal reconstructions obtained from the axial data.    Automatic exposure control was utilized to limit radiation dose.    Contrast: None    Radiation Dose:    Total DLP: 2592 mGy*cm    COMPARISON:  CT head dated 11/22/2018    FINDINGS:  There is no acute intracranial hemorrhage or edema. The gray-white matter differentiation is preserved.    There is no mass effect or midline shift. The ventricles and sulci are normal in size. The basal cisterns are patent. There is no abnormal extra-axial fluid collection.    The calvarium and skull base are intact.  There is mild right maxillary sinus mucosal thickening.                                       X-Ray Chest AP Portable (Final result)  Result time 06/29/23 14:05:37      Final result by Trey Smith MD (06/29/23 14:05:37)                    Impression:      No acute findings.      Electronically signed by: Trey Smith  Date:    06/29/2023  Time:    14:05               Narrative:    EXAMINATION:  XR CHEST AP PORTABLE    CLINICAL HISTORY:  hyperglycemia;    COMPARISON:  23 June 2023    FINDINGS:  Portable frontal view of the chest was obtained. The heart is not enlarged.  Lungs are clear.  No pneumothorax seen.  Old left rib deformities.  Plate and screw fixation left clavicle.                                      Current Medications:     Infusions:   dextrose 5 % and 0.45 % NaCl Stopped (06/30/23 1133)        Scheduled:   buPROPion  300 mg Oral QAM    famotidine (PF)  20 mg Intravenous Daily    heparin (porcine)  5,000 Units Subcutaneous Q12H    insulin detemir U-100  30 Units Subcutaneous Daily    lisinopriL  30 mg Oral Daily    mupirocin   Nasal BID        PRN:  dextrose 10 % in water (D10W), dextrose 10 % in water (D10W), dextrose 10%, dextrose 10%, dextrose 10%, dextrose 10%, dextrose 5 % and 0.45 % NaCl, glucagon (human recombinant), glucose, glucose, HYDROcodone-acetaminophen, insulin aspart U-100, lorazepam, sodium chloride 0.9%, sodium chloride 0.9%, sodium chloride 0.9%    Antibiotics and Day Number of Therapy:  S/p vanc, cefepime, flagyl x1 day; since stopped    Lines and Day Number of Therapy:  PIV    Assessment & Plan:     DKA       - Pt admitted to ICU initially, placed on DKA protocol and transitioned yesterday morning after gap closed       - Currently on Levemir 30 units daily, moderate dose SSI       - CBG's have been well controlled, AM CBG 96 on labs; at goal       - Will resume home regimen upon discharge    RODOLFO- resolved       - Initial BUN/Cr 39.8/3.5 up from baseline of 11.7/0.89       - BUN/Cr returned to baseline gradually; 25.7/1.03 this morning       - Encourage good PO intake    Generalized weakness  Diffuse Muscle Tenderness       - Pt reports 7 weeks of symptoms, progressively worsening with 2 recent DKA  admissions. Previously very active as a professional        - PT/OT consulted       - CK level WNL       - Discussed case with neurology who recommended LP to assess for possible causes; pt leaning towards yes. Will reassess and perform tomorrow to rule out chronic inflammatory demyelinating polyneuropathy          CODE STATUS: Full  Access: PIV  Antibiotics: none  Diet: Diabetic  DVT Prophylaxis: Heparin 5k  GI Prophylaxis: Pepcid  Fluids: none      Disposition: Admitted 6/29 for severe DKA and RODOLFO which are resolved, however given recent stay and repeat encounter will keep at least another day with inpatient workup for possible myopathy vs deconditioning pending with LP likely tomorrow.       Julio Cesar Esposito MD  [id number]

## 2023-07-01 NOTE — PT/OT/SLP PROGRESS
Physical Therapy Treatment    Patient Name:  Jose Francisco Romero   MRN:  [id number]    Recommendations     Discharge Recommendations:  home, outpatient OT   Discharge Equipment Recommendations: walker, rolling   Barriers to discharge: fall risk and severity of deficits    Assessment     Jose Francisco Romero is a 42 y.o. male admitted with a medical diagnosis of <principal problem not specified>.    He presents with the following impairments/functional limitations:  weakness, impaired endurance, gait instability, impaired balance, pain.    Rehab Prognosis: Good.    Patient would benefit from continued skilled acute PT services to: address above listed impairments/functional limitations; receive patient/caregiver education; reduce fall risk; and maximize independency/safety with functional mobility.    Recent Surgery: * No surgery found *      Plan     During this hospitalization, patient to be seen 5 x/week to address the identified impairments/functional limitations via gait training, therapeutic activities, therapeutic exercises and progress toward the established goals.    Plan of Care Expires:  07/28/23    Subjective     Communicated with patient's nurse prior to session.    Patient agreeable to participate in treatment session.    Chief Complaint: Patietn reports generalized pain in BLE  Patient/Family Comments/goals: to dec pain  Pain/Comfort:  Pain Rating 1: 8/10  Location - Side 1: Bilateral  Location 1: leg  Pain Rating Post-Intervention 1: 8/10    Objective     Patient found supine in bed with    upon PT entry to room.    General Precautions: Standard, fall   Orthopedic Precautions:    Braces:    Respiratory Status: room air    Functional Mobility:    Bed Mobility:  Supine to Sit: contact guard assistance  Sit to Supine: contact guard assistance    Transfers:  Sit to Stand: stand by assistance with rollator    Gait:  Patient ambulated 260 ftft with rollator and stand by assistance.  Patient demonstrates unsteady  gait.    Other Mobility:  not assessed    Patient left sitting edge of bed with all lines intact, call button in reach, nurse notified, and family present.    Goals     Multidisciplinary Problems       Physical Therapy Goals          Problem: Physical Therapy    Goal Priority Disciplines Outcome Goal Variances Interventions   Physical Therapy Goal     PT, PT/OT Ongoing, Progressing     Description: Goals to be met by: Discharge     Patient will increase functional independence with mobility by performin. Sit to stand transfer with Modified Payne  2. Bed to chair transfer with Modified Payne using No Assistive Device  3. Gait  x 500 feet with Modified Payne using LRAD.                       Time Tracking     PT Received On: 23  PT Start Time: 1055     PT Stop Time: 1110  PT Total Time (min): 15 min     Billable Minutes: Gait Training 15    2023

## 2023-07-02 LAB
ALBUMIN SERPL-MCNC: 2.7 G/DL (ref 3.5–5)
ALBUMIN/GLOB SERPL: 1 RATIO (ref 1.1–2)
ALP SERPL-CCNC: 102 UNIT/L (ref 40–150)
ALT SERPL-CCNC: 35 UNIT/L (ref 0–55)
AST SERPL-CCNC: 38 UNIT/L (ref 5–34)
BASOPHILS # BLD AUTO: 0.01 X10(3)/MCL
BASOPHILS NFR BLD AUTO: 0.2 %
BILIRUBIN DIRECT+TOT PNL SERPL-MCNC: 0.3 MG/DL
BUN SERPL-MCNC: 17.4 MG/DL (ref 8.9–20.6)
CALCIUM SERPL-MCNC: 8.9 MG/DL (ref 8.4–10.2)
CHLORIDE SERPL-SCNC: 101 MMOL/L (ref 98–107)
CO2 SERPL-SCNC: 27 MMOL/L (ref 22–29)
CREAT SERPL-MCNC: 0.84 MG/DL (ref 0.73–1.18)
EOSINOPHIL # BLD AUTO: 0.11 X10(3)/MCL (ref 0–0.9)
EOSINOPHIL NFR BLD AUTO: 2.6 %
ERYTHROCYTE [DISTWIDTH] IN BLOOD BY AUTOMATED COUNT: 12.6 % (ref 11.5–17)
GFR SERPLBLD CREATININE-BSD FMLA CKD-EPI: >60 MLS/MIN/1.73/M2
GLOBULIN SER-MCNC: 2.6 GM/DL (ref 2.4–3.5)
GLUCOSE SERPL-MCNC: 129 MG/DL (ref 74–100)
HCT VFR BLD AUTO: 33.6 % (ref 42–52)
HGB BLD-MCNC: 11.5 G/DL (ref 14–18)
IMM GRANULOCYTES # BLD AUTO: 0.01 X10(3)/MCL (ref 0–0.04)
IMM GRANULOCYTES NFR BLD AUTO: 0.2 %
LYMPHOCYTES # BLD AUTO: 1.87 X10(3)/MCL (ref 0.6–4.6)
LYMPHOCYTES NFR BLD AUTO: 44.7 %
MCH RBC QN AUTO: 27.5 PG (ref 27–31)
MCHC RBC AUTO-ENTMCNC: 34.2 G/DL (ref 33–36)
MCV RBC AUTO: 80.4 FL (ref 80–94)
MONOCYTES # BLD AUTO: 0.24 X10(3)/MCL (ref 0.1–1.3)
MONOCYTES NFR BLD AUTO: 5.7 %
NEUTROPHILS # BLD AUTO: 1.94 X10(3)/MCL (ref 2.1–9.2)
NEUTROPHILS NFR BLD AUTO: 46.6 %
NRBC BLD AUTO-RTO: 0 %
PATH REV: NORMAL
PLATELET # BLD AUTO: 161 X10(3)/MCL (ref 130–400)
PMV BLD AUTO: 10.2 FL (ref 7.4–10.4)
POCT GLUCOSE: 138 MG/DL (ref 70–110)
POCT GLUCOSE: 155 MG/DL (ref 70–110)
POCT GLUCOSE: 237 MG/DL (ref 70–110)
POCT GLUCOSE: 303 MG/DL (ref 70–110)
POCT GLUCOSE: 372 MG/DL (ref 70–110)
POTASSIUM SERPL-SCNC: 4.2 MMOL/L (ref 3.5–5.1)
PROT SERPL-MCNC: 5.3 GM/DL (ref 6.4–8.3)
RBC # BLD AUTO: 4.18 X10(6)/MCL (ref 4.7–6.1)
SODIUM SERPL-SCNC: 136 MMOL/L (ref 136–145)
WBC # SPEC AUTO: 4.18 X10(3)/MCL (ref 4.5–11.5)

## 2023-07-02 PROCEDURE — 63600175 PHARM REV CODE 636 W HCPCS: Performed by: STUDENT IN AN ORGANIZED HEALTH CARE EDUCATION/TRAINING PROGRAM

## 2023-07-02 PROCEDURE — 25000003 PHARM REV CODE 250: Performed by: STUDENT IN AN ORGANIZED HEALTH CARE EDUCATION/TRAINING PROGRAM

## 2023-07-02 PROCEDURE — 11000001 HC ACUTE MED/SURG PRIVATE ROOM

## 2023-07-02 PROCEDURE — 80053 COMPREHEN METABOLIC PANEL: CPT | Performed by: STUDENT IN AN ORGANIZED HEALTH CARE EDUCATION/TRAINING PROGRAM

## 2023-07-02 PROCEDURE — 25000003 PHARM REV CODE 250: Performed by: HOSPITALIST

## 2023-07-02 PROCEDURE — 85025 COMPLETE CBC W/AUTO DIFF WBC: CPT | Performed by: STUDENT IN AN ORGANIZED HEALTH CARE EDUCATION/TRAINING PROGRAM

## 2023-07-02 PROCEDURE — 94761 N-INVAS EAR/PLS OXIMETRY MLT: CPT

## 2023-07-02 PROCEDURE — 25000003 PHARM REV CODE 250: Performed by: FAMILY MEDICINE

## 2023-07-02 RX ADMIN — HYDROCODONE BITARTRATE AND ACETAMINOPHEN 1 TABLET: 5; 325 TABLET ORAL at 09:07

## 2023-07-02 RX ADMIN — INSULIN DETEMIR 30 UNITS: 100 INJECTION, SOLUTION SUBCUTANEOUS at 09:07

## 2023-07-02 RX ADMIN — BUPROPION HYDROCHLORIDE 300 MG: 150 TABLET, FILM COATED, EXTENDED RELEASE ORAL at 06:07

## 2023-07-02 RX ADMIN — INSULIN ASPART 4 UNITS: 100 INJECTION, SOLUTION INTRAVENOUS; SUBCUTANEOUS at 01:07

## 2023-07-02 RX ADMIN — FAMOTIDINE 20 MG: 20 TABLET, FILM COATED ORAL at 09:07

## 2023-07-02 RX ADMIN — LISINOPRIL 30 MG: 10 TABLET ORAL at 09:07

## 2023-07-02 RX ADMIN — MUPIROCIN: 20 OINTMENT TOPICAL at 09:07

## 2023-07-02 RX ADMIN — INSULIN ASPART 4 UNITS: 100 INJECTION, SOLUTION INTRAVENOUS; SUBCUTANEOUS at 12:07

## 2023-07-02 RX ADMIN — HEPARIN SODIUM 5000 UNITS: 5000 INJECTION, SOLUTION INTRAVENOUS; SUBCUTANEOUS at 09:07

## 2023-07-02 RX ADMIN — INSULIN ASPART 4 UNITS: 100 INJECTION, SOLUTION INTRAVENOUS; SUBCUTANEOUS at 05:07

## 2023-07-02 NOTE — PROCEDURES
"Jose Francisco Romero is a 42 y.o. male patient in DKA with BLE weakness noted, concern for CDIP so LP attempted.    Temp: 98 °F (36.7 °C) (07/02/23 1151)  Pulse: 67 (07/02/23 1151)  Resp: 18 (07/02/23 1134)  BP: (!) 146/80 (07/02/23 1151)  SpO2: 100 % (07/02/23 1151)  Weight: 69 kg (152 lb 1.9 oz) (07/02/23 1134)  Height: 6' 0.99" (185.4 cm) (07/02/23 1134)       Lumbar Puncture    Date/Time: 7/2/2023 3:56 PM  Location procedure was performed: [facility] MEDICINE  Performed by: Julio Cesar Esposito MD  Authorized by: Julio Cesar Esposito MD   Assisting provider: Jayne Ireland MD  Pre-operative diagnosis:  DKA  Post-operative diagnosis: DKA  Consent Done: Yes  Indications: Assess for CDIP.  Anesthesia: local infiltration    Anesthesia:  Local Anesthetic: lidocaine 1% with epinephrine  Anesthetic total: 5 mL    Patient sedated: no  Preparation: Patient was prepped and draped in the usual sterile fashion.  Lumbar space: L4-L5 interspace  Patient's position: left lateral decubitus  Needle length: 5.0 in  Number of attempts: 1  Complications: No  Estimated blood loss (mL): 0  Specimens: No  Implants: No  Comments: Unsuccessful attempt, patient in pain, code called requiring my attention so procedure aborted.    Julio Cesar Esposito MD  [employer] IM PGY II        7/2/2023    "

## 2023-07-02 NOTE — PROGRESS NOTES
[facility] Progress Note     Resident Team: [facility] List 3  Attending Physician: Jayne Ireland MD  Resident: Vaibhav  Intern: Mo Lehman       Subjective:      Brief HPI:  Mr. Romero is a 42 y.o. male with PMH significant for 1 diabetes, hypertension, unspecified psychiatric disorder, severe diabetic neuropathy who presents to [facility] ED for nausea, vomiting, chills, generalized weakness, which has progressively worsened since he was discharged for short 2-day hospitalization last week for DKA on 23 and has had poor PO intake for the last 3 days. Also noted to have slowed/slurred speech but no confusion, and had diarrhea x1 day, and SOB/respiratory distress x1 day. He denies fever, headache, neck pain, chest pain, palpitations, syncope, diaphoresis, increased cough/sputum, dysuria, melena, hematochezia, rash, or any new wounds.      In the ED, patient noted to be afebrile, tachycardic in 130s, tachypnic, in respiratory distress breathing 30 breaths per minute maintaining adequate O2 sats, normotensive. Labs significant for leukocytosis with left shift WBC 38.2, H&H at baseline, severe metabolic acidosis with CO2 undetectable, pseudohyponatremia corrected to 148, anion gap of > 37, hyperkalemia with K 6.9 shifted x1 in ED, RODOLFO with BUN/Cr 39.8/3.5 up from baseline of 11.7/0.89, hyperglycemia with glucose 1001, beta hydroxybutarate of 13.2. UDS, UA pending. IM consulted for DKA, severe sepsis with unknown source requiring ICU admission.     Interval History:   NAEO. Pt remains sore and weak, otherwise no complaints. States he would like LP today, will obtain consent and perform. Labs remain improved, slight leukopenia noted. CBG's at goal on current regimen.      Review of Systems:  ROS completed and negative except as indicated above.     Objective:     Last 24 Hour Vital Signs:  BP  Min: 128/82  Max: 163/91  Temp  Av.7 °F (36.5 °C)  Min: 97.4 °F (36.3 °C)  Max: 97.9 °F (36.6  °C)  Pulse  Av.3  Min: 65  Max: 91  Resp  Av  Min: 18  Max: 20  SpO2  Av.8 %  Min: 98 %  Max: 100 %  I/O last 3 completed shifts:  In: 200 [P.O.:200]  Out: -     Physical Examination:  Physical Exam  Vitals reviewed.   Constitutional:       General: He is not in acute distress.     Appearance: He is normal weight. He is not ill-appearing, toxic-appearing or diaphoretic.   HENT:      Head: Normocephalic and atraumatic.      Mouth/Throat:      Mouth: Mucous membranes are moist.      Pharynx: Oropharynx is clear.   Eyes:      General: No visual field deficit.     Extraocular Movements: Extraocular movements intact.      Pupils: Pupils are equal, round, and reactive to light.   Cardiovascular:      Rate and Rhythm: Normal rate and regular rhythm.      Pulses: Normal pulses.      Heart sounds: Normal heart sounds. No murmur heard.    No friction rub. No gallop.   Pulmonary:      Effort: No respiratory distress.      Breath sounds: Normal breath sounds. No wheezing, rhonchi or rales.   Abdominal:      General: Abdomen is flat. Bowel sounds are normal. There is no distension.      Palpations: Abdomen is soft.      Tenderness: There is no abdominal tenderness.   Musculoskeletal:      Right lower leg: No edema.      Left lower leg: No edema.   Skin:     General: Skin is warm and dry.      Capillary Refill: Capillary refill takes less than 2 seconds.   Neurological:      General: No focal deficit present.      Mental Status: He is alert and oriented to person, place, and time. Mental status is at baseline.      GCS: GCS eye subscore is 4. GCS verbal subscore is 5. GCS motor subscore is 6.      Cranial Nerves: Cranial nerves 2-12 are intact. No cranial nerve deficit, dysarthria or facial asymmetry.      Sensory: Sensory deficit (mild defecit in BLE) present.      Motor: Weakness (slightly decreased LLE strength 4/5; remaining limbs 5/5 strength) present. No tremor, atrophy, abnormal muscle tone, seizure activity  or pronator drift.      Coordination: Coordination is intact. Coordination normal. Finger-Nose-Finger Test and Heel to Shin Test normal. Rapid alternating movements normal.      Deep Tendon Reflexes: Babinski sign absent on the right side. Babinski sign absent on the left side.      Reflex Scores:       Tricep reflexes are 2+ on the right side and 2+ on the left side.       Bicep reflexes are 2+ on the right side and 2+ on the left side.       Brachioradialis reflexes are 2+ on the right side and 2+ on the left side.       Patellar reflexes are 2+ on the right side and 2+ on the left side.       Achilles reflexes are 2+ on the right side and 2+ on the left side.        Laboratory:  Most Recent Data:  CBC:   Lab Results   Component Value Date    WBC 4.18 (L) 07/02/2023    HGB 11.5 (L) 07/02/2023    HCT 33.6 (L) 07/02/2023     07/02/2023    MCV 80.4 07/02/2023    RDW 12.6 07/02/2023     WBC Differential:   Recent Labs   Lab 06/29/23  1357 06/30/23  0243 07/01/23  0440 07/02/23  0444   WBC 38.21* 24.60* 9.76 4.18*   HGB 13.2* 12.0* 11.6* 11.5*   HCT 42.7 34.8* 33.7* 33.6*    267 209 161   MCV 88.0 78.0* 79.1* 80.4       BMP:   Lab Results   Component Value Date     07/02/2023    K 4.2 07/02/2023    CO2 27 07/02/2023    BUN 17.4 07/02/2023    CREATININE 0.84 07/02/2023    CALCIUM 8.9 07/02/2023    MG 1.60 06/30/2023    PHOS 3.2 06/30/2023     LFTs:   Lab Results   Component Value Date    ALBUMIN 2.7 (L) 07/02/2023    BILITOT 0.3 07/02/2023    AST 38 (H) 07/02/2023    ALKPHOS 102 07/02/2023    ALT 35 07/02/2023     Coags:   Lab Results   Component Value Date    INR 1.1 11/23/2022    PROTIME 14.3 11/24/2018    PTT 30.9 11/24/2018     FLP:   Lab Results   Component Value Date    CHOL 137 12/02/2021    HDL 46 12/02/2021    TRIG 152 (H) 12/02/2021     DM:   Lab Results   Component Value Date    HGBA1C 10.4 (H) 06/24/2023    HGBA1C 11.6 (H) 12/01/2021    HGBA1C 11.8 (H) 03/02/2021    CREATININE 0.84  07/02/2023     Thyroid:   Lab Results   Component Value Date    TSH 1.238 06/30/2023     Anemia:   Lab Results   Component Value Date    IRON 89 06/25/2023    TIBC 188 (L) 06/25/2023    FERRITIN 140.2 04/06/2020    [id number] 1,053 (H) 07/01/2023     Cardiac:   Lab Results   Component Value Date    TROPONINI <0.010 06/29/2023    BNP 12.1 06/23/2023       Microbiology Data Reviewed: yes  Pertinent Findings:  N/a    Other Results:  EKG (my interpretation): no new EKG.    Radiology:  Imaging Results              CT Head Without Contrast (Final result)  Result time 06/29/23 15:22:06      Final result by Josie Ruff MD (06/29/23 15:22:06)                   Impression:      No acute intracranial abnormality.      Electronically signed by: Josie Ruff  Date:    06/29/2023  Time:    15:22               Narrative:    EXAMINATION:  CT HEAD WITHOUT CONTRAST    CLINICAL HISTORY:  Mental status change, unknown cause;    TECHNIQUE:  Axial scans were obtained from skull base to the vertex.    Coronal and sagittal reconstructions obtained from the axial data.    Automatic exposure control was utilized to limit radiation dose.    Contrast: None    Radiation Dose:    Total DLP: 2592 mGy*cm    COMPARISON:  CT head dated 11/22/2018    FINDINGS:  There is no acute intracranial hemorrhage or edema. The gray-white matter differentiation is preserved.    There is no mass effect or midline shift. The ventricles and sulci are normal in size. The basal cisterns are patent. There is no abnormal extra-axial fluid collection.    The calvarium and skull base are intact.  There is mild right maxillary sinus mucosal thickening.                                       X-Ray Chest AP Portable (Final result)  Result time 06/29/23 14:05:37      Final result by Trey Smith MD (06/29/23 14:05:37)                   Impression:      No acute findings.      Electronically signed by: Trey Smith  Date:    06/29/2023  Time:    14:05                Narrative:    EXAMINATION:  XR CHEST AP PORTABLE    CLINICAL HISTORY:  hyperglycemia;    COMPARISON:  23 June 2023    FINDINGS:  Portable frontal view of the chest was obtained. The heart is not enlarged.  Lungs are clear.  No pneumothorax seen.  Old left rib deformities.  Plate and screw fixation left clavicle.                                      Current Medications:     Infusions:   dextrose 5 % and 0.45 % NaCl Stopped (06/30/23 1133)        Scheduled:   buPROPion  300 mg Oral QAM    famotidine  20 mg Oral Daily    heparin (porcine)  5,000 Units Subcutaneous Q12H    insulin detemir U-100  30 Units Subcutaneous Daily    lisinopriL  30 mg Oral Daily    mupirocin   Nasal BID        PRN:  dextrose 10 % in water (D10W), dextrose 10 % in water (D10W), dextrose 10%, dextrose 10%, dextrose 10%, dextrose 10%, dextrose 5 % and 0.45 % NaCl, glucagon (human recombinant), glucose, glucose, HYDROcodone-acetaminophen, insulin aspart U-100, lorazepam, melatonin, sodium chloride 0.9%, sodium chloride 0.9%, sodium chloride 0.9%    Antibiotics and Day Number of Therapy:  S/p vanc, cefepime, flagyl x1 day; since stopped    Lines and Day Number of Therapy:  PIV    Assessment & Plan:     DKA       - Pt admitted to ICU initially, placed on DKA protocol and transitioned yesterday morning after gap closed       - Currently on Levemir 30 units daily, moderate dose SSI       - CBG's have been well controlled, AM  on labs; at goal       - Will resume home regimen upon discharge    RODOLFO- resolved       - Initial BUN/Cr 39.8/3.5 up from baseline of 11.7/0.89       - BUN/Cr remains at baseline 17.4/0.84       - Encourage good PO intake    Generalized weakness  Diffuse Muscle Tenderness       - Pt reports 7 weeks of symptoms, progressively worsening with 2 recent DKA admissions. Previously very active as a professional        - PT/OT consulted       - CK level WNL       - Discussed case with neurology who recommended LP to  assess for possible causes including CDIP; pt agreeable, will attempt later today          CODE STATUS: Full  Access: PIV  Antibiotics: none  Diet: Diabetic  DVT Prophylaxis: Heparin 5k  GI Prophylaxis: Pepcid  Fluids: none      Disposition: Admitted 6/29 for severe DKA and RODOLFO which are resolved, however given recent stay and repeat encounter, will perform LP today and await PT recs for dispo tomorrow.       Julio Cesar Esposito MD  [employer] [facility]

## 2023-07-03 VITALS
WEIGHT: 134.88 LBS | BODY MASS INDEX: 17.88 KG/M2 | SYSTOLIC BLOOD PRESSURE: 158 MMHG | HEART RATE: 79 BPM | TEMPERATURE: 98 F | RESPIRATION RATE: 20 BRPM | OXYGEN SATURATION: 99 % | HEIGHT: 73 IN | DIASTOLIC BLOOD PRESSURE: 97 MMHG

## 2023-07-03 PROBLEM — G62.9 POLYNEUROPATHY: Status: ACTIVE | Noted: 2023-07-03

## 2023-07-03 LAB
ALBUMIN SERPL-MCNC: 3 G/DL (ref 3.5–5)
ALBUMIN/GLOB SERPL: 1 RATIO (ref 1.1–2)
ALP SERPL-CCNC: 108 UNIT/L (ref 40–150)
ALT SERPL-CCNC: 35 UNIT/L (ref 0–55)
AST SERPL-CCNC: 23 UNIT/L (ref 5–34)
BASOPHILS # BLD AUTO: 0.02 X10(3)/MCL
BASOPHILS NFR BLD AUTO: 0.5 %
BILIRUBIN DIRECT+TOT PNL SERPL-MCNC: 0.4 MG/DL
BUN SERPL-MCNC: 13.6 MG/DL (ref 8.9–20.6)
CALCIUM SERPL-MCNC: 9.3 MG/DL (ref 8.4–10.2)
CHLORIDE SERPL-SCNC: 100 MMOL/L (ref 98–107)
CO2 SERPL-SCNC: 26 MMOL/L (ref 22–29)
CREAT SERPL-MCNC: 0.92 MG/DL (ref 0.73–1.18)
EOSINOPHIL # BLD AUTO: 0.2 X10(3)/MCL (ref 0–0.9)
EOSINOPHIL NFR BLD AUTO: 4.7 %
ERYTHROCYTE [DISTWIDTH] IN BLOOD BY AUTOMATED COUNT: 12.6 % (ref 11.5–17)
GFR SERPLBLD CREATININE-BSD FMLA CKD-EPI: >60 MLS/MIN/1.73/M2
GLOBULIN SER-MCNC: 2.9 GM/DL (ref 2.4–3.5)
GLUCOSE SERPL-MCNC: 129 MG/DL (ref 74–100)
HCT VFR BLD AUTO: 36.5 % (ref 42–52)
HGB BLD-MCNC: 12.3 G/DL (ref 14–18)
IMM GRANULOCYTES # BLD AUTO: 0.01 X10(3)/MCL (ref 0–0.04)
IMM GRANULOCYTES NFR BLD AUTO: 0.2 %
LYMPHOCYTES # BLD AUTO: 1.84 X10(3)/MCL (ref 0.6–4.6)
LYMPHOCYTES NFR BLD AUTO: 43.4 %
MCH RBC QN AUTO: 27.5 PG (ref 27–31)
MCHC RBC AUTO-ENTMCNC: 33.7 G/DL (ref 33–36)
MCV RBC AUTO: 81.5 FL (ref 80–94)
MONOCYTES # BLD AUTO: 0.24 X10(3)/MCL (ref 0.1–1.3)
MONOCYTES NFR BLD AUTO: 5.7 %
NEUTROPHILS # BLD AUTO: 1.93 X10(3)/MCL (ref 2.1–9.2)
NEUTROPHILS NFR BLD AUTO: 45.5 %
NRBC BLD AUTO-RTO: 0 %
PLATELET # BLD AUTO: 167 X10(3)/MCL (ref 130–400)
PMV BLD AUTO: 10.1 FL (ref 7.4–10.4)
POCT GLUCOSE: 107 MG/DL (ref 70–110)
POCT GLUCOSE: 46 MG/DL (ref 70–110)
POCT GLUCOSE: 96 MG/DL (ref 70–110)
POTASSIUM SERPL-SCNC: 4.3 MMOL/L (ref 3.5–5.1)
PROT SERPL-MCNC: 5.9 GM/DL (ref 6.4–8.3)
RBC # BLD AUTO: 4.48 X10(6)/MCL (ref 4.7–6.1)
SODIUM SERPL-SCNC: 135 MMOL/L (ref 136–145)
WBC # SPEC AUTO: 4.24 X10(3)/MCL (ref 4.5–11.5)

## 2023-07-03 PROCEDURE — 85025 COMPLETE CBC W/AUTO DIFF WBC: CPT | Performed by: STUDENT IN AN ORGANIZED HEALTH CARE EDUCATION/TRAINING PROGRAM

## 2023-07-03 PROCEDURE — 94761 N-INVAS EAR/PLS OXIMETRY MLT: CPT

## 2023-07-03 PROCEDURE — 80053 COMPREHEN METABOLIC PANEL: CPT | Performed by: STUDENT IN AN ORGANIZED HEALTH CARE EDUCATION/TRAINING PROGRAM

## 2023-07-03 PROCEDURE — 97116 GAIT TRAINING THERAPY: CPT

## 2023-07-03 PROCEDURE — 25000003 PHARM REV CODE 250: Performed by: STUDENT IN AN ORGANIZED HEALTH CARE EDUCATION/TRAINING PROGRAM

## 2023-07-03 RX ORDER — INSULIN GLARGINE 100 [IU]/ML
25 INJECTION, SOLUTION SUBCUTANEOUS DAILY
Qty: 15 ML | Refills: 5 | Status: SHIPPED | OUTPATIENT
Start: 2023-07-03 | End: 2023-07-03 | Stop reason: SDUPTHER

## 2023-07-03 RX ORDER — INSULIN GLARGINE 100 [IU]/ML
25 INJECTION, SOLUTION SUBCUTANEOUS DAILY
Qty: 15 ML | Refills: 5 | Status: SHIPPED | OUTPATIENT
Start: 2023-07-03

## 2023-07-03 RX ADMIN — FAMOTIDINE 20 MG: 20 TABLET, FILM COATED ORAL at 09:07

## 2023-07-03 RX ADMIN — LISINOPRIL 30 MG: 10 TABLET ORAL at 09:07

## 2023-07-03 RX ADMIN — BUPROPION HYDROCHLORIDE 300 MG: 150 TABLET, FILM COATED, EXTENDED RELEASE ORAL at 09:07

## 2023-07-03 NOTE — PROGRESS NOTES
Inpatient Nutrition Assessment    Admit Date: 6/29/2023   Total duration of encounter: 4 days     Nutrition Recommendation/Prescription     Continue diabetic diet   Change Boost Glucose Control to Boost Breeze (provides 250 kcal, 9 g protein per serving) TID per pt's preference  Pt education on diet/complete  Suggest MVI  Biweekly wt      Communication of Recommendations: reviewed with patient and reviewed with caregiver    Nutrition Assessment     Malnutrition Assessment/Nutrition-Focused Physical Exam    Malnutrition Context: acute illness or injury  Malnutrition Level: severe  Energy Intake (Malnutrition): less than or equal to 50% for greater than or equal to 5 days  Weight Loss (Malnutrition): greater than 5% in 1 month      A minimum of two characteristics is recommended for diagnosis of either severe or non-severe malnutrition.    Chart Review    Reason Seen: follow-up    Malnutrition Screening Tool Results   Have you recently lost weight without trying?: Unsure  Have you been eating poorly because of a decreased appetite?: Yes   MST Score: 3     Diagnosis:  DKA, SIRS, RODOLFO, respiratory distress/metabolic acidosis, anxiety/depression, peripheral neuropathy, Generalized weakness, Diffuse Muscle tenderness    Relevant Medical History: DM, HTN, psychiatric disorder, diabetic neuropathy     Nutrition-Related Medications: insulin, famotidine  Calorie Containing IV Medications: no significant kcals from medications at this time    Nutrition-Related Labs:  (6-30) H/H 12.0/34.8(L) Gluc 119 Bun 37.9(H) Cr 2.2(H) K 3.4(L)   7/3/23 -- K 4.3, BUN 13.6, Cr 0.92, Glu 129 H    Diet/PN Order: Diet diabetic  Oral Supplement Order: none  Tube Feeding Order: none  Appetite/Oral Intake: fair/50-75% of meals  Factors Affecting Nutritional Intake: decreased appetite  Food/Jew/Cultural Preferences: none reported  Food Allergies: none reported    Skin Integrity: intact  Wound(s):   none reported     Comments    7/3/23 -- Pt  "reports improved appetite eating ~50% of meals, not drinking Boost Glucose control stating its too thick, willing to try Boost Breeze to supplement meals; Also encouraged snacking between meals; Rec daily MVI; no n/v, LBM ; pt reports possible discharge today    () Pt reported he has not been feeling well approx 3 weeks; was [facility] approx week ago; poor appetite --50% or less x 3 weeks; + wt loss--approx 28# over 2-3 months. Pt willing to try oral supplement. Abnormal labs noted: Bun/Cr (H)--RODOLFO.     Anthropometrics  HT 6'1"  Height: 6' 0.99" (185.4 cm)    Last Weight: 61.2 kg (134 lb 14.4 oz) (23 0600) Weight Method: Bed Scale  BMI (Calculated): 17.8BMI 20.0  #  BMI Classification: normal (BMI 18.5-24.9)         Usual Body Weight (UBW), k.8 kg  % Usual Body Weight: 84.53     Usual Weight Provided By: patient and EMR weight history    Wt Readings from Last 5 Encounters:   23 61.2 kg (134 lb 14.4 oz)   23 72.6 kg (160 lb)   23 86.2 kg (190 lb)   23 84 kg (185 lb 3 oz)   23 84.2 kg (185 lb 9.6 oz)     Weight Change(s) Since Admission:  Admit Weight: 69.8 kg (153 lb 14.1 oz) (23 1327)  Pt reported #; approx 16% wt loss over 2-3 months   7/3/23 -- no new wt    Estimated Needs    Weight Used For Calorie Calculations: 69 kg (152 lb 1.9 oz)  Energy Calorie Requirements (kcal): 2070 kcal/d; 30 yanick/kg  Energy Need Method: Kcal/kg  Weight Used For Protein Calculations: 69 kg (152 lb 1.9 oz)  Protein Requirements: 96 gm protein/d ; 1.4 gm/kg  Fluid Requirements (mL): 2070 ml/d; 1ml/yanick  Temp (24hrs), Av.9 °F (36.6 °C), Min:97.7 °F (36.5 °C), Max:98 °F (36.7 °C)       Enteral Nutrition    Patient not receiving enteral nutrition at this time.    Parenteral Nutrition    Patient not receiving parenteral nutrition support at this time.    Evaluation of Received Nutrient Intake    Calories: not meeting estimated needs  Protein: not meeting " estimated needs    Patient Education    Education Provided: diabetic diet  Teaching Method: explanation and printed materials  Comprehension: verbalizes understanding  Barriers to Learning: none evident  Expected Compliance: fair  Comments: All questions were answered and dietitian's contact information was provided.     Nutrition Diagnosis     PES: Malnutrition related to acute illness as evidenced by eating 50% or less 3 weeks; + 16% wt loss 2-3 months . (continues)    Interventions/Goals     Intervention(s): modified composition of meals/snacks, commercial beverage, multivitamin/mineral supplement therapy, purpose of nutrition education, and collaboration with other providers  Goal: Meet greater than 75% of nutritional needs by follow-up. (goal progressing)    Monitoring & Evaluation     Dietitian will monitor food and beverage intake, weight, food/nutrition knowledge skill, and glucose/endocrine profile.  Nutrition Risk/Follow-Up: high (follow-up in 1-4 days)   Please consult if re-assessment needed sooner.

## 2023-07-03 NOTE — PROGRESS NOTES
07/03/23 1448   Missed Time Reason   Missed Treatment Reason Patient discharged prior to initiation of evaluation

## 2023-07-03 NOTE — DISCHARGE SUMMARY
[facility] Discharge Summary    Admitting Physician: Jayne Ireland MD  Attending Physician: Jayne Ireland MD  Date of Admit: 6/29/2023  Date of Discharge: 7/3/2023    Condition: Good  Outcome: Condition has improved and patient is now ready for discharge.  DISPOSITION: Home or Self Care    Discharge Diagnoses     Patient Active Problem List   Diagnosis    Diabetes    Diabetic ketoacidosis without coma    Diabetic polyneuropathy    Severe malnutrition    Polyneuropathy       Principal Problem:  Severe malnutrition    Consultants and Procedures     Consultants:  Consults (From admission, onward)          Status Ordering Provider     Inpatient consult to Hospitalist  Once        Provider:  Julio Cesar Esposito MD    Acknowledged JASBIR BECERRA             Procedures:   * No surgery found *     Brief History of Present Illness      Mr. Romero is a 42 y.o. male with PMH significant for 1 diabetes, hypertension, unspecified psychiatric disorder, severe diabetic neuropathy who presents to [facility] ED for nausea, vomiting, chills, generalized weakness, which has progressively worsened since he was discharged for short 2-day hospitalization last week for DKA on 6/25/23 and has had poor PO intake for the last 3 days. Also noted to have slowed/slurred speech but no confusion, and had diarrhea x1 day, and SOB/respiratory distress x1 day. He denies fever, headache, neck pain, chest pain, palpitations, syncope, diaphoresis, increased cough/sputum, dysuria, melena, hematochezia, rash, or any new wounds.      In the ED, patient noted to be afebrile, tachycardic in 130s, tachypnic, in respiratory distress breathing 30 breaths per minute maintaining adequate O2 sats, normotensive. Labs significant for leukocytosis with left shift WBC 38.2, H&H at baseline, severe metabolic acidosis with CO2 undetectable, pseudohyponatremia corrected to 148, anion gap of > 37, hyperkalemia with K 6.9 shifted x1 in ED, RODOLFO with BUN/Cr  "39.8/3.5 up from baseline of 11.7/0.89, hyperglycemia with glucose 1001, beta hydroxybutarate of 13.2. UDS, UA pending. IM consulted for DKA, severe sepsis with unknown source requiring ICU admission.        Hospital Course with Pertinent Findings     Mr. Romero was admitted for severe DKA with RODOLFO and hyperkalemia which resolved after utilizing DKA protocol with IV insulin, IVF resuscitation. He improved greatly and was transitioned to long acting insulin and sliding scale, which he tolerated well though had lower blood sugars in  range mostly, with one episode of hypoglycemia so his home Lantus will be changed to 25 units daily and restart home SSI regimen. He did complain of significant muscle weakness, worked with PT who noted improvement during stay, walking independently. Discussed muscle weakness/soreness with neurology who recommended LP to rule out CDIP, which was unsuccessful so pt will be sent to IR for LP to check protein and celll count as well as for an outpatient EMG. Pt stable for discharge 7/3/23, meds reconciled and he will follow up with his PCP for adjustment post-hospitalization.    Discharge physical exam:  Vitals  BP: (!) 145/104  Temp: 97.8 °F (36.6 °C)  Temp Source: Oral  Pulse: 87  Resp: 18  SpO2: 100 %  Height: 6' 0.99" (185.4 cm)  Weight: 61.2 kg (134 lb 14.4 oz)    PE  Constitutional:       General: He is not in acute distress.     Appearance: He is normal weight. He is not ill-appearing, toxic-appearing or diaphoretic.   HENT:      Head: Normocephalic and atraumatic.      Mouth/Throat:      Mouth: Mucous membranes are moist.      Pharynx: Oropharynx is clear.   Eyes:      General: No visual field deficit.     Extraocular Movements: Extraocular movements intact.      Pupils: Pupils are equal, round, and reactive to light.   Cardiovascular:      Rate and Rhythm: Normal rate and regular rhythm.      Pulses: Normal pulses.      Heart sounds: Normal heart sounds. No murmur heard.    " No friction rub. No gallop.   Pulmonary:      Effort: No respiratory distress.      Breath sounds: Normal breath sounds. No wheezing, rhonchi or rales.   Abdominal:      General: Abdomen is flat. Bowel sounds are normal. There is no distension.      Palpations: Abdomen is soft.      Tenderness: There is no abdominal tenderness.   Musculoskeletal:      Right lower leg: No edema.      Left lower leg: No edema.   Skin:     General: Skin is warm and dry.      Capillary Refill: Capillary refill takes less than 2 seconds.   Neurological:      General: No focal deficit present.      Mental Status: He is alert and oriented to person, place, and time. Mental status is at baseline.      GCS: GCS eye subscore is 4. GCS verbal subscore is 5. GCS motor subscore is 6.      Cranial Nerves: Cranial nerves 2-12 are intact. No cranial nerve deficit, dysarthria or facial asymmetry.      Sensory: Sensory deficit (mild defecit in BLE) present.      Motor: Weakness (slightly decreased LLE strength 4/5; remaining limbs 5/5 strength) present. No tremor, atrophy, abnormal muscle tone, seizure activity or pronator drift.      Coordination: Coordination is intact. Coordination normal. Finger-Nose-Finger Test and Heel to Shin Test normal. Rapid alternating movements normal.      Deep Tendon Reflexes: Babinski sign absent on the right side. Babinski sign absent on the left side.      Reflex Scores:       Tricep reflexes are 2+ on the right side and 2+ on the left side.       Bicep reflexes are 2+ on the right side and 2+ on the left side.       Brachioradialis reflexes are 2+ on the right side and 2+ on the left side.       Patellar reflexes are 2+ on the right side and 2+ on the left side.       Achilles reflexes are 2+ on the right side and 2+ on the left side.    TIME SPENT ON DISCHARGE: 60 minutes    Discharge Medications        Medication List        CHANGE how you take these medications      buPROPion 300 MG 24 hr tablet  Commonly known  "as: WELLBUTRIN XL  What changed: Another medication with the same name was removed. Continue taking this medication, and follow the directions you see here.     insulin glargine 100 units/mL SubQ pen  Commonly known as: LANTUS SOLOSTAR U-100 INSULIN  Inject 25 Units into the skin once daily.  What changed: how much to take            CONTINUE taking these medications      blood sugar diagnostic Strp  Commonly known as: TRUE METRIX GLUCOSE TEST STRIP  1 strip by Misc.(Non-Drug; Combo Route) route 4 (four) times daily.     busPIRone 10 MG tablet  Commonly known as: BUSPAR     capsaicin 0.1 % Crea  Apply 1 application topically 3 (three) times daily as needed (Neuropathy).     clonazePAM 2 MG Tab  Commonly known as: KlonoPIN     cyclobenzaprine 10 MG tablet  Commonly known as: FLEXERIL     insulin aspart U-100 100 unit/mL (3 mL) Inpn pen  Commonly known as: NovoLOG FlexPen U-100 Insulin  12 units TID with meals with correction 1:50 >150 Max dose 50 units     lansoprazole 30 MG capsule  Commonly known as: PREVACID     LIDOcaine 5 % Oint ointment  Commonly known as: XYLOCAINE     lisinopriL 30 MG tablet  Commonly known as: PRINIVIL,ZESTRIL  Take 1 tablet (30 mg total) by mouth once daily.     nicotine 7 mg/24 hr  Commonly known as: NICODERM CQ     pen needle, diabetic 31 gauge x 3/16" Ndle  Use 4 times a day for insulin injection (BD Mini)     rOPINIRole 0.5 MG tablet  Commonly known as: REQUIP  Take 1 tablet (0.5 mg total) by mouth 3 (three) times daily. Start 1/2 tab (0.25mg x 2 days). Then take one tab (0.5mg) daily x 5 days. Increase dose to 1mg nightly after first week.            STOP taking these medications      ALPRAZolam 2 MG Tab  Commonly known as: XANAX     FLUoxetine 40 MG capsule               Where to Get Your Medications        These medications were sent to [facility][id number] [address] AT [facility] & [address] [address]      " Hours: 24-hours Phone: [phone]   insulin glargine 100 units/mL SubQ pen         Discharge Information:     Mr. Jose Francisco Romero is being discharged Home or Self Care.    Discharge Procedure Orders   IR LUMBAR PUNCTURE DIAGNOSTIC WITH IMAGING   Standing Status: Future Standing Exp. Date: 07/03/24   Order Comments: Please order CSF Protein, cell count, glucose and attach Julio Cesar Esposito MD to results for me to follow up on. Unable to add outpatient CSF labs for future date. Thank you.     Order Specific Question Answer Comments   Reason for Exam: Rule out CDIP, likely diabetic neuropathy    Has the patient had a prior contrast or iodine reaction? No    Is the patient currently on any blood thinners like Aspirin, Coumadin, or Plavix? No    Is the patient on any Metformin drug, such as Glucophage or Glucovance? No    May the Radiologist modify the order per protocol to meet the clinical needs of the patient? Yes    Release to patient Immediate      EMG W/ ULTRASOUND AND NERVE CONDUCTION TEST 4 Extremities   Standing Status: Future Standing Exp. Date: 07/03/24   Order Comments: Pt complains of weakness noted to BLE; exam notes LLE weakness 4/5 and normal 5/5 RLE. Significant neuropathy to BLE and mild to BUE.     Order Specific Question Answer Comments   Number of Extremities 4 Extremities    Reason for Procedure: Diabetic neuropathy         Follow-Up Appointments:   Follow-up Information       Christie Tamayo NP Follow up.    Specialties: Urgent Care, Emergency Medicine  Contact information:  [address] [facility] [address] [phone]               [facility] - Emergency Dept Follow up.    Specialty: Emergency Medicine  Why: If symptoms worsen  Contact information:  [address] [facility] [address] [phone]                             To address at follow-up:  -Lantus dosing which was decreased to 25 units daily for lower blood sugars during hospital stay  -EMG, IR Lumbar  Puncture results to test for CDIP    The above information was discussed with the patient in clear terms. He was able to repeat the instructions to me in his own words. All questions answered. ED precautions provided.    Julio Cesar Esposito MD  [facility] IM PGYIII

## 2023-07-03 NOTE — PT/OT/SLP DISCHARGE
Physical Therapy Discharge Summary    Name: Jose Francisco Romero  MRN: [id number]   Principal Problem: Severe malnutrition     Patient Discharged from acute Physical Therapy on 7/3/2023  .  Please refer to prior PT noted date on 7/3/2023   for functional status.     Assessment:     Patient was discharged unexpectedly.  Information required to complete an accurate discharge summary is unknown.  Refer to therapy initial evaluation and last progress note for initial and most recent functional status and goal achievement.  Recommendations made may be found in medical record.    1. Diabetic ketoacidosis with coma associated with type 1 diabetes mellitus    2. Leukocytosis, unspecified type    3. Hyperkalemia    4. Nausea and vomiting, unspecified vomiting type    5. Hyperphosphatemia    6. RODOLFO (acute kidney injury)    7. Dehydration    8. Uncontrolled type 1 diabetes mellitus with hyperglycemia        Objective:     GOALS:   Multidisciplinary Problems       Physical Therapy Goals          Problem: Physical Therapy    Goal Priority Disciplines Outcome Goal Variances Interventions   Physical Therapy Goal     PT, PT/OT Ongoing, Progressing     Description: Goals to be met by: Discharge     Patient will increase functional independence with mobility by performin. Sit to stand transfer with Modified [place] met 7/3/2023   met  2. Bed to chair transfer with Modified [place] using No Assistive Device met 7/3/2023    3. Gait  x 500 feet with Modified [place] using LRAD. Ongoing       Not met on DC 7/3/2023                         Reasons for Discontinuation of Therapy Services  DC from the hospital.       Plan:     Patient Discharged to: Home with Home Health Service.      7/3/2023

## 2023-07-03 NOTE — PT/OT/SLP PROGRESS
Physical Therapy Treatment    Patient Name:  Jose Francisco Romero   MRN:  [id number]    Recommendations     Discharge Recommendations:  home, outpatient OT   Discharge Equipment Recommendations: cane, straight   Barriers to discharge: fall risk    Assessment     Jose Francisco Romero is a 42 y.o. male admitted with a medical diagnosis of <principal problem not specified>.    He presents with the following impairments/functional limitations:  impaired functional mobility, gait instability, impaired balance, decreased safety awareness.    Rehab Prognosis: Good.    Patient would benefit from continued skilled acute PT services to: address above listed impairments/functional limitations; receive patient/caregiver education; reduce fall risk; and maximize independency/safety with functional mobility.    Recent Surgery: * No surgery found *      Plan     During this hospitalization, patient to be seen 3 x/week to address the identified impairments/functional limitations via gait training, therapeutic activities and progress toward the established goals.    Plan of Care Expires:  07/28/23    Subjective     Communicated with patient's nurse Francie prior to session.    Patient agreeable to participate in treatment session.    Chief Complaint: none  Patient/Family Comments/goals: return home. Pt. Was ready to leave this moment. Pt. Initially refused therapy, but his Dad talked to him and persuaded pt. To participate in therapy.  Pain/Comfort:  Pain Rating 1: 0/10  Pain Rating Post-Intervention 1: 0/10    Objective     Patient found right sidelying with peripheral IV  upon PT entry to room.    General Precautions: Standard, fall   Orthopedic Precautions:N/A   Braces: N/A  Respiratory Status: room air    Functional Mobility:    Bed Mobility:  Supine to Sit: independence    Transfers:  Sit to Stand: independence with no assistive device    Gait:  Patient ambulated 400 ft with no assistive device and contact guard assistance.  Patient  demonstrates loss of balance occasionally when walking in the moise.    Other Mobility:  not assessed    Patient left sitting edge of bed with all lines intact, call button in reach, nurse notified, and his Dad was present.    Goals     Multidisciplinary Problems       Physical Therapy Goals          Problem: Physical Therapy    Goal Priority Disciplines Outcome Goal Variances Interventions   Physical Therapy Goal     PT, PT/OT Ongoing, Progressing     Description: Goals to be met by: Discharge     Patient will increase functional independence with mobility by performin. Sit to stand transfer with Modified [place] met 7/3/2023   met  2. Bed to chair transfer with Modified [place] using No Assistive Device met 7/3/2023    3. Gait  x 500 feet with Modified [place] using LRAD. Ongoing     Reviewed 7/3/2023                       Time Tracking     PT Received On: 23  PT Start Time: 925     PT Stop Time: 948  PT Total Time (min): 23 min     Billable Minutes: Gait Training 23    2023

## 2023-07-03 NOTE — CARE UPDATE
Pt's nurse called and asked that his pharmacy be changed to [facility] [address]. Rx for insulin with dose change sent to this pharmacy per pt request.    Julio Cesar Esposito MD  [facility] IM PGY III

## 2023-07-04 LAB
BACTERIA BLD CULT: NORMAL
BACTERIA BLD CULT: NORMAL

## 2023-07-05 ENCOUNTER — TELEPHONE (OUTPATIENT)
Dept: INTERVENTIONAL RADIOLOGY/VASCULAR | Facility: HOSPITAL | Age: 43
End: 2023-07-05

## 2023-07-10 ENCOUNTER — HOSPITAL ENCOUNTER (OUTPATIENT)
Dept: INTERVENTIONAL RADIOLOGY/VASCULAR | Facility: HOSPITAL | Age: 43
Discharge: HOME | End: 2023-07-10
Attending: STUDENT IN AN ORGANIZED HEALTH CARE EDUCATION/TRAINING PROGRAM
Payer: MEDICAID

## 2023-07-10 DIAGNOSIS — E10.11 DIABETIC KETOACIDOSIS WITH COMA ASSOCIATED WITH TYPE 1 DIABETES MELLITUS: ICD-10-CM

## 2023-07-10 DIAGNOSIS — G61.81 CIDP (CHRONIC INFLAMMATORY DEMYELINATING POLYNEUROPATHY): ICD-10-CM

## 2023-07-10 DIAGNOSIS — G62.9 POLYNEUROPATHY: Primary | ICD-10-CM

## 2023-07-10 DIAGNOSIS — E10.42 DIABETIC POLYNEUROPATHY ASSOCIATED WITH TYPE 1 DIABETES MELLITUS: Primary | Chronic | ICD-10-CM

## 2023-07-10 LAB
APPEARANCE CSF: CLEAR
COLOR CSF: COLORLESS
GLUCOSE CSF-MCNC: 100 MG/DL (ref 40–70)
PROT CSF-MCNC: 102.8 MG/DL (ref 15–45)
RBC # CSF MANUAL: 0 /UL
WBC # CSF MANUAL: 3 /UL (ref 0–5)

## 2023-07-10 PROCEDURE — 84157 ASSAY OF PROTEIN OTHER: CPT | Performed by: STUDENT IN AN ORGANIZED HEALTH CARE EDUCATION/TRAINING PROGRAM

## 2023-07-10 PROCEDURE — 89051 BODY FLUID CELL COUNT: CPT | Performed by: STUDENT IN AN ORGANIZED HEALTH CARE EDUCATION/TRAINING PROGRAM

## 2023-07-10 PROCEDURE — 82945 GLUCOSE OTHER FLUID: CPT | Performed by: STUDENT IN AN ORGANIZED HEALTH CARE EDUCATION/TRAINING PROGRAM

## 2023-07-10 PROCEDURE — 87070 CULTURE OTHR SPECIMN AEROBIC: CPT | Performed by: STUDENT IN AN ORGANIZED HEALTH CARE EDUCATION/TRAINING PROGRAM

## 2023-07-10 PROCEDURE — 62328 DX LMBR SPI PNXR W/FLUOR/CT: CPT

## 2023-07-13 ENCOUNTER — TELEPHONE (OUTPATIENT)
Dept: ENDOCRINOLOGY | Facility: CLINIC | Age: 43
End: 2023-07-13
Payer: MEDICAID

## 2023-07-13 NOTE — TELEPHONE ENCOUNTER
----- Message from Khushboo Tran sent at 7/13/2023 10:42 AM CDT -----  Regarding: Diabetic shoes  TOÑO PT       Pt called stating that he was discharged from the hospital and would like to see Ms. Denson as soon as possible. He did miss his last appt, and is on the schedule for 7/18/23.   Pt also has questions about diabetic shoes.   Pt # [phone]

## 2023-07-13 NOTE — TELEPHONE ENCOUNTER
Spoke with patient, he states he was unaware of appointment  and not sure he can make that appointment.  He was not at home and will call once he is home and checks his calendar.

## 2023-07-14 ENCOUNTER — CLINICAL SUPPORT (OUTPATIENT)
Dept: DIABETES | Facility: CLINIC | Age: 43
End: 2023-07-14
Payer: MEDICAID

## 2023-07-14 DIAGNOSIS — E10.65 UNCONTROLLED TYPE 1 DIABETES MELLITUS WITH HYPERGLYCEMIA: Primary | ICD-10-CM

## 2023-07-14 PROCEDURE — G0108 DIAB MANAGE TRN  PER INDIV: HCPCS | Mod: PBBFAC

## 2023-07-14 PROCEDURE — 99211 OFF/OP EST MAY X REQ PHY/QHP: CPT | Mod: PBBFAC

## 2023-07-15 LAB
BACTERIA CSF CULT: NORMAL
GRAM STN SPEC: NORMAL

## 2023-07-16 VITALS — HEIGHT: 73 IN | WEIGHT: 163.19 LBS | BODY MASS INDEX: 21.63 KG/M2

## 2023-07-16 NOTE — PROGRESS NOTES
"Diabetes Care Specialist Progress Note  Author: Vanessa Moore RD  Date: 7/14/2023    Program Intake  Reason for Diabetes Program Visit:: Intervention  Type of Intervention:: Individual  Individual: Education  Education: Self-Management Skill Review  Current diabetes risk level:: moderate (risk score 1)  In the last 12 months, have you:: been admitted to a hospital  Was the ER or hospital admission related to diabetes?: Yes (7/3/23 for DKA, 6/25/23 for DKA, 6/29 & 6/23 for hyperglycemia)    Lab Results   Component Value Date    HGBA1C 10.4 (H) 06/24/2023   - down slightly from 10.7 on 4/12/23    Clinical      Problem Review  Comorbidities: Heart Attack, Retinopathy, Neuropathy    Clinical Assessment  Current Diabetes Treatment: Insulin (Lantus 30 units qHS, Novolog 7 units with breakfast, 12 units with lunch & supper + 2 extra units if glucose is "high")  Have you ever been hospitalized because your blood sugar was high?: yes (see comments) (most recently on 7/3/23)      Nutritional Status  Diet: Regular  Meal Plan 24 Hour Recall: Breakfast, Lunch, Dinner, Snack  Meal Plan 24 Hour Recall - Breakfast: peanut butter sandwich (no jelly), 2 cups milk (~ 54g carb)  Meal Plan 24 Hour Recall - Lunch: roast beef sandwich, small bag chips, 2 cups milk (~ 74g carb)  Meal Plan 24 Hour Recall - Dinner: baked fish with broccoli/potatoes/cabbage/cauliflower, 1 cup sugar free punch (~ 15g carb or less)  Meal Plan 24 Hour Recall - Snack: 1 large bag of microwave popcorn (~ 40g carb)  Change in appetite?: Yes (decreased over the past couple months due to illness with hyperglycemia)  Recent Changes in Weight: Weight Loss  Was weight loss intentional or unintentional?: Unintentional (22# in less than 2 months due to uncontrolled type 1 diabetes with DKA requiring multiple hospitalizations with physical signs of malnutrition present + reports decreased strength)  Current nutritional status an area of need that is impacting patient's " "ability to self-manage diabetes?: No      Diabetes Self-Management Skills Assessment    Diabetes Disease Process/Treatment Options  Diabetes Disease Process/Treatment Options: Skills Assessment Completed: No  Deferred due to:: Time  Area of need?: Yes    Nutrition/Healthy Eating  Challenges to healthy eating:: portion control  Method of carbohydrate measurement:: no method  Area of need?: Yes    Physical Activity/Exercise  Patient's daily activity level:: sedentary (over the past few months due to weakness & legs "giving out" requiring cane for walking, also reports dizziness due to meds & severe "crushing" leg/foot pain)  Patient formally exercises outside of work.: no  Reasons for not exercising:: physically unable to exercise currently  Physical Activity/Exercise Skills Assessment Completed: : No  Deffered due to:: Time  Area of need?: No    Medications  Patient is able to describe current diabetes management routine.: yes  Diabetes management routine:: insulin  Patient is able to identify current diabetes medications, dosages, and appropriate timing of medications.: yes  Assessment indicates:: Instruction Needed  Area of need?: Yes    Home Blood Glucose Monitoring  Patient states that blood sugar is checked at home daily.: yes  Monitoring Method:: home glucometer, personal continuous glucose monitor  How often do you check your blood sugar?: 3 times a day  When do you check your blood sugar?: Before breakfast, Before lunch, Before dinner, Before bedtime  When you check what is your typical blood sugar range? : no logs, lowest 65 & highest 480 since last hospitalization 7/3, 160 fasting 7/14 AM, 200 before dinner 7/13  Blood glucose logs:: no, encouraged to keep logs, encouraged to bring logs to provider visits  Blood glucose logs reviewed today?: no  Personal CGM type:: was off of Dexcom since end of May due to lack of supplies, now has some supplies (both G6 & G7 per pt) but forgot to bring to appt for set " up  Patient is able to use personal CGM appropriately.: yes  CGM Report reviewed?: no  Area of need?: No      Psychosocial/Coping  Psychosocial/Coping Skills Assessment Completed: : No  Deffered due to:: Time  Area of need?: Yes      Assessment Summary and Plan    Based on today's diabetes care assessment, the following areas of need were identified:      Social 4/21/2023   Support No   Access to [employer] No   Cognitive/Behavioral Health No   Communication No   Health Literacy No        Clinical 7/14/2023   Medication Adherence -   Nutritional Status No        Diabetes Self-Management Skills 7/14/2023   Diabetes Disease Process/Treatment Options Yes - will discuss at next visit as time permits   Nutrition/Healthy Eating Yes - see care plan   Physical Activity/Exercise No   Medication Yes - see care plan   Home Blood Glucose Monitoring No - Pt to resume use of Dexcom before next visit.  Encouraged pt to reach out to Dexcom supplier for clarification as to why he received both G6 & G7 supplies.   Acute Complications -   Chronic Complications -   Psychosocial/Coping Yes - will discuss at next visit as time permits        Today's interventions were provided through individual discussion, instruction, and written materials were provided.      Patient verbalized understanding of instruction and written materials.  Pt was able to return back demonstration of instructions today. Patient understood key points, needs reinforcement and further instruction.     Diabetes Self-Management Care Plan:    Today's Diabetes Self-Management Care Plan was developed with Jose Francisco's input. Jose Francisco has agreed to work toward the following goal(s) to improve his/her overall diabetes control.      Care Plan: Diabetes Management   Updates made since 6/16/2023 12:00 AM        Problem: Healthy Eating         Goal: Patient agrees to begin counting carbohydrates & attempt to keep meals </= 60g carbohydrate & snacks </= 15g carbohydrate in  preparation for possibility of transitioning to pump therapy in the future.    Start Date: 5/23/2023   Expected End Date: 8/14/2023   This Visit's Progress: Not met   Priority: High   Barriers: No Barriers Identified   Note:    5/23/23: Reviewed sources of carbohydrate & appropriate portion sizes.  Discussed carbohydrate counting.  Encouraged pt to read nutrition facts labels for serving size & grams of total carbohydrate.  Gave goal of </= 60g carbohydrate per meal & </= 15g carbohydrate per snack but ultimately I believe that pt would benefit from I:C ratio vs set mealtime insulin doses to allow for more flexibility in meal size.  Will discuss with endo NP.    7/14/23: Per food recall, pt keeping 2/3 of meals </= 60g carb but exceeding carb goals at lunch meal & exceeding </= 15g carb goal at snack.  Pt does not seem to have been carb counting but was instructed to start use of carb ratio 1:10 on 5/29 which he states he never started.       Problem: Medications         Goal: Patient Agrees to begin use of 1:10 carb ratio with all meals/snacks in place of set mealtime doses of Novolog.    Start Date: 7/14/2023   Expected End Date: 8/14/2023   Priority: High   Barriers: Lack of Motivation to Change   Note:    7/14/23: Pt instructed on carb counting on 5/23 & use of 1:10 carb ratio on 5/29 with good understanding.  Pt reports, however, that he never began use of carb ratio.  Instead, he has been using set mealtime doses of 7 units with breakfast & 12 units with lunch & supper.  Pt would greatly benefit from use of carb ratio vs set mealtime doses due to variability in meal sizes (breakfast ~ 54g carb, lunch ~ 74g carb, supper ~ 15g carb, snack ~ 40g carb).  This would also allow pt some flexibility in meal sizes/choices while he is recovering from recent hospitalizations with decreased appetite.  Pt does state that he has been using 1:50 > 150 correction scale.  Provided new insulin instruction sheet with both 1:10  carb ratio & correction scale.  Pt to follow up with endo NP on 7/18/23.             Follow Up Plan     Pt with multiple recent hospitalizations for DKA with symptoms of N/V/chills/weaknessAMS/chest pain/SOB.  Pt with weakness & leg/foot pain requiring cane for mobilization & decreased appetite requiring appetite stimulant since hospitalizations.  Pt plans to discuss the process of obtaining diabetic shoes with endo NP at 7/18/23 follow up visit.  Pt also reports that he has been living with his mother since hospitalizations while he recovers & regains strength.  Follow up in about 1 month (around 8/14/2023) for review of disease process, exercise, coping, & goal progress.    Today's care plan and follow up schedule was discussed with patient.  Jose Francisco verbalized understanding of the care plan, goals, and agrees to follow up plan.        The patient was encouraged to communicate with his/her health care provider/physician and care team regarding his/her condition(s) and treatment.  I provided the patient with my contact information today and encouraged to contact me via phone or [facility] Patient Portal as needed.     Length of Visit   Total Time: 30 Minutes

## 2023-07-18 ENCOUNTER — HOSPITAL ENCOUNTER (OUTPATIENT)
Facility: HOSPITAL | Age: 43
Discharge: HOME | End: 2023-07-19
Attending: INTERNAL MEDICINE | Admitting: INTERNAL MEDICINE
Payer: MEDICAID

## 2023-07-18 ENCOUNTER — OFFICE VISIT (OUTPATIENT)
Dept: ENDOCRINOLOGY | Facility: CLINIC | Age: 43
End: 2023-07-18
Payer: MEDICAID

## 2023-07-18 VITALS
BODY MASS INDEX: 22.13 KG/M2 | HEART RATE: 93 BPM | RESPIRATION RATE: 17 BRPM | DIASTOLIC BLOOD PRESSURE: 88 MMHG | TEMPERATURE: 98 F | SYSTOLIC BLOOD PRESSURE: 114 MMHG | HEIGHT: 73 IN | WEIGHT: 167 LBS

## 2023-07-18 DIAGNOSIS — E10.10 TYPE 1 DIABETES MELLITUS WITH KETOACIDOSIS WITHOUT COMA: ICD-10-CM

## 2023-07-18 DIAGNOSIS — G61.81 CIDP (CHRONIC INFLAMMATORY DEMYELINATING POLYNEUROPATHY): ICD-10-CM

## 2023-07-18 DIAGNOSIS — G62.9 NEUROPATHY: ICD-10-CM

## 2023-07-18 DIAGNOSIS — E10.65 UNCONTROLLED TYPE 1 DIABETES MELLITUS WITH HYPERGLYCEMIA: Primary | ICD-10-CM

## 2023-07-18 DIAGNOSIS — R07.9 CHEST PAIN: ICD-10-CM

## 2023-07-18 LAB
ALBUMIN SERPL-MCNC: 3.6 G/DL (ref 3.5–5)
ALBUMIN/GLOB SERPL: 1 RATIO (ref 1.1–2)
ALP SERPL-CCNC: 128 UNIT/L (ref 40–150)
ALT SERPL-CCNC: 35 UNIT/L (ref 0–55)
APPEARANCE UR: CLEAR
AST SERPL-CCNC: 26 UNIT/L (ref 5–34)
BACTERIA #/AREA URNS AUTO: ABNORMAL /HPF
BASOPHILS # BLD AUTO: 0.06 X10(3)/MCL
BASOPHILS NFR BLD AUTO: 0.9 %
BILIRUB UR QL STRIP.AUTO: NEGATIVE
BILIRUBIN DIRECT+TOT PNL SERPL-MCNC: 0.3 MG/DL
BUN SERPL-MCNC: 20.9 MG/DL (ref 8.9–20.6)
CALCIUM SERPL-MCNC: 9.9 MG/DL (ref 8.4–10.2)
CHLORIDE SERPL-SCNC: 101 MMOL/L (ref 98–107)
CO2 SERPL-SCNC: 28 MMOL/L (ref 22–29)
COLOR UR: ABNORMAL
CREAT SERPL-MCNC: 1.19 MG/DL (ref 0.73–1.18)
CRP SERPL-MCNC: 1.3 MG/L
EOSINOPHIL # BLD AUTO: 0.28 X10(3)/MCL (ref 0–0.9)
EOSINOPHIL NFR BLD AUTO: 4.1 %
ERYTHROCYTE [DISTWIDTH] IN BLOOD BY AUTOMATED COUNT: 13.5 % (ref 11.5–17)
ERYTHROCYTE [SEDIMENTATION RATE] IN BLOOD: 8 MM/HR (ref 0–15)
GFR SERPLBLD CREATININE-BSD FMLA CKD-EPI: >60 MLS/MIN/1.73/M2
GLOBULIN SER-MCNC: 3.5 GM/DL (ref 2.4–3.5)
GLUCOSE SERPL-MCNC: 162 MG/DL (ref 74–100)
GLUCOSE UR QL STRIP.AUTO: ABNORMAL
HCT VFR BLD AUTO: 39.2 % (ref 42–52)
HGB BLD-MCNC: 12.9 G/DL (ref 14–18)
HYALINE CASTS #/AREA URNS LPF: ABNORMAL /LPF
IMM GRANULOCYTES # BLD AUTO: 0.02 X10(3)/MCL (ref 0–0.04)
IMM GRANULOCYTES NFR BLD AUTO: 0.3 %
KETONES UR QL STRIP.AUTO: NEGATIVE
LEUKOCYTE ESTERASE UR QL STRIP.AUTO: NEGATIVE
LYMPHOCYTES # BLD AUTO: 2.23 X10(3)/MCL (ref 0.6–4.6)
LYMPHOCYTES NFR BLD AUTO: 32.3 %
MCH RBC QN AUTO: 27.2 PG (ref 27–31)
MCHC RBC AUTO-ENTMCNC: 32.9 G/DL (ref 33–36)
MCV RBC AUTO: 82.7 FL (ref 80–94)
MONOCYTES # BLD AUTO: 0.35 X10(3)/MCL (ref 0.1–1.3)
MONOCYTES NFR BLD AUTO: 5.1 %
MUCOUS THREADS URNS QL MICRO: ABNORMAL /LPF
NEUTROPHILS # BLD AUTO: 3.96 X10(3)/MCL (ref 2.1–9.2)
NEUTROPHILS NFR BLD AUTO: 57.3 %
NITRITE UR QL STRIP.AUTO: NEGATIVE
NRBC BLD AUTO-RTO: 0 %
PH UR STRIP.AUTO: 5.5 [PH]
PLATELET # BLD AUTO: 226 X10(3)/MCL (ref 130–400)
PMV BLD AUTO: 9.3 FL (ref 7.4–10.4)
POCT GLUCOSE: 190 MG/DL (ref 70–110)
POCT GLUCOSE: 199 MG/DL (ref 70–110)
POCT GLUCOSE: 94 MG/DL (ref 70–110)
POTASSIUM SERPL-SCNC: 4.2 MMOL/L (ref 3.5–5.1)
PROT SERPL-MCNC: 7.1 GM/DL (ref 6.4–8.3)
PROT UR QL STRIP.AUTO: ABNORMAL
RBC # BLD AUTO: 4.74 X10(6)/MCL (ref 4.7–6.1)
RBC #/AREA URNS AUTO: ABNORMAL /HPF
RBC UR QL AUTO: NEGATIVE
SODIUM SERPL-SCNC: 138 MMOL/L (ref 136–145)
SP GR UR STRIP.AUTO: 1.03
SQUAMOUS #/AREA URNS LPF: ABNORMAL /HPF
UROBILINOGEN UR STRIP-ACNC: NORMAL
WBC # SPEC AUTO: 6.9 X10(3)/MCL (ref 4.5–11.5)
WBC #/AREA URNS AUTO: ABNORMAL /HPF

## 2023-07-18 PROCEDURE — 3046F HEMOGLOBIN A1C LEVEL >9.0%: CPT | Mod: CPTII,,, | Performed by: NURSE PRACTITIONER

## 2023-07-18 PROCEDURE — 3074F SYST BP LT 130 MM HG: CPT | Mod: CPTII,,, | Performed by: NURSE PRACTITIONER

## 2023-07-18 PROCEDURE — 3079F PR MOST RECENT DIASTOLIC BLOOD PRESSURE 80-89 MM HG: ICD-10-PCS | Mod: CPTII,,, | Performed by: NURSE PRACTITIONER

## 2023-07-18 PROCEDURE — 96372 THER/PROPH/DIAG INJ SC/IM: CPT

## 2023-07-18 PROCEDURE — 99215 OFFICE O/P EST HI 40 MIN: CPT | Mod: S$PBB,,, | Performed by: NURSE PRACTITIONER

## 2023-07-18 PROCEDURE — 4010F ACE/ARB THERAPY RXD/TAKEN: CPT | Mod: CPTII,,, | Performed by: NURSE PRACTITIONER

## 2023-07-18 PROCEDURE — G0379 DIRECT REFER HOSPITAL OBSERV: HCPCS

## 2023-07-18 PROCEDURE — 3060F PR POS MICROALBUMINURIA RESULT DOCUMENTED/REVIEW: ICD-10-PCS | Mod: CPTII,,, | Performed by: NURSE PRACTITIONER

## 2023-07-18 PROCEDURE — 3008F BODY MASS INDEX DOCD: CPT | Mod: CPTII,,, | Performed by: NURSE PRACTITIONER

## 2023-07-18 PROCEDURE — 83521 IG LIGHT CHAINS FREE EACH: CPT

## 2023-07-18 PROCEDURE — G0378 HOSPITAL OBSERVATION PER HR: HCPCS

## 2023-07-18 PROCEDURE — 3046F PR MOST RECENT HEMOGLOBIN A1C LEVEL > 9.0%: ICD-10-PCS | Mod: CPTII,,, | Performed by: NURSE PRACTITIONER

## 2023-07-18 PROCEDURE — 1111F DSCHRG MED/CURRENT MED MERGE: CPT | Mod: CPTII,,, | Performed by: NURSE PRACTITIONER

## 2023-07-18 PROCEDURE — 3066F PR DOCUMENTATION OF TREATMENT FOR NEPHROPATHY: ICD-10-PCS | Mod: CPTII,,, | Performed by: NURSE PRACTITIONER

## 2023-07-18 PROCEDURE — 3066F NEPHROPATHY DOC TX: CPT | Mod: CPTII,,, | Performed by: NURSE PRACTITIONER

## 2023-07-18 PROCEDURE — 85025 COMPLETE CBC W/AUTO DIFF WBC: CPT

## 2023-07-18 PROCEDURE — 3074F PR MOST RECENT SYSTOLIC BLOOD PRESSURE < 130 MM HG: ICD-10-PCS | Mod: CPTII,,, | Performed by: NURSE PRACTITIONER

## 2023-07-18 PROCEDURE — 3008F PR BODY MASS INDEX (BMI) DOCUMENTED: ICD-10-PCS | Mod: CPTII,,, | Performed by: NURSE PRACTITIONER

## 2023-07-18 PROCEDURE — 81001 URINALYSIS AUTO W/SCOPE: CPT

## 2023-07-18 PROCEDURE — 3079F DIAST BP 80-89 MM HG: CPT | Mod: CPTII,,, | Performed by: NURSE PRACTITIONER

## 2023-07-18 PROCEDURE — 86334 IMMUNOFIX E-PHORESIS SERUM: CPT

## 2023-07-18 PROCEDURE — 11000001 HC ACUTE MED/SURG PRIVATE ROOM

## 2023-07-18 PROCEDURE — 85652 RBC SED RATE AUTOMATED: CPT

## 2023-07-18 PROCEDURE — 1111F PR DISCHARGE MEDS RECONCILED W/ CURRENT OUTPATIENT MED LIST: ICD-10-PCS | Mod: CPTII,,, | Performed by: NURSE PRACTITIONER

## 2023-07-18 PROCEDURE — 63600175 PHARM REV CODE 636 W HCPCS

## 2023-07-18 PROCEDURE — 80053 COMPREHEN METABOLIC PANEL: CPT

## 2023-07-18 PROCEDURE — 99214 OFFICE O/P EST MOD 30 MIN: CPT | Mod: PBBFAC | Performed by: NURSE PRACTITIONER

## 2023-07-18 PROCEDURE — 4010F PR ACE/ARB THEARPY RXD/TAKEN: ICD-10-PCS | Mod: CPTII,,, | Performed by: NURSE PRACTITIONER

## 2023-07-18 PROCEDURE — 86140 C-REACTIVE PROTEIN: CPT

## 2023-07-18 PROCEDURE — 84165 PROTEIN E-PHORESIS SERUM: CPT

## 2023-07-18 PROCEDURE — 86039 ANTINUCLEAR ANTIBODIES (ANA): CPT

## 2023-07-18 PROCEDURE — 3060F POS MICROALBUMINURIA REV: CPT | Mod: CPTII,,, | Performed by: NURSE PRACTITIONER

## 2023-07-18 PROCEDURE — 99215 PR OFFICE/OUTPT VISIT, EST, LEVL V, 40-54 MIN: ICD-10-PCS | Mod: S$PBB,,, | Performed by: NURSE PRACTITIONER

## 2023-07-18 PROCEDURE — 82784 ASSAY IGA/IGD/IGG/IGM EACH: CPT

## 2023-07-18 RX ORDER — ONDANSETRON 2 MG/ML
4 INJECTION INTRAMUSCULAR; INTRAVENOUS EVERY 8 HOURS PRN
Status: DISCONTINUED | OUTPATIENT
Start: 2023-07-18 | End: 2023-07-19 | Stop reason: HOSPADM

## 2023-07-18 RX ORDER — INSULIN ASPART 100 [IU]/ML
12 INJECTION, SOLUTION INTRAVENOUS; SUBCUTANEOUS
Status: DISCONTINUED | OUTPATIENT
Start: 2023-07-18 | End: 2023-07-19 | Stop reason: HOSPADM

## 2023-07-18 RX ORDER — IBUPROFEN 200 MG
16 TABLET ORAL
Status: DISCONTINUED | OUTPATIENT
Start: 2023-07-18 | End: 2023-07-19 | Stop reason: HOSPADM

## 2023-07-18 RX ORDER — SODIUM CHLORIDE 0.9 % (FLUSH) 0.9 %
10 SYRINGE (ML) INJECTION EVERY 12 HOURS PRN
Status: DISCONTINUED | OUTPATIENT
Start: 2023-07-18 | End: 2023-07-19 | Stop reason: HOSPADM

## 2023-07-18 RX ORDER — GLUCAGON 1 MG
1 KIT INJECTION
Status: DISCONTINUED | OUTPATIENT
Start: 2023-07-18 | End: 2023-07-19 | Stop reason: HOSPADM

## 2023-07-18 RX ORDER — IBUPROFEN 200 MG
24 TABLET ORAL
Status: DISCONTINUED | OUTPATIENT
Start: 2023-07-18 | End: 2023-07-19 | Stop reason: HOSPADM

## 2023-07-18 RX ORDER — ACETAMINOPHEN 500 MG
1000 TABLET ORAL EVERY 8 HOURS PRN
Status: DISCONTINUED | OUTPATIENT
Start: 2023-07-18 | End: 2023-07-19 | Stop reason: HOSPADM

## 2023-07-18 RX ORDER — ACETAMINOPHEN 325 MG/1
650 TABLET ORAL EVERY 4 HOURS PRN
Status: DISCONTINUED | OUTPATIENT
Start: 2023-07-18 | End: 2023-07-19 | Stop reason: HOSPADM

## 2023-07-18 RX ORDER — NALOXONE HCL 0.4 MG/ML
0.02 VIAL (ML) INJECTION
Status: DISCONTINUED | OUTPATIENT
Start: 2023-07-18 | End: 2023-07-19 | Stop reason: HOSPADM

## 2023-07-18 RX ADMIN — INSULIN DETEMIR 25 UNITS: 100 INJECTION, SOLUTION SUBCUTANEOUS at 08:07

## 2023-07-18 NOTE — PLAN OF CARE
07/18/23 1550   Discharge Assessment   Assessment Type Discharge Planning Assessment   Confirmed/corrected address, phone number and insurance Yes   Confirmed Demographics Correct on Facesheet   Source of Information patient   When was your last doctors appointment?   (PCP: Christie Tamayo)   Does patient/caregiver understand observation status   (Inpatient)   Communicated [employer] with patient/caregiver Date not available/Unable to determine   Reason For Admission CIPD   People in Home parent(s);child(jacquelyn), dependent   Facility Arrived From: Home   Do you expect to return to your current living situation? Yes   Do you have help at home or someone to help you manage your care at home? Yes   Who are your caregiver(s) and their phone number(s)? Jake Romero, father, P: [phone]; Danelle Romero, mother, P: [phone]   Prior to hospitilization cognitive status: Alert/Oriented;No Deficits   Current cognitive status: Alert/Oriented;No Deficits   Walking or Climbing Stairs ambulation difficulty, requires equipment   Mobility Management Cane   Equipment Currently Used at Home cane, straight   Readmission within 30 days? Yes   Patient currently being followed by outpatient case management? No   Do you currently have service(s) that help you manage your care at home? No   Do you take prescription medications? Yes  ([facility] on [address])   Do you have prescription coverage? Yes   Coverage [facility]   Do you have any problems affording any of your prescribed medications? No   Is the patient taking medications as prescribed? yes   Who is going to help you get home at discharge? Family   How do you get to doctors appointments? car, drives self   Are you on dialysis? No   Do you take coumadin? No   Discharge Plan A Home with family   DME Needed Upon Discharge  none   Discharge Plan discussed with: Patient   Transition of Care Barriers None   OTHER   Name(s) of People in Home 3 minor children ages 3,6, and 9 years     From  note at  previous admission: Patient is single with 4 children (3,6,9,16). Oldest son resides with relatives; children were reportedly taken from their mother due to substance abuse-custody awarded to patient. Patient unemployed- parents, Danelle & Jake, provide financial support and assistance as needed. Independent with ADL's. CM to follow for DC planning needs.

## 2023-07-18 NOTE — PLAN OF CARE
Problem: Adult Inpatient Plan of Care  Goal: Plan of Care Review  Outcome: Ongoing, Progressing  Flowsheets (Taken 7/18/2023 1438)  Plan of Care Reviewed With: patient  Goal: Patient-Specific Goal (Individualized)  Outcome: Ongoing, Progressing  Goal: Absence of Hospital-Acquired Illness or Injury  Outcome: Ongoing, Progressing  Goal: Optimal Comfort and Wellbeing  Outcome: Ongoing, Progressing  Goal: Readiness for Transition of Care  Outcome: Ongoing, Progressing  Intervention: Mutually Develop Transition Plan  Flowsheets (Taken 7/18/2023 1438)  Equipment Currently Used at Home: cane, straight  Who are your caregiver(s) and their phone number(s)?: mother  Do you expect to return to your current living situation?: Yes  Do you have help at home or someone to help you manage your care at home?: Yes     Problem: Diabetes Comorbidity  Goal: Blood Glucose Level Within Targeted Range  Outcome: Ongoing, Progressing  Intervention: Monitor and Manage Glycemia  Flowsheets (Taken 7/18/2023 143)  Glycemic Management: blood glucose monitored

## 2023-07-18 NOTE — PROGRESS NOTES
Subjective     Patient ID: Jose Francisco Romero is a 42 y.o. male.    Chief Complaint: Diabetes    Endocrine clinic note 04/12/2023:  42 year male scheduled today as new patient referral to endocrine clinic for history of uncontrolled diabetes type 1 with multiple episodes of DKA.  Patient was recently hospitalized with DKA one-week ago and reports to clinic today as new patient referral.  Patient states he was diagnosed with type 1 diabetes about 5 years ago when he went in the hospital with DKA patient has never been establish with an endocrinologist in his currently on basal insulin with a sliding scale with prandial insulin that he checks his sugar after eating and gives insulin after blood glucoses elevated.  Patient's PCP had written a Dexcom patient did not know how to use patient has not with him today Dexcom was placed on patient educated on placement by Noemí bee LPN.  Patient has not had formal education on type 1 diabetes or insulin.  Patient states he checks his CBGS 3 to 4 times a day and states his lowest blood glucoses 80 in the morning blood sugars range as high as the 400s.  He states occasionally forgets his basal insulin at night.  Discussed with patient in depth today that we will check a C-peptide and becky 65 also be referring him to Diabetes Education for insulin medication will adjust his regimen today.      Endocrine clinic note 05/22/2023:  42 year male scheduled today as one-month follow-up for endocrine clinic for insulin titration for history of uncontrolled type 1 diabetes with multiple episodes of DKA.  Previously patient's regimen was adjusted and Dexcom was restarted. Dexcom interpretation 05/06/2023-05/19/2023.  Days with CGM data 100% 14/14 day.  Insulin data is lacking limiting interpretation.  Previously patient was having volatile blood sugars and treating glucose after eating patient's since has been giving insulin before eating and has had more stable blood sugars at times he  has prandial spikes at night patient states he is eating higher carb meals with rice and potatoes at night.  Patient has 2 episodes of hypoglycemia around lunchtime he states he does not eat a large meal like at nighttime.  Decrease lunchtime insulin to 10 units patient has appointment with Diabetes Education tomorrow will start carb counting and will work towards a pump.     Endocrine clinic note 07/18/2023:  42-year-old male scheduled today for endocrine clinic follow-up.  History of uncontrolled type 1 diabetes with recent DKA hospitalized for sepsis of unknown origin.  Patient was hospitalized with a white blood cell count on 06/29/2023 of 38.21 with sepsis of unknown origin.  Lumbar puncture was attempted unsuccessful. Pt returned for out pt lumbar puncture CSF containing protein 102.8 high in glucose 100 high indicating CIDP.  On admit white blood cell 38.2.  Patient states he is lost over 30 lb and also his extreme pain through his extremities and is weak and having to walk with a cane.  Patient has weakness difficulty walking and 20-30 pound weight loss and severe fatigue unable to sleep due to pain.   Call was made Alexia Jacinto NP case was reviewed possible CIDP also discussed since pt is extremely symptomatic patient may need to be admitted IVIG treatment.   Type 1 diabetes recent A1c 10.7.  Patient is not on a Dexcom due to shipping issues started on G7 but G6 shipped. Patient was given 2 samples of Dexcom G7 today until order is figured out.     Medicine team was called patient was admitted the hospital.          Review of Systems   Constitutional:  Negative for activity change, appetite change, chills and fatigue.   HENT: Negative.  Negative for dental problem, hearing loss, rhinorrhea, sneezing and goiter.    Eyes: Negative.  Negative for photophobia and visual disturbance.   Respiratory: Negative.  Negative for cough, chest tightness and shortness of breath.    Cardiovascular:  Negative for chest pain,  palpitations and leg swelling.   Gastrointestinal: Negative.  Negative for abdominal distention, abdominal pain, change in bowel habit, constipation, diarrhea, nausea, vomiting and change in bowel habit.   Endocrine: Negative.  Negative for cold intolerance, heat intolerance, polydipsia, polyphagia and polyuria.   Genitourinary: Negative.  Negative for difficulty urinating and erectile dysfunction.   Musculoskeletal:  Negative for back pain, joint swelling, leg pain, myalgias and joint deformity.   Integumentary:  Negative for color change, pallor and rash. Negative.   Allergic/Immunologic: Negative.  Negative for environmental allergies, food allergies and frequent infections.   Neurological: Negative.  Negative for tremors, syncope, headaches, coordination difficulties and coordination difficulties.   Hematological: Negative.  Does not bruise/bleed easily.   Psychiatric/Behavioral:  Negative for agitation and behavioral problems. The patient is not nervous/anxious and is not hyperactive.         Objective     Physical Exam  Constitutional:       General: He is not in acute distress.     Appearance: Normal appearance. He is normal weight. He is not ill-appearing.   HENT:      Head: Normocephalic.      Right Ear: External ear normal.      Left Ear: External ear normal.      Nose: No congestion or rhinorrhea.      Mouth/Throat:      Mouth: Mucous membranes are moist.      Pharynx: Oropharynx is clear.   Eyes:      Conjunctiva/sclera: Conjunctivae normal.      Pupils: Pupils are equal, round, and reactive to light.   Neck:      Thyroid: No thyroid mass, thyromegaly or thyroid tenderness.   Cardiovascular:      Rate and Rhythm: Normal rate and regular rhythm.      Pulses: Normal pulses.      Heart sounds: Normal heart sounds. No murmur heard.  Pulmonary:      Effort: Pulmonary effort is normal. No respiratory distress.      Breath sounds: Normal breath sounds.   Abdominal:      General: Abdomen is flat. Bowel sounds  are normal. There is no distension.      Palpations: Abdomen is soft.      Tenderness: There is no abdominal tenderness.   Genitourinary:     Testes: Normal.   Musculoskeletal:         General: Normal range of motion.      Cervical back: Normal range of motion and neck supple.      Right lower leg: No edema.      Left lower leg: No edema.   Feet:      Right foot:      Skin integrity: Skin integrity normal.      Left foot:      Skin integrity: Skin integrity normal.   Lymphadenopathy:      Cervical: No cervical adenopathy.   Skin:     General: Skin is warm and dry.      Coloration: Skin is not jaundiced or pale.   Neurological:      General: No focal deficit present.      Mental Status: He is alert and oriented to person, place, and time.      Motor: No weakness.      Coordination: Coordination normal.      Gait: Gait normal.   Psychiatric:         Mood and Affect: Mood normal.         Behavior: Behavior normal.         Thought Content: Thought content normal.         Judgment: Judgment normal.          Assessment and Plan     1. Uncontrolled type 1 diabetes mellitus with hyperglycemia  Current A1C 10.7   A1C goal <7.0   Medications: Lantus 25 units, NovoLog 8 units with breakfast 12 units with lunch 10 units with dinner with a correction scale 1:50 > 150 Dexcom G7 started    Follow ADA diet, avoid soda, simple sweets, and limit rice, breads and starches  Do not skip meals, eat balanced ADA approved meals   Maintain a healthy weight, exercise 4-5 times a week for 30 minutes  Diet and medication adherence discussed on visit  Call Clinic to report Hypoglycemia (CBG's <90)  Yearly flu shot, pneumonia shot as indicated   Return to clinic 3 months     2. Neuropathy  Chronic inflammatory Demyelinating polyneuropathy   See below    3. DKA   Sepsis of unknown origin   Component Ref Range & Units 2 wk ago  (7/3/23) 2 wk ago  (7/2/23) 2 wk ago  (7/1/23) 2 wk ago  (6/30/23) 2 wk ago  (6/29/23) 2 wk ago  (6/29/23) 3 wk  ago  (6/25/23)   WBC 4.50 - 11.50 x10(3)/mcL 4.24 Low   4.18 Low   9.76  24.60 High    38.21 High   7.56    RBC 4.70 - 6.10 x10(6)/mcL 4.48 Low   4.18 Low   4.26 Low   4.46 Low    4.85  4.98      CIDP (chronic inflammatory demyelinating polyneuropathy)  Protein, CSF Order: [id number]  Status: Final result     Visible to patient: No (inaccessible in Patient Portal)     Next appt: 08/14/2023 at 08:00 AM in Diabetes (Vanessa Moore RD)     0 Result Notes       Component Ref Range & Units 8 d ago   Protein CSF  15.0 - 45.0 mg/dL 102.8 High        Contains abnormal data Glucose, CSF Order: [id number]  Status: Final result     Visible to patient: No (inaccessible in Patient Portal)     Next appt: 08/14/2023 at 08:00 AM in Diabetes (Vanessa Moore RD)     0 Result Notes       Component Ref Range & Units 8 d ago   Glucose CSF  40 - 70 mg/dL 100 High        -     Ambulatory referral/consult to Neurology; Future; Expected date: 07/25/2023  Keep follow-up appointment in September      I spent a total of 40 minutes on the day of the visit.  This includes face to face time and non-face to face time preparing to see the patient (eg, review of tests), obtaining and/or reviewing separately obtained history, documenting clinical information in the electronic or other health record, independently interpreting results and communicating results to the patient/family/caregiver, or care coordinator.  Over 50% of the visit spent discussing with the patient lumbar puncture calling neuro staff and also talking to medicine team having patient admitted.

## 2023-07-19 VITALS
RESPIRATION RATE: 18 BRPM | BODY MASS INDEX: 22.13 KG/M2 | WEIGHT: 167 LBS | HEIGHT: 73 IN | DIASTOLIC BLOOD PRESSURE: 87 MMHG | TEMPERATURE: 98 F | HEART RATE: 92 BPM | OXYGEN SATURATION: 99 % | SYSTOLIC BLOOD PRESSURE: 132 MMHG

## 2023-07-19 PROBLEM — E44.0 MODERATE MALNUTRITION: Status: ACTIVE | Noted: 2023-07-19

## 2023-07-19 PROBLEM — G61.81 CIDP (CHRONIC INFLAMMATORY DEMYELINATING POLYNEUROPATHY): Status: ACTIVE | Noted: 2023-07-19

## 2023-07-19 LAB
ALBUMIN SERPL-MCNC: 3.2 G/DL (ref 3.5–5)
ALBUMIN/GLOB SERPL: 1 RATIO (ref 1.1–2)
ALP SERPL-CCNC: 103 UNIT/L (ref 40–150)
ALT SERPL-CCNC: 28 UNIT/L (ref 0–55)
AST SERPL-CCNC: 19 UNIT/L (ref 5–34)
BASOPHILS # BLD AUTO: 0.07 X10(3)/MCL
BASOPHILS NFR BLD AUTO: 1.2 %
BILIRUBIN DIRECT+TOT PNL SERPL-MCNC: 0.4 MG/DL
BUN SERPL-MCNC: 15.4 MG/DL (ref 8.9–20.6)
CALCIUM SERPL-MCNC: 9.4 MG/DL (ref 8.4–10.2)
CHLORIDE SERPL-SCNC: 101 MMOL/L (ref 98–107)
CO2 SERPL-SCNC: 30 MMOL/L (ref 22–29)
CREAT SERPL-MCNC: 0.95 MG/DL (ref 0.73–1.18)
EOSINOPHIL # BLD AUTO: 0.33 X10(3)/MCL (ref 0–0.9)
EOSINOPHIL NFR BLD AUTO: 5.5 %
ERYTHROCYTE [DISTWIDTH] IN BLOOD BY AUTOMATED COUNT: 13.4 % (ref 11.5–17)
GFR SERPLBLD CREATININE-BSD FMLA CKD-EPI: >60 MLS/MIN/1.73/M2
GLOBULIN SER-MCNC: 3.3 GM/DL (ref 2.4–3.5)
GLUCOSE SERPL-MCNC: 173 MG/DL (ref 74–100)
HCT VFR BLD AUTO: 38.8 % (ref 42–52)
HGB BLD-MCNC: 12.6 G/DL (ref 14–18)
IGA SERPL-MCNC: 220 MG/DL (ref 63–484)
IGG SERPL-MCNC: 1185 MG/DL (ref 540–1822)
IGM SERPL-MCNC: 104 MG/DL (ref 22–240)
IMM GRANULOCYTES # BLD AUTO: 0.01 X10(3)/MCL (ref 0–0.04)
IMM GRANULOCYTES NFR BLD AUTO: 0.2 %
LYMPHOCYTES # BLD AUTO: 2.5 X10(3)/MCL (ref 0.6–4.6)
LYMPHOCYTES NFR BLD AUTO: 41.9 %
MCH RBC QN AUTO: 26.8 PG (ref 27–31)
MCHC RBC AUTO-ENTMCNC: 32.5 G/DL (ref 33–36)
MCV RBC AUTO: 82.6 FL (ref 80–94)
MONOCYTES # BLD AUTO: 0.44 X10(3)/MCL (ref 0.1–1.3)
MONOCYTES NFR BLD AUTO: 7.4 %
NEUTROPHILS # BLD AUTO: 2.61 X10(3)/MCL (ref 2.1–9.2)
NEUTROPHILS NFR BLD AUTO: 43.8 %
NRBC BLD AUTO-RTO: 0 %
PLATELET # BLD AUTO: 213 X10(3)/MCL (ref 130–400)
PMV BLD AUTO: 10 FL (ref 7.4–10.4)
POCT GLUCOSE: 105 MG/DL (ref 70–110)
POCT GLUCOSE: 43 MG/DL (ref 70–110)
POCT GLUCOSE: 66 MG/DL (ref 70–110)
POTASSIUM SERPL-SCNC: 4.6 MMOL/L (ref 3.5–5.1)
PROT SERPL-MCNC: 6.5 GM/DL (ref 6.4–8.3)
RBC # BLD AUTO: 4.7 X10(6)/MCL (ref 4.7–6.1)
SODIUM SERPL-SCNC: 137 MMOL/L (ref 136–145)
WBC # SPEC AUTO: 5.96 X10(3)/MCL (ref 4.5–11.5)

## 2023-07-19 PROCEDURE — 25000003 PHARM REV CODE 250

## 2023-07-19 PROCEDURE — 96372 THER/PROPH/DIAG INJ SC/IM: CPT

## 2023-07-19 PROCEDURE — 63600175 PHARM REV CODE 636 W HCPCS

## 2023-07-19 PROCEDURE — 80053 COMPREHEN METABOLIC PANEL: CPT

## 2023-07-19 PROCEDURE — 85025 COMPLETE CBC W/AUTO DIFF WBC: CPT

## 2023-07-19 PROCEDURE — G0378 HOSPITAL OBSERVATION PER HR: HCPCS

## 2023-07-19 RX ORDER — GABAPENTIN 300 MG/1
300 CAPSULE ORAL 3 TIMES DAILY
Status: DISCONTINUED | OUTPATIENT
Start: 2023-07-19 | End: 2023-07-19 | Stop reason: HOSPADM

## 2023-07-19 RX ORDER — CYCLOBENZAPRINE HCL 10 MG
10 TABLET ORAL 3 TIMES DAILY PRN
Status: DISCONTINUED | OUTPATIENT
Start: 2023-07-19 | End: 2023-07-19 | Stop reason: HOSPADM

## 2023-07-19 RX ORDER — PANTOPRAZOLE SODIUM 40 MG/1
40 TABLET, DELAYED RELEASE ORAL DAILY
Status: DISCONTINUED | OUTPATIENT
Start: 2023-07-19 | End: 2023-07-19 | Stop reason: HOSPADM

## 2023-07-19 RX ORDER — LISINOPRIL 10 MG/1
30 TABLET ORAL DAILY
Status: DISCONTINUED | OUTPATIENT
Start: 2023-07-19 | End: 2023-07-19 | Stop reason: HOSPADM

## 2023-07-19 RX ADMIN — Medication 16 G: at 11:07

## 2023-07-19 RX ADMIN — INSULIN ASPART 12 UNITS: 100 INJECTION, SOLUTION INTRAVENOUS; SUBCUTANEOUS at 08:07

## 2023-07-19 NOTE — NURSING
"Patient was educated on why taking home medications during hospitalization was unsafe. Patient stated, " Well I took my lisinopril already."  "

## 2023-07-19 NOTE — PLAN OF CARE
Problem: Adult Inpatient Plan of Care  Goal: Plan of Care Review  Outcome: Met     Problem: Adult Inpatient Plan of Care  Goal: Optimal Comfort and Wellbeing  Outcome: Met

## 2023-07-19 NOTE — ASSESSMENT & PLAN NOTE
"Patient meets ASPEN criteria for moderate malnutrition of acute illness or injury per RD assessment as evidenced by:  Energy Intake (Malnutrition): less than 75% for greater than 7 days  Weight Loss (Malnutrition): greater than 5% in 1 month                 A minimum of two characteristics is recommended for diagnosis of either severe or non-severe malnutrition.    Ht Readings from Last 1 Encounters:   07/18/23 6' 1" (1.854 m)     Wt Readings from Last 1 Encounters:   07/18/23 1411 75.8 kg (167 lb)     Body mass index is 22.03 kg/m².    Patient has been screened and assessed by RD. See nutrition recommendations documented in inpatient nutrition assessment. RD will continue to follow patient throughout hospitalization.      "

## 2023-07-19 NOTE — PROGRESS NOTES
Inpatient Nutrition Assessment    Admit Date: 7/18/2023   Total duration of encounter: 1 day     Nutrition Recommendation/Prescription     Diabetic diet  Monitor Weights Weekly   Boost Glucose Control (provides 190 kcal, 16 g protein per serving) BID    Communication of Recommendations: reviewed with patient and reviewed with caregiver    Nutrition Assessment     Malnutrition Assessment/Nutrition-Focused Physical Exam    Malnutrition Context: acute illness or injury  Malnutrition Level: moderate  Energy Intake (Malnutrition): less than 75% for greater than 7 days  Weight Loss (Malnutrition): greater than 5% in 1 month                                                  A minimum of two characteristics is recommended for diagnosis of either severe or non-severe malnutrition.    Chart Review    Reason Seen: continuous nutrition monitoring    Malnutrition Screening Tool Results   Have you recently lost weight without trying?: No  Have you been eating poorly because of a decreased appetite?: No   MST Score: 0     Diagnosis:  Uncontrolled DM with hyperglycemia, Neuropathy, DKA, CIDP    Relevant Medical History: Uncontrolled DM type 1 with multiple episodes DKA    Nutrition-Related Medications: Insulin aspart, insulin determir, lisinopril, protonix  Calorie Containing IV Medications: no significant kcals from medications at this time    Nutrition-Related Labs:  7/19/23 -- Glu 173 H, K 4.6, BUN 15, Cr 0.95    Diet/PN Order: Diet diabetic  Oral Supplement Order: none  Tube Feeding Order: none  Appetite/Oral Intake: fair/50-75% of meals  Factors Affecting Nutritional Intake: decreased appetite  Food/Mu-ism/Cultural Preferences: none reported  Food Allergies: none reported       Wound(s):   skin intact    Comments    7/19/23 -- Pt reports fair appetite > 1 week with improvement since last admit ~ 1 week ago; denies n/v, LBM documented on 7/17; pt reports -190 lb ~ 2 months ago, pt last weighing 182 lb May 22 noted per  "EMR wt hx, suggest Boost Glu control BID to supplement nutrition; Glu (H) - h/o uncontrolled DM, pt denies need for diabetic diet education stating he receives it every time he comes    Anthropometrics    Height: 6' 1" (185.4 cm)    Last Weight: 75.8 kg (167 lb) (23 1411) Weight Method: Standard Scale  BMI (Calculated): 22  BMI Classification: normal (BMI 18.5-24.9)        Ideal Body Weight (IBW), Male: 184 lb     % Ideal Body Weight, Male (lb): 90.76 %                 Usual Body Weight (UBW), k kg  % Usual Body Weight: 90.37     Usual Weight Provided By: patient    Wt Readings from Last 5 Encounters:   23 75.8 kg (167 lb)   23 75.8 kg (167 lb)   23 74 kg (163 lb 3.2 oz)   23 61.2 kg (134 lb 14.4 oz)   23 72.6 kg (160 lb)   23 82.8 kg    Weight Change(s) Since Admission:  Admit Weight: 75.8 kg (167 lb) (23 1411)      Estimated Needs    Weight Used For Calorie Calculations: 75 kg (165 lb 5.5 oz)  Energy Calorie Requirements (kcal): 2200 - 2300 kcal (30 kcal/kg)  Energy Need Method: Kcal/kg  Weight Used For Protein Calculations: 75 kg (165 lb 5.5 oz)  Protein Requirements: 75 - 90 gm (1 - 1.2 gm/kg)  Fluid Requirements (mL): 1299-4130 ml (1ml/kcal)  Temp (24hrs), Av.2 °F (36.8 °C), Min:97.9 °F (36.6 °C), Max:98.6 °F (37 °C)       Enteral Nutrition    Patient not receiving enteral nutrition at this time.    Parenteral Nutrition    Patient not receiving parenteral nutrition support at this time.    Evaluation of Received Nutrient Intake    Calories: not meeting estimated needs  Protein: not meeting estimated needs    Patient Education    Not applicable.    Nutrition Diagnosis     PES: Malnutrition related to  uncontrolled DM as evidenced by < 75% nutrition intake, > 5% wt loss x 1 month. (new)    Interventions/Goals     Intervention(s): modified composition of meals/snacks, commercial beverage, and collaboration with other providers  Goal: Meet greater than 75% of " nutritional needs by follow-up. (new)    Monitoring & Evaluation     Dietitian will monitor food and beverage intake, weight change, glucose/endocrine profile, and gastrointestinal profile.  Nutrition Risk/Follow-Up: moderate (follow-up in 3-5 days)   Please consult if re-assessment needed sooner.

## 2023-07-19 NOTE — H&P
[facility] History & Physical Note     Resident Team: [facility] Medicine List     Date of Admit: 7/18/2023    Chief Complaint     Generalized weakness and fatigue     Subjective:      History of Present Illness:  Jose Francisco Romero is a 42 y.o. male with a PMHx of uncontrolled Type 1 DM (Dx in 2018) w/multiple episodes of DKA, HTN, severe diabetic neuropathy who presented to endocrine clinic on 7/18/2023  with a primary complaint of generalized malaise, fatigue, and lower extremity neuropathic pain that has progressively getting worse for the past 2 months. He was recently admitted on 06/29/2023 for DKA with RODOLFO. He was treated for his DKA. He was complaining of significant muscle weakness at that time, which he worked with PT. Discussed muscle weakness/soreness with neurology who recommended LP to rule out CDIP. Bedside LP was unsuccessful and patient was lateral sent to IR for LP to check protein and celll count as well as for an outpatient electromyography. CSF was later found to be significant for elevated protein, glucose, and negative WBC/cultures. Patient reports he has had lower extremity weakness, causing him to have difficulty ambulation. He has to use a walking cane. He states he has had 20-30 lb weight loss in the past 2-3 months due to decrease in appetite and difficulty with sleep due to his neuropathic pain in his lower extremity. He denies fever, chills, diaphoresis, chest pain, SOB, palpitations, N/V, but admits to unintentional weight loss, fatigue, generalized weakness, worsening peripheral neuropathy.    Admit to service under observation for further management of CDIP.    Past Medical History:  Past Medical History:   Diagnosis Date    Diabetes mellitus type I     Hypertension        Past Surgical History:  Past Surgical History:   Procedure Laterality Date    CLAVICLE SURGERY         Family History:  Family History   Problem Relation Age of Onset    Diabetes Mother     Diabetes Father     Heart disease  Father        Social History:  Social History     Tobacco Use    Smoking status: Former     Packs/day: 0.50     Types: Cigarettes     Quit date: 5/15/2023     Years since quittin.1     Passive exposure: Past    Smokeless tobacco: Never   Substance Use Topics    Alcohol use: Never    Drug use: Never       Allergies:  Review of patient's allergies indicates:   Allergen Reactions    Penicillins Shortness Of Breath    Sulfa (sulfonamide antibiotics)        Home Medications:  Prior to Admission medications    Medication Sig Start Date End Date Taking? Authorizing Provider   blood sugar diagnostic (TRUE METRIX GLUCOSE TEST STRIP) Strp 1 strip by Misc.(Non-Drug; Combo Route) route 4 (four) times daily. 23   Marvin Warren MD   buPROPion (WELLBUTRIN XL) 300 MG 24 hr tablet Take 300 mg by mouth every morning. 3/7/23   Historical Provider   busPIRone (BUSPAR) 10 MG tablet Take 10 mg by mouth. 1/3/22   Historical Provider   capsaicin 0.1 % Crea Apply 1 application topically 3 (three) times daily as needed (Neuropathy). 23   Jf Quiroz MD   clonazePAM (KLONOPIN) 2 MG Tab Take 2 mg by mouth daily as needed. 23   Historical Provider   cyclobenzaprine (FLEXERIL) 10 MG tablet Take 10 mg by mouth 3 (three) times daily as needed for Muscle spasms.    Historical Provider   insulin (LANTUS SOLOSTAR U-100 INSULIN) glargine 100 units/mL SubQ pen Inject 25 Units into the skin once daily. 7/3/23   Julio Cesar Esposito MD   insulin aspart U-100 (NOVOLOG FLEXPEN U-100 INSULIN) 100 unit/mL (3 mL) InPn pen 12 units TID with meals with correction 1:50 >150 Max dose 50 units 23   Janiya Thomas NP   lansoprazole (PREVACID) 30 MG capsule Take 30 mg by mouth. 1/3/22   Historical Provider   LIDOcaine (XYLOCAINE) 5 % Oint ointment Apply 2 g topically 2 (two) times daily as needed. 23   Historical Provider   lisinopriL (PRINIVIL,ZESTRIL) 30 MG tablet Take 1 tablet (30 mg total) by mouth once daily. 22  "23  EM Ambrocio   nicotine (NICODERM CQ) 7 mg/24 hr Place 1 patch onto the skin every 24 hours.    Historical Provider   pen needle, diabetic 31 gauge x 3/" Ndle Use 4 times a day for insulin injection (BD Mini) 23   Janiay Thomas NP   rOPINIRole (REQUIP) 0.5 MG tablet Take 1 tablet (0.5 mg total) by mouth 3 (three) times daily. Start 1/2 tab (0.25mg x 2 days). Then take one tab (0.5mg) daily x 5 days. Increase dose to 1mg nightly after first week. 23  Jf Quiroz MD       Review of Systems:  Review of Systems   Constitutional:  Positive for malaise/fatigue and weight loss. Negative for chills, diaphoresis and fever.   Respiratory:  Negative for cough, shortness of breath and wheezing.    Cardiovascular:  Negative for chest pain, palpitations, leg swelling and PND.   Gastrointestinal:  Negative for heartburn, nausea and vomiting.   Musculoskeletal:  Negative for myalgias.   Neurological:  Positive for weakness (lower extremity).       Objective:   Last 24 Hour Vital Signs:  BP  Min: 128/79  Max: 156/93  Temp  Av.2 °F (36.8 °C)  Min: 97.9 °F (36.6 °C)  Max: 98.6 °F (37 °C)  Pulse  Av.3  Min: 79  Max: 93  Resp  Av  Min: 18  Max: 18  SpO2  Av %  Min: 98 %  Max: 100 %  Height  Av' 1" (185.4 cm)  Min: 6' 1" (185.4 cm)  Max: 6' 1" (185.4 cm)  Weight  Av.8 kg (167 lb)  Min: 75.8 kg (167 lb)  Max: 75.8 kg (167 lb)  Body mass index is 22.03 kg/m².  I/O last 3 completed shifts:  In: 180 [P.O.:180]  Out: 550 [Urine:550]    Physical Examination:  Physical Exam  Vitals and nursing note reviewed.   Constitutional:       Appearance: Normal appearance.   HENT:      Head: Normocephalic and atraumatic.      Nose: Nose normal.      Mouth/Throat:      Mouth: Mucous membranes are dry.      Pharynx: Oropharynx is clear.   Eyes:      Extraocular Movements: Extraocular movements intact.      Conjunctiva/sclera: Conjunctivae normal.      Pupils: Pupils are equal, round, and " reactive to light.   Cardiovascular:      Rate and Rhythm: Normal rate and regular rhythm.      Pulses: Normal pulses.      Heart sounds: Normal heart sounds.   Pulmonary:      Effort: Pulmonary effort is normal.      Breath sounds: Normal breath sounds.   Abdominal:      General: Abdomen is flat. Bowel sounds are normal. There is no distension.      Palpations: Abdomen is soft.      Tenderness: There is no abdominal tenderness. There is guarding (in all quandrants).   Musculoskeletal:         General: No swelling, tenderness or deformity. Normal range of motion.      Cervical back: Normal range of motion and neck supple.      Right lower leg: No edema.      Left lower leg: No edema.   Skin:     General: Skin is warm and dry.      Capillary Refill: Capillary refill takes less than 2 seconds.   Neurological:      General: No focal deficit present.      Mental Status: He is alert and oriented to person, place, and time.      Cranial Nerves: No cranial nerve deficit.      Sensory: No sensory deficit.      Motor: No weakness.      Coordination: Coordination normal.      Gait: Gait normal.      Deep Tendon Reflexes: Reflexes normal.   Psychiatric:         Mood and Affect: Mood normal.         Behavior: Behavior normal.      Laboratory:  Most Recent Data:  CBC:   Lab Results   Component Value Date    WBC 5.96 07/19/2023    HGB 12.6 (L) 07/19/2023    HCT 38.8 (L) 07/19/2023     07/19/2023    MCV 82.6 07/19/2023    RDW 13.4 07/19/2023     WBC Differential:   Recent Labs   Lab 07/18/23  1542 07/19/23  0337   WBC 6.90 5.96   HGB 12.9* 12.6*   HCT 39.2* 38.8*    213   MCV 82.7 82.6     BMP:   Lab Results   Component Value Date     07/19/2023    K 4.6 07/19/2023     11/24/2022    CO2 30 (H) 07/19/2023    BUN 15.4 07/19/2023    CREATININE 0.95 07/19/2023    CALCIUM 9.4 07/19/2023    MG 1.60 06/30/2023    PHOS 3.2 06/30/2023     LFTs:   Lab Results   Component Value Date    ALBUMIN 3.2 (L) 07/19/2023     BILITOT 0.4 07/19/2023    AST 19 07/19/2023    ALKPHOS 103 07/19/2023    ALT 28 07/19/2023     Trended Lab Data:  Recent Labs   Lab 07/18/23  1542 07/19/23  0337   WBC 6.90 5.96   HGB 12.9* 12.6*   HCT 39.2* 38.8*    213   MCV 82.7 82.6   RDW 13.5 13.4    137   K 4.2 4.6   CO2 28 30*   BUN 20.9* 15.4   CREATININE 1.19* 0.95   ALBUMIN 3.6 3.2*   BILITOT 0.3 0.4   AST 26 19   ALKPHOS 128 103   ALT 35 28     Assessment & Plan:     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  Protein .8; Glucose   Case management consulted for establish to infusion center; will need orders per neurology, pending    Type 1 DM with peripheral neuropathy  Continue with Detemir 25 units qhs with Aspart 12 units TID w/meals  recent A1c 10.7, current established to use Dexcom G7 after obtaining samples in Endocrine clinic on 07/18/2023  Ordered Gabapentin 300 mg TID and Flexeril 10 mg TID prn  Will continue to monitor CBGs     HTN  BP in the 140s/80s  Resumed home lisinopril 30 mg qd        CODE STATUS: full  Access: pIV  Antibiotics: none  Diet: Diabetic diet  DVT Prophylaxis: compression device   GI Prophylaxis: pantoprazole 40 mg qd  Fluids: none      Disposition: pending referral to infusion clinic; plan to discharge once referral placed/established       Chente Ennis MD     [facility] Family Medicine Resident HO-1  07/19/2023

## 2023-07-19 NOTE — PLAN OF CARE
Consult to set up IVIG treatment noted. Spoke to Belkis in the infusion clinic, P: [phone]. She stated that patient's neurologist needs to put the orders in and send an outpatient referral to [facility]. MD team has been updated.

## 2023-07-19 NOTE — DISCHARGE SUMMARY
[facility] Discharge Summary    Admitting Physician: Munira Linn MD  Attending Physician: No att. providers found  Date of Admit: 7/18/2023  Date of Discharge: 7/19/2023    Condition: Good  Outcome:  Establishment with neurology and infusion center accomplished  DISPOSITION: Home or Self Care    Discharge Diagnoses     Patient Active Problem List   Diagnosis    Diabetes    Diabetic ketoacidosis without coma    Diabetic polyneuropathy    Severe malnutrition    Polyneuropathy    CIDP (chronic inflammatory demyelinating polyneuropathy)    Moderate malnutrition       Principal Problem:  CIDP (chronic inflammatory demyelinating polyneuropathy)    Consultants and Procedures     Consultants:  IP CONSULT TO SOCIAL WORK/CASE MANAGEMENT    Brief Admission History      Jose Francisco Romero is a 42 y.o. male with a PMHx of uncontrolled Type 1 DM (Dx in 2018) w/multiple episodes of DKA, HTN, severe diabetic neuropathy who presented to endocrine clinic on 7/18/2023  with a primary complaint of generalized malaise, fatigue, and lower extremity neuropathic pain that has progressively getting worse for the past 2 months. He was recently admitted on 06/29/2023 for DKA with RODOLFO. He was treated for his DKA. He was complaining of significant muscle weakness at that time, which he worked with PT. Discussed muscle weakness/soreness with neurology who recommended LP to rule out CDIP. Bedside LP was unsuccessful and patient was lateral sent to IR for LP to check protein and celll count as well as for an outpatient electromyography. CSF was later found to be significant for elevated protein, glucose, and negative WBC/cultures. Patient reports he has had lower extremity weakness, causing him to have difficulty ambulation. He has to use a walking cane. He states he has had 20-30 lb weight loss in the past 2-3 months due to decrease in appetite and difficulty with sleep due to his neuropathic pain in his lower extremity. He  "denies fever, chills, diaphoresis, chest pain, SOB, palpitations, N/V, but admits to unintentional weight loss, fatigue, generalized weakness, worsening peripheral neuropathy.    Hospital Course with Pertinent Findings     Neurology was consulted during patient's admission from endocrine clinic, who recommended the patient be established at the infusion center for IVIG therapy. Patient will need will need outpatient IVIG 2 g/kg for 3-5 days followed by 1 g/kg q2wks for 3 months. Patient was established with the neurology outpatient clinic to see Dr. Pfeiffer on 07/26/2023 at 1100.     Discharge physical exam:  Vitals  BP: 132/87  Temp: 97.5 °F (36.4 °C)  Temp Source: Oral  Pulse: 92  Resp: 18  SpO2: 99 %  Height: 6' 1" (185.4 cm)  Weight: 75.8 kg (167 lb)    General:  Well developed, well nourished, no acute respiratory distress  Head: Normocephalic, atraumatic  Eyes: PERRL, EOMI, anicteric sclera  Throat: No posterior pharyngeal erythema or exudate, no tonsillar exudate  Neck: supple, normal ROM, no JVD  CVS:  RRR, S1 and S2 normal, no murmurs, no added heart sounds, rubs, gallops, regular peripheral pulses, and no peripheral edema  Resp:  Lungs clear to auscultation bilaterally, no wheezes, rales, or rhonci  GI:  Abdomen soft, non-tender, non-distended, normoactive bowel sounds  MSK:  Full range of motion, no obvious deformities   Skin:  No rashes, ulcers, erythema  Neuro:  Alert and oriented x3, No focal neuro deficits, CNII-XII grossly intact  Psych:  Appropriate mood and affect     TIME SPENT ON DISCHARGE: 60 minutes    Discharge Medications        Medication List        CONTINUE taking these medications      blood sugar diagnostic Strp  Commonly known as: TRUE METRIX GLUCOSE TEST STRIP  1 strip by Misc.(Non-Drug; Combo Route) route 4 (four) times daily.     buPROPion 300 MG 24 hr tablet  Commonly known as: WELLBUTRIN XL     busPIRone 10 MG tablet  Commonly known as: BUSPAR     capsaicin 0.1 % Crea  Apply 1 " "application topically 3 (three) times daily as needed (Neuropathy).     clonazePAM 2 MG Tab  Commonly known as: KlonoPIN     cyclobenzaprine 10 MG tablet  Commonly known as: FLEXERIL     insulin aspart U-100 100 unit/mL (3 mL) Inpn pen  Commonly known as: NovoLOG FlexPen U-100 Insulin  12 units TID with meals with correction 1:50 >150 Max dose 50 units     insulin glargine 100 units/mL SubQ pen  Commonly known as: LANTUS SOLOSTAR U-100 INSULIN  Inject 25 Units into the skin once daily.     lansoprazole 30 MG capsule  Commonly known as: PREVACID     LIDOcaine 5 % Oint ointment  Commonly known as: XYLOCAINE     lisinopriL 30 MG tablet  Commonly known as: PRINIVIL,ZESTRIL  Take 1 tablet (30 mg total) by mouth once daily.     nicotine 7 mg/24 hr  Commonly known as: NICODERM CQ     pen needle, diabetic 31 gauge x 3/16" Ndle  Use 4 times a day for insulin injection (BD Mini)     rOPINIRole 0.5 MG tablet  Commonly known as: REQUIP  Take 1 tablet (0.5 mg total) by mouth 3 (three) times daily. Start 1/2 tab (0.25mg x 2 days). Then take one tab (0.5mg) daily x 5 days. Increase dose to 1mg nightly after first week.              Discharge Information:   Jose Francisco Romero is being discharged Home or Self Care.    Follow-Up Appointments:   Follow-up Information       Tiffany Pfeiffre MD Follow up on 7/26/2023.    Specialties: Neurology, Psychiatry  Why: At 11:00 AM for Neurology appointment to establish IVIG infusion treatment  Contact information:  [address] [facility] [address] [phone]                             The above information was discussed with the patient in clear terms. Patient was able to repeat the instructions to me in their own words. All questions answered. ED precautions provided.    Chente Ennis MD     [facility] Family Medicine Resident HO-1  07/19/2023    "

## 2023-07-20 LAB
ALBUMIN % SPEP (OHS): 48.82
ALBUMIN SERPL-MCNC: 3 G/DL (ref 3.5–5)
ALBUMIN/GLOB SERPL: 0.9 RATIO (ref 1.1–2)
ALPHA 1 GLOB (OHS): 0.24 GM/DL
ALPHA 1 GLOB% (OHS): 3.86
ALPHA 2 GLOB % (OHS): 13.32
ALPHA 2 GLOB (OHS): 0.83 GM/DL
BETA GLOB (OHS): 0.99 GM/DL
BETA GLOB% (OHS): 15.95
GAMMA GLOBULIN % (OHS): 18.06
GAMMA GLOBULIN (OHS): 1.12 GM/DL
GLOBULIN SER-MCNC: 3.2 GM/DL (ref 2.4–3.5)
KAPPA LC FREE SER-MCNC: 2.6 MG/DL (ref 0.33–1.94)
KAPPA LC FREE/LAMBDA FREE SER: 1.02 {RATIO} (ref 0.26–1.65)
LAMBDA LC FREE SERPL-MCNC: 2.56 MG/DL (ref 0.57–2.63)
M SPIKE % (OHS): ABNORMAL
M SPIKE (OHS): ABNORMAL
PATH REV: NORMAL
PROT SERPL-MCNC: 6.2 GM/DL (ref 6.4–8.3)

## 2023-07-20 NOTE — PT/OT/SLP PROGRESS
POST DISCHARGE DOCUMENTATION - 07/20/2023 8:36 AM    Physical Therapy    Missed Treatment Session AND D/C Note    Patient Name:  Jose Francisco Romero   MRN:  [id number]      Patient not seen at this time secondary to patient discharged prior to initiation of evaluation

## 2023-07-21 LAB
AR ANA INTERPRETIVE COMMENT: NORMAL
AR ANTINUCLEAR ANTIBODY (ANA), HEP-2, IGG: NORMAL

## 2023-07-24 NOTE — PROGRESS NOTES
[facility] Neurology Initial Office Visit Note    Initial Visit Date: 7/26/2023  Current Visit Date:  07/26/2023    Chief Complaint:     Chief Complaint   Patient presents with    Patient presents today to discuss the process for his infus       History of Present Illness:      This is 42 y.o. male with history of poorly-controlled DM I with frequent DKA, who is referred for dizziness and weakness for the past 6 months. States that his feet and leg has been experiencing burning sensation along with arthralgia for the past at least 2-3 weeks. Patient also reports chronic lower back pain. Has not been vomiting. Has trouble with poor appetite on occasion and has been losing weight for the past few months. Denied any nausea. Reports abdominal pressure at times. States that he had lost weight recently. Denied any alcohol use. Quit smoking around 2 months ago. Not consistent with medications. Now inquiring into disability.     Medications:     Current Outpatient Medications on File Prior to Visit   Medication Sig Dispense Refill    albuterol (PROVENTIL/VENTOLIN HFA) 90 mcg/actuation inhaler Inhale into the lungs.      benzonatate (TESSALON) 200 MG capsule Take 200 mg by mouth 3 (three) times daily as needed for Cough.      blood sugar diagnostic (TRUE METRIX GLUCOSE TEST STRIP) Strp 1 strip by Misc.(Non-Drug; Combo Route) route 4 (four) times daily. 200 each 2    buPROPion (WELLBUTRIN XL) 300 MG 24 hr tablet Take 300 mg by mouth every morning.      clonazePAM (KLONOPIN) 2 MG Tab Take 2 mg by mouth daily as needed.      cyproheptadine (PERIACTIN) 4 mg tablet Take 4 mg by mouth 2 (two) times daily.      diclofenac sodium (VOLTAREN) 1 % Gel APPLY 2-4 grams TO affected joints FOUR TIMES DAILY FOR PAIN (DO NOT EXCEED 8 GRAMS PER DAY PER JOINT AND 32 GRAMS TOTAL)      FLUoxetine 40 MG capsule Take 40 mg by mouth once daily.      ibuprofen (ADVIL,MOTRIN) 800 MG tablet Take 800 mg by mouth every 6 (six) hours as needed.      insulin  "(LANTUS SOLOSTAR U-100 INSULIN) glargine 100 units/mL SubQ pen Inject 25 Units into the skin once daily. 15 mL 5    insulin aspart U-100 (NOVOLOG FLEXPEN U-100 INSULIN) 100 unit/mL (3 mL) InPn pen 12 units TID with meals with correction 1:50 >150 Max dose 50 units 15 mL 11    lisinopriL (PRINIVIL,ZESTRIL) 5 MG tablet       pen needle, diabetic 31 gauge x 3/16" Ndle Use 4 times a day for insulin injection (BD Mini) 120 each 11    pregabalin (LYRICA) 50 MG capsule Take 50 mg by mouth 2 (two) times daily.      traMADoL (ULTRAM) 50 mg tablet Take 50 mg by mouth 2 (two) times daily.      [DISCONTINUED] busPIRone (BUSPAR) 10 MG tablet Take 10 mg by mouth.      [DISCONTINUED] capsaicin 0.1 % Crea Apply 1 application topically 3 (three) times daily as needed (Neuropathy). 56.6 g 1    [DISCONTINUED] cyclobenzaprine (FLEXERIL) 10 MG tablet Take 10 mg by mouth 3 (three) times daily as needed for Muscle spasms.      [DISCONTINUED] lansoprazole (PREVACID) 30 MG capsule Take 30 mg by mouth.      [DISCONTINUED] LIDOcaine (XYLOCAINE) 5 % Oint ointment Apply 2 g topically 2 (two) times daily as needed.      [DISCONTINUED] lisinopriL (PRINIVIL,ZESTRIL) 30 MG tablet Take 1 tablet (30 mg total) by mouth once daily. 30 tablet 0    [DISCONTINUED] nicotine (NICODERM CQ) 7 mg/24 hr Place 1 patch onto the skin every 24 hours.      [DISCONTINUED] rOPINIRole (REQUIP) 0.5 MG tablet Take 1 tablet (0.5 mg total) by mouth 3 (three) times daily. Start 1/2 tab (0.25mg x 2 days). Then take one tab (0.5mg) daily x 5 days. Increase dose to 1mg nightly after first week. 90 tablet 1     No current facility-administered medications on file prior to visit.       Labs:      Latest Reference Range & Units Most Recent   WBC 4.50 - 11.50 x10(3)/mcL 5.96  7/19/23 03:37   RBC 4.70 - 6.10 x10(6)/mcL 4.70  7/19/23 03:37   Hemoglobin 14.0 - 18.0 g/dL 12.6 (L)  7/19/23 03:37   Hematocrit 42.0 - 52.0 % 38.8 (L)  7/19/23 03:37   MCV 80.0 - 94.0 fL 82.6  7/19/23 03:37 "   MCH 27.0 - 31.0 pg 26.8 (L)  7/19/23 03:37   MCHC 33.0 - 36.0 g/dL 32.5 (L)  7/19/23 03:37   Neutrophils Abs Calc 2.1 - 9.2 x10(3)/mcL 29.8038 (H)  6/29/23 13:57   Segs Man 43 - 75 % 87 (H)  11/8/19 09:45   Aniso (none)  1+ !  6/29/23 13:57   RBC Morph Normal  Abnormal !  6/29/23 13:57   Iron 65 - 175 ug/dL 89  6/25/23 03:47   TIBC 250 - 450 ug/dL 188 (L)  6/25/23 03:47   Iron Binding Capacity Unsaturated 69 - 240 ug/dL 99  6/25/23 03:47   Transferrin 174 - 364 mg/dL 160 (L)  6/25/23 03:47   Ferritin 26.0 - 388.0 ng/mL 140.2  4/6/20 15:20   Vitamin B-12 213 - 816 pg/mL 1,053 (H)  7/1/23 04:40   Iron Saturation 20 - 50 % 47  6/25/23 03:47   Sed Rate 0 - 15 mm/hr 8  7/18/23 15:42   Sodium 136 - 145 mmol/L 137  7/19/23 03:37   Potassium 3.5 - 5.1 mmol/L 4.6  7/19/23 03:37   Potassium 3.5 - 5.1 mmol/L 4.1 (E)  11/24/22 06:09   Chloride 98 - 107 mmol/L 101  7/19/23 03:37   CO2 22 - 29 mmol/L 30 (H)  7/19/23 03:37   Anion Gap mEq/L 12.0  6/30/23 06:45   BUN 8.9 - 20.6 mg/dL 15.4  7/19/23 03:37   Creatinine 0.73 - 1.18 mg/dL 0.95  7/19/23 03:37   AST 5 - 34 unit/L 19  7/19/23 03:37   ALT 0 - 55 unit/L 28  7/19/23 03:37   Lipase <=60 U/L 11  6/29/23 13:54   CRP <5.00 mg/L 1.30  7/18/23 15:42   Globulin, Total 2.4 - 3.5 gm/dL 3.3  7/19/23 03:37   Osmolality 280 - 300 mOsm/kg 344 (HH)  6/29/23 20:02   Thyroid Stimulating Hormone 0.350 - 4.940 uIU/mL 1.238  6/30/23 06:45   Hemoglobin A1C External <=7.0 % 10.4 (H)  6/24/23 04:11   Beta-Hydroxybutyrate <=0.30 mmol/L 13.21 (H)  6/29/23 13:54   GAD65 Ab Assay, S  0.79  4/12/23 11:02   Benzodiazepine Screen, Urine Negative  Negative  6/30/23 05:28   Phencyclidine Negative  Negative  6/30/23 05:28   Cocaine (Metab.) Negative  Negative  6/30/23 05:28   Opiate Scrn, Ur Negative  Negative  6/30/23 05:28   Barbiturate Screen, Ur Negative  Negative  6/30/23 05:28   Amphetamine Screen, Ur Negative  Negative  6/30/23 05:28   Fentanyl, Urine Negative  Negative  6/30/23 05:28    Cannabinoids, Urine Negative  Negative  6/30/23 05:28   MDMA, Urine Negative  Negative  6/30/23 05:28   Alpha 1 Glob gm/dl 0.24  7/18/23 15:43   Alpha 2 Glob gm/dl 0.83  7/18/23 15:43   Beta Glob gm/dl 0.99  7/18/23 15:43   Gamma Globulin gm/dl 1.12  7/18/23 15:43   Pathology Review  SPEP: No M-spike indicative of a monoclonal protein is identified.    CRISTO:  Immunofixation shows a normal pattern of immunoglobulins.    No monoclonal bands are detected.    Anton Boland M.D.     7/18/23 15:43   Kappa/Lambda FLC Ratio 0.2600 - 1.65  1.02  7/18/23 15:42   % Albumin  48.82  7/18/23 15:43   % Alpha-1  3.86  7/18/23 15:43   % Alpha-2  13.32  7/18/23 15:43   Beta Glob %  15.95  7/18/23 15:43   Gamma Globulin %  18.06  7/18/23 15:43   [place] Free Light Chain 0.3300 - 1.94 mg/dL 2.60 (H)  7/18/23 15:42   Lambda Free Light Chain 0.5700 - 2.63 mg/dL 2.56  7/18/23 15:42   M SPIKE  See Comments  7/18/23 15:43   M Roel %  See Comments  7/18/23 15:43   Hep A IgM Nonreactive  Nonreactive  6/30/23 06:45   Hep B C IgM Nonreactive  Nonreactive  6/30/23 06:45   Hepatitis B Surface Antigen Nonreactive  Nonreactive  6/30/23 06:45   Hepatitis C Ab Nonreactive  Nonreactive  6/30/23 06:45   Syphilis Antibody Nonreactive, Equivocal  Nonreactive  6/30/23 07:06   Appear CSF Clear  Clear  7/10/23 10:40   COLOR CSF Colorless  Colorless  7/10/23 10:40   RBC, CSF /uL 0  7/10/23 10:40   WBC, CSF 0 - 5 /uL 3  7/10/23 10:40   Glucose, CSF 40 - 70 mg/dL 100 (H)  7/10/23 10:40   Protein CSF  15.0 - 45.0 mg/dL 102.8 (H)  7/10/23 10:40   (HH): Data is critically high  (L): Data is abnormally low  (H): Data is abnormally high  !: Data is abnormal  (E): External lab result    Studies:      Denied     Review of Systems:     Review of Systems   All other systems reviewed and are negative.    Physical Exams:     Vitals:    07/26/23 1053   BP: 108/71   Pulse: 92   Temp: 97.7 °F (36.5 °C)       Physical Exam  Vitals and nursing note reviewed.    Constitutional:       Appearance: Normal appearance.   HENT:      Head: Normocephalic and atraumatic.      Nose: Nose normal.      Mouth/Throat:      Mouth: Mucous membranes are moist.      Pharynx: Oropharynx is clear.   Eyes:      Conjunctiva/sclera: Conjunctivae normal.   Cardiovascular:      Rate and Rhythm: Normal rate and regular rhythm.      Pulses: Normal pulses.      Heart sounds: Normal heart sounds.   Pulmonary:      Effort: Pulmonary effort is normal.      Breath sounds: Normal breath sounds.   Abdominal:      General: Abdomen is flat.      Palpations: Abdomen is soft.   Musculoskeletal:         General: Normal range of motion.      Cervical back: Normal range of motion.   Skin:     Findings: Erythema present.      Comments: Venous stasis changes with scaling in bilateral LE   Neurological:      Mental Status: He is alert.   Psychiatric:         Mood and Affect: Mood normal.       Comprehensive Neurological Exam:  Mental Status: Alert Oriented to Self, Date, and Place. Comprehension wnl. No dysarthria.   CN II - XII: ROSEANNE, No APD,  VFFC, No ptosis OU, EOMI without nystagmus, LT/Temp symmetric in CN V1-3 distribution, Hearing grossly intact, Face Symmetric, Tongue and Uvula midline, Trapezius symmetric bilateral.   Motor: tone and bulk wnl throughout, no abnormal involuntary or voluntary movements, 5/5 to confrontation, Fine finger movements wnl b/l, No pronator drift.   Sensory: Temp decreased in stocking glove distribution up to bilateral proximal 1/4 of bilateral shin and wrist. Vibration absent up to bilateral UE DIP.   Reflexes: 1+ throughout in bilateral UE, absent in bilateral LE, plantar reflexes downward bilateral.   Cerebellar: FNF wnl b/l, RAHM wnl b/l  Romberg: Negative  Gait: normal. Heel Gait, Toe Gait wnl.    Assessment:     This is 42 y.o. male with history of  poorly-controlled DM I with frequent DKA, who is referred for progressive small fiber polyneuropathy. Presentation atypical for  CIDP. Unable to rule out diabetic neuropathic cachexia. Also has issues with polypharmacy.     Problem List Items Addressed This Visit          Neuro    Diabetic polyneuropathy - Primary (Chronic)    Relevant Orders    Miscellaneous Test, Sendout [facility] Send Out: Chronic Inflammatory Demyelinating Polyradiculoneuropathy/Nodopathy Evaluation, Serum    Vitamin A and Vitamin E    CIDP (chronic inflammatory demyelinating polyneuropathy)    Relevant Orders    Miscellaneous Test, Sendout [facility] Send Out: Chronic Inflammatory Demyelinating Polyradiculoneuropathy/Nodopathy Evaluation, Serum    Vitamin A and Vitamin E       Endocrine    Diabetic neuropathic cachexia     Other Visit Diagnoses       Polyneuropathy associated with underlying disease        Relevant Orders    Miscellaneous Test, Sendout [facility] Send Out: Chronic Inflammatory Demyelinating Polyradiculoneuropathy/Nodopathy Evaluation, Serum    Vitamin A and Vitamin E            Plan:     [] autoimmune polyneuropathy labs   [] stop Wellbutrin   [] stop Benzonatate   [] trial of IVIG 1 gram/kg every 3 weeks     RTC 3 Months      I have explained the treatment plan, diagnosis, and prognosis to patient. All questions are answered to the best of my knowledge.     Face to face time 60 minutes, including documentation, chart review, counseling, education, review of test results, relevant medical records, and coordination of care.   I have explained the treatment plan, diagnosis, and prognosis to patient. All questions are answered to the best of my knowledge.       Tiffany Pfeiffer MD   General Neurology  07/26/2023

## 2023-07-26 ENCOUNTER — OFFICE VISIT (OUTPATIENT)
Dept: NEUROLOGY | Facility: CLINIC | Age: 43
End: 2023-07-26
Payer: MEDICAID

## 2023-07-26 VITALS
SYSTOLIC BLOOD PRESSURE: 108 MMHG | DIASTOLIC BLOOD PRESSURE: 71 MMHG | WEIGHT: 170 LBS | HEIGHT: 73 IN | OXYGEN SATURATION: 98 % | HEART RATE: 92 BPM | BODY MASS INDEX: 22.53 KG/M2 | TEMPERATURE: 98 F

## 2023-07-26 DIAGNOSIS — E88.A: ICD-10-CM

## 2023-07-26 DIAGNOSIS — E10.42 DIABETIC POLYNEUROPATHY ASSOCIATED WITH TYPE 1 DIABETES MELLITUS: ICD-10-CM

## 2023-07-26 DIAGNOSIS — G61.81 CIDP (CHRONIC INFLAMMATORY DEMYELINATING POLYNEUROPATHY): Primary | ICD-10-CM

## 2023-07-26 DIAGNOSIS — G63 POLYNEUROPATHY ASSOCIATED WITH UNDERLYING DISEASE: ICD-10-CM

## 2023-07-26 DIAGNOSIS — E11.40: ICD-10-CM

## 2023-07-26 PROCEDURE — 3046F HEMOGLOBIN A1C LEVEL >9.0%: CPT | Mod: CPTII,,, | Performed by: PSYCHIATRY & NEUROLOGY

## 2023-07-26 PROCEDURE — 3074F PR MOST RECENT SYSTOLIC BLOOD PRESSURE < 130 MM HG: ICD-10-PCS | Mod: CPTII,,, | Performed by: PSYCHIATRY & NEUROLOGY

## 2023-07-26 PROCEDURE — 3060F PR POS MICROALBUMINURIA RESULT DOCUMENTED/REVIEW: ICD-10-PCS | Mod: CPTII,,, | Performed by: PSYCHIATRY & NEUROLOGY

## 2023-07-26 PROCEDURE — 3078F DIAST BP <80 MM HG: CPT | Mod: CPTII,,, | Performed by: PSYCHIATRY & NEUROLOGY

## 2023-07-26 PROCEDURE — 99215 OFFICE O/P EST HI 40 MIN: CPT | Mod: PBBFAC | Performed by: PSYCHIATRY & NEUROLOGY

## 2023-07-26 PROCEDURE — 4010F ACE/ARB THERAPY RXD/TAKEN: CPT | Mod: CPTII,,, | Performed by: PSYCHIATRY & NEUROLOGY

## 2023-07-26 PROCEDURE — 3008F PR BODY MASS INDEX (BMI) DOCUMENTED: ICD-10-PCS | Mod: CPTII,,, | Performed by: PSYCHIATRY & NEUROLOGY

## 2023-07-26 PROCEDURE — 3060F POS MICROALBUMINURIA REV: CPT | Mod: CPTII,,, | Performed by: PSYCHIATRY & NEUROLOGY

## 2023-07-26 PROCEDURE — 99205 PR OFFICE/OUTPT VISIT, NEW, LEVL V, 60-74 MIN: ICD-10-PCS | Mod: S$PBB,,, | Performed by: PSYCHIATRY & NEUROLOGY

## 2023-07-26 PROCEDURE — 99205 OFFICE O/P NEW HI 60 MIN: CPT | Mod: S$PBB,,, | Performed by: PSYCHIATRY & NEUROLOGY

## 2023-07-26 PROCEDURE — 3066F PR DOCUMENTATION OF TREATMENT FOR NEPHROPATHY: ICD-10-PCS | Mod: CPTII,,, | Performed by: PSYCHIATRY & NEUROLOGY

## 2023-07-26 PROCEDURE — 1111F DSCHRG MED/CURRENT MED MERGE: CPT | Mod: CPTII,,, | Performed by: PSYCHIATRY & NEUROLOGY

## 2023-07-26 PROCEDURE — 3008F BODY MASS INDEX DOCD: CPT | Mod: CPTII,,, | Performed by: PSYCHIATRY & NEUROLOGY

## 2023-07-26 PROCEDURE — 1159F PR MEDICATION LIST DOCUMENTED IN MEDICAL RECORD: ICD-10-PCS | Mod: CPTII,,, | Performed by: PSYCHIATRY & NEUROLOGY

## 2023-07-26 PROCEDURE — 3074F SYST BP LT 130 MM HG: CPT | Mod: CPTII,,, | Performed by: PSYCHIATRY & NEUROLOGY

## 2023-07-26 PROCEDURE — 4010F PR ACE/ARB THEARPY RXD/TAKEN: ICD-10-PCS | Mod: CPTII,,, | Performed by: PSYCHIATRY & NEUROLOGY

## 2023-07-26 PROCEDURE — 3078F PR MOST RECENT DIASTOLIC BLOOD PRESSURE < 80 MM HG: ICD-10-PCS | Mod: CPTII,,, | Performed by: PSYCHIATRY & NEUROLOGY

## 2023-07-26 PROCEDURE — 3066F NEPHROPATHY DOC TX: CPT | Mod: CPTII,,, | Performed by: PSYCHIATRY & NEUROLOGY

## 2023-07-26 PROCEDURE — 1111F PR DISCHARGE MEDS RECONCILED W/ CURRENT OUTPATIENT MED LIST: ICD-10-PCS | Mod: CPTII,,, | Performed by: PSYCHIATRY & NEUROLOGY

## 2023-07-26 PROCEDURE — 1159F MED LIST DOCD IN RCRD: CPT | Mod: CPTII,,, | Performed by: PSYCHIATRY & NEUROLOGY

## 2023-07-26 PROCEDURE — 3046F PR MOST RECENT HEMOGLOBIN A1C LEVEL > 9.0%: ICD-10-PCS | Mod: CPTII,,, | Performed by: PSYCHIATRY & NEUROLOGY

## 2023-07-26 RX ORDER — DICLOFENAC SODIUM 10 MG/G
GEL TOPICAL
COMMUNITY
Start: 2023-07-05 | End: 2023-12-14 | Stop reason: ALTCHOICE

## 2023-07-26 RX ORDER — ALBUTEROL SULFATE 90 UG/1
AEROSOL, METERED RESPIRATORY (INHALATION)
COMMUNITY
Start: 2023-06-09

## 2023-07-26 RX ORDER — LISINOPRIL 5 MG/1
5 TABLET ORAL DAILY
Status: ON HOLD | COMMUNITY
Start: 2023-07-05 | End: 2023-12-24 | Stop reason: HOSPADM

## 2023-07-26 RX ORDER — TRAMADOL HYDROCHLORIDE 50 MG/1
50 TABLET ORAL 2 TIMES DAILY
COMMUNITY
Start: 2023-07-24 | End: 2023-12-14 | Stop reason: ALTCHOICE

## 2023-07-26 RX ORDER — CYPROHEPTADINE HYDROCHLORIDE 4 MG/1
4 TABLET ORAL 2 TIMES DAILY
COMMUNITY
Start: 2023-07-11 | End: 2023-12-14 | Stop reason: ALTCHOICE

## 2023-07-26 RX ORDER — HEPARIN 100 UNIT/ML
500 SYRINGE INTRAVENOUS
Status: CANCELLED | OUTPATIENT
Start: 2023-07-31

## 2023-07-26 RX ORDER — SODIUM CHLORIDE 0.9 % (FLUSH) 0.9 %
10 SYRINGE (ML) INJECTION
Status: CANCELLED | OUTPATIENT
Start: 2023-07-31

## 2023-07-26 RX ORDER — IBUPROFEN 800 MG/1
800 TABLET ORAL EVERY 6 HOURS PRN
COMMUNITY
Start: 2023-07-12 | End: 2023-12-14 | Stop reason: ALTCHOICE

## 2023-07-26 RX ORDER — ACETAMINOPHEN 325 MG/1
650 TABLET ORAL
Status: CANCELLED | OUTPATIENT
Start: 2023-07-31

## 2023-07-26 RX ORDER — BENZONATATE 200 MG/1
200 CAPSULE ORAL 3 TIMES DAILY PRN
COMMUNITY
End: 2023-07-26

## 2023-07-26 RX ORDER — FAMOTIDINE 10 MG/ML
20 INJECTION INTRAVENOUS 2 TIMES DAILY
Status: CANCELLED | OUTPATIENT
Start: 2023-07-31

## 2023-07-26 RX ORDER — FLUOXETINE HYDROCHLORIDE 40 MG/1
40 CAPSULE ORAL DAILY
Status: ON HOLD | COMMUNITY
End: 2023-12-24 | Stop reason: HOSPADM

## 2023-07-26 RX ORDER — PREGABALIN 50 MG/1
50 CAPSULE ORAL 2 TIMES DAILY
COMMUNITY
Start: 2023-07-08

## 2023-08-17 ENCOUNTER — CLINICAL SUPPORT (OUTPATIENT)
Dept: DIABETES | Facility: CLINIC | Age: 43
End: 2023-08-17
Payer: MEDICAID

## 2023-08-17 DIAGNOSIS — E10.65 UNCONTROLLED TYPE 1 DIABETES MELLITUS WITH HYPERGLYCEMIA: Primary | ICD-10-CM

## 2023-08-17 PROCEDURE — G0108 DIAB MANAGE TRN  PER INDIV: HCPCS | Mod: PBBFAC

## 2023-08-17 PROCEDURE — 99211 OFF/OP EST MAY X REQ PHY/QHP: CPT | Mod: PBBFAC

## 2023-08-18 VITALS — WEIGHT: 171.63 LBS | BODY MASS INDEX: 22.75 KG/M2 | HEIGHT: 73 IN

## 2023-08-18 NOTE — PROGRESS NOTES
Diabetes Care Specialist Progress Note  Author: Vanessa Moore RD  Date: 8/17/2023    Program Intake  Reason for Diabetes Program Visit:: Intervention  Type of Intervention:: Individual  Individual: Education  Education: Self-Management Skill Review  Current diabetes risk level:: moderate (risk score 1.5)    Lab Results   Component Value Date    HGBA1C 10.4 (H) 06/24/2023       Clinical    Clinical Assessment  Current Diabetes Treatment: Insulin (Lantus 30 units qHS, Novolog 7/12/12 + 1:50 > 150 correction)  Have you ever experienced hypoglycemia (low blood sugar)?: yes  Are you able to tell when your blood sugar is low?: Yes  What symptoms do you experience?: Dizzy/Light-headed (feels symptoms at 80)  How do you treat hypoglycemia (low blood sugar)?: other (2 glucose tablets)  Have you ever experienced hyperglycemia (high blood sugar)?: yes  In the last month, how often have you experienced high blood sugar?: more than once a day    Labs  Do you have regular lab work to monitor your medications?: Yes  Type of Regular Lab Work: A1c, BMP, CBC  Where do you get your labs drawn?: [facility]  Lab Compliance Barriers: No    Nutritional Status  Diet: Regular  Meal Plan 24 Hour Recall: Breakfast, Lunch, Dinner  Meal Plan 24 Hour Recall - Breakfast: (~ 60g carb) 2 slices toast w/ butter, 1 cup orange juice  Meal Plan 24 Hour Recall - Lunch: (~54g carb) peanut butter & jelly sandwich, 2 cups milk  Meal Plan 24 Hour Recall - Dinner: (~ 90g carb) 2 cups shrimp pasta, green salad w/ [place] dressing, gatorade zero  Change in appetite?: Yes (improving w/ cyproheptadine (ordered 7/11))  Recent Changes in Weight: Weight Gain (8# wt gain in past month)  Current nutritional status an area of need that is impacting patient's ability to self-manage diabetes?: No      Diabetes Self-Management Skills Assessment    Diabetes Disease Process/Treatment Options  Patient/caregiver able to state what happens when someone has diabetes.:  no  Patient/caregiver knows what type of diabetes they have.: yes  Diabetes Type : Type I  Diabetes Disease Process/Treatment Options: Skills Assessment Completed: Yes  Assessment indicates:: Instruction Needed  Area of need?: Yes    Nutrition/Healthy Eating  Challenges to healthy eating:: portion control  Method of carbohydrate measurement:: no method  Area of need?: Yes    Physical Activity/Exercise  Patient's daily activity level:: moderately active (on feet for 8 hours 6-7 days per week working at [employer])  Patient formally exercises outside of work.: no  Reasons for not exercising:: physically unable to exercise currently (leg pain)  Patient can identify reasons why exercise/physical activity is important in diabetes management.: no  Physical Activity/Exercise Skills Assessment Completed: : Yes  Assessment indicates:: Instruction Needed  Area of need?: Yes    Medications  Patient is able to describe current diabetes management routine.: yes  Diabetes management routine:: insulin  Patient is able to identify current diabetes medications, dosages, and appropriate timing of medications.: yes  Patient reports problems or concerns with current medication regimen.: no  Medication Skills Assessment Completed:: Yes  Assessment indicates:: Instruction Needed  Area of need?: Yes    Home Blood Glucose Monitoring  Patient states that blood sugar is checked at home daily.: yes  Monitoring Method:: home glucometer  How often do you check your blood sugar?: 4 times a day  When do you check your blood sugar?: Before breakfast, Before lunch, Before dinner, Before bedtime  When you check what is your typical blood sugar range? : most BGs 200-225 per pt, 225 this AM fasting, has been as low as 60 & as high as 340 past month  Blood glucose logs:: no  Blood glucose logs reviewed today?: no  Personal CGM type:: still awaiting Dexcom supplies to resume use of G7  Area of need?: No    Psychosocial/Coping  Patient can identify ways of  coping with chronic disease.: yes  Patient-stated ways of coping with chronic disease:: support from loved ones, support group (pt talks to friends & parents for support & attends support meetings almost daily due to h/o drug abuse)  Psychosocial/Coping Skills Assessment Completed: : Yes  Assessment indicates:: Instruction Needed  Area of need?: Yes    Assessment Summary and Plan    Based on today's diabetes care assessment, the following areas of need were identified:      Social 4/21/2023   Support No   Access to Mass Media/Tech No   Cognitive/Behavioral Health No   Communication No   Health Literacy No        Clinical 8/17/2023   Medication Adherence -   Lab Compliance No   Nutritional Status No        Diabetes Self-Management Skills 8/17/2023   Diabetes Disease Process/Treatment Options Yes - Discussed types of diabetes & pathophysiology.     Nutrition/Healthy Eating Yes - see care plan   Physical Activity/Exercise Yes - Discussed exercise & effects on blood sugar.   Medication Yes - see care plan   Home Blood Glucose Monitoring No   Acute Complications -   Chronic Complications -   Psychosocial/Coping Yes - Discussed stress & effects on blood sugar.          Today's interventions were provided through individual discussion, instruction, and written materials were provided.      Patient verbalized understanding of instruction and written materials.  Pt was able to return back demonstration of instructions today. Patient understood key points, needs reinforcement and further instruction.     Diabetes Self-Management Care Plan:    Today's Diabetes Self-Management Care Plan was developed with Jose Francisco's input. Jose Francisco has agreed to work toward the following goal(s) to improve his/her overall diabetes control.      Care Plan: Diabetes Management   Updates made since 7/19/2023 12:00 AM        Problem: Healthy Eating         Goal: Patient agrees to begin counting carbohydrates & attempt to keep meals </= 60g carbohydrate &  snacks </= 15g carbohydrate in preparation for possibility of transitioning to pump therapy in the future.    Start Date: 5/23/2023   Expected End Date: 9/19/2023   This Visit's Progress: No change   Recent Progress: Not met   Priority: High   Barriers: No Barriers Identified   Note:    5/23/23: Reviewed sources of carbohydrate & appropriate portion sizes.  Discussed carbohydrate counting.  Encouraged pt to read nutrition facts labels for serving size & grams of total carbohydrate.  Gave goal of </= 60g carbohydrate per meal & </= 15g carbohydrate per snack but ultimately I believe that pt would benefit from I:C ratio vs set mealtime insulin doses to allow for more flexibility in meal size.  Will discuss with endo NP.    7/14/23: Per food recall, pt keeping 2/3 of meals </= 60g carb but exceeding carb goals at lunch meal & exceeding </= 15g carb goal at snack.  Pt does not seem to have been carb counting but was instructed to start use of carb ratio 1:10 on 5/29 which he states he never started.    8/17/23: Per food recall, pt keeping 2/3 meals/d </= 60g carb.  Exceeded carb goal at supper meal.  Pt still not carb counting or using carb ratio.  Stressed importance of beginning to carb count if insulin pump is desired in the future.       Problem: Medications         Goal: Patient Agrees to begin use of 1:10 carb ratio with all meals/snacks in place of set mealtime doses of Novolog. Completed 8/17/2023   Start Date: 7/14/2023   Expected End Date: 8/14/2023   This Visit's Progress: Not met   Priority: High   Barriers: Lack of Motivation to Change   Note:    7/14/23: Pt instructed on carb counting on 5/23 & use of 1:10 carb ratio on 5/29 with good understanding.  Pt reports, however, that he never began use of carb ratio.  Instead, he has been using set mealtime doses of 7 units with breakfast & 12 units with lunch & supper.  Pt would greatly benefit from use of carb ratio vs set mealtime doses due to variability in  meal sizes (breakfast ~ 54g carb, lunch ~ 74g carb, supper ~ 15g carb, snack ~ 40g carb).  This would also allow pt some flexibility in meal sizes/choices while he is recovering from recent hospitalizations with decreased appetite.  Pt does state that he has been using 1:50 > 150 correction scale.  Provided new insulin instruction sheet with both 1:10 carb ratio & correction scale.  Pt to follow up with endo NP on 7/18/23.    8/17/23: Pt has not started using carb ratio as previously agreed upon.  Has still been using set mealtime doses of insulin.  Will establish a new goal at this time but did encourage pt to continue trying to count carbohydrates.       Problem: Medications         Goal: Patient agrees to research insulin pump options prior to next endo visit on 9/6 so that he may discuss with endo NP.    Start Date: 8/17/2023   Expected End Date: 9/6/2023   Priority: Medium   Barriers: No Barriers Identified   Note:    8/17/23: Pt reports that he was told at his last endo appt on 7/18 that they would discuss insulin pump options at his next visit on 9/6.  Reviewed different available pump options currently on the market & provided education materials.  Encouraged pt to research these options further before next endo appt so that he can make an informed decision together with endo NP about what the best option for him would be.  Encouraged pt to call with any questions.           Follow Up Plan     Follow up in about 1 month (around 9/19/2023) for review of glucometer/Dexcom reports & goal progress.    Today's care plan and follow up schedule was discussed with patient.  Jose Francisco verbalized understanding of the care plan, goals, and agrees to follow up plan.        The patient was encouraged to communicate with his/her health care provider/physician and care team regarding his/her condition(s) and treatment.  I provided the patient with my contact information today and encouraged to contact me via phone or [facility]'s  Patient Portal as needed.     Length of Visit   Total Time: 60 Minutes

## 2023-08-21 ENCOUNTER — INFUSION (OUTPATIENT)
Dept: INFUSION THERAPY | Facility: HOSPITAL | Age: 43
End: 2023-08-21
Attending: INTERNAL MEDICINE
Payer: MEDICAID

## 2023-08-21 VITALS
RESPIRATION RATE: 19 BRPM | OXYGEN SATURATION: 100 % | SYSTOLIC BLOOD PRESSURE: 137 MMHG | WEIGHT: 170 LBS | DIASTOLIC BLOOD PRESSURE: 87 MMHG | BODY MASS INDEX: 22.53 KG/M2 | HEIGHT: 73 IN | HEART RATE: 70 BPM

## 2023-08-21 DIAGNOSIS — G61.81 CIDP (CHRONIC INFLAMMATORY DEMYELINATING POLYNEUROPATHY): Primary | ICD-10-CM

## 2023-08-21 PROCEDURE — 96366 THER/PROPH/DIAG IV INF ADDON: CPT

## 2023-08-21 PROCEDURE — 96365 THER/PROPH/DIAG IV INF INIT: CPT

## 2023-08-21 PROCEDURE — 25000003 PHARM REV CODE 250: Performed by: PSYCHIATRY & NEUROLOGY

## 2023-08-21 PROCEDURE — 63600175 PHARM REV CODE 636 W HCPCS: Mod: JZ,JA,JG | Performed by: PSYCHIATRY & NEUROLOGY

## 2023-08-21 PROCEDURE — 96375 TX/PRO/DX INJ NEW DRUG ADDON: CPT

## 2023-08-21 RX ORDER — FAMOTIDINE 10 MG/ML
20 INJECTION INTRAVENOUS 2 TIMES DAILY
Status: CANCELLED | OUTPATIENT
Start: 2023-09-11

## 2023-08-21 RX ORDER — SODIUM CHLORIDE 0.9 % (FLUSH) 0.9 %
10 SYRINGE (ML) INJECTION
Status: DISCONTINUED | OUTPATIENT
Start: 2023-08-21 | End: 2023-08-21 | Stop reason: HOSPADM

## 2023-08-21 RX ORDER — ACETAMINOPHEN 325 MG/1
650 TABLET ORAL
Status: CANCELLED | OUTPATIENT
Start: 2023-09-11

## 2023-08-21 RX ORDER — HEPARIN 100 UNIT/ML
500 SYRINGE INTRAVENOUS
Status: DISCONTINUED | OUTPATIENT
Start: 2023-08-21 | End: 2023-08-21 | Stop reason: HOSPADM

## 2023-08-21 RX ORDER — FAMOTIDINE 10 MG/ML
20 INJECTION INTRAVENOUS 2 TIMES DAILY
Status: DISCONTINUED | OUTPATIENT
Start: 2023-08-21 | End: 2023-08-21 | Stop reason: HOSPADM

## 2023-08-21 RX ORDER — HEPARIN 100 UNIT/ML
500 SYRINGE INTRAVENOUS
Status: CANCELLED | OUTPATIENT
Start: 2023-09-11

## 2023-08-21 RX ORDER — SODIUM CHLORIDE 0.9 % (FLUSH) 0.9 %
10 SYRINGE (ML) INJECTION
Status: CANCELLED | OUTPATIENT
Start: 2023-09-11

## 2023-08-21 RX ORDER — ACETAMINOPHEN 325 MG/1
650 TABLET ORAL
Status: COMPLETED | OUTPATIENT
Start: 2023-08-21 | End: 2023-08-21

## 2023-08-21 RX ADMIN — FAMOTIDINE 20 MG: 10 INJECTION INTRAVENOUS at 08:08

## 2023-08-21 RX ADMIN — SODIUM CHLORIDE: 9 INJECTION, SOLUTION INTRAVENOUS at 08:08

## 2023-08-21 RX ADMIN — ACETAMINOPHEN 650 MG: 325 TABLET, FILM COATED ORAL at 08:08

## 2023-08-21 RX ADMIN — IMMUNE GLOBULIN INFUSION (HUMAN) 75 G: 100 INJECTION, SOLUTION INTRAVENOUS; SUBCUTANEOUS at 08:08

## 2023-08-30 ENCOUNTER — HOSPITAL ENCOUNTER (INPATIENT)
Facility: HOSPITAL | Age: 43
LOS: 2 days | Discharge: HOME | DRG: 638 | End: 2023-09-01
Attending: STUDENT IN AN ORGANIZED HEALTH CARE EDUCATION/TRAINING PROGRAM | Admitting: HOSPITALIST
Payer: MEDICAID

## 2023-08-30 DIAGNOSIS — R19.7 DIARRHEA, UNSPECIFIED TYPE: ICD-10-CM

## 2023-08-30 DIAGNOSIS — K92.0 COFFEE GROUND EMESIS: ICD-10-CM

## 2023-08-30 DIAGNOSIS — K92.1 MELENA: ICD-10-CM

## 2023-08-30 DIAGNOSIS — R07.9 CHEST PAIN: ICD-10-CM

## 2023-08-30 DIAGNOSIS — E87.20 LACTIC ACIDOSIS: ICD-10-CM

## 2023-08-30 DIAGNOSIS — F19.90 POLYSUBSTANCE USE DISORDER: ICD-10-CM

## 2023-08-30 DIAGNOSIS — E10.10 DIABETIC KETOACIDOSIS WITHOUT COMA ASSOCIATED WITH TYPE 1 DIABETES MELLITUS: Primary | ICD-10-CM

## 2023-08-30 DIAGNOSIS — E43 SEVERE MALNUTRITION: ICD-10-CM

## 2023-08-30 DIAGNOSIS — N17.9 AKI (ACUTE KIDNEY INJURY): ICD-10-CM

## 2023-08-30 LAB
ALBUMIN SERPL-MCNC: 3.9 G/DL (ref 3.5–5)
ALBUMIN/GLOB SERPL: 0.9 RATIO (ref 1.1–2)
ALLENS TEST BLOOD GAS (OHS): ABNORMAL
ALP SERPL-CCNC: 109 UNIT/L (ref 40–150)
ALT SERPL-CCNC: 18 UNIT/L (ref 0–55)
ANION GAP SERPL CALC-SCNC: 16 MEQ/L
ANION GAP SERPL CALC-SCNC: 5 MEQ/L
APPEARANCE UR: CLEAR
AST SERPL-CCNC: 16 UNIT/L (ref 5–34)
B-OH-BUTYR SERPL-MCNC: 8.72 MMOL/L
BACTERIA #/AREA URNS AUTO: ABNORMAL /HPF
BASE EXCESS BLD CALC-SCNC: -14.1 MMOL/L
BASOPHILS # BLD AUTO: 0.12 X10(3)/MCL
BASOPHILS NFR BLD AUTO: 0.6 %
BILIRUB SERPL-MCNC: 0.9 MG/DL
BILIRUB UR QL STRIP.AUTO: NEGATIVE
BLOOD GAS SAMPLE TYPE (OHS): ABNORMAL
BUN SERPL-MCNC: 24.4 MG/DL (ref 8.9–20.6)
BUN SERPL-MCNC: 27.2 MG/DL (ref 8.9–20.6)
BUN SERPL-MCNC: 30 MG/DL (ref 8.9–20.6)
CALCIUM SERPL-MCNC: 10.3 MG/DL (ref 8.4–10.2)
CALCIUM SERPL-MCNC: 9.5 MG/DL (ref 8.4–10.2)
CALCIUM SERPL-MCNC: 9.7 MG/DL (ref 8.4–10.2)
CHLORIDE SERPL-SCNC: 106 MMOL/L (ref 98–107)
CHLORIDE SERPL-SCNC: 108 MMOL/L (ref 98–107)
CHLORIDE SERPL-SCNC: 96 MMOL/L (ref 98–107)
CO2 BLDA-SCNC: 13.2 MMOL/L
CO2 SERPL-SCNC: 12 MMOL/L (ref 22–29)
CO2 SERPL-SCNC: 16 MMOL/L (ref 22–29)
CO2 SERPL-SCNC: 27 MMOL/L (ref 22–29)
COHGB MFR BLDA: 3.2 %
COLOR UR: COLORLESS
CREAT SERPL-MCNC: 1.36 MG/DL (ref 0.73–1.18)
CREAT SERPL-MCNC: 1.61 MG/DL (ref 0.73–1.18)
CREAT SERPL-MCNC: 1.85 MG/DL (ref 0.73–1.18)
CREAT/UREA NIT SERPL: 17
CREAT/UREA NIT SERPL: 18
DRAWN BY BLOOD GAS (OHS): ABNORMAL
EOSINOPHIL # BLD AUTO: 0.06 X10(3)/MCL (ref 0–0.9)
EOSINOPHIL NFR BLD AUTO: 0.3 %
ERYTHROCYTE [DISTWIDTH] IN BLOOD BY AUTOMATED COUNT: 14.5 % (ref 11.5–17)
FIO2 BLOOD GAS (OHS): 21 %
FLUAV AG UPPER RESP QL IA.RAPID: NOT DETECTED
FLUBV AG UPPER RESP QL IA.RAPID: NOT DETECTED
GFR SERPLBLD CREATININE-BSD FMLA CKD-EPI: 46 MLS/MIN/1.73/M2
GFR SERPLBLD CREATININE-BSD FMLA CKD-EPI: 55 MLS/MIN/1.73/M2
GFR SERPLBLD CREATININE-BSD FMLA CKD-EPI: >60 MLS/MIN/1.73/M2
GLOBULIN SER-MCNC: 4.3 GM/DL (ref 2.4–3.5)
GLUCOSE SERPL-MCNC: 234 MG/DL (ref 74–100)
GLUCOSE SERPL-MCNC: 442 MG/DL (ref 74–100)
GLUCOSE SERPL-MCNC: 693 MG/DL (ref 74–100)
GLUCOSE UR QL STRIP.AUTO: ABNORMAL
HCO3 BLDA-SCNC: 12.3 MMOL/L
HCT VFR BLD AUTO: 39.8 % (ref 42–52)
HGB BLD-MCNC: 12.7 G/DL (ref 14–18)
HYALINE CASTS #/AREA URNS LPF: ABNORMAL /LPF
IMM GRANULOCYTES # BLD AUTO: 0.12 X10(3)/MCL (ref 0–0.04)
IMM GRANULOCYTES NFR BLD AUTO: 0.6 %
KETONES UR QL STRIP.AUTO: ABNORMAL
LACTATE SERPL-SCNC: 5.6 MMOL/L (ref 0.5–2.2)
LACTATE SERPL-SCNC: 5.9 MMOL/L (ref 0.5–2.2)
LEUKOCYTE ESTERASE UR QL STRIP.AUTO: NEGATIVE
LIPASE SERPL-CCNC: 5 U/L
LYMPHOCYTES # BLD AUTO: 1.42 X10(3)/MCL (ref 0.6–4.6)
LYMPHOCYTES NFR BLD AUTO: 6.9 %
MAGNESIUM SERPL-MCNC: 1.5 MG/DL (ref 1.6–2.6)
MAGNESIUM SERPL-MCNC: 1.9 MG/DL (ref 1.6–2.6)
MAGNESIUM SERPL-MCNC: 2 MG/DL (ref 1.6–2.6)
MCH RBC QN AUTO: 26.7 PG (ref 27–31)
MCHC RBC AUTO-ENTMCNC: 31.9 G/DL (ref 33–36)
MCV RBC AUTO: 83.6 FL (ref 80–94)
METHGB MFR BLDA: 0.8 %
MONOCYTES # BLD AUTO: 0.98 X10(3)/MCL (ref 0.1–1.3)
MONOCYTES NFR BLD AUTO: 4.8 %
MUCOUS THREADS URNS QL MICRO: ABNORMAL /LPF
NEUTROPHILS # BLD AUTO: 17.89 X10(3)/MCL (ref 2.1–9.2)
NEUTROPHILS NFR BLD AUTO: 86.8 %
NITRITE UR QL STRIP.AUTO: NEGATIVE
NRBC BLD AUTO-RTO: 0 %
O2 HB BLOOD GAS (OHS): 74.6 %
OXYHGB MFR BLDA: 13.4 G/DL
PCO2 BLDA: 30 MMHG (ref 40–50)
PH BLDA: 7.22 [PH] (ref 7.3–7.4)
PH UR STRIP.AUTO: 5 [PH]
PHOSPHATE SERPL-MCNC: 1.3 MG/DL (ref 2.3–4.7)
PHOSPHATE SERPL-MCNC: 2.2 MG/DL (ref 2.3–4.7)
PHOSPHATE SERPL-MCNC: 4.9 MG/DL (ref 2.3–4.7)
PLATELET # BLD AUTO: 336 X10(3)/MCL (ref 130–400)
PMV BLD AUTO: 10.4 FL (ref 7.4–10.4)
PO2 BLDA: 45 MMHG (ref 30–40)
POCT GLUCOSE: 112 MG/DL (ref 70–110)
POCT GLUCOSE: 141 MG/DL (ref 70–110)
POCT GLUCOSE: 158 MG/DL (ref 70–110)
POCT GLUCOSE: 159 MG/DL (ref 70–110)
POCT GLUCOSE: 227 MG/DL (ref 70–110)
POCT GLUCOSE: 259 MG/DL (ref 70–110)
POCT GLUCOSE: 264 MG/DL (ref 70–110)
POCT GLUCOSE: 363 MG/DL (ref 70–110)
POCT GLUCOSE: 461 MG/DL (ref 70–110)
POCT GLUCOSE: >500 MG/DL (ref 70–110)
POCT GLUCOSE: >500 MG/DL (ref 70–110)
POTASSIUM SERPL-SCNC: 3.7 MMOL/L (ref 3.5–5.1)
POTASSIUM SERPL-SCNC: 4 MMOL/L (ref 3.5–5.1)
POTASSIUM SERPL-SCNC: 4.9 MMOL/L (ref 3.5–5.1)
PROT SERPL-MCNC: 8.2 GM/DL (ref 6.4–8.3)
PROT UR QL STRIP.AUTO: ABNORMAL
RBC # BLD AUTO: 4.76 X10(6)/MCL (ref 4.7–6.1)
RBC #/AREA URNS AUTO: ABNORMAL /HPF
RBC UR QL AUTO: NEGATIVE
SAO2 % BLDA: 77.7 %
SARS-COV-2 RNA RESP QL NAA+PROBE: NOT DETECTED
SODIUM SERPL-SCNC: 136 MMOL/L (ref 136–145)
SODIUM SERPL-SCNC: 138 MMOL/L (ref 136–145)
SODIUM SERPL-SCNC: 140 MMOL/L (ref 136–145)
SP GR UR STRIP.AUTO: 1.02 (ref 1–1.03)
SQUAMOUS #/AREA URNS LPF: ABNORMAL /HPF
TROPONIN I SERPL-MCNC: <0.01 NG/ML (ref 0–0.04)
UROBILINOGEN UR STRIP-ACNC: NORMAL
WBC # SPEC AUTO: 20.59 X10(3)/MCL (ref 4.5–11.5)
WBC #/AREA URNS AUTO: ABNORMAL /HPF

## 2023-08-30 PROCEDURE — 82962 GLUCOSE BLOOD TEST: CPT

## 2023-08-30 PROCEDURE — 80053 COMPREHEN METABOLIC PANEL: CPT | Performed by: STUDENT IN AN ORGANIZED HEALTH CARE EDUCATION/TRAINING PROGRAM

## 2023-08-30 PROCEDURE — 81001 URINALYSIS AUTO W/SCOPE: CPT | Performed by: STUDENT IN AN ORGANIZED HEALTH CARE EDUCATION/TRAINING PROGRAM

## 2023-08-30 PROCEDURE — 25000003 PHARM REV CODE 250: Performed by: INTERNAL MEDICINE

## 2023-08-30 PROCEDURE — 84484 ASSAY OF TROPONIN QUANT: CPT | Performed by: STUDENT IN AN ORGANIZED HEALTH CARE EDUCATION/TRAINING PROGRAM

## 2023-08-30 PROCEDURE — 83735 ASSAY OF MAGNESIUM: CPT | Performed by: STUDENT IN AN ORGANIZED HEALTH CARE EDUCATION/TRAINING PROGRAM

## 2023-08-30 PROCEDURE — 99900035 HC TECH TIME PER 15 MIN (STAT)

## 2023-08-30 PROCEDURE — 96374 THER/PROPH/DIAG INJ IV PUSH: CPT

## 2023-08-30 PROCEDURE — 99291 CRITICAL CARE FIRST HOUR: CPT

## 2023-08-30 PROCEDURE — 82010 KETONE BODYS QUAN: CPT | Performed by: STUDENT IN AN ORGANIZED HEALTH CARE EDUCATION/TRAINING PROGRAM

## 2023-08-30 PROCEDURE — 94660 CPAP INITIATION&MGMT: CPT

## 2023-08-30 PROCEDURE — 27000190 HC CPAP FULL FACE MASK W/VALVE

## 2023-08-30 PROCEDURE — 63600175 PHARM REV CODE 636 W HCPCS

## 2023-08-30 PROCEDURE — 25500020 PHARM REV CODE 255: Performed by: INTERNAL MEDICINE

## 2023-08-30 PROCEDURE — 96361 HYDRATE IV INFUSION ADD-ON: CPT

## 2023-08-30 PROCEDURE — 99223 PR INITIAL HOSPITAL CARE,LEVL III: ICD-10-PCS | Mod: ,,, | Performed by: HOSPITALIST

## 2023-08-30 PROCEDURE — 99223 1ST HOSP IP/OBS HIGH 75: CPT | Mod: ,,, | Performed by: HOSPITALIST

## 2023-08-30 PROCEDURE — 25000003 PHARM REV CODE 250: Performed by: HOSPITALIST

## 2023-08-30 PROCEDURE — 84100 ASSAY OF PHOSPHORUS: CPT

## 2023-08-30 PROCEDURE — 83735 ASSAY OF MAGNESIUM: CPT

## 2023-08-30 PROCEDURE — 83605 ASSAY OF LACTIC ACID: CPT | Performed by: STUDENT IN AN ORGANIZED HEALTH CARE EDUCATION/TRAINING PROGRAM

## 2023-08-30 PROCEDURE — 63600175 PHARM REV CODE 636 W HCPCS: Performed by: INTERNAL MEDICINE

## 2023-08-30 PROCEDURE — 83690 ASSAY OF LIPASE: CPT | Performed by: STUDENT IN AN ORGANIZED HEALTH CARE EDUCATION/TRAINING PROGRAM

## 2023-08-30 PROCEDURE — 20000000 HC ICU ROOM

## 2023-08-30 PROCEDURE — 87040 BLOOD CULTURE FOR BACTERIA: CPT | Performed by: STUDENT IN AN ORGANIZED HEALTH CARE EDUCATION/TRAINING PROGRAM

## 2023-08-30 PROCEDURE — S5010 5% DEXTROSE AND 0.45% SALINE: HCPCS | Performed by: INTERNAL MEDICINE

## 2023-08-30 PROCEDURE — 82803 BLOOD GASES ANY COMBINATION: CPT

## 2023-08-30 PROCEDURE — 96360 HYDRATION IV INFUSION INIT: CPT | Mod: 59

## 2023-08-30 PROCEDURE — 96375 TX/PRO/DX INJ NEW DRUG ADDON: CPT

## 2023-08-30 PROCEDURE — 93005 ELECTROCARDIOGRAM TRACING: CPT

## 2023-08-30 PROCEDURE — 85025 COMPLETE CBC W/AUTO DIFF WBC: CPT | Performed by: STUDENT IN AN ORGANIZED HEALTH CARE EDUCATION/TRAINING PROGRAM

## 2023-08-30 PROCEDURE — 25000003 PHARM REV CODE 250: Performed by: STUDENT IN AN ORGANIZED HEALTH CARE EDUCATION/TRAINING PROGRAM

## 2023-08-30 PROCEDURE — 25000003 PHARM REV CODE 250

## 2023-08-30 PROCEDURE — 0240U COVID/FLU A&B PCR: CPT | Performed by: STUDENT IN AN ORGANIZED HEALTH CARE EDUCATION/TRAINING PROGRAM

## 2023-08-30 RX ORDER — POTASSIUM CHLORIDE 7.45 MG/ML
40 INJECTION INTRAVENOUS
Status: DISCONTINUED | OUTPATIENT
Start: 2023-08-30 | End: 2023-08-30

## 2023-08-30 RX ORDER — LEVOFLOXACIN 5 MG/ML
750 INJECTION, SOLUTION INTRAVENOUS
Status: DISCONTINUED | OUTPATIENT
Start: 2023-08-30 | End: 2023-09-01

## 2023-08-30 RX ORDER — CALCIUM GLUCONATE 20 MG/ML
1 INJECTION, SOLUTION INTRAVENOUS
Status: DISCONTINUED | OUTPATIENT
Start: 2023-08-30 | End: 2023-09-01 | Stop reason: HOSPADM

## 2023-08-30 RX ORDER — SODIUM CHLORIDE 9 MG/ML
1000 INJECTION, SOLUTION INTRAVENOUS CONTINUOUS
Status: ACTIVE | OUTPATIENT
Start: 2023-08-30 | End: 2023-08-30

## 2023-08-30 RX ORDER — DEXTROSE MONOHYDRATE 100 MG/ML
INJECTION, SOLUTION INTRAVENOUS
Status: DISCONTINUED | OUTPATIENT
Start: 2023-08-30 | End: 2023-09-01 | Stop reason: HOSPADM

## 2023-08-30 RX ORDER — POTASSIUM CHLORIDE 7.45 MG/ML
60 INJECTION INTRAVENOUS
Status: DISCONTINUED | OUTPATIENT
Start: 2023-08-30 | End: 2023-09-01 | Stop reason: HOSPADM

## 2023-08-30 RX ORDER — MUPIROCIN 20 MG/G
OINTMENT TOPICAL 2 TIMES DAILY
Status: DISCONTINUED | OUTPATIENT
Start: 2023-08-30 | End: 2023-09-01 | Stop reason: HOSPADM

## 2023-08-30 RX ORDER — CALCIUM GLUCONATE 20 MG/ML
3 INJECTION, SOLUTION INTRAVENOUS
Status: DISCONTINUED | OUTPATIENT
Start: 2023-08-30 | End: 2023-09-01 | Stop reason: HOSPADM

## 2023-08-30 RX ORDER — POTASSIUM CHLORIDE 7.45 MG/ML
60 INJECTION INTRAVENOUS
Status: DISCONTINUED | OUTPATIENT
Start: 2023-08-30 | End: 2023-08-30

## 2023-08-30 RX ORDER — POTASSIUM CHLORIDE 7.45 MG/ML
80 INJECTION INTRAVENOUS
Status: DISCONTINUED | OUTPATIENT
Start: 2023-08-30 | End: 2023-08-30

## 2023-08-30 RX ORDER — DEXTROSE MONOHYDRATE AND SODIUM CHLORIDE 5; .45 G/100ML; G/100ML
INJECTION, SOLUTION INTRAVENOUS CONTINUOUS PRN
Status: DISCONTINUED | OUTPATIENT
Start: 2023-08-30 | End: 2023-09-01 | Stop reason: HOSPADM

## 2023-08-30 RX ORDER — SODIUM CHLORIDE 0.9 % (FLUSH) 0.9 %
10 SYRINGE (ML) INJECTION
Status: DISCONTINUED | OUTPATIENT
Start: 2023-08-30 | End: 2023-09-01 | Stop reason: HOSPADM

## 2023-08-30 RX ORDER — MAGNESIUM SULFATE HEPTAHYDRATE 40 MG/ML
2 INJECTION, SOLUTION INTRAVENOUS
Status: COMPLETED | OUTPATIENT
Start: 2023-08-30 | End: 2023-08-30

## 2023-08-30 RX ORDER — MAGNESIUM SULFATE HEPTAHYDRATE 40 MG/ML
2 INJECTION, SOLUTION INTRAVENOUS
Status: DISCONTINUED | OUTPATIENT
Start: 2023-08-30 | End: 2023-09-01 | Stop reason: HOSPADM

## 2023-08-30 RX ORDER — POTASSIUM CHLORIDE 7.45 MG/ML
40 INJECTION INTRAVENOUS
Status: DISCONTINUED | OUTPATIENT
Start: 2023-08-30 | End: 2023-09-01 | Stop reason: HOSPADM

## 2023-08-30 RX ORDER — METRONIDAZOLE 500 MG/100ML
500 INJECTION, SOLUTION INTRAVENOUS
Status: DISCONTINUED | OUTPATIENT
Start: 2023-08-30 | End: 2023-08-30

## 2023-08-30 RX ORDER — CALCIUM GLUCONATE 20 MG/ML
2 INJECTION, SOLUTION INTRAVENOUS
Status: DISCONTINUED | OUTPATIENT
Start: 2023-08-30 | End: 2023-09-01 | Stop reason: HOSPADM

## 2023-08-30 RX ORDER — POTASSIUM CHLORIDE 7.45 MG/ML
80 INJECTION INTRAVENOUS
Status: DISCONTINUED | OUTPATIENT
Start: 2023-08-30 | End: 2023-09-01 | Stop reason: HOSPADM

## 2023-08-30 RX ORDER — METOCLOPRAMIDE HYDROCHLORIDE 5 MG/ML
10 INJECTION INTRAMUSCULAR; INTRAVENOUS
Status: COMPLETED | OUTPATIENT
Start: 2023-08-30 | End: 2023-08-30

## 2023-08-30 RX ORDER — MAGNESIUM SULFATE HEPTAHYDRATE 40 MG/ML
4 INJECTION, SOLUTION INTRAVENOUS
Status: DISCONTINUED | OUTPATIENT
Start: 2023-08-30 | End: 2023-09-01 | Stop reason: HOSPADM

## 2023-08-30 RX ORDER — ENOXAPARIN SODIUM 100 MG/ML
40 INJECTION SUBCUTANEOUS EVERY 24 HOURS
Status: DISCONTINUED | OUTPATIENT
Start: 2023-08-30 | End: 2023-08-31

## 2023-08-30 RX ADMIN — MAGNESIUM SULFATE HEPTAHYDRATE 2 G: 40 INJECTION, SOLUTION INTRAVENOUS at 09:08

## 2023-08-30 RX ADMIN — CEFTRIAXONE SODIUM 2 G: 2 INJECTION, POWDER, FOR SOLUTION INTRAMUSCULAR; INTRAVENOUS at 04:08

## 2023-08-30 RX ADMIN — SODIUM CHLORIDE 1000 ML: 9 INJECTION, SOLUTION INTRAVENOUS at 08:08

## 2023-08-30 RX ADMIN — INSULIN HUMAN 0.1 UNITS/KG/HR: 1 INJECTION, SOLUTION INTRAVENOUS at 07:08

## 2023-08-30 RX ADMIN — MUPIROCIN: 20 OINTMENT TOPICAL at 09:08

## 2023-08-30 RX ADMIN — SODIUM CHLORIDE, POTASSIUM CHLORIDE, SODIUM LACTATE AND CALCIUM CHLORIDE 1000 ML: 600; 310; 30; 20 INJECTION, SOLUTION INTRAVENOUS at 08:08

## 2023-08-30 RX ADMIN — SODIUM CHLORIDE 1000 ML: 9 INJECTION, SOLUTION INTRAVENOUS at 06:08

## 2023-08-30 RX ADMIN — LEVOFLOXACIN 750 MG: 750 INJECTION, SOLUTION INTRAVENOUS at 12:08

## 2023-08-30 RX ADMIN — METOCLOPRAMIDE HYDROCHLORIDE 10 MG: 5 INJECTION INTRAMUSCULAR; INTRAVENOUS at 07:08

## 2023-08-30 RX ADMIN — INSULIN HUMAN 0.02 UNITS/KG/HR: 1 INJECTION, SOLUTION INTRAVENOUS at 05:08

## 2023-08-30 RX ADMIN — IOPAMIDOL 100 ML: 755 INJECTION, SOLUTION INTRAVENOUS at 08:08

## 2023-08-30 RX ADMIN — DEXTROSE AND SODIUM CHLORIDE: 5; 450 INJECTION, SOLUTION INTRAVENOUS at 04:08

## 2023-08-30 RX ADMIN — INSULIN DETEMIR 25 UNITS: 100 INJECTION, SOLUTION SUBCUTANEOUS at 07:08

## 2023-08-30 RX ADMIN — ENOXAPARIN SODIUM 40 MG: 40 INJECTION SUBCUTANEOUS at 04:08

## 2023-08-30 RX ADMIN — SODIUM CHLORIDE, POTASSIUM CHLORIDE, SODIUM LACTATE AND CALCIUM CHLORIDE 1000 ML: 600; 310; 30; 20 INJECTION, SOLUTION INTRAVENOUS at 07:08

## 2023-08-30 NOTE — ED PROVIDER NOTES
Encounter Date: 2023       History     Chief Complaint   Patient presents with    Vomiting     Pt c/o N/V since yesterday. Hx of DKA     Patient presents to the emergency department with multiple complaints.  States since yesterday has not felt well, been vomiting constantly, having pain in his chest in his abdomen and body aches all over and fatigued.  His blood sugar was running high tonight over 500 and he was concerned he might be in DKA.  Denies any fevers cough or congestion.  States he did not take his long-acting insulin last night.    The history is provided by the patient and the EMS personnel.     Review of patient's allergies indicates:   Allergen Reactions    Penicillins Shortness Of Breath    Sulfa (sulfonamide antibiotics)      Past Medical History:   Diagnosis Date    Diabetes mellitus type I     Hypertension      Past Surgical History:   Procedure Laterality Date    CLAVICLE SURGERY       Family History   Problem Relation Age of Onset    Diabetes Mother     Diabetes Father     Heart disease Father      Social History     Tobacco Use    Smoking status: Former     Current packs/day: 0.00     Types: Cigarettes     Quit date: 5/15/2023     Years since quittin.2     Passive exposure: Past    Smokeless tobacco: Never   Substance Use Topics    Alcohol use: Never    Drug use: Never     Review of Systems   Constitutional:  Negative for chills and fever.   HENT:  Negative for congestion and sore throat.    Respiratory:  Negative for cough and shortness of breath.    Cardiovascular:  Positive for chest pain. Negative for palpitations.   Gastrointestinal:  Positive for abdominal pain, nausea and vomiting.   Genitourinary:  Negative for dysuria and hematuria.   Musculoskeletal:  Positive for myalgias. Negative for arthralgias.   Neurological:  Negative for dizziness and weakness.       Physical Exam     Initial Vitals   BP Pulse Resp Temp SpO2   23 0636 23 0635 23 0636 23 0636  08/30/23 0636   (!) 143/83 (!) 130 (!) 28 98.1 °F (36.7 °C) 100 %      MAP       --                Physical Exam    Nursing note and vitals reviewed.  Constitutional: He appears well-developed and well-nourished.   HENT:   Head: Normocephalic and atraumatic.   Eyes: Conjunctivae are normal. Pupils are equal, round, and reactive to light.   Neck: Neck supple.   Normal range of motion.  Cardiovascular:  Regular rhythm and normal heart sounds.           Tachycardic   Pulmonary/Chest: Breath sounds normal. He is in respiratory distress (moderate).   Abdominal: Abdomen is soft. He exhibits no distension. There is abdominal tenderness (Mildly tender throughout). There is no rebound and no guarding.   Musculoskeletal:         General: No edema. Normal range of motion.      Cervical back: Normal range of motion and neck supple.     Neurological: He is alert and oriented to person, place, and time.   Skin: Skin is warm and dry.         ED Course   Critical Care    Date/Time: 8/30/2023 7:24 AM    Performed by: Myron Quiroz MD  Authorized by: Myron Quiroz MD  Direct patient critical care time: 15 minutes  Additional history critical care time: 5 minutes  Ordering / reviewing critical care time: 20 minutes  Documentation critical care time: 5 minutes  Consulting other physicians critical care time: 5 minutes  Total critical care time (exclusive of procedural time) : 50 minutes  Critical care was necessary to treat or prevent imminent or life-threatening deterioration of the following conditions: metabolic crisis, dehydration and endocrine crisis.  Critical care was time spent personally by me on the following activities: development of treatment plan with patient or surrogate, discussions with consultants, interpretation of cardiac output measurements, evaluation of patient's response to treatment, examination of patient, obtaining history from patient or surrogate, ordering and performing  treatments and interventions, ordering and review of laboratory studies, ordering and review of radiographic studies, pulse oximetry, re-evaluation of patient's condition and review of old charts.        Labs Reviewed   COMPREHENSIVE METABOLIC PANEL - Abnormal; Notable for the following components:       Result Value    Chloride 96 (*)     Carbon Dioxide 12 (*)     Glucose Level 693 (*)     Blood Urea Nitrogen 30.0 (*)     Creatinine 1.85 (*)     Calcium Level Total 10.3 (*)     Globulin 4.3 (*)     Albumin/Globulin Ratio 0.9 (*)     All other components within normal limits   LACTIC ACID, PLASMA - Abnormal; Notable for the following components:    Lactic Acid Level 5.9 (*)     All other components within normal limits   MAGNESIUM - Abnormal; Notable for the following components:    Magnesium Level 1.50 (*)     All other components within normal limits   BLOOD GAS - Abnormal; Notable for the following components:    pH, Blood gas 7.220 (*)     pCO2, Blood gas 30.0 (*)     pO2, Blood gas 45.0 (*)     All other components within normal limits   CBC WITH DIFFERENTIAL - Abnormal; Notable for the following components:    WBC 20.59 (*)     Hgb 12.7 (*)     Hct 39.8 (*)     MCH 26.7 (*)     MCHC 31.9 (*)     Neut # 17.89 (*)     IG# 0.12 (*)     All other components within normal limits   LIPASE - Normal   TROPONIN I - Normal   BLOOD CULTURE [facility]   BLOOD CULTURE [facility]   CBC W/ AUTO DIFFERENTIAL    Narrative:     The following orders were created for panel order CBC auto differential.  Procedure                               Abnormality         Status                     ---------                               -----------         ------                     CBC with Differential[[id number]]        Abnormal            Final result                 Please view results for these tests on the individual orders.   URINALYSIS, REFLEX TO URINE CULTURE   BETA - HYDROXYBUTYRATE, SERUM   COVID/FLU A&B PCR   EXTRA TUBES    Narrative:      The following orders were created for panel order EXTRA TUBES.  Procedure                               Abnormality         Status                     ---------                               -----------         ------                     Light Blue Top Hold[[id number]]                              In process                 Red Top Hold[[id number]]                                     In process                 Gold Top Hold[[id number]]                                    In process                 Pink Top Hold[[id number]]                                    In process                   Please view results for these tests on the individual orders.   LIGHT BLUE TOP HOLD   RED TOP HOLD   GOLD TOP HOLD   PINK TOP HOLD   EXTRA TUBES    Narrative:     The following orders were created for panel order EXTRA TUBES.  Procedure                               Abnormality         Status                     ---------                               -----------         ------                     Gold Top Hold[[id number]]                                    In process                   Please view results for these tests on the individual orders.   GOLD TOP HOLD   POCT GLUCOSE MONITORING CONTINUOUS     EKG Readings: (Independently Interpreted)   Initial Reading: No STEMI. Rhythm: Sinus Tachycardia. Heart Rate: 128. Ectopy: No Ectopy. Conduction: Normal. Axis: Right Axis Deviation.       Imaging Results              X-Ray Chest AP Portable (Final result)  Result time 08/30/23 07:27:38      Final result by Josie Ruff MD (08/30/23 07:27:38)                   Impression:      No acute abnormality of the chest.      Electronically signed by: Josie Ruff  Date:    08/30/2023  Time:    07:27               Narrative:    EXAMINATION:  XR CHEST AP PORTABLE    CLINICAL HISTORY:  Chest pain;    COMPARISON:  Chest x-ray dated 06/29/2023    FINDINGS:  The heart is normal in size.  The lungs are clear.  There is no pleural effusion or  visible pneumothorax.                                    X-Rays:   Independently Interpreted Readings:   Chest X-Ray: Normal heart size.  No infiltrates.  No acute abnormalities.     Medications   sodium chloride 0.9% bolus 1,000 mL 1,000 mL (1,000 mLs Intravenous New Bag 8/30/23 0649)   lactated ringers bolus 1,000 mL (1,000 mLs Intravenous New Bag 8/30/23 0712)   magnesium sulfate 2g in water 50mL IVPB (premix) (has no administration in time range)   sodium chloride 0.9% flush 10 mL (has no administration in time range)   0.9%  NaCl infusion (has no administration in time range)   dextrose 5 % and 0.45 % NaCl infusion (has no administration in time range)   dextrose 10 % infusion (has no administration in time range)   dextrose 10 % infusion (has no administration in time range)   insulin regular in 0.9 % NaCl 100 unit/100 mL (1 unit/mL) infusion (has no administration in time range)   insulin regular bolus from bag/infusion 7.98 Units 7.98 mL (has no administration in time range)   potassium chloride 10 mEq in 100 mL IVPB (has no administration in time range)     And   potassium chloride 10 mEq in 100 mL IVPB (has no administration in time range)     And   potassium chloride 10 mEq in 100 mL IVPB (has no administration in time range)   dextrose 10% bolus 125 mL 125 mL (has no administration in time range)   dextrose 10% bolus 250 mL 250 mL (has no administration in time range)   lactated ringers bolus 1,000 mL (has no administration in time range)   levoFLOXacin 750 mg/150 mL IVPB 750 mg (has no administration in time range)     Medical Decision Making  Amount and/or Complexity of Data Reviewed  Labs: ordered.     Details: High anion gap met acidosis with RODOLFO and lactic acidosis  Radiology: ordered and independent interpretation performed. Decision-making details documented in ED Course.  ECG/medicine tests: ordered and independent interpretation performed. Decision-making details documented in ED  Course.  Discussion of management or test interpretation with external provider(s): 7:24 AM Consult: I discussed the case with Dr. Husain ([facility]). Agrees with current management.   Recommends will see in ED      Risk  Prescription drug management.  Decision regarding hospitalization.               ED Course as of 08/30/23 0731   Wed Aug 30, 2023   0702 Handoff to Dr. Bonilla after discussion after patient history and ED course [AW]      ED Course User Index  [AW] Cesar Brock MD                    Clinical Impression:   Final diagnoses:  [R07.9] Chest pain  [E10.10] Diabetic ketoacidosis without coma associated with type 1 diabetes mellitus (Primary)  [E87.20] Lactic acidosis  [N17.9] RODOLFO (acute kidney injury)        ED Disposition Condition    Admit Stable                Myron Quiroz MD  08/30/23 0726       Myron Quiroz MD  08/30/23 0731

## 2023-08-30 NOTE — PLAN OF CARE
08/30/23 1332   Discharge Assessment   Assessment Type Discharge Planning Assessment   Confirmed/corrected address, phone number and insurance Yes   Confirmed Demographics Correct on Facesheet   Source of Information family;health record   Reason For Admission Lactic acidosis   N17.9 RODOLFO (acute kidney injury)  R07.9 Chest pain  E10.10 Diabetic ketoacidosis without coma associated with type 1 diabetes mellitus   People in Home parent(s);child(jacquelyn), dependent   Facility Arrived From: Home   Do you expect to return to your current living situation? Yes   Do you have help at home or someone to help you manage your care at home? Yes   Who are your caregiver(s) and their phone number(s)? Jake Romero (Father)   [phone]; Mother, Danelle Romero ([phone])   Prior to hospitilization cognitive status: Alert/Oriented   Current cognitive status: Alert/Oriented   Walking or Climbing Stairs ambulation difficulty, requires equipment   Mobility Management Cane   Equipment Currently Used at Home cane, straight   Readmission within 30 days? No   Patient currently being followed by outpatient case management? No   Do you currently have service(s) that help you manage your care at home? No   Do you take prescription medications? Yes  ([facility] [place])   Do you have prescription coverage? Yes   Coverage [facility] M/D   Do you have any problems affording any of your prescribed medications? No   Is the patient taking medications as prescribed? yes   Who is going to help you get home at discharge? Family   How do you get to doctors appointments? car, drives self   Are you on dialysis? No   DME Needed Upon Discharge  none   Discharge Plan discussed with: Parent(s)   Name(s) and Number(s) Jake Romero (Father)   [phone]   Transition of Care Barriers None   Discharge Plan A Home with family   OTHER   Name(s) of People in Home Jake Romero (Father)   [phone]; Mother, Danelle Romero ([phone]); 3 minor children (3,  6, 9 yrs)     Pt single with 4 children (3, 6, 9, 16 yrs); Pt has custody of children who were taken from mother due to substance abuse; Pt unemployed, residing with parents, Danelle & Jake, & his 3 youngest children; Oldest son living with relatives; Disability application pending; Independent with ADL's; No d/c needs identified at this time.

## 2023-08-30 NOTE — LETTER
September 1, 2023         [address] [facility] [address]  Phone: [phone]  Fax: [phone]       Patient: Jose Francisco Romero   YOB: 1980  Date of Visit: 09/01/2023    To Whom It May Concern:    Paco Romero  was at [facility] on 08/30/23- 09/01/2023. The patient may return to work/school on with no restrictions. If you have any questions or concerns, or if I can be of further assistance, please do not hesitate to contact me.    Sincerely,    Rigoberto Robertson RN

## 2023-08-30 NOTE — H&P
[facility] - ICU  Pulmonary Critical Care Note    Patient Name: Jose Francisco Romero  MRN: [id number]  Admission Date: 8/30/2023  Hospital Length of Stay: 0 days  Code Status: Full Code  Attending Provider: Dustin Patiño MD  Primary Care Provider: Marie Nichole     Subjective:     HPI:   Jose Francisco Romero is a 42-year-old male with past medical history significant for type 1 diabetes, hypertension, and psychiatric disorder presents to [facility] ED for nausea, vomiting, chills, and generalized weakness.  During this time is also complaining of chest pain along with pain in his abdomen.  He describes the pain as muscular type pain that had gone on since yesterday.  Patient had checked his blood glucose at home yesterday and saw glucose was over 500. He reports poor appetite due to nausea and vomiting during this time. He was concerned for possible DKA as he had been treated for in the past.  Patient reports taking his home diabetic regimen as prescribed, however he states he did not take his home insulin yesterday.  In the ED, patient was afebrile 98.1, tachycardic 130, tachypneic 28, blood pressure 143/83 saturating 100% on room air.  CBC was remarkable for WBC 20.5 and H/H 12.7/39.8.  CMP was remarkable for sodium 136, chloride 96, bicarb 12, BUN/creatinine 30.0/1.85, glucose 693.  Troponin in the ED was negative.  Lactate was elevated 5.9.  Beta hydroxybutyrate was 8.7.  ABG showed pH was 7.2, pCO2 30, PO2 45.  Internal Medicine was consulted for admission.    Hospital Course/Significant events:  08/30/23: Admitted to the ICU for DKA    24 Hour Interval History:  N/A    Past Medical History:   Diagnosis Date    Diabetes mellitus type I     Hypertension        Past Surgical History:   Procedure Laterality Date    CLAVICLE SURGERY         Social History     Socioeconomic History    Marital status: Single   Tobacco Use    Smoking status: Former     Current packs/day: 0.00     Types: Cigarettes     Quit date:  5/15/2023     Years since quittin.2     Passive exposure: Past    Smokeless tobacco: Never   Substance and Sexual Activity    Alcohol use: Never    Drug use: Never    Sexual activity: Yes     Birth control/protection: Condom     Social Determinants of Health     Financial Resource Strain: High Risk (2023)    Overall Financial Resource Strain (CARDIA)     Difficulty of Paying Living Expenses: Hard   Food Insecurity: Food Insecurity Present (2023)    Hunger Vital Sign     Worried About Running Out of Food in the Last Year: Sometimes true     Ran Out of Food in the Last Year: Never true   Transportation Needs: No Transportation Needs (2023)    PRAPARE - Transportation     Lack of Transportation (Medical): No     Lack of Transportation (Non-Medical): No   Physical Activity: Inactive (2023)    Exercise Vital Sign     Days of Exercise per Week: 0 days     Minutes of Exercise per Session: 0 min   Stress: Stress Concern Present (2023)    [employer] - Occupational Stress Questionnaire     Feeling of Stress : To some extent   Social Connections: Socially Isolated (2023)    Social Connection and Isolation Panel [NHANES]     Frequency of Communication with Friends and Family: Three times a week     Frequency of Social Gatherings with Friends and Family: Three times a week     Attends Mu-ism Services: Never     Active Member of Clubs or Organizations: No     Attends Club or Organization Meetings: Never     Marital Status: Never    Housing Stability: Low Risk  (2023)    Housing Stability Vital Sign     Unable to Pay for Housing in the Last Year: No     Number of Places Lived in the Last Year: 1     Unstable Housing in the Last Year: No           Current Outpatient Medications   Medication Instructions    albuterol (PROVENTIL/VENTOLIN HFA) 90 mcg/actuation inhaler Inhalation    blood sugar diagnostic (TRUE METRIX GLUCOSE TEST STRIP) Strp 1 strip,  "Misc.(Non-Drug; Combo Route), 4 times daily    clonazePAM (KLONOPIN) 2 mg, Oral, Daily PRN    cyproheptadine (PERIACTIN) 4 mg, Oral, 2 times daily    diclofenac sodium (VOLTAREN) 1 % Gel APPLY 2-4 grams TO affected joints FOUR TIMES DAILY FOR PAIN (DO NOT EXCEED 8 GRAMS PER DAY PER JOINT AND 32 GRAMS TOTAL)    FLUoxetine 40 mg, Oral, Daily    ibuprofen (ADVIL,MOTRIN) 800 mg, Oral, Every 6 hours PRN    insulin (LANTUS SOLOSTAR U-100 INSULIN) 25 Units, Subcutaneous, Daily    insulin aspart U-100 (NOVOLOG FLEXPEN U-100 INSULIN) 100 unit/mL (3 mL) InPn pen 12 units TID with meals with correction 1:50 >150 Max dose 50 units    lisinopriL (PRINIVIL,ZESTRIL) 5 MG tablet No dose, route, or frequency recorded.    pen needle, diabetic 31 gauge x 3/16" Ndle Use 4 times a day for insulin injection (BD Mini)    pregabalin (LYRICA) 50 mg, Oral, 2 times daily    traMADoL (ULTRAM) 50 mg, Oral, 2 times daily       Current Inpatient Medications   levoFLOXacin  750 mg Intravenous Q24H       Current Intravenous Infusions   sodium chloride 0.9% 125 mL/hr at 08/30/23 1131    dextrose 5 % and 0.45 % NaCl      insulin regular 1 units/mL infusion orderable (DKA) 0.1 Units/kg/hr (08/30/23 1131)         Review of Systems   Constitutional:  Negative for chills and fever.   HENT:  Negative for congestion, sinus pain and sore throat.    Eyes:  Negative for blurred vision and double vision.   Respiratory:  Positive for shortness of breath. Negative for cough, sputum production and wheezing.    Cardiovascular:  Negative for chest pain, palpitations and leg swelling.   Gastrointestinal:  Positive for abdominal pain, nausea and vomiting.   Genitourinary:  Negative for dysuria.   Musculoskeletal:  Positive for myalgias.   Neurological:  Negative for dizziness, focal weakness, seizures and weakness.          Objective:       Intake/Output Summary (Last 24 hours) at 8/30/2023 1327  Last data filed at 8/30/2023 1131  Gross per 24 hour   Intake 3527.68 " ml   Output --   Net 3527.68 ml         Vital Signs (Most Recent):  Temp: 98.3 °F (36.8 °C) (08/30/23 1245)  Pulse: 108 (08/30/23 1149)  Resp: 16 (08/30/23 1149)  BP: 130/69 (08/30/23 1149)  SpO2: 98 % (08/30/23 1149)  Body mass index is 23.22 kg/m².  Weight: 79.8 kg (176 lb) Vital Signs (24h Range):  Temp:  [98.1 °F (36.7 °C)-98.3 °F (36.8 °C)] 98.3 °F (36.8 °C)  Pulse:  [108-130] 108  Resp:  [9-28] 16  SpO2:  [98 %-100 %] 98 %  BP: (111-143)/(65-83) 130/69     Physical Exam  Constitutional:       Comments: Tired and sedated   HENT:      Head: Normocephalic.      Mouth/Throat:      Mouth: Mucous membranes are dry.   Eyes:      Conjunctiva/sclera: Conjunctivae normal.      Pupils: Pupils are equal, round, and reactive to light.   Cardiovascular:      Rate and Rhythm: Normal rate and regular rhythm.      Pulses: Normal pulses.   Pulmonary:      Effort: Pulmonary effort is normal. No respiratory distress.   Abdominal:      General: Abdomen is flat. Bowel sounds are normal. There is no distension.      Palpations: Abdomen is soft.      Tenderness: There is no abdominal tenderness.   Musculoskeletal:         General: Normal range of motion.      Cervical back: Normal range of motion.   Skin:     General: Skin is warm.      Capillary Refill: Capillary refill takes less than 2 seconds.   Neurological:      Comments: Alert and oriented           Lines/Drains/Airways       Peripheral Intravenous Line  Duration                  Peripheral IV - Single Lumen 08/30/23 0635 20 G Posterior;Right Forearm <1 day         Peripheral IV - Single Lumen 08/30/23 0730 18 G Left Antecubital <1 day                    Significant Labs:    Lab Results   Component Value Date    WBC 20.59 (H) 08/30/2023    HGB 12.7 (L) 08/30/2023    HCT 39.8 (L) 08/30/2023    MCV 83.6 08/30/2023     08/30/2023           BMP  Lab Results   Component Value Date     08/30/2023    K 4.0 08/30/2023    CHLORIDE 106 08/30/2023    CO2 16 (L) 08/30/2023     BUN 27.2 (H) 08/30/2023    CREATININE 1.61 (H) 08/30/2023    CALCIUM 9.5 08/30/2023    AGAP 16.0 08/30/2023    ESTGFRAFRICA 99 11/24/2022    EGFRNONAA >60 04/04/2022         ABG  Recent Labs   Lab 08/30/23  0650   PH 7.220*   PO2 45.0*   PCO2 30.0*   HCO3 12.3   POCBASEDEF -14.10       Mechanical Ventilation Support:  Oxygen Concentration (%): 21 (08/30/23 0648)      Significant Imaging:  I have reviewed the pertinent imaging within the past 24 hours.        Assessment/Plan:     Assessment  DKA  SIRS 3/4  RODOLFO  Respiratory distress due to metabolic acidosis  Anxiety/Depression      Plan  Admitted to ICU for DKA management  Likely due gastroenteritis vs medication noncompliance  Started on insulin drip; can transition to subcutaneous insulin if able to tolerate PO and if bicarb > 18, anion gap > 12, and glucose < 250   Monitor BMP q4hrs, replete K >4, P >3, Mag >2  BiPAP PRN for SOB  Blood cultures pending  Currently on Levaquin and Rocephin    DVT Prophylaxis: Lovenox  GI Prophylaxis: N/A     32 minutes of critical care was time spent personally by me on the following activities: development of treatment plan with patient or surrogate and bedside caregivers, discussions with consultants, evaluation of patient's response to treatment, examination of patient, ordering and performing treatments and interventions, ordering and review of laboratory studies, ordering and review of radiographic studies, pulse oximetry, re-evaluation of patient's condition.  This critical care time did not overlap with that of any other provider or involve time for any procedures.     Marvin Warren MD  Pulmonary Critical Care Medicine  [facility] - ICU

## 2023-08-31 LAB
ALBUMIN SERPL-MCNC: 3.1 G/DL (ref 3.5–5)
ALBUMIN/GLOB SERPL: 1 RATIO (ref 1.1–2)
ALP SERPL-CCNC: 75 UNIT/L (ref 40–150)
ALT SERPL-CCNC: 12 UNIT/L (ref 0–55)
AST SERPL-CCNC: 11 UNIT/L (ref 5–34)
BASOPHILS # BLD AUTO: 0.05 X10(3)/MCL
BASOPHILS NFR BLD AUTO: 0.4 %
BILIRUB SERPL-MCNC: 0.3 MG/DL
BUN SERPL-MCNC: 23.2 MG/DL (ref 8.9–20.6)
CALCIUM SERPL-MCNC: 9.2 MG/DL (ref 8.4–10.2)
CHLORIDE SERPL-SCNC: 107 MMOL/L (ref 98–107)
CO2 SERPL-SCNC: 25 MMOL/L (ref 22–29)
CREAT SERPL-MCNC: 1.07 MG/DL (ref 0.73–1.18)
EOSINOPHIL # BLD AUTO: 0.12 X10(3)/MCL (ref 0–0.9)
EOSINOPHIL NFR BLD AUTO: 1 %
ERYTHROCYTE [DISTWIDTH] IN BLOOD BY AUTOMATED COUNT: 14.6 % (ref 11.5–17)
ETHANOL SERPL-MCNC: <10 MG/DL
FERRITIN SERPL-MCNC: 299.48 NG/ML (ref 21.81–274.66)
GFR SERPLBLD CREATININE-BSD FMLA CKD-EPI: >60 MLS/MIN/1.73/M2
GLOBULIN SER-MCNC: 3.2 GM/DL (ref 2.4–3.5)
GLUCOSE SERPL-MCNC: 151 MG/DL (ref 74–100)
HCT VFR BLD AUTO: 34.2 % (ref 42–52)
HGB BLD-MCNC: 11.5 G/DL (ref 14–18)
IMM GRANULOCYTES # BLD AUTO: 0.04 X10(3)/MCL (ref 0–0.04)
IMM GRANULOCYTES NFR BLD AUTO: 0.3 %
IRON SATN MFR SERPL: 19 % (ref 20–50)
IRON SERPL-MCNC: 37 UG/DL (ref 65–175)
LACTATE SERPL-SCNC: 1 MMOL/L (ref 0.5–2.2)
LIPASE SERPL-CCNC: 7 U/L
LYMPHOCYTES # BLD AUTO: 2.7 X10(3)/MCL (ref 0.6–4.6)
LYMPHOCYTES NFR BLD AUTO: 21.4 %
MAGNESIUM SERPL-MCNC: 1.6 MG/DL (ref 1.6–2.6)
MCH RBC QN AUTO: 26.9 PG (ref 27–31)
MCHC RBC AUTO-ENTMCNC: 33.6 G/DL (ref 33–36)
MCV RBC AUTO: 80.1 FL (ref 80–94)
MONOCYTES # BLD AUTO: 0.6 X10(3)/MCL (ref 0.1–1.3)
MONOCYTES NFR BLD AUTO: 4.8 %
NEUTROPHILS # BLD AUTO: 9.1 X10(3)/MCL (ref 2.1–9.2)
NEUTROPHILS NFR BLD AUTO: 72.1 %
NRBC BLD AUTO-RTO: 0 %
PHOSPHATE SERPL-MCNC: 2.3 MG/DL (ref 2.3–4.7)
PLATELET # BLD AUTO: 246 X10(3)/MCL (ref 130–400)
PMV BLD AUTO: 9.6 FL (ref 7.4–10.4)
POCT GLUCOSE: 144 MG/DL (ref 70–110)
POCT GLUCOSE: 162 MG/DL (ref 70–110)
POCT GLUCOSE: 237 MG/DL (ref 70–110)
POCT GLUCOSE: 300 MG/DL (ref 70–110)
POCT GLUCOSE: 84 MG/DL (ref 70–110)
POTASSIUM SERPL-SCNC: 3.9 MMOL/L (ref 3.5–5.1)
PROT SERPL-MCNC: 6.3 GM/DL (ref 6.4–8.3)
RBC # BLD AUTO: 4.27 X10(6)/MCL (ref 4.7–6.1)
SODIUM SERPL-SCNC: 139 MMOL/L (ref 136–145)
TIBC SERPL-MCNC: 153 UG/DL (ref 69–240)
TIBC SERPL-MCNC: 190 UG/DL (ref 250–450)
TRANSFERRIN SERPL-MCNC: 161 MG/DL (ref 174–364)
TRANSFERRIN SERPL-MCNC: 166 MG/DL (ref 174–364)
WBC # SPEC AUTO: 12.61 X10(3)/MCL (ref 4.5–11.5)

## 2023-08-31 PROCEDURE — 83540 ASSAY OF IRON: CPT | Performed by: FAMILY MEDICINE

## 2023-08-31 PROCEDURE — 83690 ASSAY OF LIPASE: CPT | Performed by: FAMILY MEDICINE

## 2023-08-31 PROCEDURE — 97161 PT EVAL LOW COMPLEX 20 MIN: CPT

## 2023-08-31 PROCEDURE — 63600175 PHARM REV CODE 636 W HCPCS: Performed by: FAMILY MEDICINE

## 2023-08-31 PROCEDURE — 94761 N-INVAS EAR/PLS OXIMETRY MLT: CPT

## 2023-08-31 PROCEDURE — 63600175 PHARM REV CODE 636 W HCPCS

## 2023-08-31 PROCEDURE — 25000003 PHARM REV CODE 250: Performed by: STUDENT IN AN ORGANIZED HEALTH CARE EDUCATION/TRAINING PROGRAM

## 2023-08-31 PROCEDURE — 25000003 PHARM REV CODE 250: Performed by: FAMILY MEDICINE

## 2023-08-31 PROCEDURE — 63600175 PHARM REV CODE 636 W HCPCS: Performed by: STUDENT IN AN ORGANIZED HEALTH CARE EDUCATION/TRAINING PROGRAM

## 2023-08-31 PROCEDURE — C9113 INJ PANTOPRAZOLE SODIUM, VIA: HCPCS | Performed by: FAMILY MEDICINE

## 2023-08-31 PROCEDURE — 21400001 HC TELEMETRY ROOM

## 2023-08-31 PROCEDURE — 82077 ASSAY SPEC XCP UR&BREATH IA: CPT | Performed by: FAMILY MEDICINE

## 2023-08-31 PROCEDURE — 82728 ASSAY OF FERRITIN: CPT | Performed by: FAMILY MEDICINE

## 2023-08-31 PROCEDURE — 63600175 PHARM REV CODE 636 W HCPCS: Performed by: INTERNAL MEDICINE

## 2023-08-31 PROCEDURE — 83605 ASSAY OF LACTIC ACID: CPT | Performed by: FAMILY MEDICINE

## 2023-08-31 PROCEDURE — 97165 OT EVAL LOW COMPLEX 30 MIN: CPT

## 2023-08-31 PROCEDURE — 84466 ASSAY OF TRANSFERRIN: CPT | Performed by: FAMILY MEDICINE

## 2023-08-31 PROCEDURE — 80053 COMPREHEN METABOLIC PANEL: CPT | Performed by: STUDENT IN AN ORGANIZED HEALTH CARE EDUCATION/TRAINING PROGRAM

## 2023-08-31 PROCEDURE — 85025 COMPLETE CBC W/AUTO DIFF WBC: CPT | Performed by: STUDENT IN AN ORGANIZED HEALTH CARE EDUCATION/TRAINING PROGRAM

## 2023-08-31 PROCEDURE — 84100 ASSAY OF PHOSPHORUS: CPT | Performed by: STUDENT IN AN ORGANIZED HEALTH CARE EDUCATION/TRAINING PROGRAM

## 2023-08-31 PROCEDURE — 83735 ASSAY OF MAGNESIUM: CPT | Performed by: STUDENT IN AN ORGANIZED HEALTH CARE EDUCATION/TRAINING PROGRAM

## 2023-08-31 PROCEDURE — 25000003 PHARM REV CODE 250: Performed by: HOSPITALIST

## 2023-08-31 RX ORDER — SODIUM CHLORIDE, SODIUM LACTATE, POTASSIUM CHLORIDE, CALCIUM CHLORIDE 600; 310; 30; 20 MG/100ML; MG/100ML; MG/100ML; MG/100ML
INJECTION, SOLUTION INTRAVENOUS CONTINUOUS
Status: DISCONTINUED | OUTPATIENT
Start: 2023-08-31 | End: 2023-09-01 | Stop reason: HOSPADM

## 2023-08-31 RX ORDER — INSULIN ASPART 100 [IU]/ML
8 INJECTION, SOLUTION INTRAVENOUS; SUBCUTANEOUS
Status: DISCONTINUED | OUTPATIENT
Start: 2023-08-31 | End: 2023-09-01 | Stop reason: HOSPADM

## 2023-08-31 RX ORDER — PANTOPRAZOLE SODIUM 40 MG/10ML
40 INJECTION, POWDER, LYOPHILIZED, FOR SOLUTION INTRAVENOUS DAILY
Status: DISCONTINUED | OUTPATIENT
Start: 2023-08-31 | End: 2023-09-01

## 2023-08-31 RX ORDER — TALC
9 POWDER (GRAM) TOPICAL NIGHTLY PRN
Status: DISCONTINUED | OUTPATIENT
Start: 2023-08-31 | End: 2023-09-01 | Stop reason: HOSPADM

## 2023-08-31 RX ORDER — INSULIN ASPART 100 [IU]/ML
8 INJECTION, SOLUTION INTRAVENOUS; SUBCUTANEOUS
Status: DISCONTINUED | OUTPATIENT
Start: 2023-08-31 | End: 2023-08-31

## 2023-08-31 RX ORDER — INSULIN LISPRO 100 [IU]/ML
8 INJECTION, SOLUTION INTRAVENOUS; SUBCUTANEOUS
Status: DISCONTINUED | OUTPATIENT
Start: 2023-08-31 | End: 2023-08-31

## 2023-08-31 RX ORDER — FOLIC ACID 1 MG/1
1 TABLET ORAL DAILY
Status: DISCONTINUED | OUTPATIENT
Start: 2023-08-31 | End: 2023-09-01 | Stop reason: HOSPADM

## 2023-08-31 RX ORDER — ACETAMINOPHEN 325 MG/1
650 TABLET ORAL EVERY 6 HOURS PRN
Status: DISCONTINUED | OUTPATIENT
Start: 2023-08-31 | End: 2023-09-01 | Stop reason: HOSPADM

## 2023-08-31 RX ORDER — CLONAZEPAM 1 MG/1
1 TABLET ORAL DAILY
Status: DISCONTINUED | OUTPATIENT
Start: 2023-09-07 | End: 2023-09-01 | Stop reason: HOSPADM

## 2023-08-31 RX ORDER — PANTOPRAZOLE SODIUM 40 MG/10ML
40 INJECTION, POWDER, LYOPHILIZED, FOR SOLUTION INTRAVENOUS ONCE
Status: COMPLETED | OUTPATIENT
Start: 2023-08-31 | End: 2023-09-01

## 2023-08-31 RX ORDER — THIAMINE HCL 100 MG
100 TABLET ORAL DAILY
Status: DISCONTINUED | OUTPATIENT
Start: 2023-08-31 | End: 2023-09-01 | Stop reason: HOSPADM

## 2023-08-31 RX ORDER — INSULIN ASPART 100 [IU]/ML
0-5 INJECTION, SOLUTION INTRAVENOUS; SUBCUTANEOUS
Status: DISCONTINUED | OUTPATIENT
Start: 2023-08-31 | End: 2023-09-01 | Stop reason: HOSPADM

## 2023-08-31 RX ORDER — LORAZEPAM 2 MG/ML
2 INJECTION INTRAMUSCULAR
Status: DISCONTINUED | OUTPATIENT
Start: 2023-08-31 | End: 2023-09-01 | Stop reason: HOSPADM

## 2023-08-31 RX ORDER — CLONAZEPAM 0.5 MG/1
0.5 TABLET ORAL DAILY
Status: DISCONTINUED | OUTPATIENT
Start: 2023-09-14 | End: 2023-09-01 | Stop reason: HOSPADM

## 2023-08-31 RX ADMIN — FOLIC ACID 1 MG: 1 TABLET ORAL at 09:08

## 2023-08-31 RX ADMIN — PANTOPRAZOLE SODIUM 40 MG: 40 INJECTION, POWDER, FOR SOLUTION INTRAVENOUS at 09:08

## 2023-08-31 RX ADMIN — MUPIROCIN: 20 OINTMENT TOPICAL at 09:08

## 2023-08-31 RX ADMIN — INSULIN ASPART 2 UNITS: 100 INJECTION, SOLUTION INTRAVENOUS; SUBCUTANEOUS at 12:08

## 2023-08-31 RX ADMIN — INSULIN ASPART 1 UNITS: 100 INJECTION, SOLUTION INTRAVENOUS; SUBCUTANEOUS at 12:08

## 2023-08-31 RX ADMIN — THIAMINE HCL TAB 100 MG 100 MG: 100 TAB at 09:08

## 2023-08-31 RX ADMIN — INSULIN ASPART 8 UNITS: 100 INJECTION, SOLUTION INTRAVENOUS; SUBCUTANEOUS at 12:08

## 2023-08-31 RX ADMIN — LEVOFLOXACIN 750 MG: 750 INJECTION, SOLUTION INTRAVENOUS at 08:08

## 2023-08-31 RX ADMIN — INSULIN ASPART 8 UNITS: 100 INJECTION, SOLUTION INTRAVENOUS; SUBCUTANEOUS at 05:08

## 2023-08-31 RX ADMIN — THERA TABS 1 TABLET: TAB at 09:08

## 2023-08-31 RX ADMIN — MUPIROCIN: 20 OINTMENT TOPICAL at 08:08

## 2023-08-31 RX ADMIN — INSULIN DETEMIR 25 UNITS: 100 INJECTION, SOLUTION SUBCUTANEOUS at 09:08

## 2023-08-31 RX ADMIN — SODIUM CHLORIDE, POTASSIUM CHLORIDE, SODIUM LACTATE AND CALCIUM CHLORIDE: 600; 310; 30; 20 INJECTION, SOLUTION INTRAVENOUS at 12:08

## 2023-08-31 RX ADMIN — ACETAMINOPHEN 650 MG: 325 TABLET, FILM COATED ORAL at 05:08

## 2023-08-31 RX ADMIN — CLONAZEPAM 1.5 MG: 1 TABLET ORAL at 11:08

## 2023-08-31 NOTE — PROGRESS NOTES
[facility] - ICU  Pulmonary Critical Care Note    Patient Name: Jose Francisco Romero  MRN: [id number]  Admission Date: 8/30/2023  Hospital Length of Stay: 1 days  Code Status: Full Code  Attending Provider: Dustin Patiño MD  Primary Care Provider: Marie Nichole     Subjective:     HPI:   Jose Francisco Romero is a 42-year-old male with past medical history significant for type 1 diabetes, hypertension, and psychiatric disorder presents to [facility] ED for nausea, vomiting, chills, and generalized weakness.  During this time is also complaining of chest pain along with pain in his abdomen.  He describes the pain as muscular type pain that had gone on since yesterday.  Patient had checked his blood glucose at home yesterday and saw glucose was over 500. He reports poor appetite due to nausea and vomiting during this time. He was concerned for possible DKA as he had been treated for in the past.  Patient reports taking his home diabetic regimen as prescribed, however he states he did not take his home insulin yesterday.  In the ED, patient was afebrile 98.1, tachycardic 130, tachypneic 28, blood pressure 143/83 saturating 100% on room air.  CBC was remarkable for WBC 20.5 and H/H 12.7/39.8.  CMP was remarkable for sodium 136, chloride 96, bicarb 12, BUN/creatinine 30.0/1.85, glucose 693.  Troponin in the ED was negative.  Lactate was elevated 5.9.  Beta hydroxybutyrate was 8.7.  ABG showed pH was 7.2, pCO2 30, PO2 45.  Internal Medicine was consulted for admission.    Hospital Course/Significant events:  08/30/23: Admitted to the ICU for DKA    24 Hour Interval History:  No acute events overnight. Tolerating diet. Reports continued abdominal pain, was having diarrhea. States he has prior hx of ulcers unclear where in GI tract was as kid, had prior EGD. States no abuse of nsaids, or smoking, or prior hpylori. Does report coffee ground emesis and melena prior to admission. Denies current ETOH use, but is on clonazepam  chronically daily despite polysubstance use hx in addition to frequent gabapentin, pregabalin, and codiene rx from multiple pharmacies per outpatient prescription review.    clonazepam     Dispensed Days Supply Quantity Provider Pharmacy   CLONAZEPAM 2MG TAB 2023 30 30 tablet  [id number]   CLONAZEPAM 2MG TAB 2023 30 30 tablet JANESSAKELIN [id number]   CLONAZEPAM 2MG TAB 2023 30 30 tablet  [id number]   alprazolam     Dispensed Days Supply Quantity Provider Pharmacy   ALPRAZOLAM 2MG TAB 2023 30 30 tablet  [id number]   acetaminophen with codeine     Dispensed Days Supply Quantity Provider Pharmacy   acetaminophen 300 mg-codeine 30 mg tablet 2023 3 12 each Shon Mejía DDS [facility]     tramadol HCl     Dispensed Days Supply Quantity Provider Pharmacy   tramadol 50 mg tablet 2023 30 60 each Antoine Garner MD [facility]     Past Medical History:   Diagnosis Date    Diabetes mellitus type I     Hypertension        Past Surgical History:   Procedure Laterality Date    CLAVICLE SURGERY         Social History     Socioeconomic History    Marital status: Single   Tobacco Use    Smoking status: Former     Current packs/day: 0.00     Types: Cigarettes     Quit date: 5/15/2023     Years since quittin.2     Passive exposure: Past    Smokeless tobacco: Never   Substance and Sexual Activity    Alcohol use: Never    Drug use: Never    Sexual activity: Yes     Birth control/protection: Condom     Social Determinants of Health     Financial Resource Strain: High Risk (2023)    Overall Financial Resource Strain (CARDIA)     Difficulty of Paying Living Expenses: Hard   Food Insecurity: Food Insecurity Present (2023)    Hunger Vital Sign     Worried About Running Out of Food in the Last Year: Sometimes true     Ran Out of Food in the Last Year: Never true   Transportation Needs: No  Transportation Needs (6/30/2023)    PRAPARE - Transportation     Lack of Transportation (Medical): No     Lack of Transportation (Non-Medical): No   Physical Activity: Inactive (6/30/2023)    Exercise Vital Sign     Days of Exercise per Week: 0 days     Minutes of Exercise per Session: 0 min   Stress: Stress Concern Present (6/30/2023)    Citizen of the [place] of Occupational Health - Occupational Stress Questionnaire     Feeling of Stress : To some extent   Social Connections: Socially Isolated (6/30/2023)    Social Connection and Isolation Panel [NHANES]     Frequency of Communication with Friends and Family: Three times a week     Frequency of Social Gatherings with Friends and Family: Three times a week     Attends Caodaism Services: Never     Active Member of Clubs or Organizations: No     Attends Club or Organization Meetings: Never     Marital Status: Never    Housing Stability: Low Risk  (6/30/2023)    Housing Stability Vital Sign     Unable to Pay for Housing in the Last Year: No     Number of Places Lived in the Last Year: 1     Unstable Housing in the Last Year: No           Current Outpatient Medications   Medication Instructions    albuterol (PROVENTIL/VENTOLIN HFA) 90 mcg/actuation inhaler Inhalation    blood sugar diagnostic (TRUE METRIX GLUCOSE TEST STRIP) Strp 1 strip, Misc.(Non-Drug; Combo Route), 4 times daily    clonazePAM (KLONOPIN) 2 mg, Oral, Daily PRN    cyproheptadine (PERIACTIN) 4 mg, Oral, 2 times daily    diclofenac sodium (VOLTAREN) 1 % Gel APPLY 2-4 grams TO affected joints FOUR TIMES DAILY FOR PAIN (DO NOT EXCEED 8 GRAMS PER DAY PER JOINT AND 32 GRAMS TOTAL)    FLUoxetine 40 mg, Oral, Daily    ibuprofen (ADVIL,MOTRIN) 800 mg, Oral, Every 6 hours PRN    insulin (LANTUS SOLOSTAR U-100 INSULIN) 25 Units, Subcutaneous, Daily    insulin aspart U-100 (NOVOLOG FLEXPEN U-100 INSULIN) 100 unit/mL (3 mL) InPn pen 12 units TID with meals with correction 1:50 >150 Max dose 50 units     "lisinopriL (PRINIVIL,ZESTRIL) 5 MG tablet No dose, route, or frequency recorded.    pen needle, diabetic 31 gauge x 3/16" Ndle Use 4 times a day for insulin injection (BD Mini)    pregabalin (LYRICA) 50 mg, Oral, 2 times daily    traMADoL (ULTRAM) 50 mg, Oral, 2 times daily       Current Inpatient Medications   folic acid  1 mg Oral Daily    insulin detemir U-100  25 Units Subcutaneous QHS    levoFLOXacin  750 mg Intravenous Q24H    multivitamin  1 tablet Oral Daily    mupirocin   Nasal BID    pantoprazole  40 mg Intravenous Daily    pantoprazole  40 mg Intravenous Once    thiamine  100 mg Oral Daily       Current Intravenous Infusions   dextrose 5 % and 0.45 % NaCl Stopped (08/31/23 0013)    lactated ringers           Review of Systems   Constitutional:  Negative for chills and fever.   HENT:  Negative for congestion, sinus pain and sore throat.    Eyes:  Negative for blurred vision and double vision.   Respiratory:  Positive for shortness of breath. Negative for cough, sputum production and wheezing.    Cardiovascular:  Negative for chest pain, palpitations and leg swelling.   Gastrointestinal:  Positive for abdominal pain, nausea and vomiting.   Genitourinary:  Negative for dysuria.   Musculoskeletal:  Positive for myalgias.   Neurological:  Negative for dizziness, focal weakness, seizures and weakness.          Objective:       Intake/Output Summary (Last 24 hours) at 8/31/2023 1114  Last data filed at 8/31/2023 1048  Gross per 24 hour   Intake 3337.51 ml   Output --   Net 3337.51 ml           Vital Signs (Most Recent):  Temp: 97.8 °F (36.6 °C) (08/31/23 0802)  Pulse: 77 (08/31/23 1107)  Resp: (!) 24 (08/31/23 1000)  BP: (!) 154/100 (08/31/23 1107)  SpO2: 99 % (08/31/23 1107)  Body mass index is 23.22 kg/m².  Weight: 79.8 kg (176 lb) Vital Signs (24h Range):  Temp:  [97.8 °F (36.6 °C)-98.6 °F (37 °C)] 97.8 °F (36.6 °C)  Pulse:  [] 77  Resp:  [15-24] 24  SpO2:  [97 %-100 %] 99 %  BP: (118-154)/() " 154/100     Physical Exam  Constitutional:       Comments: Tired and sedated   HENT:      Head: Normocephalic.      Mouth/Throat:      Mouth: Mucous membranes are dry.   Eyes:      Conjunctiva/sclera: Conjunctivae normal.      Pupils: Pupils are equal, round, and reactive to light.   Cardiovascular:      Rate and Rhythm: Normal rate and regular rhythm.      Pulses: Normal pulses.   Pulmonary:      Effort: Pulmonary effort is normal. No respiratory distress.   Abdominal:      General: Abdomen is flat. Bowel sounds are normal. There is no distension.      Palpations: Abdomen is soft.      Tenderness: There is no abdominal tenderness.   Musculoskeletal:         General: Normal range of motion.      Cervical back: Normal range of motion.   Skin:     General: Skin is warm.      Capillary Refill: Capillary refill takes less than 2 seconds.   Neurological:      Comments: Alert and oriented           Lines/Drains/Airways       Peripheral Intravenous Line  Duration                  Peripheral IV - Single Lumen 08/30/23 0635 20 G Posterior;Right Forearm 1 day         Peripheral IV - Single Lumen 08/30/23 0730 18 G Left Antecubital 1 day                    Significant Labs:    Lab Results   Component Value Date    WBC 12.61 (H) 08/31/2023    HGB 11.5 (L) 08/31/2023    HCT 34.2 (L) 08/31/2023    MCV 80.1 08/31/2023     08/31/2023           BMP  Lab Results   Component Value Date     08/31/2023    K 3.9 08/31/2023    CHLORIDE 107 08/31/2023    CO2 25 08/31/2023    BUN 23.2 (H) 08/31/2023    CREATININE 1.07 08/31/2023    CALCIUM 9.2 08/31/2023    AGAP 5.0 08/30/2023    ESTGFRAFRICA 99 11/24/2022    EGFRNONAA >60 04/04/2022         ABG  Recent Labs   Lab 08/30/23  0650   PH 7.220*   PO2 45.0*   PCO2 30.0*   HCO3 12.3   POCBASEDEF -14.10         Mechanical Ventilation Support:  Oxygen Concentration (%): 21 (08/30/23 0648)      Significant Imaging:  I have reviewed the pertinent imaging within the past 24  hours.        Assessment/Plan:     Assessment  DKA  SIRS 3/4  RODOLFO  Respiratory distress due to metabolic acidosis  Anxiety/Depression  Abdominal pain  Melena  Coffee ground emesis  Anemia mild ALYSSA/AOCD  Pyrosis  Polysubstance use d/o      Plan  Admitted to ICU for DKA management  Likely due gastroenteritis vs medication noncompliance  Off insulin gtt back on home levimir 25 QHS and 80% of meal time at 8 U TID  Gi panel pending  Monitor BMP q24hrs, replete K >4, P >3, Mag >2  BiPAP PRN for SOB  Blood cultures pending  Currently on Levaquin and Rocephin will likley stop pending negative bctx, did deescalate from cef/levaquin to levaquin alone d/t redundant coverage  GI consulted regarding melena/hematemesis, appreciate recommendations concern for possible hpylori  Concern for possible rx med abuse, will recommend to taper off clonazepam, and started ciwa protocol  Continue LR at 125 given continued slightly reduced renal fx    DVT Prophylaxis: Lovenox held d/t slight downtrend in hgb and recent melena  GI Prophylaxis: N/A     32 minutes of critical care was time spent personally by me on the following activities: development of treatment plan with patient or surrogate and bedside caregivers, discussions with consultants, evaluation of patient's response to treatment, examination of patient, ordering and performing treatments and interventions, ordering and review of laboratory studies, ordering and review of radiographic studies, pulse oximetry, re-evaluation of patient's condition.  This critical care time did not overlap with that of any other provider or involve time for any procedures.     Marita Lester MD  Pulmonary Critical Care Medicine  [facility] - ICU

## 2023-08-31 NOTE — PROGRESS NOTES
"Inpatient Nutrition Evaluation    Admit Date: 2023   Total duration of encounter: 1 day    Nutrition Recommendation/Prescription     Diabetic diet  Pt education on diet/complete  MVI/fe  Biweekly wt  Will monitor nutrition status     Nutrition Assessment     Chart Review    Reason Seen: continuous nutrition monitoring    Malnutrition Screening Tool Results   Have you recently lost weight without trying?: No  Have you been eating poorly because of a decreased appetite?: No   MST Score: 0     Diagnosis:  DKA, DM, SIRS, RODOLFO, respiratory distress, anxiety/depression     Relevant Medical History: PUD, DM, HTN, psych disorder     Nutrition-Related Medications: rocephin, insulin, levofloxacin     Nutrition-Related Labs:  () H/H 11.5/34.2(L) Gluc 151(H) Bun 23.2 Cr 1.0 K 3.9 Alb 3.1(L)     Diet Order: Diet diabetic  Oral Supplement Order: none  Appetite/Oral Intake: fair/50-75% of meals  Factors Affecting Nutritional Intake: nausea and vomiting  Food/Church/Cultural Preferences: none reported  Food Allergies: none reported       Wound(s):   none    Comments    () Pt familiar from prior admits; reported usually with good appetite; but has been down 1-2 days; ate cereal/milk for breakfast; admitted with N/V; now improved. No recent wt loss. Current diet tx appropriate. Gluc (H) encouraged diet adherence.     Anthropometrics    Height: 6' 1" (185.4 cm) Height Method: Stated  Last Weight: 79.8 kg (176 lb) (23 0636) Weight Method: Bed Scale  BMI (Calculated): 23.2  BMI Classification: normal (BMI 18.5-24.9)        Ideal Body Weight (IBW), Male: 184 lb     % Ideal Body Weight, Male (lb): 95.65 %                 Usual Body Weight (UBW), k.8 kg  % Usual Body Weight: 100.25     Usual Weight Provided By: patient and EMR weight history    Wt Readings from Last 5 Encounters:   23 79.8 kg (176 lb)   23 77.1 kg (170 lb)   23 77.8 kg (171 lb 9.6 oz)   23 77.1 kg (170 lb)   23 75.8 " kg (167 lb)     Weight Change(s) Since Admission:  Admit Weight: 79.8 kg (176 lb) (08/30/23 0636)  No wt loss     Patient Education    Education Provided: diabetic diet  Teaching Method: explanation and printed materials  Comprehension: verbalizes understanding  Barriers to Learning: none evident  Expected Compliance: fair  Comments: All questions were answered and dietitian's contact information was provided.     Monitoring & Evaluation     Dietitian will monitor food and beverage intake, weight, and food/nutrition knowledge skill.  Nutrition Risk/Follow-Up: low (follow-up in 5-7 days)  Patients assigned 'low nutrition risk' status do not qualify for a full nutritional assessment but will be monitored and re-evaluated in a 5-7 day time period. Please consult if re-evaluation needed sooner.

## 2023-08-31 NOTE — PT/OT/SLP EVAL
"Occupational Therapy   Evaluation and Discharge Note    Name: Jose Francisco Romero  MRN: [id number]  Admitting Diagnosis:   DKA  SIRS 3/4  RODOLFO  Respiratory distress due to metabolic acidosis  Anxiety/Depression  Abdominal pain  Melena  Coffee ground emesis  Anemia mild ALYSSA/AOCD  Pyrosis  Polysubstance use d/o  Recent Surgery: * No surgery found *      Recommendations:     Discharge Recommendations: home  Discharge Equipment Recommendations: none  Barriers to discharge:  None    Assessment:     Jose Francisco Romero is a 42 y.o. male with a medical diagnosis of   Patient Active Problem List   Diagnosis    Diabetic neuropathic cachexia    Diabetic ketoacidosis without coma    Diabetic polyneuropathy    Severe malnutrition    Polyneuropathy    CIDP (chronic inflammatory demyelinating polyneuropathy)    Moderate malnutrition     . At this time, patient is functioning at their prior level of function and does not require further acute OT services.     Plan:     During this hospitalization, patient does not require further acute OT services.  Please re-consult if situation changes.    Plan of Care Reviewed with: patient    Subjective     Chief Complaint: B lower leg pain  Patient/Family Comments/goals: DC home when medically stable, resume activities    Occupational Profile:  Living Environment: mobile home with 8 steps to enter with bilateral handrails, walk-in shower, with his 4 children (in school)  Previous level of function: mod I for ADLs and mobility, using RW or cane when pain is exacerbated  Roles and Routines: pt works part-time at [employer], his mother helps with younger children, he drives himself and does all the cooking and cleaning at home  Equipment Used at home: cane, straight, walker, rolling, other (see comments) (uses these "sometimes", d/t pain/neuropathy)  Assistance upon Discharge: support from his mother and older children at home    Pain/Comfort:  Pain Rating 1: 6/10  Location - Side 1: Bilateral  Location - " Orientation 1: lower  Location 1: leg  Pain Addressed 1: Reposition, Distraction, Cessation of Activity  Pain Rating Post-Intervention 1: 6/10    Patients cultural, spiritual, Jew conflicts given the current situation: no    Objective:     Communicated with: nurse Lopez prior to session.  Patient found supine with telemetry, peripheral IV upon OT entry to room.    General Precautions: Standard,    Orthopedic Precautions: N/A  Braces: N/A  Respiratory Status: Room air     Occupational Performance:    Bed Mobility:    Patient completed Supine to Sit with independence    Functional Mobility/Transfers:  Patient completed Sit <> Stand Transfer with supervision  with  no assistive device   Functional Mobility: mod I to ambulate in moise with PT    Activities of Daily Living:  Grooming: independence .  Upper Body Dressing: independence .  Lower Body Dressing: modified independence seated EOB  Toileting: modified independence .    Cognitive/Visual Perceptual:  Cognitive/Psychosocial Skills:     -       Oriented to: Person, Place, Time, and Situation   -       Follows Commands/attention:Follows multistep  commands  -       Safety awareness/insight to disability: intact     Physical Exam:  BUE AROM WFL, strength at RUE 4+/5 but with noted proximal weakness at LUE 4-/5 at shoulder and elbow but 4+/5  strength    Treatment & Education:  Pt. educated on orientation to environment, use of call bell for assist as needed.  Pt verbalized understanding.      Patient left sitting edge of bed with all lines intact and call button in reach    GOALS:   Multidisciplinary Problems       Occupational Therapy Goals       Not on file                    History:     Past Medical History:   Diagnosis Date    Diabetes mellitus type I     Hypertension          Past Surgical History:   Procedure Laterality Date    CLAVICLE SURGERY         Time Tracking:     OT Date of Treatment: 08/31/23  OT Start Time: 1107  OT Stop Time: 1120  OT  Total Time (min): 13 min    Billable Minutes:Evaluation 13    8/31/2023

## 2023-08-31 NOTE — PT/OT/SLP EVAL
Physical Therapy Evaluation & Discharge Note    Patient Name:  Jose Francisco Romero   MRN:  [id number]    Recommendations     Discharge Recommendations:  home   Discharge Equipment Recommendations: none   Barriers to discharge: none    Assessment     Jose Francisco Romero is a 42 y.o. male admitted with a medical diagnosis of: DKA.        Patient was seen by therapy for evaluation only. Patient's current level of function is at baseline (PLOF). Patient does not require further acute PT services.     Recent Surgery: * No surgery found *      Plan     Patient discharged from acute PT Services on 08/31/23.    Based on current functional levels observed, patient does not require further acute PT services.  Re-consult PT Services if patient's status changes and therapy services warranted.    Subjective     Communicated with patient's nurse  prior to session.    Patient agreeable to participate in evaluation.     Chief Complaint: pain  Patient/Family Comments/goals: to go home  Pain/Comfort:  Pain Rating 1: 6/10  Location - Side 1: Bilateral  Location 1: leg  Pain Addressed 1: Nurse notified  Pain Rating Post-Intervention 1: 6/10    Patients cultural, spiritual, Hinduism conflicts given the current situation: no    Social History  Living Environment: Patient lives with their 4 children  in a mobile home, with  8 steps outside, with bilat handrails, with  walk- in shower .  Functional Level: Prior to admission patient ambulated without assistive device and was independent in ADL's.  Equipment at Home: none  Assistance Upon Discharge: family.    Objective     Patient found supine in bed and with HOB elevated with peripheral IV  upon PT entry to room.    General Precautions: Standard,     Orthopedic Precautions:N/A   Braces: N/A  Respiratory Status: room air        Exams:  Orientation: Patient is oriented to Person, Place, Time, Situation  Commands: Patient follows commands consistently  BILAT UE ROM/strength - defer to OT - see OT  note for details  RLE ROM: WNL  RLE Strength: WNL  LLE ROM: WNL  LLE Strength: WNL    Functional Mobility:    Bed Mobility:  Supine to Sit: independence  Sit to Supine: independence    Transfers:  Sit to Stand: independence with no assistive device    Gait:  Patient ambulated 130ft with no assistive device and independence.  Patient demonstrates steady gait, symmetrical step length, and upright posture.    Other Mobility:  not assessed    Balance:  Sit  Static: NORMAL: No deviations seen in posture held statically  Dynamic: NORMAL: No deviations seen in posture held dynamically  Stand  Static: NORMAL: No deviations seen in posture held statically  Dynamic: NORMAL: No deviations seen in posture held dynamically    Patient left supine in bed and with HOB elevated with all lines intact and call button in reach.    Education     White board updated regarding patient's safest level of mobility with staff assistance.    Goals - No STG's or LTG's established secondary to patient was seen for evaluation and discharge     Multidisciplinary Problems       Physical Therapy Goals       Not on file                    History     Past Medical History:   Diagnosis Date    Diabetes mellitus type I     Hypertension      Past Surgical History:   Procedure Laterality Date    CLAVICLE SURGERY       Time Tracking     PT Received On: 08/31/23  PT Start Time: 1106     PT Stop Time: 1116  PT Total Time (min): 10 min     Billable Minutes: Evaluation , low complexity    08/31/2023

## 2023-08-31 NOTE — NURSING TRANSFER
Nursing Transfer Note      8/31/2023   10:36 AM    Nurse giving handoff Lamar     Nurse receiving handoff:Artie     Reason patient is being transferred: downgrade    Transfer To: [id number]    Transfer via wheelchair    Transfer with personal items    Transported by Lamar    Transfer Vital Signs:  Blood Pressure:151/98  Heart Rate:80  O2:100  Temperature:97.8  Respirations:18    Telemetry: Rhythm NSR  Order for Tele Monitor? Yes    Additional Lines: none    4eyes on Skin: yes    Medicines sent: Na    Any special needs or follow-up needed: na    Patient belongings transferred with patient: Yes    Chart send with patient: Yes    Notified: patient notified family    Patient reassessed at: 8/31@1000 (date, time)  1  Upon arrival to floor: cardiac monitor applied
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
## 2023-09-01 ENCOUNTER — ANESTHESIA EVENT (OUTPATIENT)
Dept: ENDOSCOPY | Facility: HOSPITAL | Age: 43
DRG: 638 | End: 2023-09-01
Payer: MEDICAID

## 2023-09-01 ENCOUNTER — ANESTHESIA (OUTPATIENT)
Dept: ENDOSCOPY | Facility: HOSPITAL | Age: 43
DRG: 638 | End: 2023-09-01
Payer: MEDICAID

## 2023-09-01 ENCOUNTER — DOCUMENTATION ONLY (OUTPATIENT)
Dept: PHARMACY | Facility: HOSPITAL | Age: 43
End: 2023-09-01
Payer: MEDICAID

## 2023-09-01 VITALS
BODY MASS INDEX: 23.33 KG/M2 | OXYGEN SATURATION: 100 % | DIASTOLIC BLOOD PRESSURE: 95 MMHG | HEIGHT: 73 IN | WEIGHT: 176 LBS | RESPIRATION RATE: 18 BRPM | HEART RATE: 51 BPM | TEMPERATURE: 97 F | SYSTOLIC BLOOD PRESSURE: 165 MMHG

## 2023-09-01 PROBLEM — R19.7 DIARRHEA: Status: ACTIVE | Noted: 2023-09-01

## 2023-09-01 PROBLEM — E11.10 DKA (DIABETIC KETOACIDOSIS): Status: ACTIVE | Noted: 2023-09-01

## 2023-09-01 PROBLEM — K92.1 MELENA: Status: ACTIVE | Noted: 2023-09-01

## 2023-09-01 LAB
ALBUMIN SERPL-MCNC: 2.8 G/DL (ref 3.5–5)
ALBUMIN/GLOB SERPL: 0.9 RATIO (ref 1.1–2)
ALP SERPL-CCNC: 67 UNIT/L (ref 40–150)
ALT SERPL-CCNC: 14 UNIT/L (ref 0–55)
AST SERPL-CCNC: 19 UNIT/L (ref 5–34)
BASOPHILS # BLD AUTO: 0.02 X10(3)/MCL
BASOPHILS NFR BLD AUTO: 0.3 %
BILIRUB SERPL-MCNC: 0.3 MG/DL
BUN SERPL-MCNC: 13.8 MG/DL (ref 8.9–20.6)
CALCIUM SERPL-MCNC: 9.2 MG/DL (ref 8.4–10.2)
CHLORIDE SERPL-SCNC: 100 MMOL/L (ref 98–107)
CO2 SERPL-SCNC: 28 MMOL/L (ref 22–29)
CREAT SERPL-MCNC: 0.98 MG/DL (ref 0.73–1.18)
EOSINOPHIL # BLD AUTO: 0.18 X10(3)/MCL (ref 0–0.9)
EOSINOPHIL NFR BLD AUTO: 3.1 %
ERYTHROCYTE [DISTWIDTH] IN BLOOD BY AUTOMATED COUNT: 14.5 % (ref 11.5–17)
GFR SERPLBLD CREATININE-BSD FMLA CKD-EPI: >60 MLS/MIN/1.73/M2
GLOBULIN SER-MCNC: 3.2 GM/DL (ref 2.4–3.5)
GLUCOSE SERPL-MCNC: 242 MG/DL (ref 74–100)
HCT VFR BLD AUTO: 33 % (ref 42–52)
HGB BLD-MCNC: 10.7 G/DL (ref 14–18)
IMM GRANULOCYTES # BLD AUTO: 0.02 X10(3)/MCL (ref 0–0.04)
IMM GRANULOCYTES NFR BLD AUTO: 0.3 %
LYMPHOCYTES # BLD AUTO: 2.48 X10(3)/MCL (ref 0.6–4.6)
LYMPHOCYTES NFR BLD AUTO: 42.1 %
MAGNESIUM SERPL-MCNC: 1.3 MG/DL (ref 1.6–2.6)
MCH RBC QN AUTO: 26.3 PG (ref 27–31)
MCHC RBC AUTO-ENTMCNC: 32.4 G/DL (ref 33–36)
MCV RBC AUTO: 81.1 FL (ref 80–94)
MONOCYTES # BLD AUTO: 0.36 X10(3)/MCL (ref 0.1–1.3)
MONOCYTES NFR BLD AUTO: 6.1 %
NEUTROPHILS # BLD AUTO: 2.83 X10(3)/MCL (ref 2.1–9.2)
NEUTROPHILS NFR BLD AUTO: 48.1 %
NRBC BLD AUTO-RTO: 0 %
PHOSPHATE SERPL-MCNC: 2.4 MG/DL (ref 2.3–4.7)
PLATELET # BLD AUTO: 192 X10(3)/MCL (ref 130–400)
PMV BLD AUTO: 10.4 FL (ref 7.4–10.4)
POCT GLUCOSE: 149 MG/DL (ref 70–110)
POCT GLUCOSE: 150 MG/DL (ref 70–110)
POCT GLUCOSE: 175 MG/DL (ref 70–110)
POCT GLUCOSE: 214 MG/DL (ref 70–110)
POTASSIUM SERPL-SCNC: 4.2 MMOL/L (ref 3.5–5.1)
PROT SERPL-MCNC: 6 GM/DL (ref 6.4–8.3)
RBC # BLD AUTO: 4.07 X10(6)/MCL (ref 4.7–6.1)
SODIUM SERPL-SCNC: 135 MMOL/L (ref 136–145)
WBC # SPEC AUTO: 5.89 X10(3)/MCL (ref 4.5–11.5)

## 2023-09-01 PROCEDURE — D9220A PRA ANESTHESIA: ICD-10-PCS | Mod: ,,, | Performed by: NURSE ANESTHETIST, CERTIFIED REGISTERED

## 2023-09-01 PROCEDURE — 43239 EGD BIOPSY SINGLE/MULTIPLE: CPT | Performed by: INTERNAL MEDICINE

## 2023-09-01 PROCEDURE — 88312 SPECIAL STAINS GROUP 1: CPT | Mod: TC | Performed by: INTERNAL MEDICINE

## 2023-09-01 PROCEDURE — 83735 ASSAY OF MAGNESIUM: CPT | Performed by: STUDENT IN AN ORGANIZED HEALTH CARE EDUCATION/TRAINING PROGRAM

## 2023-09-01 PROCEDURE — 25000003 PHARM REV CODE 250: Performed by: NURSE ANESTHETIST, CERTIFIED REGISTERED

## 2023-09-01 PROCEDURE — 94761 N-INVAS EAR/PLS OXIMETRY MLT: CPT

## 2023-09-01 PROCEDURE — 88305 TISSUE EXAM BY PATHOLOGIST: CPT | Mod: TC | Performed by: INTERNAL MEDICINE

## 2023-09-01 PROCEDURE — 25000003 PHARM REV CODE 250: Performed by: HOSPITALIST

## 2023-09-01 PROCEDURE — 27201423 OPTIME MED/SURG SUP & DEVICES STERILE SUPPLY: Performed by: INTERNAL MEDICINE

## 2023-09-01 PROCEDURE — D9220A PRA ANESTHESIA: Mod: ,,, | Performed by: NURSE ANESTHETIST, CERTIFIED REGISTERED

## 2023-09-01 PROCEDURE — 63600175 PHARM REV CODE 636 W HCPCS: Performed by: FAMILY MEDICINE

## 2023-09-01 PROCEDURE — 25000003 PHARM REV CODE 250: Performed by: FAMILY MEDICINE

## 2023-09-01 PROCEDURE — C9113 INJ PANTOPRAZOLE SODIUM, VIA: HCPCS | Performed by: FAMILY MEDICINE

## 2023-09-01 PROCEDURE — 84100 ASSAY OF PHOSPHORUS: CPT | Performed by: STUDENT IN AN ORGANIZED HEALTH CARE EDUCATION/TRAINING PROGRAM

## 2023-09-01 PROCEDURE — 85025 COMPLETE CBC W/AUTO DIFF WBC: CPT | Performed by: STUDENT IN AN ORGANIZED HEALTH CARE EDUCATION/TRAINING PROGRAM

## 2023-09-01 PROCEDURE — 63600175 PHARM REV CODE 636 W HCPCS: Performed by: NURSE ANESTHETIST, CERTIFIED REGISTERED

## 2023-09-01 PROCEDURE — 80053 COMPREHEN METABOLIC PANEL: CPT | Performed by: STUDENT IN AN ORGANIZED HEALTH CARE EDUCATION/TRAINING PROGRAM

## 2023-09-01 PROCEDURE — 37000008 HC ANESTHESIA 1ST 15 MINUTES: Performed by: INTERNAL MEDICINE

## 2023-09-01 RX ORDER — SODIUM CHLORIDE, SODIUM LACTATE, POTASSIUM CHLORIDE, CALCIUM CHLORIDE 600; 310; 30; 20 MG/100ML; MG/100ML; MG/100ML; MG/100ML
INJECTION, SOLUTION INTRAVENOUS CONTINUOUS
Status: DISCONTINUED | OUTPATIENT
Start: 2023-09-01 | End: 2023-09-01 | Stop reason: HOSPADM

## 2023-09-01 RX ORDER — INSULIN ASPART 100 [IU]/ML
4-12 INJECTION, SOLUTION INTRAVENOUS; SUBCUTANEOUS
Status: CANCELLED | OUTPATIENT
Start: 2023-09-01

## 2023-09-01 RX ORDER — PROPOFOL 10 MG/ML
VIAL (ML) INTRAVENOUS
Status: DISCONTINUED | OUTPATIENT
Start: 2023-09-01 | End: 2023-09-01

## 2023-09-01 RX ORDER — SODIUM CHLORIDE, SODIUM LACTATE, POTASSIUM CHLORIDE, CALCIUM CHLORIDE 600; 310; 30; 20 MG/100ML; MG/100ML; MG/100ML; MG/100ML
1000 INJECTION, SOLUTION INTRAVENOUS CONTINUOUS
Status: CANCELLED | OUTPATIENT
Start: 2023-09-01

## 2023-09-01 RX ORDER — SODIUM CHLORIDE 9 MG/ML
INJECTION, SOLUTION INTRAVENOUS CONTINUOUS
Status: CANCELLED | OUTPATIENT
Start: 2023-09-01

## 2023-09-01 RX ORDER — LIDOCAINE HYDROCHLORIDE 20 MG/ML
INJECTION INTRAVENOUS
Status: DISCONTINUED | OUTPATIENT
Start: 2023-09-01 | End: 2023-09-01

## 2023-09-01 RX ORDER — PANTOPRAZOLE SODIUM 40 MG/1
40 TABLET, DELAYED RELEASE ORAL DAILY
Status: DISCONTINUED | OUTPATIENT
Start: 2023-09-02 | End: 2023-09-01 | Stop reason: HOSPADM

## 2023-09-01 RX ORDER — PANTOPRAZOLE SODIUM 40 MG/1
40 TABLET, DELAYED RELEASE ORAL DAILY
Qty: 30 TABLET | Refills: 11 | Status: SHIPPED | OUTPATIENT
Start: 2023-09-02 | End: 2024-09-01

## 2023-09-01 RX ORDER — DIAZEPAM 5 MG/1
10 TABLET ORAL
Status: CANCELLED | OUTPATIENT
Start: 2023-09-01 | End: 2023-09-01

## 2023-09-01 RX ADMIN — LIDOCAINE HYDROCHLORIDE 100 MG: 20 INJECTION INTRAVENOUS at 10:09

## 2023-09-01 RX ADMIN — PANTOPRAZOLE SODIUM 40 MG: 40 INJECTION, POWDER, FOR SOLUTION INTRAVENOUS at 01:09

## 2023-09-01 RX ADMIN — PROPOFOL 30 MG: 10 INJECTION, EMULSION INTRAVENOUS at 10:09

## 2023-09-01 RX ADMIN — INSULIN ASPART 8 UNITS: 100 INJECTION, SOLUTION INTRAVENOUS; SUBCUTANEOUS at 11:09

## 2023-09-01 RX ADMIN — FOLIC ACID 1 MG: 1 TABLET ORAL at 11:09

## 2023-09-01 RX ADMIN — CLONAZEPAM 1.5 MG: 1 TABLET ORAL at 09:09

## 2023-09-01 RX ADMIN — THIAMINE HCL TAB 100 MG 100 MG: 100 TAB at 11:09

## 2023-09-01 RX ADMIN — PROPOFOL 100 MG: 10 INJECTION, EMULSION INTRAVENOUS at 10:09

## 2023-09-01 RX ADMIN — MUPIROCIN: 20 OINTMENT TOPICAL at 11:09

## 2023-09-01 RX ADMIN — THERA TABS 1 TABLET: TAB at 11:09

## 2023-09-01 NOTE — PLAN OF CARE
09/01/23 1031   Discharge Assessment   Assessment Type Discharge Planning Assessment   Confirmed/corrected address, phone number and insurance Yes   Confirmed Demographics Correct on Facesheet   Source of Information family  (Pt not available, off unit at this time. Spoke with Pt's mother Treasure Romero [phone])   When was your last doctors appointment?   (Jann)   Reason For Admission Lactic acidosis   People in Home child(jacquelyn), dependent;parent(s)  (Pt & his 4 dependent kids lives with his parents.)   Facility Arrived From: home   Do you expect to return to your current living situation? Yes   Do you have help at home or someone to help you manage your care at home? Yes   Who are your caregiver(s) and their phone number(s)? Mother Treasure Romero [phone]   Prior to hospitilization cognitive status: Alert/Oriented   Current cognitive status: Alert/Oriented   Walking or Climbing Stairs ambulation difficulty, requires equipment   Mobility Management Cane   Equipment Currently Used at Home cane, quad   Patient currently being followed by outpatient case management? No   Do you currently have service(s) that help you manage your care at home? No   Do you take prescription medications? Yes   Do you have prescription coverage? Yes   Coverage [place] [employer]   Do you have any problems affording any of your prescribed medications? No   Is the patient taking medications as prescribed? yes   Who is going to help you get home at discharge? Mom   How do you get to doctors appointments? family or friend will provide   Are you on dialysis? No   Do you take coumadin? No   DME Needed Upon Discharge    (Pending PT/OT eval)   Discharge Plan discussed with: Parent(s)   Name(s) and Number(s) Treasure Romero [phone]   Discharge Plan A Home with family   Physical Activity   On average, how many days per week do you engage in moderate to strenuous exercise (like a brisk walk)? 0 days   On  average, how many minutes do you engage in exercise at this level? 0 min   Financial Resource Strain   How hard is it for you to pay for the very basics like food, housing, medical care, and heating? Not hard   Housing Stability   In the last 12 months, was there a time when you were not able to pay the mortgage or rent on time? N   In the last 12 months, was there a time when you did not have a steady place to sleep or slept in a shelter (including now)? N   Transportation Needs   In the past 12 months, has lack of transportation kept you from medical appointments or from getting medications? no   In the past 12 months, has lack of transportation kept you from meetings, work, or from getting things needed for daily living? No   Food Insecurity   Within the past 12 months, you worried that your food would run out before you got the money to buy more. Never true   Within the past 12 months, the food you bought just didn't last and you didn't have money to get more. Never true   Stress   Do you feel stress - tense, restless, nervous, or anxious, or unable to sleep at night because your mind is troubled all the time - these days? Not at all   Social Connections   In a typical week, how many times do you talk on the phone with family, friends, or neighbors? More than 3   How often do you get together with friends or relatives? More than 3   How often do you attend [employer] or [employer] services? Never   Do you belong to any clubs or organizations such as [employer] groups, unions, fraternal or athletic groups, or school groups? No   How often do you attend meetings of the clubs or organizations you belong to? Never   Are you , , , , never , or living with a partner? Never marrie   Alcohol Use   Q1: How often do you have a drink containing alcohol? Never   Q2: How many drinks containing alcohol do you have on a typical day when you are drinking? None   Q3: How often do you have six or more  drinks on one occasion? Never

## 2023-09-01 NOTE — PROVATION PATIENT INSTRUCTIONS
Discharge Summary/Instructions after an Endoscopic Procedure  Patient Name: Jose Francisco Romero  Patient MRN: [id number]  Patient YOB: 1980  Friday, September 1, 2023  Daiana Chilel MD  Dear patient,  As a result of recent federal legislation (The Federal Cures Act), you may   receive lab or pathology results from your procedure in your [facility]   account before your physician is able to contact you. Your physician or   their representative will relay the results to you with their   recommendations at their soonest availability.  Thank you,  RESTRICTIONS:  During your procedure today, you received medications for sedation.  These   medications may affect your judgment, balance and coordination.  Therefore,   for 24 hours, you have the following restrictions:   - DO NOT drive a car, operate machinery, make legal/financial decisions,   sign important papers or drink alcohol.    ACTIVITY:  Today: no heavy lifting, straining or running due to procedural   sedation/anesthesia.  The following day: return to full activity including work.  DIET:  Eat and drink normally unless instructed otherwise.     TREATMENT FOR COMMON SIDE EFFECTS:  - Mild abdominal pain, nausea, belching, bloating or excessive gas:  rest,   eat lightly and use a heating pad.  - Sore Throat: treat with throat lozenges and/or gargle with warm salt   water.  - Because air was used during the procedure, expelling large amounts of air   from your rectum or belching is normal.  - If a bowel prep was taken, you may not have a bowel movement for 1-3 days.    This is normal.  SYMPTOMS TO WATCH FOR AND REPORT TO YOUR PHYSICIAN:  1. Abdominal pain or bloating, other than gas cramps.  2. Chest pain.  3. Back pain.  4. Signs of infection such as: chills or fever occurring within 24 hours   after the procedure.  5. Rectal bleeding, which would show as bright red, maroon, or black stools.   (A tablespoon of blood from the rectum is not serious,  especially if   hemorrhoids are present.)  6. Vomiting.  7. Weakness or dizziness.  GO DIRECTLY TO THE NEAREST EMERGENCY ROOM IF YOU HAVE ANY OF THE FOLLOWING:      Difficulty breathing              Chills and/or fever over 101 F   Persistent vomiting and/or vomiting blood   Severe abdominal pain   Severe chest pain   Black, tarry stools   Bleeding- more than one tablespoon   Any other symptom or condition that you feel may need urgent attention  Your doctor recommends these additional instructions:  If any biopsies were taken, your doctors clinic will contact you in 1 to 2   weeks with any results.  - Return patient to [facility] for ongoing care.   - Advance diet as tolerated.   - Continue present medications.   - Await pathology results.   - Use Protonix (pantoprazole) 40 mg PO daily.  For questions, problems or results please call your physician - Daiana Chilel MD at Work:  [phone], Work:  [phone].  [facility] , EMERGENCY ROOM PHONE NUMBER: [phone]  IF A COMPLICATION OR EMERGENCY SITUATION ARISES AND YOU ARE UNABLE TO REACH   YOUR PHYSICIAN - GO DIRECTLY TO THE EMERGENCY ROOM.  Daiana Chilel MD  9/1/2023 1:07:32 PM  This report has been verified and signed electronically.  Dear patient,  As a result of recent federal legislation (The Federal Cures Act), you may   receive lab or pathology results from your procedure in your [facility]   account before your physician is able to contact you. Your physician or   their representative will relay the results to you with their   recommendations at their soonest availability.  Thank you,  [employer]ATION

## 2023-09-01 NOTE — MEDICAL/APP STUDENT
[facility] Medicine Wards Progress Note     Resident Team: [facility] Medicine List 3  Attending Physician: Dino Forte MD  Resident: Abraham Warren MD  Intern: Bruna Ambrocio MD        Subjective:      Brief HPI:  Jose Francisco Romero is a 42 year old male with a past medical history of T1DM, HTN, and psychological disorder. Patient presented to the ED on 8/30 with complaints of nausea, vomiting, abdominal pain, myalgias, and fatigue. He had a blood sugar of 500 at home which encouraged him to seek treatment. Patient was treated in ICU for DKA and RODOLFO. He has had no episodes of emesis or melena since admission but has had small volume clear, loose stools daily. He is still having mild abdominal pain and unchanged chronic LE muscle pain, since an LP performed 7-8 months ago. Patient struggles with insulin adherence and has taken 1 800mg ibuprofen daily for 2 months without relief of pain. Patient acknowledges adherence with all psych and HTN medications.     Interval History: No acute events over night. Patient endorses several loose BM overnight, small in size and clear in color. Patient has been NPO since midnight in preparation for EGD. Patient had PT, OT, and nutrition consultations yesterday.       Review of Systems:  Review of Systems   Constitutional:  Positive for fatigue. Negative for diaphoresis and fever.   Respiratory:  Positive for shortness of breath. Negative for cough and chest tightness.    Cardiovascular:  Negative for chest pain, palpitations and leg swelling.   Gastrointestinal:  Positive for abdominal pain, blood in stool, diarrhea and nausea. Negative for abdominal distention and vomiting.   Endocrine: Negative for polydipsia, polyphagia and polyuria.   Genitourinary:  Negative for decreased urine volume, difficulty urinating, dysuria, frequency, hematuria and urgency.   Musculoskeletal:  Positive for myalgias. Negative for arthralgias and back pain.   Skin:  Positive for color change.        Several  patches of hyperpigmentation on bilateral lower legs. Patient says this gets worse when his sugar is high.    Neurological:  Positive for dizziness and headaches. Negative for tremors, syncope and numbness.   Psychiatric/Behavioral:  Negative for agitation and confusion.         Objective:     Last 24 Hour Vital Signs:  BP  Min: 139/82  Max: 175/96  Temp  Av.1 °F (36.7 °C)  Min: 97.6 °F (36.4 °C)  Max: 98.4 °F (36.9 °C)  Pulse  Av.6  Min: 48  Max: 83  Resp  Av.8  Min: 18  Max: 24  SpO2  Av.2 %  Min: 98 %  Max: 100 %  I/O last 3 completed shifts:  In: 2294.3 [P.O.:720; I.V.:1424.3; IV Piggyback:150]  Out: -     Physical Examination:  Physical Exam  Constitutional:       General: He is not in acute distress.     Appearance: Normal appearance. He is not ill-appearing.   HENT:      Head: Normocephalic and atraumatic.   Eyes:      Extraocular Movements: Extraocular movements intact.      Pupils: Pupils are equal, round, and reactive to light.   Cardiovascular:      Rate and Rhythm: Normal rate and regular rhythm.      Pulses: Normal pulses.      Heart sounds: Normal heart sounds. No murmur heard.  Pulmonary:      Effort: Pulmonary effort is normal. No respiratory distress.      Breath sounds: Normal breath sounds.   Abdominal:      General: Abdomen is flat. Bowel sounds are normal. There is no distension.      Tenderness: There is abdominal tenderness. There is no guarding or rebound.   Musculoskeletal:         General: No swelling or tenderness.   Skin:     General: Skin is warm.      Coloration: Skin is not pale.      Findings: Lesion present. No erythema or rash.      Comments: Hyperpigmented/discolored patches scattered along bilateral lower extremities   No open wounds or ulcerations   Neurological:      General: No focal deficit present.      Mental Status: He is alert and oriented to person, place, and time. Mental status is at baseline.   Psychiatric:         Mood and Affect: Mood normal.          General: well-developed well-nourished in no acute distress  Eye: PERRLA, EOMI, clear conjunctiva, eyelids normal  HENT: NC/AT, moist mucus membranes  Neck: full range of motion, no thyromegaly or lymphadenopathy  Respiratory: clear to auscultation bilaterally, respirations non-labored  Cardiovascular: regular rate and rhythm without murmurs, gallops or rubs  Gastrointestinal: soft, diffuse tenderness, non-distended with normal bowel sounds, without masses to palpation  Genitourinary: no CVA tenderness to palpation  Musculoskeletal: no obvious deformities, full range of motion of all extremities/spine without limitation but mild discomfort  Integumentary: several hyperpigmented/discolored patches on bilateral lower extremities   Extremities: radial and DP pulses 2+ bilaterally, no LE edema  Neurologic: CN II-XII intact, no signs of peripheral neurological deficit, motor/sensory function intact    Laboratory:  Most Recent Data:  CBC:   Lab Results   Component Value Date    WBC 5.89 09/01/2023    HGB 10.7 (L) 09/01/2023    HCT 33.0 (L) 09/01/2023     09/01/2023    MCV 81.1 09/01/2023    RDW 14.5 09/01/2023     WBC Differential:   Recent Labs   Lab 08/30/23  0640 08/31/23  0509 09/01/23  0410   WBC 20.59* 12.61* 5.89   HGB 12.7* 11.5* 10.7*   HCT 39.8* 34.2* 33.0*    246 192   MCV 83.6 80.1 81.1     BMP:   Lab Results   Component Value Date     (L) 09/01/2023    K 4.2 09/01/2023     11/24/2022    CO2 28 09/01/2023    BUN 13.8 09/01/2023    CREATININE 0.98 09/01/2023    CALCIUM 9.2 09/01/2023    MG 1.30 (L) 09/01/2023    PHOS 2.4 09/01/2023     LFTs:   Lab Results   Component Value Date    ALBUMIN 2.8 (L) 09/01/2023    BILITOT 0.3 09/01/2023    AST 19 09/01/2023    ALKPHOS 67 09/01/2023    ALT 14 09/01/2023     Coags:   Lab Results   Component Value Date    INR 1.1 11/23/2022    PROTIME 14.3 11/24/2018    PTT 30.9 11/24/2018     FLP:   Lab Results   Component Value Date    CHOL 137  12/02/2021    HDL 46 12/02/2021    TRIG 152 (H) 12/02/2021     DM:   Lab Results   Component Value Date    HGBA1C 10.4 (H) 06/24/2023    HGBA1C 11.6 (H) 12/01/2021    HGBA1C 11.8 (H) 03/02/2021    CREATININE 0.98 09/01/2023     Thyroid:   Lab Results   Component Value Date    TSH 1.238 06/30/2023     Anemia:   Lab Results   Component Value Date    IRON 37 (L) 08/31/2023    TIBC 190 (L) 08/31/2023    FERRITIN 299.48 (H) 08/31/2023    HZVLXQBU67 1,053 (H) 07/01/2023     Cardiac:   Lab Results   Component Value Date    TROPONINI <0.010 08/30/2023    BNP 12.1 06/23/2023       Microbiology Data Reviewed: yes  Pertinent Findings:  Blood culture show no growth at 24 hours     Other Results:  EKG 8/30: Sinus tachycardia   Rightward axis   Borderline Abnormal ECG   When compared with ECG of 30-AUG-2023 06:42,     Radiology:  Imaging Results              CT Abdomen Pelvis With Contrast (Final result)  Result time 08/30/23 08:42:13      Final result by Gabe Villegas MD (08/30/23 08:42:13)                   Narrative:    EXAMINATION  CT ABDOMEN PELVIS WITH CONTRAST    CLINICAL HISTORY  Abdominal pain, acute, nonlocalized;  vomiting    TECHNIQUE  Post-contrast helical-acquisition CT images were obtained and multiplanar reformats accomplished by a CT technologist at a separate workstation, pushed to PACS for physician review.    CONTRAST  *IV: ISOVUE-370, 100 mL  *Enteric: none    COMPARISON  20 August 2022    FINDINGS  Images were reviewed in soft tissue, lung, and bone windows.    Exam quality: adequate for evaluation    Lines/tubes: none visualized    No acute or suspicious focal abnormality identified at the included lower thoracic cavity.  The abdominopelvic vascular structures are without significant interval change.    The gallbladder and bile ducts are unremarkable.  No suggestion of acute process is appreciated at the upper abdominal solid organs, and no new or worsening focal abnormality.  Kidneys enhance in  symmetric fashion with no findings of distal radial obstruction.  Urinary bladder and prostate are unremarkable for CT assessment.    Stomach is minimally distended, otherwise normal appearance.  There are no abnormally dilated small bowel loops or discrete transition point to suggest high-grade mechanical obstruction.  The appendix is unremarkable.  No suspicious focal abnormality of the large bowel or evidence of acute pericolonic inflammatory changes.    There is no pathologic lymph node enlargement or necrotic adenopathy.    Body wall subcutaneous tissues and regional muscular structures are unremarkable.  There is similar chronic alterations of the included spinal column and bony pelvis.  No acute osseous displacement or destructive skeletal lesion is identified.    IMPRESSION  1. No convincing CT evidence of acute abdominopelvic abnormality.  2. Chronic secondary findings are without significant change from the August 2020 exam.    RADIATION DOSE  Automated tube current modulation, weight-based exposure dosing, and/or iterative reconstruction technique utilized to reach lowest reasonably achievable exposure rate.    DLP: 571 mGy*cm      Electronically signed by: Gabe Villegas  Date:    08/30/2023  Time:    08:42                                     X-Ray Chest AP Portable (Final result)  Result time 08/30/23 07:27:38      Final result by Jsoie Ruff MD (08/30/23 07:27:38)                   Impression:      No acute abnormality of the chest.      Electronically signed by: Josie Ruff  Date:    08/30/2023  Time:    07:27               Narrative:    EXAMINATION:  XR CHEST AP PORTABLE    CLINICAL HISTORY:  Chest pain;    COMPARISON:  Chest x-ray dated 06/29/2023    FINDINGS:  The heart is normal in size.  The lungs are clear.  There is no pleural effusion or visible pneumothorax.                                      Current Medications:     Infusions:   dextrose 5 % and 0.45 % NaCl Stopped (08/31/23  0013)    lactated ringers 100 mL/hr at 08/31/23 1907        Scheduled:   clonazePAM  1.5 mg Oral Daily    Followed by    [START ON 9/7/2023] clonazePAM  1 mg Oral Daily    Followed by    [START ON 9/14/2023] clonazePAM  0.5 mg Oral Daily    folic acid  1 mg Oral Daily    insulin aspart U-100  8 Units Subcutaneous TID AC    insulin detemir U-100  25 Units Subcutaneous QHS    levoFLOXacin  750 mg Intravenous Q24H    multivitamin  1 tablet Oral Daily    mupirocin   Nasal BID    pantoprazole  40 mg Intravenous Daily    pantoprazole  40 mg Intravenous Once    thiamine  100 mg Oral Daily        PRN:  acetaminophen, calcium gluconate IVPB, calcium gluconate IVPB, calcium gluconate IVPB, dextrose 10 % in water (D10W), dextrose 10 % in water (D10W), dextrose 10%, dextrose 10%, dextrose 5 % and 0.45 % NaCl, insulin aspart U-100, lorazepam, magnesium sulfate IVPB, magnesium sulfate IVPB, melatonin, potassium chloride **AND** potassium chloride **AND** potassium chloride, sodium chloride 0.9%, sodium phosphate 15 mmol in dextrose 5 % (D5W) 250 mL IVPB, sodium phosphate 20.01 mmol in dextrose 5 % (D5W) 250 mL IVPB, sodium phosphate 30 mmol in dextrose 5 % (D5W) 250 mL IVPB    Antibiotics and Day Number of Therapy:  Levofloxacin - Day 3 - discontinued today       Assessment & Plan:     Abdominal pain       - No episodes of melena or emesis since admission        - Experiencing some nausea and abdominal tenderness       - EGD to be performed today        - discontinued Levofloxacin, WBC normalized today and sepsis unlikely        - pending results of EGD, if normal, patient is stable and can be discharged today     T1DM and resolved DKA       - Anion gap is closed       - blood glucose has been elevated (CMP - 242, POCT glucose - 175)       - Patient encouraged to regularly log blood sugar        - Patient consulted with nutrition on the importance of maintaining a diabetic diet        - Patient counseled on the importance of  taking insulin regularly as prescribed     HTN       - blood pressure has been elevated but patient is in pain and not on lisinopril       - establish care at IM clinic after discharge        - consider further medical management if not improved at that time        - Patient encouraged to regularly check blood pressure at home and continue smoking cessation         Code Status: Full  Thromboprophylaxis: Lovenox  Ulcer prophylaxis: pantoprazole 40 mg IV       Luisa Martinez  Medical Student   9/1/2023  7:00 AM

## 2023-09-01 NOTE — TRANSFER OF CARE
Anesthesia Transfer of Care Note    Patient: Jose Francisco Romero    Procedure(s) Performed: Procedure(s) (LRB):  EGD (ESOPHAGOGASTRODUODENOSCOPY) (Left)    Patient location: GI    Anesthesia Type: general    Transport from OR: Transported from OR on room air with adequate spontaneous ventilation    Post pain: adequate analgesia    Post assessment: no apparent anesthetic complications and tolerated procedure well    Post vital signs: stable    Level of consciousness: awake    Nausea/Vomiting: no nausea/vomiting    Complications: none    Transfer of care protocol was followed

## 2023-09-01 NOTE — DISCHARGE SUMMARY
[facility] Discharge Summary    Admitting Physician: Dustin Patiño MD  Attending Physician: Dino Forte MD  Date of Admit: 8/30/2023  Date of Discharge: 9/1/2023    Condition: Good  Outcome: Patient tolerated treatment/procedure well without complication and is now ready for discharge.  DISPOSITION: Home or Self Care          Discharge Diagnoses     Patient Active Problem List   Diagnosis    Diabetic neuropathic cachexia    Diabetic ketoacidosis without coma    Diabetic polyneuropathy    Severe malnutrition    Polyneuropathy    CIDP (chronic inflammatory demyelinating polyneuropathy)    Moderate malnutrition    Melena    Diarrhea    DKA (diabetic ketoacidosis)       Principal Problem:  DKA (diabetic ketoacidosis)    Consultants and Procedures     Consultants:  IP CONSULT TO HOSPITAL MEDICINE  IP CONSULT TO GI    Procedures:   Procedure(s) (LRB):  EGD (ESOPHAGOGASTRODUODENOSCOPY) (Left)     Brief Admission History      Jose Francisco Romero is a 42 year old male with a past medical history of T1DM, HTN, and psychological disorder. Patient presented to the ED on 8/30 with complaints of nausea, vomiting, abdominal pain, myalgias, and fatigue. He had a blood sugar of 500 at home which encouraged him to seek treatment. Patient was treated in ICU for DKA and RODOLFO. He has had no episodes of emesis or melena since admission but has had small volume clear, loose stools daily. He is still having mild abdominal pain and unchanged chronic LE muscle pain, since an LP performed 7-8 months ago. Patient struggles with insulin adherence and has taken 1 800mg ibuprofen daily for 2 months without relief of pain. Patient acknowledges adherence with all psych and HTN medications. In the ED, patient was afebrile 98.1, tachycardic 130, tachypneic 28, blood pressure 143/83 saturating 100% on room air.  CBC was remarkable for WBC 20.5 and H/H 12.7/39.8.  CMP was remarkable for sodium 136, chloride 96, bicarb 12, BUN/creatinine  "30.0/1.85, glucose 693.  Troponin in the ED was negative.  Lactate was elevated 5.9.  Beta hydroxybutyrate was 8.7.  ABG showed pH was 7.2, pCO2 30, PO2 45.  Internal Medicine was consulted for admission.    Hospital Course with Pertinent Findings     Patient was admitted to the ICU for DKA.  Patient was started on insulin drip, IV fluids, and antibiotics..  Patient's gap eventually closed and his nausea and vomiting has since resolved.  Patient was able to tolerate diet without any issues and was able to be weaned off the insulin drip.  Patient's condition improved and was able to be taken off antibiotics once infection was ruled out.  When patient was more alert, he complained of having coffee-ground emesis a couple days prior to admission.  Due to abundance of concern, and GI was consulted who had planned for EGD.  EGD was performed today which revealed esophageal and gastric ulcers of which were sent to pathology which is currently pending.  Ulcers were nonbleeding and patient was recommended to start taking Protonix daily.  Otherwise patient is currently stable and ready for discharge at this time.  Will need follow-up with Internal Medicine post wards clinic.    Discharge physical exam:  Vitals  BP: (!) 165/95  Temp: 97.4 °F (36.3 °C)  Temp Source: Oral  Pulse: (!) 51  Resp: 18  SpO2: 100 %  Height: 6' 1" (185.4 cm)  Weight: 79.8 kg (176 lb)    Physical Exam  Constitutional:       Appearance: Normal appearance.   HENT:      Head: Normocephalic and atraumatic.      Nose: Nose normal.      Mouth/Throat:      Mouth: Mucous membranes are moist.      Pharynx: Oropharynx is clear.   Eyes:      Conjunctiva/sclera: Conjunctivae normal.      Pupils: Pupils are equal, round, and reactive to light.   Cardiovascular:      Rate and Rhythm: Normal rate and regular rhythm.      Pulses: Normal pulses.      Heart sounds: No murmur heard.  Pulmonary:      Effort: Pulmonary effort is normal. No respiratory distress.      Breath " "sounds: No wheezing.   Chest:      Chest wall: No tenderness.   Abdominal:      General: Abdomen is flat. Bowel sounds are normal. There is no distension.      Palpations: Abdomen is soft.      Tenderness: There is no abdominal tenderness.   Musculoskeletal:         General: No swelling. Normal range of motion.      Cervical back: Normal range of motion.   Skin:     General: Skin is warm.   Neurological:      General: No focal deficit present.      Mental Status: He is alert and oriented to person, place, and time.          TIME SPENT ON DISCHARGE: 60 minutes    Discharge Medications        Medication List        START taking these medications      pantoprazole 40 MG tablet  Commonly known as: PROTONIX  Take 1 tablet (40 mg total) by mouth once daily.  Start taking on: September 2, 2023            CONTINUE taking these medications      albuterol 90 mcg/actuation inhaler  Commonly known as: PROVENTIL/VENTOLIN HFA     blood sugar diagnostic Strp  Commonly known as: TRUE METRIX GLUCOSE TEST STRIP  1 strip by Misc.(Non-Drug; Combo Route) route 4 (four) times daily.     clonazePAM 2 MG Tab  Commonly known as: KlonoPIN     cyproheptadine 4 mg tablet  Commonly known as: PERIACTIN     diclofenac sodium 1 % Gel  Commonly known as: VOLTAREN     FLUoxetine 40 MG capsule     ibuprofen 800 MG tablet  Commonly known as: ADVIL,MOTRIN     insulin aspart U-100 100 unit/mL (3 mL) Inpn pen  Commonly known as: NovoLOG FlexPen U-100 Insulin  12 units TID with meals with correction 1:50 >150 Max dose 50 units     insulin glargine 100 units/mL SubQ pen  Commonly known as: LANTUS SOLOSTAR U-100 INSULIN  Inject 25 Units into the skin once daily.     lisinopriL 5 MG tablet  Commonly known as: PRINIVIL,ZESTRIL     pen needle, diabetic 31 gauge x 3/16" Ndle  Use 4 times a day for insulin injection (BD Mini)     pregabalin 50 MG capsule  Commonly known as: LYRICA     traMADoL 50 mg tablet  Commonly known as: ULTRAM               Where to Get " Your Medications        These medications were sent to [place], [place] - [address]  [address] [address]      Phone: [phone]   pantoprazole 40 MG tablet         Discharge Information:      Follow-up Information       [facility] - Emergency Dept Follow up.    Specialty: Emergency Medicine  Why: As needed, If symptoms worsen  Contact information:  [address] [place] [id number] [phone]             [facility] - Internal Medicine Follow up.    Specialty: Internal Medicine  Contact information:  [address] [place] [id number] [phone]                         Prescribed Protonix 40 mg daily  Follow-up with Internal Medicine post wards clinic  Follow-up on EGD pathology results    Marvin Warren MD  [facility] Internal Medicine, -2

## 2023-09-01 NOTE — PROGRESS NOTES
Gastroenterology/Hepatology Progress Note    Patient Name: Jose Francisco Romero  Age: 42 y.o.  : 1980  MRN: [id number]  Admission Date: 2023      Self, Aaareferral    SUBJECTIVE:     Patient was seen in the preoperative room resting comfortably.  He remained afebrile, vital signs were positive for mild bradycardia and hypertension.  Patient did have bowel movement overnight in his last bowel movement was this morning.  He mentions it was clear.  He has mild epigastric pain that has been constant since admission.  He was able to eat food and tolerated well.  Denies any nausea, vomiting, dysuria, hematuria, melena, hematochezia, fever or chills.  Informed consent was obtained and patient was pending EGD.     Review of patient's allergies indicates:   Allergen Reactions    Penicillins Shortness Of Breath    Sulfa (sulfonamide antibiotics)           INPATIENT MEDICATIONS    Scheduled Meds:   clonazePAM  1.5 mg Oral Daily    Followed by    [START ON 2023] clonazePAM  1 mg Oral Daily    Followed by    [START ON 2023] clonazePAM  0.5 mg Oral Daily    folic acid  1 mg Oral Daily    insulin aspart U-100  8 Units Subcutaneous TID AC    insulin detemir U-100  25 Units Subcutaneous QHS    multivitamin  1 tablet Oral Daily    mupirocin   Nasal BID    [START ON 2023] pantoprazole  40 mg Oral Daily    thiamine  100 mg Oral Daily     Continuous Infusions:   dextrose 5 % and 0.45 % NaCl Stopped (23 0013)    lactated ringers 100 mL/hr at 23 1907    lactated ringers       PRN Meds:  acetaminophen, calcium gluconate IVPB, calcium gluconate IVPB, calcium gluconate IVPB, dextrose 10 % in water (D10W), dextrose 10 % in water (D10W), dextrose 10%, dextrose 10%, dextrose 5 % and 0.45 % NaCl, insulin aspart U-100, lorazepam, magnesium sulfate IVPB, magnesium sulfate IVPB, melatonin, potassium chloride **AND** potassium chloride **AND** potassium chloride, sodium chloride 0.9%, sodium phosphate 15 mmol in  dextrose 5 % (D5W) 250 mL IVPB, sodium phosphate 20.01 mmol in dextrose 5 % (D5W) 250 mL IVPB, sodium phosphate 30 mmol in dextrose 5 % (D5W) 250 mL IVPB          Review of Systems:       As stated above in the HPI    Objective:       VITAL SIGNS: 24 HR MIN & MAX LAST    Temp  Min: 97.4 °F (36.3 °C)  Max: 98.4 °F (36.9 °C)  97.4 °F (36.3 °C)        BP  Min: 155/95  Max: 175/96  (!) 165/95     Pulse  Min: 48  Max: 79  (!) 51     Resp  Min: 18  Max: 18  18    SpO2  Min: 98 %  Max: 100 %  100 %        Physical Exam  Vitals and nursing note reviewed.   Constitutional:       General: He is not in acute distress.     Appearance: Normal appearance.   HENT:      Head: Normocephalic and atraumatic.   Eyes:      General: No scleral icterus.     Pupils: Pupils are equal, round, and reactive to light.      Comments: Mild conjunctival pallor     Cardiovascular:      Rate and Rhythm: Normal rate and regular rhythm.      Heart sounds: No murmur heard.     No friction rub. No gallop.   Pulmonary:      Effort: Pulmonary effort is normal. No respiratory distress.      Breath sounds: Normal breath sounds. No wheezing.   Abdominal:      General: Bowel sounds are normal. There is no distension.      Palpations: Abdomen is soft.      Tenderness: There is abdominal tenderness (mild to palpation in epigastric area). There is no guarding or rebound.      Comments: Mild scaphoid abdomen     Musculoskeletal:         General: No swelling. Normal range of motion.      Cervical back: Normal range of motion.      Right lower leg: No edema.      Left lower leg: No edema.   Skin:     General: Skin is warm and dry.      Capillary Refill: Capillary refill takes less than 2 seconds.      Coloration: Skin is not jaundiced.   Neurological:      General: No focal deficit present.      Mental Status: He is alert and oriented to person, place, and time.   Psychiatric:         Mood and Affect: Mood normal.         Behavior: Behavior normal.       "        LABS:    Recent Labs   Lab 08/30/23  0640 08/31/23  0509 09/01/23  0410   WBC 20.59* 12.61* 5.89   HGB 12.7* 11.5* 10.7*   HCT 39.8* 34.2* 33.0*    246 192   MCV 83.6 80.1 81.1       Recent Labs   Lab 08/30/23  0640 08/31/23  0509 09/01/23  0410   HGB 12.7* 11.5* 10.7*   HCT 39.8* 34.2* 33.0*        Recent Labs   Lab 08/30/23  0640 08/30/23  1055 08/30/23  1519 08/31/23  0509 09/01/23  0410    138 140 139 135*   K 4.9 4.0 3.7 3.9 4.2   CO2 12* 16* 27 25 28   BUN 30.0* 27.2* 24.4* 23.2* 13.8   CREATININE 1.85* 1.61* 1.36* 1.07 0.98   BILITOT 0.9  --   --  0.3 0.3   ALKPHOS 109  --   --  75 67   AST 16  --   --  11 19   ALT 18  --   --  12 14   LABPROT 8.2  --   --  6.3* 6.0*   ALBUMIN 3.9  --   --  3.1* 2.8*        No results for input(s): "INR", "PROTIME", "PTT" in the last 168 hours.     Recent Labs   Lab 08/31/23  0835   IRON 37*   FERRITIN 299.48*            IMAGING:   CT Abdomen Pelvis With Contrast    Result Date: 8/30/2023  EXAMINATION CT ABDOMEN PELVIS WITH CONTRAST CLINICAL HISTORY Abdominal pain, acute, nonlocalized;  vomiting TECHNIQUE Post-contrast helical-acquisition CT images were obtained and multiplanar reformats accomplished by a CT technologist at a separate workstation, pushed to PACS for physician review. CONTRAST *IV: ISOVUE-370, 100 mL *Enteric: none COMPARISON 20 August 2022 FINDINGS Images were reviewed in soft tissue, lung, and bone windows. Exam quality: adequate for evaluation Lines/tubes: none visualized No acute or suspicious focal abnormality identified at the included lower thoracic cavity.  The abdominopelvic vascular structures are without significant interval change. The gallbladder and bile ducts are unremarkable.  No suggestion of acute process is appreciated at the upper abdominal solid organs, and no new or worsening focal abnormality.  Kidneys enhance in symmetric fashion with no findings of distal radial obstruction.  Urinary bladder and prostate are " unremarkable for CT assessment. Stomach is minimally distended, otherwise normal appearance.  There are no abnormally dilated small bowel loops or discrete transition point to suggest high-grade mechanical obstruction.  The appendix is unremarkable.  No suspicious focal abnormality of the large bowel or evidence of acute pericolonic inflammatory changes. There is no pathologic lymph node enlargement or necrotic adenopathy. Body wall subcutaneous tissues and regional muscular structures are unremarkable.  There is similar chronic alterations of the included spinal column and bony pelvis.  No acute osseous displacement or destructive skeletal lesion is identified. IMPRESSION 1. No convincing CT evidence of acute abdominopelvic abnormality. 2. Chronic secondary findings are without significant change from the August 2020 exam. RADIATION DOSE Automated tube current modulation, weight-based exposure dosing, and/or iterative reconstruction technique utilized to reach lowest reasonably achievable exposure rate. DLP: 571 mGy*cm Electronically signed by: Gabe Villegas Date:    08/30/2023 Time:    08:42    X-Ray Chest AP Portable    Result Date: 8/30/2023  EXAMINATION: XR CHEST AP PORTABLE CLINICAL HISTORY: Chest pain; COMPARISON: Chest x-ray dated 06/29/2023 FINDINGS: The heart is normal in size.  The lungs are clear.  There is no pleural effusion or visible pneumothorax.     No acute abnormality of the chest. Electronically signed by: Josie Ruff Date:    08/30/2023 Time:    07:27        Assessment / Plan:     Melena  Coffee ground emesis  Abdominal pain  Pyrosis  - episodes of loose stools dark in color for about 3 days, episodes of emesis - green and dark for about 3 days;   - onset of generalized abdominal pain for about 3 days as well - sharp, constant   - remote history of ulcer as child - unsure of etiology or treatment  - NSAID use of Ibuprofen 800 mg once daily  - continue downtrend of H&H from 11.5 to 10.7 and  34.2 to 33 respectively  - No signs of active bleed or any more dark stool since admission  - Agree with IV Protonix 40 mg once daily  - EGD planned for today     Normocytic Anemia  - Vitals signs stable  - Iron studies show low iron of 37, TIBC of 190 and elevated Transferrin of 299.48  - TSAT is 19.47% so likely iron deficiency anemia  - Consider IV iron replacement while inpatient    GI service will sign off.  Rest management as per primary team.     Anthony Hassan  Internal Medicine - PGY-1

## 2023-09-01 NOTE — PLAN OF CARE
Pending release of clonazepam from pharmacy. Met with taemla SANDOVAL Attending nurse on 6east. When released will give po med on 5w

## 2023-09-01 NOTE — ANESTHESIA PREPROCEDURE EVALUATION
09/01/2023  Jose Francisco Romero is a 42 y.o., male for EGD history of melena & bloody emesis     Vitals:    09/01/23 0322 09/01/23 0400 09/01/23 0746 09/01/23 0800   BP: (!) 175/96  (!) 175/96    Pulse: 79 (!) 58 (!) 48    Resp: 18  18    Temp: 36.6 °C (97.9 °F)  36.4 °C (97.6 °F)    TempSrc: Oral  Oral    SpO2: 98%  100% 100%   Weight:       Height:             History of DM1  ( @ 0740 DOS) , chronic inflammatory demyelinating polyradiculopathy (CIDP) IVIG ,  hypertension, former smoker (quit July 2023), former meth and heroin use last approx 2018;  polysubstance abuse, cardiac arrest due to OD  (2018), and psychiatric disorder      Active Ambulatory Problems     Diagnosis Date Noted    Diabetic neuropathic cachexia 10/11/2022    Diabetic ketoacidosis without coma 06/25/2023    Diabetic polyneuropathy 06/25/2023    Severe malnutrition 06/30/2023    Polyneuropathy 07/03/2023    CIDP (chronic inflammatory demyelinating polyneuropathy) 07/19/2023    Moderate malnutrition 07/19/2023     Resolved Ambulatory Problems     Diagnosis Date Noted    No Resolved Ambulatory Problems     Past Medical History:   Diagnosis Date    Diabetes mellitus type I     Hypertension        Past Surgical History:   Procedure Laterality Date    CLAVICLE SURGERY           Lab Results   Component Value Date    WBC 5.89 09/01/2023    HGB 10.7 (L) 09/01/2023    HCT 33.0 (L) 09/01/2023     09/01/2023    CHOL 137 12/02/2021    TRIG 152 (H) 12/02/2021    HDL 46 12/02/2021    ALT 14 09/01/2023    AST 19 09/01/2023     (L) 09/01/2023    K 4.2 09/01/2023     11/24/2022    CREATININE 0.98 09/01/2023    BUN 13.8 09/01/2023    CO2 28 09/01/2023    TSH 1.238 06/30/2023    INR 1.1 11/23/2022    HGBA1C 10.4 (H) 06/24/2023       Vent. Rate : 128 BPM     Atrial Rate : 128 BPM      P-R Int : 136 ms          QRS Dur : 098  ms       QT Int : 314 ms       P-R-T Axes : 085 100 084 degrees      QTc Int : 458 ms     Sinus tachycardia   Rightward axis   Borderline Abnormal ECG   When compared with ECG of 30-AUG-2023 06:42,   No significant change was found   Confirmed by Howard Lezama MD ([id number]) on 8/30/2023 6:00:09 PM     Referred By: ALEXUS    SELF           Confirmed By:Howard Lezama MD      Specimen Collected: 08/30/23 06:42          Pre-op Assessment    I have reviewed the Patient Summary Reports.     I have reviewed the Nursing Notes. I have reviewed the NPO Status.   I have reviewed the Medications.     Review of Systems  Anesthesia Hx:  No problems with previous Anesthesia  History of prior surgery of interest to airway management or planning: Denies Family Hx of Anesthesia complications.   Denies Personal Hx of Anesthesia complications.   Hematology/Oncology:  Hematology Normal   Oncology Normal     EENT/Dental:EENT/Dental Normal   Cardiovascular:  Cardiovascular Normal     Pulmonary:  Pulmonary Normal    Renal/:  Renal/ Normal     Hepatic/GI:  Hepatic/GI Normal    Musculoskeletal:  Musculoskeletal Normal    Neurological:  Neurology Normal    Endocrine:  Endocrine Normal    Dermatological:  Skin Normal    Psych:  Psychiatric Normal           Physical Exam  General: Well nourished, Cooperative, Alert and Oriented    Airway:  Mallampati: I / I  Mouth Opening: Normal  TM Distance: Normal  Tongue: Normal  Neck ROM: Normal ROM    Dental:  Intact        Anesthesia Plan  Type of Anesthesia, risks & benefits discussed:    Anesthesia Type: Gen Natural Airway  Intra-op Monitoring Plan: Standard ASA Monitors  Post Op Pain Control Plan: IV/PO Opioids PRN  (medical reason for not using multimodal pain management)  Induction:  IV  Informed Consent: Informed consent signed with the Patient and all parties understand the risks and agree with anesthesia plan.  All questions answered. Patient consented to blood products? No  ASA Score: 3  Day  of Surgery Review of History & Physical: H&P Update referred to the surgeon/provider.    Ready For Surgery From Anesthesia Perspective.     .

## 2023-09-01 NOTE — PLAN OF CARE
Problem: Adult Inpatient Plan of Care  Goal: Plan of Care Review  9/1/2023 1442 by Harpreet Vegas LPN  Outcome: Ongoing, Progressing  9/1/2023 0752 by Harpreet Vegas LPN  Outcome: Ongoing, Progressing  Goal: Patient-Specific Goal (Individualized)  9/1/2023 1442 by Harpreet Vegas LPN  Outcome: Ongoing, Progressing  9/1/2023 0752 by Harpreet Vegas LPN  Outcome: Ongoing, Progressing  Goal: Absence of Hospital-Acquired Illness or Injury  9/1/2023 1442 by Harpreet Vegas LPN  Outcome: Ongoing, Progressing  9/1/2023 0752 by Harpreet Vegas LPN  Outcome: Ongoing, Progressing  Goal: Optimal Comfort and Wellbeing  9/1/2023 1442 by Harpreet Vegas LPN  Outcome: Ongoing, Progressing  9/1/2023 0752 by Harpreet Vegas LPN  Outcome: Ongoing, Progressing  Goal: Readiness for Transition of Care  9/1/2023 1442 by Harpreet Vegas LPN  Outcome: Ongoing, Progressing  9/1/2023 0752 by Harpreet eVgas LPN  Outcome: Ongoing, Progressing

## 2023-09-01 NOTE — ANESTHESIA POSTPROCEDURE EVALUATION
Anesthesia Post Evaluation    Patient: Jose Francisco Romero    Procedure(s) Performed: Procedure(s) (LRB):  EGD (ESOPHAGOGASTRODUODENOSCOPY) (Left)    Final Anesthesia Type: general      Patient location during evaluation: GI PACU  Patient participation: Yes- Able to Participate  Level of consciousness: awake and alert  Post-procedure vital signs: reviewed and stable  Pain management: adequate  Airway patency: patent    PONV status at discharge: No PONV  Anesthetic complications: no      Cardiovascular status: stable  Respiratory status: spontaneous ventilation and unassisted  Hydration status: euvolemic  Follow-up not needed.

## 2023-09-04 LAB
BACTERIA BLD CULT: NORMAL
BACTERIA BLD CULT: NORMAL

## 2023-09-06 ENCOUNTER — PATIENT OUTREACH (OUTPATIENT)
Dept: ADMINISTRATIVE | Facility: CLINIC | Age: 43
End: 2023-09-06
Payer: MEDICAID

## 2023-09-06 LAB
ESTROGEN SERPL-MCNC: NORMAL PG/ML
INSULIN SERPL-ACNC: NORMAL U[IU]/ML
LAB AP CLINICAL INFORMATION: NORMAL
LAB AP GROSS DESCRIPTION: NORMAL
LAB AP REPORT FOOTNOTES: NORMAL
T3RU NFR SERPL: NORMAL %

## 2023-09-06 NOTE — PROGRESS NOTES
C3 nurse attempted to contact Jose Francisco Romero  for a TCC post hospital discharge follow up call. No answer. Left voicemail with callback information. The patient has a scheduled HOSFU appointment with Sunday Lester MD on 10/2/23 @ 1:10 PM. Message routed to provider's office recommending HOSFU be w/in 5-7 days post discharge date.

## 2023-09-11 ENCOUNTER — INFUSION (OUTPATIENT)
Dept: INFUSION THERAPY | Facility: HOSPITAL | Age: 43
End: 2023-09-11
Attending: INTERNAL MEDICINE
Payer: MEDICAID

## 2023-09-11 VITALS
HEIGHT: 73 IN | DIASTOLIC BLOOD PRESSURE: 90 MMHG | BODY MASS INDEX: 22.09 KG/M2 | OXYGEN SATURATION: 100 % | HEART RATE: 59 BPM | WEIGHT: 166.69 LBS | TEMPERATURE: 98 F | RESPIRATION RATE: 20 BRPM | SYSTOLIC BLOOD PRESSURE: 121 MMHG

## 2023-09-11 DIAGNOSIS — G61.81 CIDP (CHRONIC INFLAMMATORY DEMYELINATING POLYNEUROPATHY): Primary | ICD-10-CM

## 2023-09-11 PROCEDURE — 63600175 PHARM REV CODE 636 W HCPCS: Mod: JZ,JA,JG | Performed by: PSYCHIATRY & NEUROLOGY

## 2023-09-11 PROCEDURE — 96375 TX/PRO/DX INJ NEW DRUG ADDON: CPT

## 2023-09-11 PROCEDURE — 25000003 PHARM REV CODE 250: Performed by: PSYCHIATRY & NEUROLOGY

## 2023-09-11 PROCEDURE — 96365 THER/PROPH/DIAG IV INF INIT: CPT

## 2023-09-11 PROCEDURE — 96366 THER/PROPH/DIAG IV INF ADDON: CPT

## 2023-09-11 RX ORDER — HEPARIN 100 UNIT/ML
500 SYRINGE INTRAVENOUS
Status: DISCONTINUED | OUTPATIENT
Start: 2023-09-11 | End: 2023-09-11 | Stop reason: HOSPADM

## 2023-09-11 RX ORDER — FAMOTIDINE 10 MG/ML
20 INJECTION INTRAVENOUS ONCE
Status: COMPLETED | OUTPATIENT
Start: 2023-09-11 | End: 2023-09-11

## 2023-09-11 RX ORDER — FAMOTIDINE 10 MG/ML
20 INJECTION INTRAVENOUS 2 TIMES DAILY
Status: CANCELLED | OUTPATIENT
Start: 2023-10-02

## 2023-09-11 RX ORDER — ACETAMINOPHEN 325 MG/1
650 TABLET ORAL
Status: CANCELLED | OUTPATIENT
Start: 2023-10-02

## 2023-09-11 RX ORDER — SODIUM CHLORIDE 0.9 % (FLUSH) 0.9 %
10 SYRINGE (ML) INJECTION
Status: DISCONTINUED | OUTPATIENT
Start: 2023-09-11 | End: 2023-09-11 | Stop reason: HOSPADM

## 2023-09-11 RX ORDER — FAMOTIDINE 10 MG/ML
20 INJECTION INTRAVENOUS 2 TIMES DAILY
Status: DISCONTINUED | OUTPATIENT
Start: 2023-09-11 | End: 2023-09-11

## 2023-09-11 RX ORDER — SODIUM CHLORIDE 0.9 % (FLUSH) 0.9 %
10 SYRINGE (ML) INJECTION
Status: CANCELLED | OUTPATIENT
Start: 2023-10-02

## 2023-09-11 RX ORDER — HEPARIN 100 UNIT/ML
500 SYRINGE INTRAVENOUS
Status: CANCELLED | OUTPATIENT
Start: 2023-10-02

## 2023-09-11 RX ORDER — ACETAMINOPHEN 325 MG/1
650 TABLET ORAL
Status: COMPLETED | OUTPATIENT
Start: 2023-09-11 | End: 2023-09-11

## 2023-09-11 RX ADMIN — FAMOTIDINE 20 MG: 10 INJECTION, SOLUTION INTRAVENOUS at 07:09

## 2023-09-11 RX ADMIN — SODIUM CHLORIDE: 9 INJECTION, SOLUTION INTRAVENOUS at 07:09

## 2023-09-11 RX ADMIN — IMMUNE GLOBULIN INFUSION (HUMAN) 75 G: 100 INJECTION, SOLUTION INTRAVENOUS; SUBCUTANEOUS at 08:09

## 2023-09-11 RX ADMIN — ACETAMINOPHEN 650 MG: 325 TABLET ORAL at 07:09

## 2023-09-11 NOTE — NURSING
1410 - Called pt and explained his next treatment is in 3 weeks, and his appt is 10/2/23 at 8 am; pt verbalized understanding.

## 2023-09-19 ENCOUNTER — TELEPHONE (OUTPATIENT)
Dept: DIABETES | Facility: CLINIC | Age: 43
End: 2023-09-19

## 2023-09-19 ENCOUNTER — CLINICAL SUPPORT (OUTPATIENT)
Dept: DIABETES | Facility: CLINIC | Age: 43
End: 2023-09-19
Payer: MEDICAID

## 2023-09-19 VITALS — HEIGHT: 73 IN | WEIGHT: 174 LBS | BODY MASS INDEX: 23.06 KG/M2

## 2023-09-19 DIAGNOSIS — E10.65 UNCONTROLLED TYPE 1 DIABETES MELLITUS WITH HYPERGLYCEMIA: Primary | ICD-10-CM

## 2023-09-19 DIAGNOSIS — E10.9 TYPE 1 DIABETES MELLITUS WITHOUT COMPLICATION: Primary | ICD-10-CM

## 2023-09-19 PROCEDURE — 99211 OFF/OP EST MAY X REQ PHY/QHP: CPT | Mod: PBBFAC

## 2023-09-19 PROCEDURE — G0108 DIAB MANAGE TRN  PER INDIV: HCPCS | Mod: PBBFAC

## 2023-09-19 RX ORDER — INSULIN PUMP SYRINGE, 3 ML
EACH MISCELLANEOUS
Qty: 1 EACH | Refills: 0 | Status: SHIPPED | OUTPATIENT
Start: 2023-09-19 | End: 2023-09-20

## 2023-09-19 RX ORDER — LANCETS
EACH MISCELLANEOUS
Qty: 120 EACH | Refills: 11 | Status: SHIPPED | OUTPATIENT
Start: 2023-09-19 | End: 2023-12-14

## 2023-09-19 NOTE — PROGRESS NOTES
"Diabetes Care Specialist Follow-up Note  Author: Vanessa Moore RD  Date: 2023    Program Intake  Reason for Diabetes Program Visit:: Intervention  Type of Intervention:: Individual  Individual: Education  Education: Self-Management Skill Review  Current diabetes risk level:: moderate (risk score 1.5)    Lab Results   Component Value Date    HGBA1C 10.4 (H) 2023     A1c Pre Diabetes Care Specialist Intervention:  10.7% (23)    Clinical    Weight: 78.9 kg (174 lb)   Height: 6' 0.99" (185.4 cm)   Body mass index is 22.96 kg/m².  Wt gain of 3 lbs since last visit on 23      Clinical Assessment  Current Diabetes Treatment: Insulin (Lantus 30 units qHS, Novolog 12 + 1:50 > 150 correction)  Have you ever experienced hypoglycemia (low blood sugar)?: yes  In the last month, how often have you experienced low blood sugar?: other (see comments) (once over the past month per meter)  Have you ever experienced hyperglycemia (high blood sugar)?: yes  In the last month, how often have you experienced high blood sugar?: once a day      Nutritional Status  Diet: Regular  Meal Plan 24 Hour Recall: Breakfast, Lunch, Dinner  Meal Plan 24 Hour Recall - Breakfast: (~55g carb) peanut butter sandwich, 2 cups milk  Meal Plan 24 Hour Recall - Lunch: (~60-75g carb) 4 slices thin crust pizza, unsweet tea  Meal Plan 24 Hour Recall - Dinner: (~45g carb) crawfish etouffee over 1 cup rice, salad, gatorade zero  Recent Changes in Weight: Weight Gain (3# wt gain over the past month)  Current nutritional status an area of need that is impacting patient's ability to self-manage diabetes?: No    Physical activity/Exercise:   N/A at this time due to severe neuropathy, pt on feet for 8 hours at least 5 days per week at work    SMB/11-  Fastin-310  Highest past month: "high" (> 600)  Lowest past month: 58          Additional Social History    Support  Does anyone support you with your diabetes care?: yes  Who supports " "you?: self, parent  Who takes you to your medical appointments?: self  Does the current support meet the patient's needs?: Yes  Is Support an area impacting ability to self-manage diabetes?: No      Diabetes Self-Management Skills Assessment     Nutrition/Healthy Eating  Area of need?: Yes      Medications  Patient is able to describe current diabetes management routine.: yes  Diabetes management routine:: insulin  Patient is able to identify current diabetes medications, dosages, and appropriate timing of medications.: yes  Patient reports problems or concerns with current medication regimen.: no  Area of need?: Yes    Home Blood Glucose Monitoring  Patient states that blood sugar is checked at home daily.: yes  Monitoring Method:: home glucometer  Home glucometer meter type:: True Metrix  How often do you check your blood sugar?: 3 times a day (1-4x/d, average 3x/d)  When do you check your blood sugar?: Before breakfast, Before lunch, Before dinner, Before bedtime  When you check what is your typical blood sugar range? : ranging 58 - "high" (> 600) over the past month  Blood glucose logs:: no  Blood glucose logs reviewed today?: no (reviewed meter)  Personal CGM type:: still awaiting Dexcom supplies to resume use of G7, being shipped out next week per [employer] customer support  Area of need?: Yes      During today's follow-up visit,  the following areas required further assessment and content was provided/reviewed.    Based on today's diabetes care assessment, the following areas of need were identified:          9/19/2023    12:01 AM   Social   Support No            9/19/2023    12:01 AM   Clinical   Nutritional Status No            9/19/2023    12:01 AM   Diabetes Self-Management Skills   Nutrition/Healthy Eating Yes - see care plan   Medication Yes - see care plan - also discussed importance of checking blood sugar before every meal so that correction may be used as needed   Home Blood Glucose Monitoring Yes - " see care plan - assisted pt in updating date/time on current meter        Today's interventions were provided through individual discussion, instruction, and written materials were provided.    Patient verbalized understanding of instruction and written materials.  Pt was able to return back demonstration of instructions today. Patient understood key points, needs reinforcement and further instruction.     Diabetes Self-Management Care Plan Review and Evaluation of Progress:    During today's follow-up Jolies Diabetes Self-Management Care Plan progress was reviewed and progress was evaluated including his/her input. Jose Francisco has agreed to continue his/her journey to improve/maintain overall diabetes control by continuing to set health goals. See care plan progress below.      Care Plan: Diabetes Management   Updates made since 8/20/2023 12:00 AM        Problem: Healthy Eating         Goal: Patient agrees to begin counting carbohydrates & attempt to keep meals </= 60g carbohydrate & snacks </= 15g carbohydrate in preparation for possibility of transitioning to pump therapy in the future.    Start Date: 5/23/2023   Expected End Date: 10/24/2023   This Visit's Progress: No change   Recent Progress: No change   Priority: High   Barriers: No Barriers Identified   Note:    5/23/23: Reviewed sources of carbohydrate & appropriate portion sizes.  Discussed carbohydrate counting.  Encouraged pt to read nutrition facts labels for serving size & grams of total carbohydrate.  Gave goal of </= 60g carbohydrate per meal & </= 15g carbohydrate per snack but ultimately I believe that pt would benefit from I:C ratio vs set mealtime insulin doses to allow for more flexibility in meal size.  Will discuss with endo NP.    7/14/23: Per food recall, pt keeping 2/3 of meals </= 60g carb but exceeding carb goals at lunch meal & exceeding </= 15g carb goal at snack.  Pt does not seem to have been carb counting but was instructed to start use of  carb ratio 1:10 on 5/29 which he states he never started.    8/17/23: Per food recall, pt keeping 2/3 meals/d </= 60g carb.  Exceeded carb goal at supper meal.  Pt still not carb counting or using carb ratio.  Stressed importance of beginning to carb count if insulin pump is desired in the future.    9/19/23: Per food recall, pt still keeping 2/3 meals/d </= 60g carb.  Exceeded carb goal at lunch meal.       Problem: Medications         Goal: Patient agrees to research insulin pump options prior to next endo visit on 9/6 so that he may discuss with endo NP. Completed 9/19/2023   Start Date: 8/17/2023   Expected End Date: 10/24/2023   This Visit's Progress: Not met   Priority: Medium   Barriers: No Barriers Identified   Note:    8/17/23: Pt reports that he was told at his last endo appt on 7/18 that they would discuss insulin pump options at his next visit on 9/6.  Reviewed different available pump options currently on the market & provided education materials.  Encouraged pt to research these options further before next endo appt so that he can make an informed decision together with endo NP about what the best option for him would be.  Encouraged pt to call with any questions.    9/19/23:  Pt has been busy/dealing with a lot of health issues/pain lately & has had to r/s his next endo appt so he will not be seeing endo until 1/11/24.  Pt reports that he did not research insulin pump options at all since our last appointment.  Will consider re-visiting in the future when patient does not have so many competing priorities.       Problem: Blood Glucose Self-Monitoring         Goal: Patient will resume use of Dexcom G7 before next appt on 10/24.    Start Date: 9/19/2023   Expected End Date: 10/24/2023   Priority: High   Barriers: Lack of Motivation to Change; Lack of Supplies   Note:    9/19/23:  Pt has not been using Dexcom since end of May due to issues with receiving wrong supplies.  Pt reported at 8/17 appt that he  had spoken with company & was awaiting replacement supplies.  At today's appt pt reports that he has still not received supplies & has not reached out to the company to inquire about the delay.  Provided pt with phone number for [employer] ([phone]) & assisted him in contacting [employer] during our appointment to follow up on status of supplies.  Customer supported reported that 3 months of G6 supplies were last shipped on  & that pt's next shipment which will be G7 supplies instead ( & sensors 3 month supply) is due to be shipped next week.  Pt will download [employer] phone roni to track shipment & will reach out to customer support if he does not receive this shipment.  Plan is to resume use of Dexcom G7 before our next appt on 10/24 so that we may get a clearer picture of what is happening with blood sugar throughout the day.  Pt currently has been manually checking blood sugar with True Metrix meter for the past few months while without Dexcom.  He reports that his meter sometimes does not accept test strips & is requesting a new meter.  Pt states test strips are not .  Will discuss with endo NP.           Follow Up Plan     Pt presents to clinic today with multiple health complaints.  Pt c/o severe neuropathy pain affecting sleep & mobility.  Pt reports switched from gabapentin to Lyrica due to side effects of gabapentin such as dizziness.  Pt reports Lyrica does not seem to be working.  Pt also reports that he has been waiting on diabetic shoes ordered by his PCP ~ 2 months ago.  Encouraged pt to reach out to [facility] which is where pt states orders for diabetic shoes were sent.  Pt also requesting referral to podiatry/wound care for assistance with care of toenails.  Pt reports hand shaking since last discharged from hospital > 1 month ago which sometimes has caused him to drop things.  Encouraged pt to discuss this with his PCP.  Pt has also received 2 out of  possibly 8 necessary infusion treatments for chronic inflammatory demyelinating polyneuropathy & c/o inability to work for ~ 2 days after these monthly treatments due to side effects.  Follow up in about 5 weeks (around 10/24/2023) for review of Dexcom reports/glucometer & goal progress.    Today's care plan and follow up schedule was discussed with patient.  Jose Francisco verbalized understanding of the care plan, goals, and agrees to follow up plan.        The patient was encouraged to communicate with his/her health care provider/physician and care team regarding his/her condition(s) and treatment.  I provided the patient with my contact information today and encouraged to contact me via phone or [facility] Patient Portal as needed.     Length of Visit   Total Time: 60 Minutes

## 2023-09-19 NOTE — TELEPHONE ENCOUNTER
Janiya,    I met with Jose Francisco today for diabetes education follow up & we are working on getting him back on his dexcom G7 after being off of it since May.  His supplies are supposed to be shipped out next week.  He has been checking his blood sugar manually in the meantime but reports intermittent issues with his meter accepting the test strips.  Pt reports test strips are not .  Would you be able to put in an order for a new glucometer & supplies?  He currently uses a True Metrix meter.  He is requesting that this order be sent to [facility] pharmacy in [place] if possible.  He also mentioned that he would like a wound care or podiatry consult for assistance with foot care, specifically safely trimming his toenails.  Would you mind putting in this referral for him?    Attached are his most recent blood sugars taken from his meter which have been fluctuating drastically, ranging anywhere from 58 to > 600 over the  past month.    Current medications per pt:  - Lantus 30 units qHS  - Novolog 12 + correction 1:50 > 150

## 2023-09-20 RX ORDER — LANCETS
EACH MISCELLANEOUS
Qty: 120 EACH | Refills: 11 | Status: SHIPPED | OUTPATIENT
Start: 2023-09-20 | End: 2023-12-14

## 2023-09-20 RX ORDER — INSULIN PUMP SYRINGE, 3 ML
EACH MISCELLANEOUS
Qty: 1 EACH | Refills: 0 | Status: SHIPPED | OUTPATIENT
Start: 2023-09-20 | End: 2024-09-19

## 2023-09-20 NOTE — TELEPHONE ENCOUNTER
Jose Francisco just returned my call & is requesting that the pharmacy be changed to [facility] in [place] for the glucometer supplies.  Is that possible?  Also, would you be able to put in the wound care or podiatry consult for him or would he need to speak with his PCP about this?    Thanks!

## 2023-09-25 PROBLEM — E11.10 DIABETIC KETOACIDOSIS WITHOUT COMA: Status: RESOLVED | Noted: 2023-06-25 | Resolved: 2023-09-25

## 2023-10-02 ENCOUNTER — INFUSION (OUTPATIENT)
Dept: INFUSION THERAPY | Facility: HOSPITAL | Age: 43
End: 2023-10-02
Attending: INTERNAL MEDICINE
Payer: MEDICAID

## 2023-10-02 VITALS
TEMPERATURE: 98 F | SYSTOLIC BLOOD PRESSURE: 144 MMHG | OXYGEN SATURATION: 98 % | BODY MASS INDEX: 22.08 KG/M2 | HEART RATE: 78 BPM | RESPIRATION RATE: 18 BRPM | HEIGHT: 73 IN | DIASTOLIC BLOOD PRESSURE: 95 MMHG | WEIGHT: 166.63 LBS

## 2023-10-02 DIAGNOSIS — G61.81 CIDP (CHRONIC INFLAMMATORY DEMYELINATING POLYNEUROPATHY): Primary | ICD-10-CM

## 2023-10-02 PROCEDURE — 96365 THER/PROPH/DIAG IV INF INIT: CPT

## 2023-10-02 PROCEDURE — 96375 TX/PRO/DX INJ NEW DRUG ADDON: CPT

## 2023-10-02 PROCEDURE — 25000003 PHARM REV CODE 250: Performed by: PSYCHIATRY & NEUROLOGY

## 2023-10-02 PROCEDURE — 63600175 PHARM REV CODE 636 W HCPCS: Mod: JZ,JA,JG | Performed by: PSYCHIATRY & NEUROLOGY

## 2023-10-02 PROCEDURE — 96366 THER/PROPH/DIAG IV INF ADDON: CPT

## 2023-10-02 RX ORDER — ACETAMINOPHEN 325 MG/1
650 TABLET ORAL
Status: CANCELLED | OUTPATIENT
Start: 2023-10-23

## 2023-10-02 RX ORDER — HEPARIN 100 UNIT/ML
500 SYRINGE INTRAVENOUS
Status: DISCONTINUED | OUTPATIENT
Start: 2023-10-02 | End: 2023-10-02 | Stop reason: HOSPADM

## 2023-10-02 RX ORDER — FAMOTIDINE 10 MG/ML
20 INJECTION INTRAVENOUS 2 TIMES DAILY
Status: DISCONTINUED | OUTPATIENT
Start: 2023-10-02 | End: 2023-10-02 | Stop reason: HOSPADM

## 2023-10-02 RX ORDER — HEPARIN 100 UNIT/ML
500 SYRINGE INTRAVENOUS
Status: CANCELLED | OUTPATIENT
Start: 2023-10-23

## 2023-10-02 RX ORDER — SODIUM CHLORIDE 0.9 % (FLUSH) 0.9 %
10 SYRINGE (ML) INJECTION
Status: DISCONTINUED | OUTPATIENT
Start: 2023-10-02 | End: 2023-10-02 | Stop reason: HOSPADM

## 2023-10-02 RX ORDER — ACETAMINOPHEN 325 MG/1
650 TABLET ORAL
Status: COMPLETED | OUTPATIENT
Start: 2023-10-02 | End: 2023-10-02

## 2023-10-02 RX ORDER — FAMOTIDINE 10 MG/ML
20 INJECTION INTRAVENOUS 2 TIMES DAILY
Status: CANCELLED | OUTPATIENT
Start: 2023-10-23

## 2023-10-02 RX ORDER — SODIUM CHLORIDE 0.9 % (FLUSH) 0.9 %
10 SYRINGE (ML) INJECTION
Status: CANCELLED | OUTPATIENT
Start: 2023-10-23

## 2023-10-02 RX ADMIN — FAMOTIDINE 20 MG: 10 INJECTION, SOLUTION INTRAVENOUS at 08:10

## 2023-10-02 RX ADMIN — IMMUNE GLOBULIN INFUSION (HUMAN) 75 G: 100 INJECTION, SOLUTION INTRAVENOUS; SUBCUTANEOUS at 09:10

## 2023-10-02 RX ADMIN — ACETAMINOPHEN 650 MG: 325 TABLET, FILM COATED ORAL at 08:10

## 2023-10-02 NOTE — NURSING
0810 Patient is here Labs, IVIG Gammagard q 3wks. Accompanied by his mother. C/o pain to back and carly legs #7.

## 2023-10-04 ENCOUNTER — HOSPITAL ENCOUNTER (OUTPATIENT)
Dept: WOUND CARE | Facility: HOSPITAL | Age: 43
Discharge: HOME | End: 2023-10-04
Attending: FAMILY MEDICINE
Payer: MEDICAID

## 2023-10-04 VITALS
OXYGEN SATURATION: 99 % | RESPIRATION RATE: 18 BRPM | TEMPERATURE: 98 F | SYSTOLIC BLOOD PRESSURE: 143 MMHG | DIASTOLIC BLOOD PRESSURE: 87 MMHG | HEART RATE: 75 BPM

## 2023-10-04 DIAGNOSIS — L60.3 DYSTROPHIC NAIL: ICD-10-CM

## 2023-10-04 DIAGNOSIS — E10.9 TYPE 1 DIABETES MELLITUS WITHOUT COMPLICATION: ICD-10-CM

## 2023-10-04 DIAGNOSIS — E11.9 ENCOUNTER FOR DIABETIC FOOT EXAM: Primary | ICD-10-CM

## 2023-10-04 PROCEDURE — 99211 OFF/OP EST MAY X REQ PHY/QHP: CPT

## 2023-10-04 PROCEDURE — 11719 TRIM NAIL(S) ANY NUMBER: CPT

## 2023-10-04 PROCEDURE — 27000999 HC MEDICAL RECORD PHOTO DOCUMENTATION

## 2023-10-04 PROCEDURE — 11720 DEBRIDE NAIL 1-5: CPT | Mod: 59

## 2023-10-04 RX ORDER — BUPROPION HYDROCHLORIDE 300 MG/1
300 TABLET ORAL DAILY
COMMUNITY
Start: 2023-09-01

## 2023-10-04 RX ORDER — ATORVASTATIN CALCIUM 40 MG/1
40 TABLET, FILM COATED ORAL DAILY
COMMUNITY
End: 2023-12-14

## 2023-10-04 RX ORDER — TRAZODONE HYDROCHLORIDE 100 MG/1
100 TABLET ORAL NIGHTLY
COMMUNITY
Start: 2023-09-01

## 2023-10-04 RX ORDER — BUSPIRONE HYDROCHLORIDE 10 MG/1
10 TABLET ORAL 2 TIMES DAILY
COMMUNITY
Start: 2023-09-12 | End: 2023-12-14 | Stop reason: ALTCHOICE

## 2023-10-04 RX ORDER — NORTRIPTYLINE HYDROCHLORIDE 10 MG/1
1 CAPSULE ORAL DAILY
COMMUNITY
End: 2023-12-14 | Stop reason: ALTCHOICE

## 2023-10-04 RX ORDER — GABAPENTIN 100 MG/1
1 CAPSULE ORAL 3 TIMES DAILY
COMMUNITY
End: 2023-12-14 | Stop reason: ALTCHOICE

## 2023-10-04 NOTE — LETTER
October 4, 2023    Jose Francisco Romero  [address] [address]             [facility]  Wound Care  [address] [address]  Phone: [phone]  Fax: [phone]   October 4, 2023     Patient: Jose Francisco Romero   YOB: 1980   Date of Visit: 10/4/2023       To Whom it May Concern:    Jose Francisco Romero was seen in clinic on 10/4/2023. He may return to work on 10/4/2023 .    Please excuse him from any work missed.    If you have any questions or concerns, please don't hesitate to call.    Sincerely,         Kuhshboo Crowe MD  [facility], [facility]

## 2023-10-04 NOTE — PROGRESS NOTES
CHIEF COMPLAINT:  Chief Complaint   Patient presents with    diabetic foot care     HISTORY OF  PRESENT ILLNESS:  42 y.o. White male being seen today for diabetic foot exam.  Patient has been a diabetic for almost 10 years.  He has recently been under better control and has regular follow up with PCP.  He reports his right leg has intermittent numbness and intermittent numbness in bilateral feet.  He is currently getting IV IG infusions for his neuropathy.  He did stubbed his left toe and pulled part of the toenail back.  He denies any ulcers, calluses.    REVIEW OF SYSTEMS:  Review of Systems   Constitutional:  Negative for chills and fever.   Respiratory:  Negative for cough.    Gastrointestinal:  Negative for nausea.   Musculoskeletal:         As stated in HPI   Skin:         As stated in HPI   Psychiatric/Behavioral: Negative.         Past Medical History:   Diagnosis Date    Diabetes mellitus type I     Hypertension       Past Surgical History:   Procedure Laterality Date    CLAVICLE SURGERY      ESOPHAGOGASTRODUODENOSCOPY Left 2023    Procedure: EGD (ESOPHAGOGASTRODUODENOSCOPY);  Surgeon: Daiana Chilel MD;  Location: [facility] ENDOSCOPY;  Service: Gastroenterology;  Laterality: Left;      Social History     Socioeconomic History    Marital status: Single   Tobacco Use    Smoking status: Former     Current packs/day: 0.00     Types: Cigarettes     Quit date: 5/15/2023     Years since quittin.3     Passive exposure: Past    Smokeless tobacco: Never   Substance and Sexual Activity    Alcohol use: Never    Drug use: Never    Sexual activity: Yes     Birth control/protection: Condom     Social Determinants of Health     Financial Resource Strain: Low Risk  (2023)    Overall Financial Resource Strain (CARDIA)     Difficulty of Paying Living Expenses: Not hard at all   Recent Concern: Financial Resource Strain - High Risk (2023)    Overall Financial Resource Strain (CARDIA)     Difficulty of  Paying Living Expenses: Hard   Food Insecurity: No Food Insecurity (9/1/2023)    Hunger Vital Sign     Worried About Running Out of Food in the Last Year: Never true     Ran Out of Food in the Last Year: Never true   Recent Concern: Food Insecurity - Food Insecurity Present (6/30/2023)    Hunger Vital Sign     Worried About Running Out of Food in the Last Year: Sometimes true     Ran Out of Food in the Last Year: Never true   Transportation Needs: No Transportation Needs (9/1/2023)    PRAPARE - Transportation     Lack of Transportation (Medical): No     Lack of Transportation (Non-Medical): No   Physical Activity: Inactive (9/1/2023)    Exercise Vital Sign     Days of Exercise per Week: 0 days     Minutes of Exercise per Session: 0 min   Stress: No Stress Concern Present (9/1/2023)    [employer] - Occupational Stress Questionnaire     Feeling of Stress : Not at all   Recent Concern: Stress - Stress Concern Present (6/30/2023)    [employer] - Occupational Stress Questionnaire     Feeling of Stress : To some extent   Social Connections: Socially Isolated (9/1/2023)    Social Connection and Isolation Panel [NHANES]     Frequency of Communication with Friends and Family: More than three times a week     Frequency of Social Gatherings with Friends and Family: More than three times a week     Attends Confucianism Services: Never     Active Member of Clubs or Organizations: No     Attends Club or Organization Meetings: Never     Marital Status: Never    Housing Stability: Low Risk  (9/1/2023)    Housing Stability Vital Sign     Unable to Pay for Housing in the Last Year: No     Number of Places Lived in the Last Year: 1     Unstable Housing in the Last Year: No   .    Review of Most Recent Wound Care-Related Labs:  Lab Results   Component Value Date/Time    WBC 5.89 09/01/2023 04:10 AM    RBC 4.07 (L) 09/01/2023 04:10 AM    HGB 10.7 (L) 09/01/2023 04:10 AM     HCT 33.0 (L) 09/01/2023 04:10 AM    HCT 52.0 (H) 11/30/2021 10:44 AM    MCV 81.1 09/01/2023 04:10 AM    MCH 26.3 (L) 09/01/2023 04:10 AM    CREATININE 0.98 09/01/2023 04:10 AM    CREATININE 0.98 11/24/2022 06:09 AM    HGBA1C 10.4 (H) 06/24/2023 04:11 AM    CRP 1.30 07/18/2023 03:42 PM        Wound-Related Imaging:        PHYSICAL EXAMINATION:  Blood pressure (!) 143/87, pulse 75, temperature 97.7 °F (36.5 °C), resp. rate 18, SpO2 99 %.  General:  VSS, afebrile. No acute distress.   Respiratory: Non labored.  No coughing.  Cardiovascular:  No peripheral edema.   Musculoskeletal:  Full range of motion of all extremities; no joint deformities or edema.   Neurologic:  A&O X 3.    Psychiatric:  Calm, cooperative.  Mood and effect normal.  Responses appropriate.   Integumentary: Monofilament exam: abnormal on right foot at great toe and medially near metatarsal head  Dorsalis pedis and posterior tibial pulses present by Doppler   No calluses noted   Bilateral great toenail nail debridement done with Luann       ASSESSMENT/PLAN:    Patient Active Problem List    Diagnosis Date Noted    Encounter for diabetic foot exam 10/04/2023    Melena 09/01/2023    Diarrhea 09/01/2023    DKA (diabetic ketoacidosis) 09/01/2023    CIDP (chronic inflammatory demyelinating polyneuropathy) 07/19/2023    Moderate malnutrition 07/19/2023    Polyneuropathy 07/03/2023    Severe malnutrition 06/30/2023    Diabetic polyneuropathy 06/25/2023    Diabetic neuropathic cachexia 10/11/2022     Diabetic foot exam   Diabetes-continue follow up with PCP   Hypertension-slightly elevated, he will discuss with his PCP  Follow up in 3 months

## 2023-10-23 ENCOUNTER — INFUSION (OUTPATIENT)
Dept: INFUSION THERAPY | Facility: HOSPITAL | Age: 43
End: 2023-10-23
Attending: INTERNAL MEDICINE
Payer: MEDICAID

## 2023-10-23 VITALS
TEMPERATURE: 98 F | RESPIRATION RATE: 18 BRPM | OXYGEN SATURATION: 98 % | HEIGHT: 73 IN | SYSTOLIC BLOOD PRESSURE: 130 MMHG | DIASTOLIC BLOOD PRESSURE: 85 MMHG | WEIGHT: 170 LBS | BODY MASS INDEX: 22.53 KG/M2 | HEART RATE: 84 BPM

## 2023-10-23 DIAGNOSIS — G61.81 CIDP (CHRONIC INFLAMMATORY DEMYELINATING POLYNEUROPATHY): Primary | ICD-10-CM

## 2023-10-23 PROCEDURE — 96365 THER/PROPH/DIAG IV INF INIT: CPT

## 2023-10-23 PROCEDURE — 63600175 PHARM REV CODE 636 W HCPCS: Mod: JZ,JA,JG | Performed by: PSYCHIATRY & NEUROLOGY

## 2023-10-23 PROCEDURE — 96366 THER/PROPH/DIAG IV INF ADDON: CPT

## 2023-10-23 PROCEDURE — 25000003 PHARM REV CODE 250: Performed by: PSYCHIATRY & NEUROLOGY

## 2023-10-23 PROCEDURE — 96375 TX/PRO/DX INJ NEW DRUG ADDON: CPT

## 2023-10-23 RX ORDER — SODIUM CHLORIDE 0.9 % (FLUSH) 0.9 %
10 SYRINGE (ML) INJECTION
Status: CANCELLED | OUTPATIENT
Start: 2023-11-13

## 2023-10-23 RX ORDER — FAMOTIDINE 10 MG/ML
20 INJECTION INTRAVENOUS 2 TIMES DAILY
Status: CANCELLED | OUTPATIENT
Start: 2023-11-13

## 2023-10-23 RX ORDER — ACETAMINOPHEN 325 MG/1
650 TABLET ORAL
Status: CANCELLED | OUTPATIENT
Start: 2023-11-13

## 2023-10-23 RX ORDER — ACETAMINOPHEN 325 MG/1
650 TABLET ORAL
Status: COMPLETED | OUTPATIENT
Start: 2023-10-23 | End: 2023-10-23

## 2023-10-23 RX ORDER — HEPARIN 100 UNIT/ML
500 SYRINGE INTRAVENOUS
Status: CANCELLED | OUTPATIENT
Start: 2023-11-13

## 2023-10-23 RX ORDER — FAMOTIDINE 10 MG/ML
20 INJECTION INTRAVENOUS 2 TIMES DAILY
Status: DISCONTINUED | OUTPATIENT
Start: 2023-10-23 | End: 2023-10-23 | Stop reason: HOSPADM

## 2023-10-23 RX ADMIN — ACETAMINOPHEN 650 MG: 325 TABLET, FILM COATED ORAL at 08:10

## 2023-10-23 RX ADMIN — IMMUNE GLOBULIN INFUSION (HUMAN) 75 G: 100 INJECTION, SOLUTION INTRAVENOUS; SUBCUTANEOUS at 08:10

## 2023-10-23 RX ADMIN — FAMOTIDINE 20 MG: 10 INJECTION, SOLUTION INTRAVENOUS at 08:10

## 2023-10-23 RX ADMIN — SODIUM CHLORIDE: 9 INJECTION, SOLUTION INTRAVENOUS at 08:10

## 2023-10-23 NOTE — PLAN OF CARE
Patient will have no s/s of infection to PICC line. Will perform aseptic technique while flushing.

## 2023-11-07 ENCOUNTER — TELEPHONE (OUTPATIENT)
Dept: NEUROLOGY | Facility: CLINIC | Age: 43
End: 2023-11-07
Payer: MEDICAID

## 2023-11-07 ENCOUNTER — CLINICAL SUPPORT (OUTPATIENT)
Dept: DIABETES | Facility: CLINIC | Age: 43
End: 2023-11-07
Payer: MEDICAID

## 2023-11-07 DIAGNOSIS — E10.65 UNCONTROLLED TYPE 1 DIABETES MELLITUS WITH HYPERGLYCEMIA: Primary | ICD-10-CM

## 2023-11-07 PROCEDURE — G0108 DIAB MANAGE TRN  PER INDIV: HCPCS | Mod: PBBFAC

## 2023-11-07 PROCEDURE — 99211 OFF/OP EST MAY X REQ PHY/QHP: CPT | Mod: PBBFAC

## 2023-11-07 NOTE — TELEPHONE ENCOUNTER
Spoke to Zeb and explained patient was only suppose to have IVIG (Gammagard) infusion through September/October.    Dr Pfeiffer will determine if additional infusions are needed when Mr Felton comes for his follow-up appointment in December.     Per Zeb he will cancel authorization.

## 2023-11-07 NOTE — TELEPHONE ENCOUNTER
Please clarify    Your OV note states every 3 weeks? May need additional OV documentation/supporting documentation or complete Peer to Peer?    Thank you

## 2023-11-07 NOTE — TELEPHONE ENCOUNTER
----- Message from Lucero Kenny sent at 11/7/2023 11:19 AM CST -----  Regarding: EM Carrington with [facility] called for authorization on Gammagard. Needing to clarify medication is for 3 mth at a time which only what insurance allows. Please contact [phone]     PROVIDER:[TOKEN:[[id number]]],PROVIDER:[TOKEN:[[id number]]] PROVIDER:[TOKEN:[[id number]],SCHEDULEDAPPT:[12/01/2023],SCHEDULEDAPPTTIME:[02:00 PM]],PROVIDER:[TOKEN:[[id number]]] PROVIDER:[TOKEN:[[id number]],SCHEDULEDAPPT:[09/06/2023],SCHEDULEDAPPTTIME:[09:00 AM]],PROVIDER:[TOKEN:[[id number]]]

## 2023-11-10 ENCOUNTER — TELEPHONE (OUTPATIENT)
Dept: DIABETES | Facility: CLINIC | Age: 43
End: 2023-11-10
Payer: MEDICAID

## 2023-11-10 ENCOUNTER — PATIENT OUTREACH (OUTPATIENT)
Dept: ENDOCRINOLOGY | Facility: CLINIC | Age: 43
End: 2023-11-10
Payer: MEDICAID

## 2023-11-10 VITALS — BODY MASS INDEX: 22.71 KG/M2 | HEIGHT: 73 IN | WEIGHT: 171.38 LBS

## 2023-11-10 DIAGNOSIS — E10.65 UNCONTROLLED TYPE 1 DIABETES MELLITUS WITH HYPERGLYCEMIA: Primary | ICD-10-CM

## 2023-11-10 PROCEDURE — 95251 PR GLUCOSE MONITOR, 72 HOUR, PHYS INTERP: ICD-10-PCS | Mod: ,,, | Performed by: NURSE PRACTITIONER

## 2023-11-10 PROCEDURE — 95251 CONT GLUC MNTR ANALYSIS I&R: CPT | Mod: ,,, | Performed by: NURSE PRACTITIONER

## 2023-11-10 NOTE — PROGRESS NOTES
"Diabetes Care Specialist Follow-up Note  Author: Vanessa Moore RD  Date: 11/7/2023    Program Intake  Reason for Diabetes Program Visit:: Intervention  Type of Intervention:: Individual  Individual: Education  Education: Self-Management Skill Review  Current diabetes risk level:: moderate (risk score 1.5)    Lab Results   Component Value Date    HGBA1C 10.4 (H) 06/24/2023     A1c Pre Diabetes Care Specialist Intervention:  10.7% (4/12/23)    Clinical    Weight: 77.7 kg (171 lb 6.4 oz)   Height: 6' 1" (185.4 cm)   Body mass index is 22.61 kg/m².  Wt loss of 3 lbs since last visit on 09/19/23      Physical activity/Exercise:   On feet for 8-10 hours per day 6 days per week at work.  No exercise outside of work.      SMBG:  Dexcom download (10/25/23 to 11/07/23): See media file for details. Noted extended hypoglycemia during early AM hours or post-prandially sometimes with late prandial dosing.    Sensor usage: 93%  Average glucose: 168mg/dL  GMI: 7.3%    16% CGM readings greater than 250 mg/dL  20% CGM readings between 181-250 mg/dL  58% CGM readings in target glucose range of  mg/dL   5% CGM readings between 54-79%  1% CGM readings less than 54 mg/dL       Medications:  - Lantus 28-30 units qHS (depending on glucose)  - Novolog 7/12/12 + 1:50 > 150 (pt admits to taking Novolog late a couple of times per week)      Today's interventions were provided through individual discussion    Patient verbalized understanding of instruction.  Pt was able to return back demonstration of instructions today. Patient understood key points, needs reinforcement and further instruction.     Diabetes Self-Management Care Plan Review and Evaluation of Progress:    During today's follow-up Jose Francisco's Diabetes Self-Management Care Plan progress was reviewed and progress was evaluated including his/her input. Jose Francisco has agreed to continue his/her journey to improve/maintain overall diabetes control by continuing to set health goals. See care " plan progress below.      Care Plan: Diabetes Management   Updates made since 10/11/2023 12:00 AM        Problem: Healthy Eating         Goal: Patient agrees to begin counting carbohydrates & attempt to keep meals </= 60g carbohydrate & snacks </= 15g carbohydrate in preparation for possibility of transitioning to pump therapy in the future.    Start Date: 5/23/2023   Expected End Date: 1/11/2024   This Visit's Progress: No change   Recent Progress: No change   Priority: High   Barriers: No Barriers Identified   Note:    5/23/23: Reviewed sources of carbohydrate & appropriate portion sizes.  Discussed carbohydrate counting.  Encouraged pt to read nutrition facts labels for serving size & grams of total carbohydrate.  Gave goal of </= 60g carbohydrate per meal & </= 15g carbohydrate per snack but ultimately I believe that pt would benefit from I:C ratio vs set mealtime insulin doses to allow for more flexibility in meal size.  Will discuss with endo NP.    7/14/23: Per food recall, pt keeping 2/3 of meals </= 60g carb but exceeding carb goals at lunch meal & exceeding </= 15g carb goal at snack.  Pt does not seem to have been carb counting but was instructed to start use of carb ratio 1:10 on 5/29 which he states he never started.    8/17/23: Per food recall, pt keeping 2/3 meals/d </= 60g carb.  Exceeded carb goal at supper meal.  Pt still not carb counting or using carb ratio.  Stressed importance of beginning to carb count if insulin pump is desired in the future.    9/19/23: Per food recall, pt still keeping 2/3 meals/d </= 60g carb.  Exceeded carb goal at lunch meal.    11/7/23:   Food recall:  Breakfast = peanut butter sandwich with 2 cups of milk or 3 cups honey bunches of oats cereal with 2 cups milk  Lunch = sandwich or leftovers  Dinner = fish with crawfish etouffee on top, 1/2 cup corn, diet mountain dew  Per food recall, 2/3 meals still meeting goal of </= 60g carb.  Discussed high carb content of  breakfast meal when eating cereal.  Pt not carb counting.  Again discussed that he will need to carb count for insulin pump in the future so should start practicing now.       Problem: Blood Glucose Self-Monitoring         Goal: Patient will resume use of Dexcom G7 before next appt on 10/24. Completed 2023   Start Date: 2023   Expected End Date: 10/24/2023   This Visit's Progress: Met   Priority: High   Barriers: Lack of Motivation to Change; Lack of Supplies   Note:    23:  Pt has not been using Dexcom since end of May due to issues with receiving wrong supplies.  Pt reported at  appt that he had spoken with company & was awaiting replacement supplies.  At today's appt pt reports that he has still not received supplies & has not reached out to the company to inquire about the delay.  Provided pt with phone number for [employer] ([phone]) & assisted him in contacting [employer] during our appointment to follow up on status of supplies.  Customer supported reported that 3 months of G6 supplies were last shipped on  & that pt's next shipment which will be G7 supplies instead ( & sensors 3 month supply) is due to be shipped next week.  Pt will download [employer] phone roni to track shipment & will reach out to customer support if he does not receive this shipment.  Plan is to resume use of Dexcom G7 before our next appt on 10/24 so that we may get a clearer picture of what is happening with blood sugar throughout the day.  Pt currently has been manually checking blood sugar with True Metrix meter for the past few months while without Dexcom.  He reports that his meter sometimes does not accept test strips & is requesting a new meter.  Pt states test strips are not .  Will discuss with endo NP.    23: Pt has been using Dexcom G7 with .  14 day Dexcom report reviewed with patient.  Will send reports to endo for insulin adjustment as needed.          Follow Up Plan     Pt reports that he did receive diabetic shoes since last visit.  He is also being seen by wound care for toenail care.  He c/o infusion treatments now helping with pain & states he has received 6 treatments so far.  Reviewed hypoglycemia treatment.  Pt currently treating with lotus butter bar or 2 mints or mini can of soda.  Encouraged pt to keep Dexcom  at bedside during night so that he may treat timely.  Pt had been keeping  in another room while he sleeps.      Follow up in about 2 months (around 1/11/2024) for review of dexcom reports & goal progress.    Today's care plan and follow up schedule was discussed with patient.  Jose Francisco verbalized understanding of the care plan, goals, and agrees to follow up plan.        The patient was encouraged to communicate with his/her health care provider/physician and care team regarding his/her condition(s) and treatment.  I provided the patient with my contact information today and encouraged to contact me via phone or [facility] as needed.     Length of Visit   Total Time: 30 Minutes

## 2023-11-10 NOTE — PROGRESS NOTES
Dexcom interpretation D William FOZIA     Dexcom interpretation 10/25/2023-11/07/2023.  Days with CGM data 93% 13/14 days.  Average glucose 168.  16% very high, 20% high, 58% in range, 5% low, 1% very low.  On patient's best day average glucose 151 with 82% in range 1% low.  On that day patient had a prandial spikes in the morning up to 400 with drop indicating he would giving late prandial insulin.  Insulin data is lacking limiting interpretation at times.  Patient has frequent prandial spikes in the morning indicating he is not giving prandial insulin with a slight drop multiple hours later elevation at lunch later in the afternoon patient has a sudden drop hypoglycemia indicating he is giving insulin later in the day.  Patient was having hypoglycemia the 1st week of his Dexcom report which resolved on the 2nd week indicating insulin may have been reduced.   Will work with Diabetes Education with  adjusting insulin and Education on prandial dosing.

## 2023-11-10 NOTE — TELEPHONE ENCOUNTER
----- Message from Janiya Thomas NP sent at 11/10/2023  2:19 PM CST -----  Please instruct patient on consistently taking 28 units of Lantus nightly without altering the amounts after 3-5 days if hypoglycemia continues he is to reduce to 26 units and remain on the same dose every night.   ----- Message -----  From: Vanessa Moore RD  Sent: 11/10/2023  11:39 AM CST  To: FOZIA Laughlin I met with Jose Francisco on 11/7 for diabetes education follow up.  His 2 week Dexcom report is attached.  It looks like he is having extended hypoglycemia during the early AM hours & post-prandially sometimes with late prandial dosing.  Please let me know if you would like to make any adjustments to his Lantus.  He is currently taking Lantus 28-30 units qHS (depending on glucose) & Novolog 7/12/12 + 1:50 > 150.    Thanks!

## 2023-11-10 NOTE — TELEPHONE ENCOUNTER
Instructed pt to consistently take 28 units Lantus for next 3-5 days & decrease to 26 units after this time if hypoglycemia continues.  Pt verbalizes understanding.  He reports taking 28 units last PM with fasting morning glucose of 115 & no hypoglycemia during the night.

## 2023-11-13 ENCOUNTER — INFUSION (OUTPATIENT)
Dept: INFUSION THERAPY | Facility: HOSPITAL | Age: 43
End: 2023-11-13
Attending: INTERNAL MEDICINE
Payer: MEDICAID

## 2023-11-13 VITALS
BODY MASS INDEX: 22.38 KG/M2 | OXYGEN SATURATION: 98 % | RESPIRATION RATE: 18 BRPM | WEIGHT: 168.88 LBS | DIASTOLIC BLOOD PRESSURE: 98 MMHG | HEART RATE: 94 BPM | HEIGHT: 73 IN | SYSTOLIC BLOOD PRESSURE: 148 MMHG

## 2023-11-13 DIAGNOSIS — G61.81 CIDP (CHRONIC INFLAMMATORY DEMYELINATING POLYNEUROPATHY): Primary | ICD-10-CM

## 2023-11-13 RX ORDER — FAMOTIDINE 10 MG/ML
20 INJECTION INTRAVENOUS 2 TIMES DAILY
Status: DISCONTINUED | OUTPATIENT
Start: 2023-11-13 | End: 2023-11-13

## 2023-11-13 RX ORDER — ACETAMINOPHEN 325 MG/1
650 TABLET ORAL
Status: CANCELLED | OUTPATIENT
Start: 2023-12-04

## 2023-11-13 RX ORDER — ACETAMINOPHEN 325 MG/1
650 TABLET ORAL
Status: DISCONTINUED | OUTPATIENT
Start: 2023-11-13 | End: 2023-11-13

## 2023-11-13 RX ORDER — SODIUM CHLORIDE 0.9 % (FLUSH) 0.9 %
10 SYRINGE (ML) INJECTION
Status: DISCONTINUED | OUTPATIENT
Start: 2023-11-13 | End: 2023-11-13 | Stop reason: HOSPADM

## 2023-11-13 RX ORDER — SODIUM CHLORIDE 0.9 % (FLUSH) 0.9 %
10 SYRINGE (ML) INJECTION
Status: CANCELLED | OUTPATIENT
Start: 2023-12-04

## 2023-11-13 RX ORDER — FAMOTIDINE 10 MG/ML
20 INJECTION INTRAVENOUS 2 TIMES DAILY
Status: CANCELLED | OUTPATIENT
Start: 2023-12-04

## 2023-11-13 RX ORDER — HEPARIN 100 UNIT/ML
500 SYRINGE INTRAVENOUS
Status: CANCELLED | OUTPATIENT
Start: 2023-12-04

## 2023-11-13 NOTE — NURSING
Pt is here for IVIG but pt's auth as  and pt needs to be reevaluated by MD for IVIG need, per telephone encounter on 23 with Valentino DELGADO RN and Dr ORACIO Pfeiffer.  Note sent to md for reminder.    Mayelin Grier RN

## 2023-11-13 NOTE — LETTER
November 13, 2023      [facility]  [address] [place] [address]  Phone: [phone]  Fax: [phone]       Patient: Jose Francisco Romero   YOB: 1980  Date of Visit: 11/13/2023    To Whom It May Concern:    Paco Romero  was at [facility] on 11/13/2023. The patient may return to work/school on 11/14/2023 with no restrictions. If you have any questions or concerns, or if I can be of further assistance, please do not hesitate to contact me.    Sincerely,    Mayelin Grier RN

## 2023-12-04 PROBLEM — E11.10 DKA (DIABETIC KETOACIDOSIS): Status: RESOLVED | Noted: 2023-09-01 | Resolved: 2023-12-04

## 2023-12-12 NOTE — PROGRESS NOTES
[facility] Neurology Follow Up Office Visit Note    Initial Visit Date: 7/26/2023  Last Visit Date: 7/26/2023  Current Visit Date:  12/14/2023    Chief Complaint:     Chief Complaint   Patient presents with    CIDP     Has been hurting a lot-worse since infusion-not any better       History of Present Illness:      This is 43 y.o. male with history of poorly-controlled DM I with frequent DKA, who is referred for progressive small fiber polyneuropathy. Presentation atypical for CIDP. Unable to rule out diabetic neuropathic cachexia. Also has issues with polypharmacy.  During last visit, autoimmune polyneuropathy labs were ordered.  Trial of IVIG every 3 weeks was started.      Interim History  No changes in weakness. FS is still fluctuating. Still complain of pain.    Presenting History  History of poorly-controlled DM I with frequent DKA, who is referred for dizziness and weakness for the past 6 months. States that his feet and leg has been experiencing burning sensation along with arthralgia for the past at least 2-3 weeks. Patient also reports chronic lower back pain. Has not been vomiting. Has trouble with poor appetite on occasion and has been losing weight for the past few months. Denied any nausea. Reports abdominal pressure at times. States that he had lost weight recently. Denied any alcohol use. Quit smoking around 2 months ago. Not consistent with medications. Now inquiring into disability.        Medications:     Current Outpatient Medications on File Prior to Visit   Medication Sig Dispense Refill    albuterol (PROVENTIL/VENTOLIN HFA) 90 mcg/actuation inhaler Inhale into the lungs.      atorvastatin (LIPITOR) 40 MG tablet Take 40 mg by mouth Daily.      blood sugar diagnostic Strp To check BG 4 times daily, to use with insurance preferred meter 120 strip 11    blood sugar diagnostic Strp To check BG 4 times daily, to use with insurance preferred meter 120 strip 11    blood-glucose meter kit To check BG 4 times  "daily, to use with insurance preferred meter 1 each 0    buPROPion (WELLBUTRIN XL) 300 MG 24 hr tablet Take 300 mg by mouth.      busPIRone (BUSPAR) 10 MG tablet Take 10 mg by mouth 2 (two) times daily.      clonazePAM (KLONOPIN) 2 MG Tab Take 2 mg by mouth daily as needed.      cyproheptadine (PERIACTIN) 4 mg tablet Take 4 mg by mouth 2 (two) times daily.      diclofenac sodium (VOLTAREN) 1 % Gel APPLY 2-4 grams TO affected joints FOUR TIMES DAILY FOR PAIN (DO NOT EXCEED 8 GRAMS PER DAY PER JOINT AND 32 GRAMS TOTAL)      FLUoxetine 40 MG capsule Take 40 mg by mouth once daily.      gabapentin (NEURONTIN) 100 MG capsule Take 1 capsule by mouth 3 (three) times daily.      ibuprofen (ADVIL,MOTRIN) 800 MG tablet Take 800 mg by mouth every 6 (six) hours as needed.      insulin (LANTUS SOLOSTAR U-100 INSULIN) glargine 100 units/mL SubQ pen Inject 25 Units into the skin once daily. 15 mL 5    insulin aspart U-100 (NOVOLOG FLEXPEN U-100 INSULIN) 100 unit/mL (3 mL) InPn pen 12 units TID with meals with correction 1:50 >150 Max dose 50 units 15 mL 11    lancets Misc To check BG 4 times daily, to use with insurance preferred meter 120 each 11    lancets Misc To check BG 4 times daily, to use with insurance preferred meter 120 each 11    lisinopriL (PRINIVIL,ZESTRIL) 5 MG tablet       nortriptyline (PAMELOR) 10 MG capsule Take 1 capsule by mouth Daily.      pantoprazole (PROTONIX) 40 MG tablet Take 1 tablet (40 mg total) by mouth once daily. 30 tablet 11    pen needle, diabetic 31 gauge x 3/16" Ndle Use 4 times a day for insulin injection (BD Mini) 120 each 11    pregabalin (LYRICA) 50 MG capsule Take 50 mg by mouth 2 (two) times daily.      traMADoL (ULTRAM) 50 mg tablet Take 50 mg by mouth 2 (two) times daily.      traZODone (DESYREL) 100 MG tablet Take 100 mg by mouth every evening.       No current facility-administered medications on file prior to visit.       Labs:     Results for orders placed or performed during the " hospital encounter of 08/30/23   Blood culture #1 **CANNOT BE ORDERED STAT**    Specimen: Antecubital, Right; Blood   Result Value Ref Range    CULTURE, BLOOD ([facility]) No Growth at 5 days    Blood culture #2 **CANNOT BE ORDERED STAT**    Specimen: Hand, Left; Blood   Result Value Ref Range    CULTURE, BLOOD ([facility]) No Growth at 5 days    Comprehensive metabolic panel   Result Value Ref Range    Sodium Level 136 136 - 145 mmol/L    Potassium Level 4.9 3.5 - 5.1 mmol/L    Chloride 96 (L) 98 - 107 mmol/L    Carbon Dioxide 12 (L) 22 - 29 mmol/L    Glucose Level 693 (HH) 74 - 100 mg/dL    Blood Urea Nitrogen 30.0 (H) 8.9 - 20.6 mg/dL    Creatinine 1.85 (H) 0.73 - 1.18 mg/dL    Calcium Level Total 10.3 (H) 8.4 - 10.2 mg/dL    Protein Total 8.2 6.4 - 8.3 gm/dL    Albumin Level 3.9 3.5 - 5.0 g/dL    Globulin 4.3 (H) 2.4 - 3.5 gm/dL    Albumin/Globulin Ratio 0.9 (L) 1.1 - 2.0 ratio    Bilirubin Total 0.9 <=1.5 mg/dL    Alkaline Phosphatase 109 40 - 150 unit/L    Alanine Aminotransferase 18 0 - 55 unit/L    Aspartate Aminotransferase 16 5 - 34 unit/L    eGFR 46 mls/min/1.73/m2   Lactic acid, plasma   Result Value Ref Range    Lactic Acid Level 5.9 (HH) 0.5 - 2.2 mmol/L   Magnesium   Result Value Ref Range    Magnesium Level 1.50 (L) 1.60 - 2.60 mg/dL   Urinalysis, Reflex to Urine Culture    Specimen: Urine   Result Value Ref Range    Color, UA Colorless (A) Yellow, Light-Yellow, Dark Yellow, Saranya, Straw    Appearance, UA Clear Clear    Specific Gravity, UA 1.022 1.005 - 1.030    pH, UA 5.0 5.0 - 8.5    Protein, UA Trace (A) Negative    Glucose, UA 4+ (A) Negative, Normal    Ketones, UA 3+ (A) Negative    Blood, UA Negative Negative    Bilirubin, UA Negative Negative    Urobilinogen, UA Normal 0.2, 1.0, Normal    Nitrites, UA Negative Negative    Leukocyte Esterase, UA Negative Negative    WBC, UA 0-5 None Seen, 0-2, 3-5, 0-5 /HPF    Bacteria, UA Trace (A) None Seen /HPF    Squamous Epithelial Cells, UA None Seen None Seen /HPF     Mucous, UA Trace (A) None Seen /LPF    Hyaline Casts, UA None Seen None Seen /lpf    RBC, UA 0-5 None Seen, 0-2, 3-5, 0-5 /HPF   RT Blood Gas (unchecked)   Result Value Ref Range    Sample Type Venous Blood     Drawn by lab     pH, Blood gas 7.220 (L) 7.300 - 7.400    pCO2, Blood gas 30.0 (L) 40.0 - 50.0 mmHg    pO2, Blood gas 45.0 (H) 30.0 - 40.0 mmHg    TOC2, Blood gas 13.2 mmol/L    Base Excess, Blood gas -14.10 mmol/L    sO2, Blood gas 77.7 %    HCO3, Blood gas 12.3 mmol/L    THb, Blood gas 13.4 g/dL    O2 Hb, Blood Gas 74.6 %    CO Hgb 3.2 %    Met Hgb 0.8 %    Allens Test N/A     FIO2, Blood gas 21.0 %   Beta-Hydroxybutyrate, Serum   Result Value Ref Range    Beta Hydroxybutyrate 8.72 (H) <=0.30 mmol/L   Lipase   Result Value Ref Range    Lipase Level 5 <=60 U/L   CBC with Differential   Result Value Ref Range    WBC 20.59 (H) 4.50 - 11.50 x10(3)/mcL    RBC 4.76 4.70 - 6.10 x10(6)/mcL    Hgb 12.7 (L) 14.0 - 18.0 g/dL    Hct 39.8 (L) 42.0 - 52.0 %    MCV 83.6 80.0 - 94.0 fL    MCH 26.7 (L) 27.0 - 31.0 pg    MCHC 31.9 (L) 33.0 - 36.0 g/dL    RDW 14.5 11.5 - 17.0 %    Platelet 336 130 - 400 x10(3)/mcL    MPV 10.4 7.4 - 10.4 fL    Neut % 86.8 %    Lymph % 6.9 %    Mono % 4.8 %    Eos % 0.3 %    Basophil % 0.6 %    Lymph # 1.42 0.6 - 4.6 x10(3)/mcL    Neut # 17.89 (H) 2.1 - 9.2 x10(3)/mcL    Mono # 0.98 0.1 - 1.3 x10(3)/mcL    Eos # 0.06 0 - 0.9 x10(3)/mcL    Baso # 0.12 <=0.2 x10(3)/mcL    IG# 0.12 (H) 0 - 0.04 x10(3)/mcL    IG% 0.6 %    NRBC% 0.0 %   COVID/FLU A&B PCR   Result Value Ref Range    Influenza A PCR Not Detected Not Detected    Influenza B PCR Not Detected Not Detected    SARS-CoV-2 PCR Not Detected Not Detected, Negative, Invalid   Troponin I   Result Value Ref Range    Troponin-I <0.010 0.000 - 0.045 ng/mL   Lactic Acid, Plasma   Result Value Ref Range    Lactic Acid Level 5.6 (HH) 0.5 - 2.2 mmol/L   Phosphorus   Result Value Ref Range    Phosphorus Level 4.9 (H) 2.3 - 4.7 mg/dL   Basic metabolic  panel - if not done in ED   Result Value Ref Range    Sodium Level 138 136 - 145 mmol/L    Potassium Level 4.0 3.5 - 5.1 mmol/L    Chloride 106 98 - 107 mmol/L    Carbon Dioxide 16 (L) 22 - 29 mmol/L    Glucose Level 442 (H) 74 - 100 mg/dL    Blood Urea Nitrogen 27.2 (H) 8.9 - 20.6 mg/dL    Creatinine 1.61 (H) 0.73 - 1.18 mg/dL    BUN/Creatinine Ratio 17     Calcium Level Total 9.5 8.4 - 10.2 mg/dL    Anion Gap 16.0 mEq/L    eGFR 55 mls/min/1.73/m2   Phosphorus   Result Value Ref Range    Phosphorus Level 2.2 (L) 2.3 - 4.7 mg/dL   Magnesium - if not done in ED   Result Value Ref Range    Magnesium Level 2.00 1.60 - 2.60 mg/dL   Basic metabolic panel - if not done in ED   Result Value Ref Range    Sodium Level 140 136 - 145 mmol/L    Potassium Level 3.7 3.5 - 5.1 mmol/L    Chloride 108 (H) 98 - 107 mmol/L    Carbon Dioxide 27 22 - 29 mmol/L    Glucose Level 234 (H) 74 - 100 mg/dL    Blood Urea Nitrogen 24.4 (H) 8.9 - 20.6 mg/dL    Creatinine 1.36 (H) 0.73 - 1.18 mg/dL    BUN/Creatinine Ratio 18     Calcium Level Total 9.7 8.4 - 10.2 mg/dL    Anion Gap 5.0 mEq/L    eGFR >60 mls/min/1.73/m2   Phosphorus   Result Value Ref Range    Phosphorus Level 1.3 (L) 2.3 - 4.7 mg/dL   Magnesium - if not done in ED   Result Value Ref Range    Magnesium Level 1.90 1.60 - 2.60 mg/dL   Comprehensive Metabolic Panel   Result Value Ref Range    Sodium Level 139 136 - 145 mmol/L    Potassium Level 3.9 3.5 - 5.1 mmol/L    Chloride 107 98 - 107 mmol/L    Carbon Dioxide 25 22 - 29 mmol/L    Glucose Level 151 (H) 74 - 100 mg/dL    Blood Urea Nitrogen 23.2 (H) 8.9 - 20.6 mg/dL    Creatinine 1.07 0.73 - 1.18 mg/dL    Calcium Level Total 9.2 8.4 - 10.2 mg/dL    Protein Total 6.3 (L) 6.4 - 8.3 gm/dL    Albumin Level 3.1 (L) 3.5 - 5.0 g/dL    Globulin 3.2 2.4 - 3.5 gm/dL    Albumin/Globulin Ratio 1.0 (L) 1.1 - 2.0 ratio    Bilirubin Total 0.3 <=1.5 mg/dL    Alkaline Phosphatase 75 40 - 150 unit/L    Alanine Aminotransferase 12 0 - 55 unit/L     Aspartate Aminotransferase 11 5 - 34 unit/L    eGFR >60 mls/min/1.73/m2   Magnesium   Result Value Ref Range    Magnesium Level 1.60 1.60 - 2.60 mg/dL   Phosphorus   Result Value Ref Range    Phosphorus Level 2.3 2.3 - 4.7 mg/dL   CBC with Differential   Result Value Ref Range    WBC 12.61 (H) 4.50 - 11.50 x10(3)/mcL    RBC 4.27 (L) 4.70 - 6.10 x10(6)/mcL    Hgb 11.5 (L) 14.0 - 18.0 g/dL    Hct 34.2 (L) 42.0 - 52.0 %    MCV 80.1 80.0 - 94.0 fL    MCH 26.9 (L) 27.0 - 31.0 pg    MCHC 33.6 33.0 - 36.0 g/dL    RDW 14.6 11.5 - 17.0 %    Platelet 246 130 - 400 x10(3)/mcL    MPV 9.6 7.4 - 10.4 fL    Neut % 72.1 %    Lymph % 21.4 %    Mono % 4.8 %    Eos % 1.0 %    Basophil % 0.4 %    Lymph # 2.70 0.6 - 4.6 x10(3)/mcL    Neut # 9.10 2.1 - 9.2 x10(3)/mcL    Mono # 0.60 0.1 - 1.3 x10(3)/mcL    Eos # 0.12 0 - 0.9 x10(3)/mcL    Baso # 0.05 <=0.2 x10(3)/mcL    IG# 0.04 0 - 0.04 x10(3)/mcL    IG% 0.3 %    NRBC% 0.0 %   Lipase   Result Value Ref Range    Lipase Level 7 <=60 U/L   Lactic Acid, Plasma   Result Value Ref Range    Lactic Acid Level 1.0 0.5 - 2.2 mmol/L   Ethanol   Result Value Ref Range    Ethanol Level <10.0 <=10.0 mg/dL   Iron and TIBC   Result Value Ref Range    Iron Binding Capacity Unsaturated 153 69 - 240 ug/dL    Iron Level 37 (L) 65 - 175 ug/dL    Transferrin 161 (L) 174 - 364 mg/dL    Iron Binding Capacity Total 190 (L) 250 - 450 ug/dL    Iron Saturation 19 (L) 20 - 50 %   Ferritin   Result Value Ref Range    Ferritin Level 299.48 (H) 21.81 - 274.66 ng/mL   Transferrin   Result Value Ref Range    Transferrin 166 (L) 174 - 364 mg/dL   Comprehensive Metabolic Panel   Result Value Ref Range    Sodium Level 135 (L) 136 - 145 mmol/L    Potassium Level 4.2 3.5 - 5.1 mmol/L    Chloride 100 98 - 107 mmol/L    Carbon Dioxide 28 22 - 29 mmol/L    Glucose Level 242 (H) 74 - 100 mg/dL    Blood Urea Nitrogen 13.8 8.9 - 20.6 mg/dL    Creatinine 0.98 0.73 - 1.18 mg/dL    Calcium Level Total 9.2 8.4 - 10.2 mg/dL    Protein  Total 6.0 (L) 6.4 - 8.3 gm/dL    Albumin Level 2.8 (L) 3.5 - 5.0 g/dL    Globulin 3.2 2.4 - 3.5 gm/dL    Albumin/Globulin Ratio 0.9 (L) 1.1 - 2.0 ratio    Bilirubin Total 0.3 <=1.5 mg/dL    Alkaline Phosphatase 67 40 - 150 unit/L    Alanine Aminotransferase 14 0 - 55 unit/L    Aspartate Aminotransferase 19 5 - 34 unit/L    eGFR >60 mls/min/1.73/m2   Magnesium   Result Value Ref Range    Magnesium Level 1.30 (L) 1.60 - 2.60 mg/dL   Phosphorus   Result Value Ref Range    Phosphorus Level 2.4 2.3 - 4.7 mg/dL   CBC with Differential   Result Value Ref Range    WBC 5.89 4.50 - 11.50 x10(3)/mcL    RBC 4.07 (L) 4.70 - 6.10 x10(6)/mcL    Hgb 10.7 (L) 14.0 - 18.0 g/dL    Hct 33.0 (L) 42.0 - 52.0 %    MCV 81.1 80.0 - 94.0 fL    MCH 26.3 (L) 27.0 - 31.0 pg    MCHC 32.4 (L) 33.0 - 36.0 g/dL    RDW 14.5 11.5 - 17.0 %    Platelet 192 130 - 400 x10(3)/mcL    MPV 10.4 7.4 - 10.4 fL    Neut % 48.1 %    Lymph % 42.1 %    Mono % 6.1 %    Eos % 3.1 %    Basophil % 0.3 %    Lymph # 2.48 0.6 - 4.6 x10(3)/mcL    Neut # 2.83 2.1 - 9.2 x10(3)/mcL    Mono # 0.36 0.1 - 1.3 x10(3)/mcL    Eos # 0.18 0 - 0.9 x10(3)/mcL    Baso # 0.02 <=0.2 x10(3)/mcL    IG# 0.02 0 - 0.04 x10(3)/mcL    IG% 0.3 %    NRBC% 0.0 %   Specimen to Pathology   Result Value Ref Range    Case Report       [facility] Surgical Pathology                            Case: [id number]                                 Authorizing Provider:  Daiana Chilel MD   Collected:           09/01/2023 10:30 AM          Ordering Location:     [facility] -       Received:            09/01/2023 02:03 PM                                 Endoscopy                                                                    Pathologist:           Margo Bosch MD                                                        Specimen:    Stomach, gastric bxs r/o h pylori                                                          Final Diagnosis         Gastric biopsy:  - Mild chronic gastritis.  - Diff  "Quik stain is negative for H.pylori organisms, appropriate controls reviewed.  - Negative for dysplasia.          Clinical Information       Procedure:  EGD (ESOPHAGOGASTRODUODENOSCOPY) - Left  Pre-op Diagnosis:  K92.0 - Coffee ground emesis [ICD-10-CM]  K92.1 - Melena [ICD-10-CM]  Post-op Diagnosis:  K92.0 - Coffee ground emesis [ICD-10-CM]  K92.1 - Melena [ICD-10-CM]      Microscopic Description       A microscopic examination was performed and the diagnosis reflects the findings.          Gross Description       1. Stomach, gastric bxs r/o h pylori:   Received in 10% neutral buffered formalin are multiple fragments of soft tan tissue measuring 1-2 mm.  Entirely submitted in 1 cassette.      Report Footnotes       Unless the case is a "gross only" or additional testing only, the final diagnosis for each specimen is based on a microscopic examination of appropriate tissue sections.     POCT glucose   Result Value Ref Range    POCT Glucose >500 (HH) 70 - 110 mg/dL   POCT glucose   Result Value Ref Range    POCT Glucose 461 (HH) 70 - 110 mg/dL   POCT glucose   Result Value Ref Range    POCT Glucose 363 (H) 70 - 110 mg/dL   POCT glucose   Result Value Ref Range    POCT Glucose >500 (HH) 70 - 110 mg/dL   POCT glucose   Result Value Ref Range    POCT Glucose 264 (H) 70 - 110 mg/dL   POCT glucose   Result Value Ref Range    POCT Glucose 259 (H) 70 - 110 mg/dL   POCT glucose   Result Value Ref Range    POCT Glucose 227 (H) 70 - 110 mg/dL   POCT glucose   Result Value Ref Range    POCT Glucose 159 (H) 70 - 110 mg/dL   POCT glucose   Result Value Ref Range    POCT Glucose 141 (H) 70 - 110 mg/dL   POCT glucose   Result Value Ref Range    POCT Glucose 112 (H) 70 - 110 mg/dL   POCT glucose   Result Value Ref Range    POCT Glucose 158 (H) 70 - 110 mg/dL   POCT glucose   Result Value Ref Range    POCT Glucose 300 (H) 70 - 110 mg/dL   POCT glucose   Result Value Ref Range    POCT Glucose 144 (H) 70 - 110 mg/dL   POCT glucose "   Result Value Ref Range    POCT Glucose 237 (H) 70 - 110 mg/dL   POCT glucose   Result Value Ref Range    POCT Glucose 162 (H) 70 - 110 mg/dL   POCT glucose   Result Value Ref Range    POCT Glucose 84 70 - 110 mg/dL   POCT glucose   Result Value Ref Range    POCT Glucose 214 (H) 70 - 110 mg/dL   POCT glucose   Result Value Ref Range    POCT Glucose 175 (H) 70 - 110 mg/dL   POCT glucose   Result Value Ref Range    POCT Glucose 149 (H) 70 - 110 mg/dL   POCT glucose   Result Value Ref Range    POCT Glucose 150 (H) 70 - 110 mg/dL       Studies:      None    Review of Systems:     Review of Systems   All other systems reviewed and are negative.      Physical Exams:     Vitals:    12/14/23 1526   BP: (!) 156/94   Pulse: 72   Resp: 14   Temp: 97.8 °F (36.6 °C)       Physical Exam  Vitals and nursing note reviewed.   Constitutional:       Appearance: Normal appearance.   HENT:      Head: Normocephalic and atraumatic.      Nose: Nose normal.      Mouth/Throat:      Mouth: Mucous membranes are moist.      Pharynx: Oropharynx is clear.   Eyes:      Conjunctiva/sclera: Conjunctivae normal.   Cardiovascular:      Rate and Rhythm: Normal rate and regular rhythm.      Pulses: Normal pulses.      Heart sounds: Normal heart sounds.   Pulmonary:      Effort: Pulmonary effort is normal.      Breath sounds: Normal breath sounds.   Abdominal:      General: Abdomen is flat.      Palpations: Abdomen is soft.   Musculoskeletal:         General: Normal range of motion.      Cervical back: Normal range of motion.   Skin:     Findings: Erythema present.      Comments: Venous stasis changes with scaling in bilateral LE   Neurological:      Mental Status: He is alert.   Psychiatric:         Mood and Affect: Mood normal.         Comprehensive Neurological Exam:  Mental Status: Alert Oriented to Self, Date, and Place. Comprehension wnl. No dysarthria.   CN II - XII: ROSEANNE, No APD,  VFFC, No ptosis OU, EOMI without nystagmus, LT/Temp symmetric in  CN V1-3 distribution, Hearing grossly intact, Face Symmetric, Tongue and Uvula midline, Trapezius symmetric bilateral.   Motor: tone and bulk wnl throughout, no abnormal involuntary or voluntary movements, 5/5 to confrontation, Fine finger movements wnl b/l, No pronator drift.   Sensory: Temp decreased in stocking glove distribution up to bilateral proximal 1/4 of bilateral shin and wrist. Vibration absent up to bilateral UE DIP.   Reflexes: 1+ throughout in bilateral UE, absent in bilateral LE, plantar reflexes downward bilateral.   Cerebellar: FNF wnl b/l, RAHM wnl b/l  Romberg: Negative  Gait: normal. Heel Gait, Toe Gait wnl.    Assessment:     This is 43 y.o. male with history of poorly-controlled DM I with frequent DKA, who is referred for diabetic polyneuropathy.     Problem List Items Addressed This Visit          Neuro    Diabetic polyneuropathy - Primary (Chronic)       Plan     [] discontinue IVIG. Patient may benefit from seeing pain management at this point in time.     RTC PRN      I have explained the treatment plan, diagnosis, and prognosis to patient. All questions are answered to the best of my knowledge.     Face to face time 30 minutes, including documentation, chart review, counseling, education, review of test results, relevant medical records, and coordination of care.   I have explained the treatment plan, diagnosis, and prognosis to patient. All questions are answered to the best of my knowledge.       Tiffany Pfeiffer MD   General Neurology  12/14/2023

## 2023-12-14 ENCOUNTER — OFFICE VISIT (OUTPATIENT)
Dept: NEUROLOGY | Facility: CLINIC | Age: 43
End: 2023-12-14
Payer: MEDICAID

## 2023-12-14 VITALS
HEIGHT: 72 IN | WEIGHT: 171 LBS | SYSTOLIC BLOOD PRESSURE: 156 MMHG | BODY MASS INDEX: 23.16 KG/M2 | TEMPERATURE: 98 F | HEART RATE: 72 BPM | DIASTOLIC BLOOD PRESSURE: 94 MMHG | RESPIRATION RATE: 14 BRPM | OXYGEN SATURATION: 100 %

## 2023-12-14 DIAGNOSIS — E10.42 DIABETIC POLYNEUROPATHY ASSOCIATED WITH TYPE 1 DIABETES MELLITUS: Primary | ICD-10-CM

## 2023-12-14 PROBLEM — G61.81 CIDP (CHRONIC INFLAMMATORY DEMYELINATING POLYNEUROPATHY): Status: RESOLVED | Noted: 2023-07-19 | Resolved: 2023-12-14

## 2023-12-14 PROCEDURE — 4010F PR ACE/ARB THEARPY RXD/TAKEN: ICD-10-PCS | Mod: CPTII,,, | Performed by: PSYCHIATRY & NEUROLOGY

## 2023-12-14 PROCEDURE — 99214 PR OFFICE/OUTPT VISIT, EST, LEVL IV, 30-39 MIN: ICD-10-PCS | Mod: S$PBB,,, | Performed by: PSYCHIATRY & NEUROLOGY

## 2023-12-14 PROCEDURE — 4010F ACE/ARB THERAPY RXD/TAKEN: CPT | Mod: CPTII,,, | Performed by: PSYCHIATRY & NEUROLOGY

## 2023-12-14 PROCEDURE — 3080F PR MOST RECENT DIASTOLIC BLOOD PRESSURE >= 90 MM HG: ICD-10-PCS | Mod: CPTII,,, | Performed by: PSYCHIATRY & NEUROLOGY

## 2023-12-14 PROCEDURE — 1159F MED LIST DOCD IN RCRD: CPT | Mod: CPTII,,, | Performed by: PSYCHIATRY & NEUROLOGY

## 2023-12-14 PROCEDURE — 3046F PR MOST RECENT HEMOGLOBIN A1C LEVEL > 9.0%: ICD-10-PCS | Mod: CPTII,,, | Performed by: PSYCHIATRY & NEUROLOGY

## 2023-12-14 PROCEDURE — 3008F PR BODY MASS INDEX (BMI) DOCUMENTED: ICD-10-PCS | Mod: CPTII,,, | Performed by: PSYCHIATRY & NEUROLOGY

## 2023-12-14 PROCEDURE — 3066F PR DOCUMENTATION OF TREATMENT FOR NEPHROPATHY: ICD-10-PCS | Mod: CPTII,,, | Performed by: PSYCHIATRY & NEUROLOGY

## 2023-12-14 PROCEDURE — 99214 OFFICE O/P EST MOD 30 MIN: CPT | Mod: PBBFAC | Performed by: PSYCHIATRY & NEUROLOGY

## 2023-12-14 PROCEDURE — 3060F PR POS MICROALBUMINURIA RESULT DOCUMENTED/REVIEW: ICD-10-PCS | Mod: CPTII,,, | Performed by: PSYCHIATRY & NEUROLOGY

## 2023-12-14 PROCEDURE — 3077F SYST BP >= 140 MM HG: CPT | Mod: CPTII,,, | Performed by: PSYCHIATRY & NEUROLOGY

## 2023-12-14 PROCEDURE — 99214 OFFICE O/P EST MOD 30 MIN: CPT | Mod: S$PBB,,, | Performed by: PSYCHIATRY & NEUROLOGY

## 2023-12-14 PROCEDURE — 1159F PR MEDICATION LIST DOCUMENTED IN MEDICAL RECORD: ICD-10-PCS | Mod: CPTII,,, | Performed by: PSYCHIATRY & NEUROLOGY

## 2023-12-14 PROCEDURE — 3077F PR MOST RECENT SYSTOLIC BLOOD PRESSURE >= 140 MM HG: ICD-10-PCS | Mod: CPTII,,, | Performed by: PSYCHIATRY & NEUROLOGY

## 2023-12-14 PROCEDURE — 3060F POS MICROALBUMINURIA REV: CPT | Mod: CPTII,,, | Performed by: PSYCHIATRY & NEUROLOGY

## 2023-12-14 PROCEDURE — 3066F NEPHROPATHY DOC TX: CPT | Mod: CPTII,,, | Performed by: PSYCHIATRY & NEUROLOGY

## 2023-12-14 PROCEDURE — 3046F HEMOGLOBIN A1C LEVEL >9.0%: CPT | Mod: CPTII,,, | Performed by: PSYCHIATRY & NEUROLOGY

## 2023-12-14 PROCEDURE — 3008F BODY MASS INDEX DOCD: CPT | Mod: CPTII,,, | Performed by: PSYCHIATRY & NEUROLOGY

## 2023-12-14 PROCEDURE — 3080F DIAST BP >= 90 MM HG: CPT | Mod: CPTII,,, | Performed by: PSYCHIATRY & NEUROLOGY

## 2023-12-14 RX ORDER — GLUCOSAM/CHON-MSM1/C/MANG/BOSW 500-416.6
TABLET ORAL 4 TIMES DAILY
COMMUNITY
Start: 2023-11-30

## 2023-12-14 RX ORDER — LIDOCAINE 50 MG/G
2 OINTMENT TOPICAL 2 TIMES DAILY PRN
COMMUNITY
Start: 2023-12-05

## 2023-12-22 ENCOUNTER — HOSPITAL ENCOUNTER (OUTPATIENT)
Facility: HOSPITAL | Age: 43
Discharge: HOME | End: 2023-12-24
Attending: EMERGENCY MEDICINE | Admitting: INTERNAL MEDICINE
Payer: MEDICAID

## 2023-12-22 DIAGNOSIS — E11.10 DM (DIABETES MELLITUS) TYPE 2, UNCONTROLLED, WITH KETOACIDOSIS: ICD-10-CM

## 2023-12-22 DIAGNOSIS — R07.9 CHEST PAIN: ICD-10-CM

## 2023-12-22 DIAGNOSIS — E10.65 UNCONTROLLED TYPE 1 DIABETES MELLITUS WITH HYPERGLYCEMIA: ICD-10-CM

## 2023-12-22 DIAGNOSIS — G62.9 NEUROPATHY: Primary | ICD-10-CM

## 2023-12-22 DIAGNOSIS — R52 PAIN: ICD-10-CM

## 2023-12-22 DIAGNOSIS — E16.2 HYPOGLYCEMIA: ICD-10-CM

## 2023-12-22 LAB
ALBUMIN SERPL-MCNC: 3.5 G/DL (ref 3.5–5)
ALBUMIN/GLOB SERPL: 1 RATIO (ref 1.1–2)
ALP SERPL-CCNC: 99 UNIT/L (ref 40–150)
ALT SERPL-CCNC: 12 UNIT/L (ref 0–55)
AMPHET UR QL SCN: POSITIVE
APPEARANCE UR: CLEAR
AST SERPL-CCNC: 15 UNIT/L (ref 5–34)
B-OH-BUTYR SERPL-MCNC: 0.1 MMOL/L
BACTERIA #/AREA URNS AUTO: ABNORMAL /HPF
BARBITURATE SCN PRESENT UR: NEGATIVE
BASOPHILS # BLD AUTO: 0.08 X10(3)/MCL
BASOPHILS NFR BLD AUTO: 1.1 %
BENZODIAZ UR QL SCN: NEGATIVE
BILIRUB SERPL-MCNC: 0.3 MG/DL
BILIRUB UR QL STRIP.AUTO: NEGATIVE
BUN SERPL-MCNC: 22.8 MG/DL (ref 8.9–20.6)
CALCIUM SERPL-MCNC: 9.3 MG/DL (ref 8.4–10.2)
CANNABINOIDS UR QL SCN: NEGATIVE
CHLORIDE SERPL-SCNC: 108 MMOL/L (ref 98–107)
CO2 SERPL-SCNC: 25 MMOL/L (ref 22–29)
COCAINE UR QL SCN: NEGATIVE
COLOR UR AUTO: ABNORMAL
CREAT SERPL-MCNC: 1.04 MG/DL (ref 0.73–1.18)
EOSINOPHIL # BLD AUTO: 0.78 X10(3)/MCL (ref 0–0.9)
EOSINOPHIL NFR BLD AUTO: 10.7 %
ERYTHROCYTE [DISTWIDTH] IN BLOOD BY AUTOMATED COUNT: 13.4 % (ref 11.5–17)
EST. AVERAGE GLUCOSE BLD GHB EST-MCNC: 165.7 MG/DL
FENTANYL UR QL SCN: NEGATIVE
FLUAV AG UPPER RESP QL IA.RAPID: NOT DETECTED
FLUBV AG UPPER RESP QL IA.RAPID: NOT DETECTED
GFR SERPLBLD CREATININE-BSD FMLA CKD-EPI: >60 MLS/MIN/1.73/M2
GLOBULIN SER-MCNC: 3.5 GM/DL (ref 2.4–3.5)
GLUCOSE SERPL-MCNC: 53 MG/DL (ref 74–100)
GLUCOSE UR QL STRIP.AUTO: NORMAL
HBA1C MFR BLD: 7.4 %
HCT VFR BLD AUTO: 38.3 % (ref 42–52)
HGB BLD-MCNC: 12.6 G/DL (ref 14–18)
HOLD SPECIMEN: NORMAL
HYALINE CASTS #/AREA URNS LPF: ABNORMAL /LPF
IMM GRANULOCYTES # BLD AUTO: 0.01 X10(3)/MCL (ref 0–0.04)
IMM GRANULOCYTES NFR BLD AUTO: 0.1 %
KETONES UR QL STRIP.AUTO: NEGATIVE
LACTATE SERPL-SCNC: 1.5 MMOL/L (ref 0.5–2.2)
LEUKOCYTE ESTERASE UR QL STRIP.AUTO: 25
LYMPHOCYTES # BLD AUTO: 2.85 X10(3)/MCL (ref 0.6–4.6)
LYMPHOCYTES NFR BLD AUTO: 39.2 %
MAGNESIUM SERPL-MCNC: 1.6 MG/DL (ref 1.6–2.6)
MCH RBC QN AUTO: 26.6 PG (ref 27–31)
MCHC RBC AUTO-ENTMCNC: 32.9 G/DL (ref 33–36)
MCV RBC AUTO: 80.8 FL (ref 80–94)
MDMA UR QL SCN: NEGATIVE
MONOCYTES # BLD AUTO: 0.33 X10(3)/MCL (ref 0.1–1.3)
MONOCYTES NFR BLD AUTO: 4.5 %
MUCOUS THREADS URNS QL MICRO: ABNORMAL /LPF
NEUTROPHILS # BLD AUTO: 3.22 X10(3)/MCL (ref 2.1–9.2)
NEUTROPHILS NFR BLD AUTO: 44.4 %
NITRITE UR QL STRIP.AUTO: NEGATIVE
NRBC BLD AUTO-RTO: 0 %
OPIATES UR QL SCN: NEGATIVE
PCP UR QL: NEGATIVE
PH UR STRIP.AUTO: 6 [PH]
PH UR: 6 [PH] (ref 3–11)
PHOSPHATE SERPL-MCNC: 3.2 MG/DL (ref 2.3–4.7)
PLATELET # BLD AUTO: 259 X10(3)/MCL (ref 130–400)
PMV BLD AUTO: 9.7 FL (ref 7.4–10.4)
POCT GLUCOSE: 165 MG/DL (ref 70–110)
POCT GLUCOSE: 46 MG/DL (ref 70–110)
POCT GLUCOSE: 93 MG/DL (ref 70–110)
POTASSIUM SERPL-SCNC: 3.8 MMOL/L (ref 3.5–5.1)
PROT SERPL-MCNC: 7 GM/DL (ref 6.4–8.3)
PROT UR QL STRIP.AUTO: ABNORMAL
RBC # BLD AUTO: 4.74 X10(6)/MCL (ref 4.7–6.1)
RBC #/AREA URNS AUTO: ABNORMAL /HPF
RBC UR QL AUTO: ABNORMAL
SARS-COV-2 RNA RESP QL NAA+PROBE: NOT DETECTED
SODIUM SERPL-SCNC: 141 MMOL/L (ref 136–145)
SP GR UR STRIP.AUTO: 1.02 (ref 1–1.03)
SPECIFIC GRAVITY, URINE AUTO (.000) (OHS): 1.02 (ref 1–1.03)
SQUAMOUS #/AREA URNS LPF: ABNORMAL /HPF
T3FREE SERPL-MCNC: 2.8 PG/ML (ref 1.58–3.91)
T4 FREE SERPL-MCNC: 1.03 NG/DL (ref 0.7–1.48)
TSH SERPL-ACNC: 0.97 UIU/ML (ref 0.35–4.94)
UROBILINOGEN UR STRIP-ACNC: NORMAL
WBC # SPEC AUTO: 7.27 X10(3)/MCL (ref 4.5–11.5)
WBC #/AREA URNS AUTO: ABNORMAL /HPF

## 2023-12-22 PROCEDURE — 80307 DRUG TEST PRSMV CHEM ANLYZR: CPT

## 2023-12-22 PROCEDURE — 80053 COMPREHEN METABOLIC PANEL: CPT | Performed by: PHYSICIAN ASSISTANT

## 2023-12-22 PROCEDURE — 96372 THER/PROPH/DIAG INJ SC/IM: CPT | Mod: 59

## 2023-12-22 PROCEDURE — 82010 KETONE BODYS QUAN: CPT | Performed by: PHYSICIAN ASSISTANT

## 2023-12-22 PROCEDURE — 85025 COMPLETE CBC W/AUTO DIFF WBC: CPT | Performed by: PHYSICIAN ASSISTANT

## 2023-12-22 PROCEDURE — 93005 ELECTROCARDIOGRAM TRACING: CPT

## 2023-12-22 PROCEDURE — G0378 HOSPITAL OBSERVATION PER HR: HCPCS

## 2023-12-22 PROCEDURE — 25000003 PHARM REV CODE 250

## 2023-12-22 PROCEDURE — 83735 ASSAY OF MAGNESIUM: CPT | Performed by: PHYSICIAN ASSISTANT

## 2023-12-22 PROCEDURE — 82962 GLUCOSE BLOOD TEST: CPT

## 2023-12-22 PROCEDURE — 0240U COVID/FLU A&B PCR: CPT | Performed by: PHYSICIAN ASSISTANT

## 2023-12-22 PROCEDURE — 83036 HEMOGLOBIN GLYCOSYLATED A1C: CPT | Performed by: PHYSICIAN ASSISTANT

## 2023-12-22 PROCEDURE — 83605 ASSAY OF LACTIC ACID: CPT

## 2023-12-22 PROCEDURE — 84481 FREE ASSAY (FT-3): CPT

## 2023-12-22 PROCEDURE — 84443 ASSAY THYROID STIM HORMONE: CPT | Performed by: PHYSICIAN ASSISTANT

## 2023-12-22 PROCEDURE — 84439 ASSAY OF FREE THYROXINE: CPT

## 2023-12-22 PROCEDURE — 81001 URINALYSIS AUTO W/SCOPE: CPT | Performed by: PHYSICIAN ASSISTANT

## 2023-12-22 PROCEDURE — 96375 TX/PRO/DX INJ NEW DRUG ADDON: CPT

## 2023-12-22 PROCEDURE — 84100 ASSAY OF PHOSPHORUS: CPT | Performed by: PHYSICIAN ASSISTANT

## 2023-12-22 PROCEDURE — 25000003 PHARM REV CODE 250: Performed by: PHYSICIAN ASSISTANT

## 2023-12-22 PROCEDURE — 99285 EMERGENCY DEPT VISIT HI MDM: CPT

## 2023-12-22 PROCEDURE — 63600175 PHARM REV CODE 636 W HCPCS

## 2023-12-22 RX ORDER — GLUCAGON 1 MG
1 KIT INJECTION
Status: DISCONTINUED | OUTPATIENT
Start: 2023-12-22 | End: 2023-12-24 | Stop reason: HOSPADM

## 2023-12-22 RX ORDER — LABETALOL HCL 20 MG/4 ML
10 SYRINGE (ML) INTRAVENOUS EVERY 4 HOURS PRN
Status: DISCONTINUED | OUTPATIENT
Start: 2023-12-22 | End: 2023-12-24 | Stop reason: HOSPADM

## 2023-12-22 RX ORDER — PROCHLORPERAZINE EDISYLATE 5 MG/ML
5 INJECTION INTRAMUSCULAR; INTRAVENOUS EVERY 6 HOURS PRN
Status: DISCONTINUED | OUTPATIENT
Start: 2023-12-22 | End: 2023-12-24 | Stop reason: HOSPADM

## 2023-12-22 RX ORDER — POLYETHYLENE GLYCOL 3350 17 G/17G
17 POWDER, FOR SOLUTION ORAL DAILY PRN
Status: DISCONTINUED | OUTPATIENT
Start: 2023-12-22 | End: 2023-12-24 | Stop reason: HOSPADM

## 2023-12-22 RX ORDER — INSULIN ASPART 100 [IU]/ML
0-5 INJECTION, SOLUTION INTRAVENOUS; SUBCUTANEOUS
Status: DISCONTINUED | OUTPATIENT
Start: 2023-12-22 | End: 2023-12-23

## 2023-12-22 RX ORDER — CETIRIZINE HYDROCHLORIDE 10 MG/1
10 TABLET ORAL DAILY
Status: DISCONTINUED | OUTPATIENT
Start: 2023-12-23 | End: 2023-12-23

## 2023-12-22 RX ORDER — ENOXAPARIN SODIUM 100 MG/ML
40 INJECTION SUBCUTANEOUS EVERY 24 HOURS
Status: DISCONTINUED | OUTPATIENT
Start: 2023-12-22 | End: 2023-12-24 | Stop reason: HOSPADM

## 2023-12-22 RX ORDER — DULOXETIN HYDROCHLORIDE 30 MG/1
30 CAPSULE, DELAYED RELEASE ORAL DAILY
Status: DISCONTINUED | OUTPATIENT
Start: 2023-12-22 | End: 2023-12-24 | Stop reason: HOSPADM

## 2023-12-22 RX ORDER — AMOXICILLIN 250 MG
1 CAPSULE ORAL 2 TIMES DAILY PRN
Status: DISCONTINUED | OUTPATIENT
Start: 2023-12-22 | End: 2023-12-24 | Stop reason: HOSPADM

## 2023-12-22 RX ORDER — NALOXONE HCL 0.4 MG/ML
0.02 VIAL (ML) INJECTION
Status: DISCONTINUED | OUTPATIENT
Start: 2023-12-22 | End: 2023-12-24 | Stop reason: HOSPADM

## 2023-12-22 RX ORDER — IBUPROFEN 200 MG
16 TABLET ORAL
Status: DISCONTINUED | OUTPATIENT
Start: 2023-12-22 | End: 2023-12-24 | Stop reason: HOSPADM

## 2023-12-22 RX ORDER — TALC
6 POWDER (GRAM) TOPICAL NIGHTLY PRN
Status: DISCONTINUED | OUTPATIENT
Start: 2023-12-22 | End: 2023-12-24 | Stop reason: HOSPADM

## 2023-12-22 RX ORDER — IBUPROFEN 200 MG
1 TABLET ORAL DAILY PRN
Status: DISCONTINUED | OUTPATIENT
Start: 2023-12-22 | End: 2023-12-24 | Stop reason: HOSPADM

## 2023-12-22 RX ORDER — IBUPROFEN 200 MG
24 TABLET ORAL
Status: DISCONTINUED | OUTPATIENT
Start: 2023-12-22 | End: 2023-12-24 | Stop reason: HOSPADM

## 2023-12-22 RX ORDER — SIMETHICONE 80 MG
1 TABLET,CHEWABLE ORAL 3 TIMES DAILY PRN
Status: DISCONTINUED | OUTPATIENT
Start: 2023-12-22 | End: 2023-12-24 | Stop reason: HOSPADM

## 2023-12-22 RX ORDER — ONDANSETRON 4 MG/1
8 TABLET, ORALLY DISINTEGRATING ORAL EVERY 8 HOURS PRN
Status: DISCONTINUED | OUTPATIENT
Start: 2023-12-22 | End: 2023-12-24 | Stop reason: HOSPADM

## 2023-12-22 RX ORDER — ACETAMINOPHEN 325 MG/1
650 TABLET ORAL EVERY 4 HOURS PRN
Status: DISCONTINUED | OUTPATIENT
Start: 2023-12-22 | End: 2023-12-24 | Stop reason: HOSPADM

## 2023-12-22 RX ORDER — LISINOPRIL 2.5 MG/1
5 TABLET ORAL DAILY
Status: DISCONTINUED | OUTPATIENT
Start: 2023-12-23 | End: 2023-12-24

## 2023-12-22 RX ORDER — HYDRALAZINE HYDROCHLORIDE 20 MG/ML
10 INJECTION INTRAMUSCULAR; INTRAVENOUS EVERY 8 HOURS PRN
Status: DISCONTINUED | OUTPATIENT
Start: 2023-12-22 | End: 2023-12-24 | Stop reason: HOSPADM

## 2023-12-22 RX ORDER — SODIUM CHLORIDE 0.9 % (FLUSH) 0.9 %
10 SYRINGE (ML) INJECTION
Status: DISCONTINUED | OUTPATIENT
Start: 2023-12-22 | End: 2023-12-24 | Stop reason: HOSPADM

## 2023-12-22 RX ADMIN — DEXTROSE MONOHYDRATE 250 ML: 100 INJECTION, SOLUTION INTRAVENOUS at 07:12

## 2023-12-22 RX ADMIN — DULOXETINE HYDROCHLORIDE 30 MG: 30 CAPSULE, DELAYED RELEASE ORAL at 09:12

## 2023-12-22 RX ADMIN — ENOXAPARIN SODIUM 40 MG: 40 INJECTION SUBCUTANEOUS at 09:12

## 2023-12-22 RX ADMIN — ACETAMINOPHEN 650 MG: 325 TABLET, FILM COATED ORAL at 09:12

## 2023-12-23 PROBLEM — R52 PAIN: Status: ACTIVE | Noted: 2023-12-23

## 2023-12-23 PROBLEM — E11.10 DM (DIABETES MELLITUS) TYPE 2, UNCONTROLLED, WITH KETOACIDOSIS: Status: ACTIVE | Noted: 2022-10-11

## 2023-12-23 LAB
ADV 40+41 DNA STL QL NAA+NON-PROBE: NOT DETECTED
ALBUMIN SERPL-MCNC: 3.2 G/DL (ref 3.5–5)
ALBUMIN/GLOB SERPL: 1 RATIO (ref 1.1–2)
ALP SERPL-CCNC: 93 UNIT/L (ref 40–150)
ALT SERPL-CCNC: 12 UNIT/L (ref 0–55)
APPEARANCE UR: CLEAR
AST SERPL-CCNC: 13 UNIT/L (ref 5–34)
ASTRO TYP 1-8 RNA STL QL NAA+NON-PROBE: NOT DETECTED
BACTERIA #/AREA URNS AUTO: ABNORMAL /HPF
BASOPHILS # BLD AUTO: 0.06 X10(3)/MCL
BASOPHILS NFR BLD AUTO: 1 %
BILIRUB SERPL-MCNC: 0.5 MG/DL
BILIRUB UR QL STRIP.AUTO: NEGATIVE
BUN SERPL-MCNC: 18.2 MG/DL (ref 8.9–20.6)
C CAYETANENSIS DNA STL QL NAA+NON-PROBE: NOT DETECTED
C COLI+JEJ+UPSA DNA STL QL NAA+NON-PROBE: NOT DETECTED
CALCIUM SERPL-MCNC: 9 MG/DL (ref 8.4–10.2)
CHLORIDE SERPL-SCNC: 105 MMOL/L (ref 98–107)
CLOSTRIDIUM DIFFICILE GDH ANTIGEN (OHS): NEGATIVE
CLOSTRIDIUM DIFFICILE TOXIN A/B (OHS): NEGATIVE
CO2 SERPL-SCNC: 23 MMOL/L (ref 22–29)
COLOR UR AUTO: ABNORMAL
CORTIS SERPL-SCNC: 14 UG/DL
CREAT SERPL-MCNC: 0.89 MG/DL (ref 0.73–1.18)
CRYPTOSP DNA STL QL NAA+NON-PROBE: NOT DETECTED
E HISTOLYT DNA STL QL NAA+NON-PROBE: NOT DETECTED
EAEC PAA PLAS AGGR+AATA ST NAA+NON-PRB: NOT DETECTED
EC STX1+STX2 GENES STL QL NAA+NON-PROBE: NOT DETECTED
EOSINOPHIL # BLD AUTO: 0.74 X10(3)/MCL (ref 0–0.9)
EOSINOPHIL NFR BLD AUTO: 11.9 %
EPEC EAE GENE STL QL NAA+NON-PROBE: NOT DETECTED
ERYTHROCYTE [DISTWIDTH] IN BLOOD BY AUTOMATED COUNT: 13.3 % (ref 11.5–17)
ETEC LTA+ST1A+ST1B TOX ST NAA+NON-PROBE: NOT DETECTED
G LAMBLIA DNA STL QL NAA+NON-PROBE: NOT DETECTED
GFR SERPLBLD CREATININE-BSD FMLA CKD-EPI: >60 MLS/MIN/1.73/M2
GLOBULIN SER-MCNC: 3.2 GM/DL (ref 2.4–3.5)
GLUCOSE SERPL-MCNC: 258 MG/DL (ref 74–100)
GLUCOSE UR QL STRIP.AUTO: ABNORMAL
HCT VFR BLD AUTO: 37.8 % (ref 42–52)
HGB BLD-MCNC: 12.9 G/DL (ref 14–18)
HOLD SPECIMEN: NORMAL
HYALINE CASTS #/AREA URNS LPF: ABNORMAL /LPF
IMM GRANULOCYTES # BLD AUTO: 0.01 X10(3)/MCL (ref 0–0.04)
IMM GRANULOCYTES NFR BLD AUTO: 0.2 %
KETONES UR QL STRIP.AUTO: NEGATIVE
LEUKOCYTE ESTERASE UR QL STRIP.AUTO: NEGATIVE
LYMPHOCYTES # BLD AUTO: 2.83 X10(3)/MCL (ref 0.6–4.6)
LYMPHOCYTES NFR BLD AUTO: 45.5 %
MAGNESIUM SERPL-MCNC: 1.5 MG/DL (ref 1.6–2.6)
MCH RBC QN AUTO: 26.9 PG (ref 27–31)
MCHC RBC AUTO-ENTMCNC: 34.1 G/DL (ref 33–36)
MCV RBC AUTO: 78.8 FL (ref 80–94)
MONOCYTES # BLD AUTO: 0.39 X10(3)/MCL (ref 0.1–1.3)
MONOCYTES NFR BLD AUTO: 6.3 %
MUCOUS THREADS URNS QL MICRO: ABNORMAL /LPF
NEUTROPHILS # BLD AUTO: 2.19 X10(3)/MCL (ref 2.1–9.2)
NEUTROPHILS NFR BLD AUTO: 35.1 %
NITRITE UR QL STRIP.AUTO: NEGATIVE
NOROVIRUS GI+II RNA STL QL NAA+NON-PROBE: NOT DETECTED
NRBC BLD AUTO-RTO: 0 %
P SHIGELLOIDES DNA STL QL NAA+NON-PROBE: NOT DETECTED
PH UR STRIP.AUTO: 5.5 [PH]
PHOSPHATE SERPL-MCNC: 2.6 MG/DL (ref 2.3–4.7)
PLATELET # BLD AUTO: 232 X10(3)/MCL (ref 130–400)
PMV BLD AUTO: 9.6 FL (ref 7.4–10.4)
POCT GLUCOSE: 218 MG/DL (ref 70–110)
POCT GLUCOSE: 223 MG/DL (ref 70–110)
POCT GLUCOSE: 280 MG/DL (ref 70–110)
POCT GLUCOSE: 307 MG/DL (ref 70–110)
POCT GLUCOSE: 398 MG/DL (ref 70–110)
POCT GLUCOSE: 76 MG/DL (ref 70–110)
POTASSIUM SERPL-SCNC: 4.8 MMOL/L (ref 3.5–5.1)
PROT SERPL-MCNC: 6.4 GM/DL (ref 6.4–8.3)
PROT UR QL STRIP.AUTO: ABNORMAL
RBC # BLD AUTO: 4.8 X10(6)/MCL (ref 4.7–6.1)
RBC #/AREA URNS AUTO: ABNORMAL /HPF
RBC UR QL AUTO: NEGATIVE
RVA RNA STL QL NAA+NON-PROBE: NOT DETECTED
S ENT+BONG DNA STL QL NAA+NON-PROBE: NOT DETECTED
SAPO I+II+IV+V RNA STL QL NAA+NON-PROBE: NOT DETECTED
SHIGELLA SP+EIEC IPAH ST NAA+NON-PROBE: NOT DETECTED
SODIUM SERPL-SCNC: 134 MMOL/L (ref 136–145)
SP GR UR STRIP.AUTO: 1.02 (ref 1–1.03)
SQUAMOUS #/AREA URNS LPF: ABNORMAL /HPF
UROBILINOGEN UR STRIP-ACNC: NORMAL
V CHOL+PARA+VUL DNA STL QL NAA+NON-PROBE: NOT DETECTED
V CHOLERAE DNA STL QL NAA+NON-PROBE: NOT DETECTED
WBC # SPEC AUTO: 6.22 X10(3)/MCL (ref 4.5–11.5)
WBC #/AREA URNS AUTO: ABNORMAL /HPF
Y ENTEROCOL DNA STL QL NAA+NON-PROBE: NOT DETECTED

## 2023-12-23 PROCEDURE — 85025 COMPLETE CBC W/AUTO DIFF WBC: CPT

## 2023-12-23 PROCEDURE — 81001 URINALYSIS AUTO W/SCOPE: CPT

## 2023-12-23 PROCEDURE — 25000003 PHARM REV CODE 250

## 2023-12-23 PROCEDURE — 96361 HYDRATE IV INFUSION ADD-ON: CPT

## 2023-12-23 PROCEDURE — 63600175 PHARM REV CODE 636 W HCPCS

## 2023-12-23 PROCEDURE — 84100 ASSAY OF PHOSPHORUS: CPT

## 2023-12-23 PROCEDURE — 80053 COMPREHEN METABOLIC PANEL: CPT

## 2023-12-23 PROCEDURE — 96372 THER/PROPH/DIAG INJ SC/IM: CPT

## 2023-12-23 PROCEDURE — 82533 TOTAL CORTISOL: CPT

## 2023-12-23 PROCEDURE — 87507 IADNA-DNA/RNA PROBE TQ 12-25: CPT

## 2023-12-23 PROCEDURE — 86318 IA INFECTIOUS AGENT ANTIBODY: CPT

## 2023-12-23 PROCEDURE — G0378 HOSPITAL OBSERVATION PER HR: HCPCS

## 2023-12-23 PROCEDURE — 96365 THER/PROPH/DIAG IV INF INIT: CPT

## 2023-12-23 PROCEDURE — 83735 ASSAY OF MAGNESIUM: CPT

## 2023-12-23 PROCEDURE — 96366 THER/PROPH/DIAG IV INF ADDON: CPT

## 2023-12-23 PROCEDURE — 96375 TX/PRO/DX INJ NEW DRUG ADDON: CPT

## 2023-12-23 RX ORDER — CETIRIZINE HYDROCHLORIDE 10 MG/1
10 TABLET ORAL DAILY PRN
Status: DISCONTINUED | OUTPATIENT
Start: 2023-12-23 | End: 2023-12-24 | Stop reason: HOSPADM

## 2023-12-23 RX ORDER — SODIUM CHLORIDE 9 MG/ML
INJECTION, SOLUTION INTRAVENOUS CONTINUOUS
Status: DISCONTINUED | OUTPATIENT
Start: 2023-12-23 | End: 2023-12-24 | Stop reason: HOSPADM

## 2023-12-23 RX ORDER — LOPERAMIDE HYDROCHLORIDE 2 MG/1
2 CAPSULE ORAL ONCE
Status: COMPLETED | OUTPATIENT
Start: 2023-12-23 | End: 2023-12-23

## 2023-12-23 RX ORDER — INSULIN ASPART 100 [IU]/ML
0-10 INJECTION, SOLUTION INTRAVENOUS; SUBCUTANEOUS
Status: DISCONTINUED | OUTPATIENT
Start: 2023-12-23 | End: 2023-12-24 | Stop reason: HOSPADM

## 2023-12-23 RX ORDER — AMLODIPINE BESYLATE 5 MG/1
5 TABLET ORAL DAILY
Status: DISCONTINUED | OUTPATIENT
Start: 2023-12-23 | End: 2023-12-24

## 2023-12-23 RX ORDER — MAGNESIUM SULFATE HEPTAHYDRATE 40 MG/ML
2 INJECTION, SOLUTION INTRAVENOUS ONCE
Status: COMPLETED | OUTPATIENT
Start: 2023-12-23 | End: 2023-12-23

## 2023-12-23 RX ORDER — CIPROFLOXACIN 500 MG/1
500 TABLET ORAL EVERY 12 HOURS
Status: DISCONTINUED | OUTPATIENT
Start: 2023-12-23 | End: 2023-12-24 | Stop reason: HOSPADM

## 2023-12-23 RX ORDER — LOPERAMIDE HYDROCHLORIDE 2 MG/1
2 CAPSULE ORAL
Status: COMPLETED | OUTPATIENT
Start: 2023-12-23 | End: 2023-12-23

## 2023-12-23 RX ADMIN — SODIUM CHLORIDE: 9 INJECTION, SOLUTION INTRAVENOUS at 05:12

## 2023-12-23 RX ADMIN — LOPERAMIDE HYDROCHLORIDE 2 MG: 2 CAPSULE ORAL at 01:12

## 2023-12-23 RX ADMIN — AMLODIPINE BESYLATE 5 MG: 5 TABLET ORAL at 10:12

## 2023-12-23 RX ADMIN — INSULIN ASPART 8 UNITS: 100 INJECTION, SOLUTION INTRAVENOUS; SUBCUTANEOUS at 06:12

## 2023-12-23 RX ADMIN — CIPROFLOXACIN HYDROCHLORIDE 500 MG: 500 TABLET, FILM COATED ORAL at 08:12

## 2023-12-23 RX ADMIN — ACETAMINOPHEN 650 MG: 325 TABLET, FILM COATED ORAL at 03:12

## 2023-12-23 RX ADMIN — LISINOPRIL 5 MG: 2.5 TABLET ORAL at 08:12

## 2023-12-23 RX ADMIN — CIPROFLOXACIN HYDROCHLORIDE 500 MG: 500 TABLET, FILM COATED ORAL at 01:12

## 2023-12-23 RX ADMIN — LOPERAMIDE HYDROCHLORIDE 2 MG: 2 CAPSULE ORAL at 07:12

## 2023-12-23 RX ADMIN — MAGNESIUM SULFATE HEPTAHYDRATE 2 G: 40 INJECTION, SOLUTION INTRAVENOUS at 08:12

## 2023-12-23 RX ADMIN — HYDRALAZINE HYDROCHLORIDE 10 MG: 20 INJECTION INTRAMUSCULAR; INTRAVENOUS at 03:12

## 2023-12-23 RX ADMIN — INSULIN ASPART 10 UNITS: 100 INJECTION, SOLUTION INTRAVENOUS; SUBCUTANEOUS at 04:12

## 2023-12-23 RX ADMIN — ENOXAPARIN SODIUM 40 MG: 40 INJECTION SUBCUTANEOUS at 04:12

## 2023-12-23 RX ADMIN — INSULIN ASPART 3 UNITS: 100 INJECTION, SOLUTION INTRAVENOUS; SUBCUTANEOUS at 08:12

## 2023-12-23 RX ADMIN — DULOXETINE HYDROCHLORIDE 30 MG: 30 CAPSULE, DELAYED RELEASE ORAL at 08:12

## 2023-12-23 RX ADMIN — SODIUM CHLORIDE: 9 INJECTION, SOLUTION INTRAVENOUS at 03:12

## 2023-12-23 RX ADMIN — CETIRIZINE HYDROCHLORIDE 10 MG: 10 TABLET, FILM COATED ORAL at 08:12

## 2023-12-23 NOTE — PROGRESS NOTES
[facility] Wards Progress Note     Resident Team: [facility] Medicine List 2  Attending Physician: Emilio Ramirez MD  Resident: Jc Sheridan MD  Intern: Davide Anaya MD     Subjective:      Brief HPI:  Jose Francisco Romero is a 43 y.o.  American male with PMH of diabetes mellitus type I on insulin complicated by repeat hospitalizations due to DKA, diabetic polyneuropathy, chronic inflammatory demyelinating polyneuropathy s/p failed IVIG therapy currently not on treatment, hypertension, who presented to [facility] ED (12/22/2023) due to hypoglycemia. Patient reports compliance with outpatient diabetic care plan including cbg checks four times daily with insulin glargine 25 units qhs amd aspart 7 units in a.m. plus 12 units with lunch and supper. When checking cbgs for the past three days patient noted his blood glucose measurements to be low (cbgs 30-40s) particularly in the a.m. Patient also noted fatigue, malaise, lethargy, lightheadedness/dizziness without syncope or seizure, and chills associated with low cbgs. Denies any changes to insulin regimen and dietary changes. However upon further questioning patient appeared to be taking his insulin as scheduled even when noting low blood glucose on personal, home cbg checks. He also endorses a three day history of abdominal pain/cramping, nausea without vomiting, and diarrhea which he describes as loose, watery without hematochezia or melena occurring 3-4 times per day. Denies any other acute complaints at this time.     ED course: Patient hypertensive but remaining vital signs stable. CBC unremarkable. CMP showed blood glucose 53 but otherwise unremarkable. Hgb A1c 7.4. LA 1.5. COVID/FLU/RSV negative. TFT wnl. Urinalysis unremarkable. Patient given juice boxes x 2, food, and D5 infusion in ED for hypoglycemia. Internal medicine consulted for admission for work up and management of hypoglycemia.    Interval History:     Nursing reports patient had multiple  "(approximately 7) loose, watery stools without hematochezia or melena. Patient placed on c dif precautions at that time. Initiated IVF resuscitation. No other acute events overnight. C. Diff negative. Precautions removed. Patient continues to endorse multiple loose stools but slowly improving. Chills and shakes have resolved with improvement in blood glucose. Will continue inpatient status until patient is able to tolerate PO intake and stool frequency improves.    Review of Systems:  Review of Systems   Constitutional:  Negative for chills, diaphoresis, fatigue and fever.   HENT:  Negative for ear pain, hearing loss, rhinorrhea, sore throat, tinnitus and trouble swallowing.    Eyes:  Negative for pain, redness and visual disturbance.   Respiratory:  Negative for cough, chest tightness and shortness of breath.    Cardiovascular:  Negative for chest pain and palpitations.   Gastrointestinal:  Positive for diarrhea. Negative for abdominal pain, constipation, nausea and vomiting.   Genitourinary:  Negative for difficulty urinating, dysuria, frequency, hematuria and urgency.   Musculoskeletal:  Positive for arthralgias and myalgias.   Skin:  Negative for rash and wound.   Neurological:  Negative for dizziness, seizures, syncope, weakness, light-headedness, numbness and headaches.   Psychiatric/Behavioral:  Negative for confusion.         Objective:     Last 24 Hour Vital Signs:  BP  Min: 131/91  Max: 161/103  Temp  Av °F (36.7 °C)  Min: 97.9 °F (36.6 °C)  Max: 98.3 °F (36.8 °C)  Pulse  Av.5  Min: 80  Max: 118  Resp  Av  Min: 16  Max: 16  SpO2  Av.7 %  Min: 98 %  Max: 99 %  Height  Av' 0.5" (184.2 cm)  Min: 6' (182.9 cm)  Max: 6' 1" (185.4 cm)  Weight  Av.2 kg (172 lb 8 oz)  Min: 77.1 kg (170 lb)  Max: 79.4 kg (175 lb)  No intake/output data recorded.    Physical Examination:  Physical Exam  Vitals reviewed.   Constitutional:       General: He is not in acute distress.     Appearance: Normal " appearance. He is normal weight. He is ill-appearing.   HENT:      Head: Normocephalic and atraumatic.      Right Ear: External ear normal.      Left Ear: External ear normal.   Eyes:      General: No scleral icterus.        Right eye: No discharge.         Left eye: No discharge.      Extraocular Movements: Extraocular movements intact.      Conjunctiva/sclera: Conjunctivae normal.      Pupils: Pupils are equal, round, and reactive to light.   Cardiovascular:      Rate and Rhythm: Regular rhythm. Tachycardia present.      Pulses: Normal pulses.      Heart sounds: Normal heart sounds. No murmur heard.     No friction rub. No gallop.   Pulmonary:      Effort: Pulmonary effort is normal. No respiratory distress.      Breath sounds: Normal breath sounds. No stridor. No wheezing, rhonchi or rales.   Abdominal:      General: Abdomen is flat. Bowel sounds are normal. There is no distension.      Palpations: Abdomen is soft.      Tenderness: There is no abdominal tenderness. There is no guarding.   Musculoskeletal:         General: No swelling, tenderness, deformity or signs of injury. Normal range of motion.      Right lower leg: No edema.      Left lower leg: No edema.   Skin:     General: Skin is warm and dry.      Capillary Refill: Capillary refill takes less than 2 seconds.      Coloration: Skin is not jaundiced.      Findings: No bruising, erythema or rash.   Neurological:      Mental Status: He is alert.      Comments: AAO to person, place, time, and situation; CN II-XII grossly intact       Laboratory:  Most Recent Data:  CBC:   Lab Results   Component Value Date    WBC 7.27 12/22/2023    HGB 12.6 (L) 12/22/2023    HCT 38.3 (L) 12/22/2023     12/22/2023    MCV 80.8 12/22/2023    RDW 13.4 12/22/2023     WBC Differential:   Recent Labs   Lab 12/22/23  1834   WBC 7.27   HGB 12.6*   HCT 38.3*      MCV 80.8     BMP:   Lab Results   Component Value Date     12/22/2023    K 3.8 12/22/2023      11/24/2022    CO2 25 12/22/2023    BUN 22.8 (H) 12/22/2023    CREATININE 1.04 12/22/2023    CALCIUM 9.3 12/22/2023    MG 1.60 12/22/2023    PHOS 3.2 12/22/2023     LFTs:   Lab Results   Component Value Date    ALBUMIN 3.5 12/22/2023    BILITOT 0.3 12/22/2023    AST 15 12/22/2023    ALKPHOS 99 12/22/2023    ALT 12 12/22/2023     Coags:   Lab Results   Component Value Date    INR 1.1 11/23/2022    PROTIME 14.3 11/24/2018    PTT 30.9 11/24/2018     FLP:   Lab Results   Component Value Date    CHOL 137 12/02/2021    HDL 46 12/02/2021    TRIG 152 (H) 12/02/2021     DM:   Lab Results   Component Value Date    HGBA1C 7.4 (H) 12/22/2023    HGBA1C 10.4 (H) 06/24/2023    HGBA1C 11.6 (H) 12/01/2021    CREATININE 1.04 12/22/2023     Thyroid:   Lab Results   Component Value Date    TSH 0.969 12/22/2023     Anemia:   Lab Results   Component Value Date    IRON 37 (L) 08/31/2023    TIBC 190 (L) 08/31/2023    FERRITIN 299.48 (H) 08/31/2023    YRLLNAQH98 1,053 (H) 07/01/2023     Cardiac:   Lab Results   Component Value Date    TROPONINI <0.010 08/30/2023    BNP 12.1 06/23/2023     Urinalysis:   Lab Results   Component Value Date    COLORU LIGHT YELLOW 11/30/2021    PHUA 6.0 12/22/2023    NITRITE Negative 11/30/2021    KETONESU 60 (A) 11/30/2021    UROBILINOGEN Normal 12/22/2023    WBCUA 0-5 12/22/2023       Radiology:  Imaging Results              X-Ray Ankle Complete Left (Final result)  Result time 12/22/23 19:49:58      Final result by Josie Ruff MD (12/22/23 19:49:58)                   Impression:      No acute bony abnormality.      Electronically signed by: Josie Ruff  Date:    12/22/2023  Time:    19:49               Narrative:    EXAMINATION:  XR ANKLE COMPLETE 3 VIEW LEFT    CLINICAL HISTORY:  Pain, unspecified    COMPARISON:  None.    FINDINGS:  There is no acute fracture or malalignment.  The soft tissues are unremarkable.                                    Current Medications:     Infusions:   sodium  chloride 0.9%          Scheduled:   cetirizine  10 mg Oral Daily    DULoxetine  30 mg Oral Daily    enoxparin  40 mg Subcutaneous Q24H (prophylaxis, 1700)    lisinopriL  5 mg Oral Daily        PRN:  acetaminophen, dextrose 10%, dextrose 10%, dextrose 10%, dextrose 10%, glucagon (human recombinant), glucose, glucose, hydrALAZINE, insulin aspart U-100, labetalol, melatonin, naloxone, nicotine, ondansetron, polyethylene glycol, prochlorperazine, senna-docusate 8.6-50 mg, simethicone, sodium chloride 0.9%  Assessment & Plan:     Diabetes mellitus type I on insulin  Hypoglycemia  -patient reports multiple day history of home cbg 30-50s particularly in a.m.  -endorses continued insulin administration despite low cbg  -cbg on admission 93 -> 60s -> 53  -administered food and fruit juices plus D5 per hypoglycemia protocol  -repeat cbg 165->258->218  -holding home insulin regimen for now   -continue glucose checks AC & HS with hypoglycemia protocol prn hypoglycemia and low dose ssi prn hyperglycemia  -hypoglycemia does not appear to 2/2 to infection as patient does not meet SIRS criteria, has no suspicious, identifiable source for infection, and LA wnl  -TFTs wnl  -AM cortisol indeterminate due to lab draw error; low suspicion for hypoglycemia to be related to adrenal insufficiency given bp hypertensive  -will check c peptide and perform infectious work up  -considering hypoglycemia likely 2/2 surreptious insulin use in combination with viral gastroenteritis     Abdominal pain  Nausea  Nonbloody, watery diarrhea  -patient reports a multi day history of GI distress  -has had multiple loose, watery stools since being admitted  -will obtain GI panel   -c dif negative; discontinued c diff precautions  -continue IVF resuscitation  -avoiding antidiarrheal agents at this time until infectious and/or inflammatory causes can be rule out     Diabetic polyneuropathy  Chronic inflammatory demyelinating polyneuropathy s/p failed IVIG  therapy currently not on treatment  -underwent LP during previous hospitalization which was deemed consistent with CIDP which patient underwent IVIG treatment for but symptoms did not improve  -previously on gabapentin and lyrica as outpatient but unable to tolerate medications so patient self discontinued medications  -continue duloxetine 30 mg q.d.  -will continue to monitor     Hypertension  -bp hypertensive  -restarted home lisinopril 5 mg q.d.  -initiated amlodipine 5 mg q.d.  -will continue to monitor     Code Status: Full code  GI Access: PO  Feeding: Diabetic  IV Access: PIV  Fluids: None  Analgesia: Acetaminophen  Thromboprophylaxis: Lovenox    Disposition: Continue inpatient status for work up and management of hypoglycemia. Patient noted to primary team that he took his insulin even when he noticed his blood sugars were low for multiple days. Suspect hypoglycemia may be 2/2 to surreptitious insulin use in combination with viral gastroenteritis but will still perform complete work up as patient does not appear to have presented with hypoglycemia before.    Davide Anaya MD  [facility] Internal Medicine, PGY-1

## 2023-12-23 NOTE — PROGRESS NOTES
"Inpatient Nutrition Evaluation    Admit Date: 2023   Total duration of encounter: 1 day    Nutrition Recommendation/Prescription     Continue diabetic diet (pt refused oral supplement  Suggest probiotic tx--hx diarrhea past few days; c diff negative   MVI/fe  Biweekly wt  Pt education completed on diet tx  Will monitor nutrition status     Nutrition Assessment     Chart Review    Reason Seen: continuous nutrition monitoring    Malnutrition Screening Tool Results   Have you recently lost weight without trying?: No  Have you been eating poorly because of a decreased appetite?: No   MST Score: 0     Diagnosis:  DM, hypoglycemia abdominal pain, nausea, diarrhea, diabetic polyneuropathy, HTN     Relevant Medical History: DM DKA, chronic polyneuropathy HTN     Nutrition-Related Medications: IVF @ 125ml/hr, lisinopril, mgSO4     Nutrition-Related Labs:  () H/H 12.9/37.8 Guc 258(H) Bun 18.2 Cr 0.8 Alb 3.2     Diet Order: Diet diabetic  Oral Supplement Order: none  Appetite/Oral Intake: good/% of meals  Factors Affecting Nutritional Intake: abdominal pain and diarrhea  Food/[employer]/Cultural Preferences: none reported  Food Allergies: none reported    Skin Integrity: scab (on chin)  Wound(s):   as above     Comments    () Pt reported feeling better; had abdominal pain/diarrhea for 2-3 days; starting to decrease; consider probiotic for diarrhea. No wt loss; no N/V. Pt familiar from prior admits. Reinforced diabetic diet. Labs acknowledged: gluc (H)    Anthropometrics    Height: 6' 1" (185.4 cm) Height Method: Stated  Last Weight: 77.1 kg (170 lb) (23) Weight Method: Standard Scale  BMI (Calculated): 22.4  BMI Classification: normal (BMI 18.5-24.9)        Ideal Body Weight (IBW), Male: 184 lb     % Ideal Body Weight, Male (lb): 92.39 %                 Usual Body Weight (UBW), k.1 kg  % Usual Body Weight: 100.22     Usual Weight Provided By: patient and EMR weight history    Wt Readings " from Last 5 Encounters:   12/22/23 77.1 kg (170 lb)   12/14/23 77.6 kg (171 lb)   11/13/23 76.6 kg (168 lb 14.4 oz)   11/10/23 77.7 kg (171 lb 6.4 oz)   10/23/23 77.1 kg (170 lb)     Weight Change(s) Since Admission:  Admit Weight: 79.4 kg (175 lb) (12/22/23 1756)  No wt loss     Patient Education    Education Provided: diabetic diet  Teaching Method: explanation and printed materials  Comprehension: verbalizes understanding  Barriers to Learning: none evident  Expected Compliance: fair  Comments: All questions were answered and dietitian's contact information was provided.     Monitoring & Evaluation     Dietitian will monitor food and beverage intake, weight, and glucose/endocrine profile.  Nutrition Risk/Follow-Up: low (follow-up in 5-7 days)  Patients assigned 'low nutrition risk' status do not qualify for a full nutritional assessment but will be monitored and re-evaluated in a 5-7 day time period. Please consult if re-evaluation needed sooner.

## 2023-12-23 NOTE — ED PROVIDER NOTES
Encounter Date: 12/22/2023       History     Chief Complaint   Patient presents with    Blood Sugar Problem    Leg Pain     C/o ranging blood sugars down into the 30s the last 2 days,  hx of DM on insulin. Also c/o bilateral leg pain. [facility] [place] currently 93     Patient reports to the emergency room with complaints low blood sugars in the 30s and 40s starting Wednesday morning, continuing Thursday morning, and today; patient reports he checks his sugar 4 times daily and reports that all other readings throughout the day or in the 150 area; patient denies any reports of fatigue, nausea, vomiting, or weakness; patient also reports continued chronic bilateral leg pain consistent with neuropathy    The history is provided by the patient.   Diabetes  This is a chronic problem. He has type 1 diabetes mellitus. Pertinent negatives for diabetes include no blurred vision, no chest pain, no polydipsia, no polyphagia, no visual change, no weakness and no weight loss. Pertinent negatives for hypoglycemia include no mood changes, nervousness/anxiousness, speech difficulty or sweats. Pertinent negatives for hypoglycemia complications include no blackouts, no nocturnal hypoglycemia and no required assistance. Risk factors for coronary artery disease include male sex. He is compliant with treatment all of the time. His home blood glucose trend is decreasing steadily. His breakfast blood glucose is taken between 6-7 am. His breakfast blood glucose range is generally <70 mg/dl. His lunch blood glucose range is generally 140-180 mg/dl. His highest blood glucose is 140-180 mg/dl. His weight is stable.     Review of patient's allergies indicates:   Allergen Reactions    Penicillins Shortness Of Breath    Sulfa (sulfonamide antibiotics)      Past Medical History:   Diagnosis Date    Diabetes mellitus type I     Hypertension      Past Surgical History:   Procedure Laterality Date    CLAVICLE SURGERY      ESOPHAGOGASTRODUODENOSCOPY Left 9/1/2023     Procedure: EGD (ESOPHAGOGASTRODUODENOSCOPY);  Surgeon: Daiana Chilel MD;  Location: [facility] ENDOSCOPY;  Service: Gastroenterology;  Laterality: Left;     Family History   Problem Relation Age of Onset    Diabetes Mother     Diabetes Father     Heart disease Father      Social History     Tobacco Use    Smoking status: Every Day     Current packs/day: 0.00     Types: Cigarettes     Last attempt to quit: 5/15/2023     Years since quittin.6     Passive exposure: Past    Smokeless tobacco: Never   Substance Use Topics    Alcohol use: Never    Drug use: Never     Review of Systems   Constitutional:  Negative for fever and weight loss.   HENT:  Negative for sore throat.    Eyes:  Negative for blurred vision.   Respiratory:  Negative for shortness of breath.    Cardiovascular:  Negative for chest pain.   Gastrointestinal:  Negative for nausea.   Endocrine: Negative for polydipsia and polyphagia.   Genitourinary:  Negative for dysuria.   Musculoskeletal:  Negative for back pain.   Skin:  Negative for rash.   Neurological:  Negative for speech difficulty and weakness.   Hematological:  Does not bruise/bleed easily.   Psychiatric/Behavioral: Negative.  The patient is not nervous/anxious.        Physical Exam     Initial Vitals [23 1756]   BP Pulse Resp Temp SpO2   (!) 131/91 (!) 118 16 97.9 °F (36.6 °C) 99 %      MAP       --         Physical Exam    Vitals reviewed.  Constitutional: He appears well-developed and well-nourished.   HENT:   Head: Normocephalic and atraumatic.   Eyes: Conjunctivae and EOM are normal. Pupils are equal, round, and reactive to light.   Neck:   Normal range of motion.  Cardiovascular:  Normal rate, regular rhythm, normal heart sounds and intact distal pulses.           Pulmonary/Chest: Breath sounds normal. No respiratory distress. He has no wheezes. He exhibits no tenderness.   Abdominal: Abdomen is soft. Bowel sounds are normal. He exhibits no distension. There is no abdominal  tenderness.   Musculoskeletal:         General: Normal range of motion.      Cervical back: Normal range of motion.     Neurological: He is alert and oriented to person, place, and time. He displays normal reflexes. No cranial nerve deficit or sensory deficit. GCS score is 15. GCS eye subscore is 4. GCS verbal subscore is 5. GCS motor subscore is 6.   Skin: Skin is warm. No pallor.   Psychiatric: He has a normal mood and affect. His behavior is normal. Judgment and thought content normal.         ED Course   Procedures  Labs Reviewed   COMPREHENSIVE METABOLIC PANEL - Abnormal; Notable for the following components:       Result Value    Chloride 108 (*)     Glucose Level 53 (*)     Blood Urea Nitrogen 22.8 (*)     Albumin/Globulin Ratio 1.0 (*)     All other components within normal limits   HEMOGLOBIN A1C - Abnormal; Notable for the following components:    Hemoglobin A1c 7.4 (*)     All other components within normal limits   CBC WITH DIFFERENTIAL - Abnormal; Notable for the following components:    Hgb 12.6 (*)     Hct 38.3 (*)     MCH 26.6 (*)     MCHC 32.9 (*)     All other components within normal limits   URINALYSIS, REFLEX TO URINE CULTURE - Abnormal; Notable for the following components:    Protein, UA 1+ (*)     Blood, UA Trace (*)     Leukocyte Esterase, UA 25 (*)     Bacteria, UA Trace (*)     Squamous Epithelial Cells, UA Trace (*)     Mucous, UA Trace (*)     All other components within normal limits   DRUG SCREEN, URINE (BEAKER) - Abnormal; Notable for the following components:    Amphetamines, Urine Positive (*)     All other components within normal limits    Narrative:     Cut off concentrations:    Amphetamines - 1000 ng/ml  Barbiturates - 200 ng/ml  Benzodiazepine - 200 ng/ml  Cannabinoids (THC) - 50 ng/ml  Cocaine - 300 ng/ml  Fentanyl - 1.0 ng/ml  MDMA - 500 ng/ml  Opiates - 300 ng/ml   Phencyclidine (PCP) - 25 ng/ml    Specimen submitted for drug analysis and tested for pH and specific gravity  in order to evaluate sample integrity. Suspect tampering if specific gravity is <1.003 and/or pH is not within the range of 4.5 - 8.0  False negatives may result form substances such as bleach added to urine.  False positives may result for the presence of a substance with similar chemical structure to the drug or its metabolite.    This test provides only a PRELIMINARY analytical test result. A more specific alternate chemical method must be used in order to obtain a confirmed analytical result. Gas chromatography/mass spectrometry (GC/MS) is the preferred confirmatory method. Other chemical confirmation methods are available. Clinical consideration and professional judgement should be applied to any drug of abuse test result, particularly when preliminary positive results are used.    Positive results will be confirmed only at the physicians request. Unconfirmed screening results are to be used only for medical purposes (treatment).        POCT GLUCOSE - Abnormal; Notable for the following components:    POCT Glucose 46 (*)     All other components within normal limits   POCT GLUCOSE - Abnormal; Notable for the following components:    POCT Glucose 165 (*)     All other components within normal limits   BETA - HYDROXYBUTYRATE, SERUM - Normal   MAGNESIUM - Normal   PHOSPHORUS - Normal   TSH - Normal   COVID/FLU A&B PCR - Normal    Narrative:     The Xpert Xpress SARS-CoV-2/FLU/RSV plus is a rapid, multiplexed real-time PCR test intended for the simultaneous qualitative detection and differentiation of SARS-CoV-2, Influenza A, Influenza B, and respiratory syncytial virus (RSV) viral RNA in either nasopharyngeal swab or nasal swab specimens.         CBC W/ AUTO DIFFERENTIAL    Narrative:     The following orders were created for panel order CBC auto differential.  Procedure                               Abnormality         Status                     ---------                               -----------         ------                      CBC with Differential[[id number]]       Abnormal            Final result                 Please view results for these tests on the individual orders.   EXTRA TUBES    Narrative:     The following orders were created for panel order EXTRA TUBES.  Procedure                               Abnormality         Status                     ---------                               -----------         ------                     Light Blue Top Hold[[id number]]                             Final result               Light Green Top Hold[[id number]]                            Final result               Gold Top Hold[[id number]]                                   Final result                 Please view results for these tests on the individual orders.   LIGHT BLUE TOP HOLD   LIGHT GREEN TOP HOLD   GOLD TOP HOLD   URINALYSIS, REFLEX TO URINE CULTURE   T4, FREE   T3,FREE (OLG ONLY)   LACTIC ACID, PLASMA   EXTRA TUBES    Narrative:     The following orders were created for panel order EXTRA TUBES.  Procedure                               Abnormality         Status                     ---------                               -----------         ------                     Light Blue Top Hold[[id number]]                             In process                 Lavender Top Hold[[id number]]                               In process                 Gold Top Hold[[id number]]                                   In process                   Please view results for these tests on the individual orders.   LIGHT BLUE TOP HOLD   LAVENDER TOP HOLD   GOLD TOP HOLD   POCT GLUCOSE   POCT GLUCOSE MONITORING CONTINUOUS          Imaging Results              X-Ray Ankle Complete Left (Final result)  Result time 12/22/23 19:49:58      Final result by Josie Ruff MD (12/22/23 19:49:58)                   Impression:      No acute bony abnormality.      Electronically signed by: Josie Ruff  Date:    12/22/2023  Time:    19:49                Narrative:    EXAMINATION:  XR ANKLE COMPLETE 3 VIEW LEFT    CLINICAL HISTORY:  Pain, unspecified    COMPARISON:  None.    FINDINGS:  There is no acute fracture or malalignment.  The soft tissues are unremarkable.                                       Medications   dextrose 10% bolus 125 mL 125 mL (has no administration in time range)   dextrose 10% bolus 250 mL 250 mL (0 mLs Intravenous Stopped 12/22/23 1959)   lisinopriL tablet 5 mg (has no administration in time range)   sodium chloride 0.9% flush 10 mL (has no administration in time range)   melatonin tablet 6 mg (has no administration in time range)   ondansetron disintegrating tablet 8 mg (has no administration in time range)   prochlorperazine injection Soln 5 mg (has no administration in time range)   polyethylene glycol packet 17 g (has no administration in time range)   senna-docusate 8.6-50 mg per tablet 1 tablet (has no administration in time range)   acetaminophen tablet 650 mg (has no administration in time range)   naloxone 0.4 mg/mL injection 0.02 mg (has no administration in time range)   glucose chewable tablet 16 g (has no administration in time range)   glucose chewable tablet 24 g (has no administration in time range)   glucagon (human recombinant) injection 1 mg (has no administration in time range)   dextrose 10% bolus 125 mL 125 mL (has no administration in time range)   dextrose 10% bolus 250 mL 250 mL (has no administration in time range)   enoxaparin injection 40 mg (has no administration in time range)   DULoxetine DR capsule 30 mg (has no administration in time range)   insulin aspart U-100 injection 0-5 Units (has no administration in time range)   hydrALAZINE injection 10 mg (has no administration in time range)   labetalol 20 mg/4 mL (5 mg/mL) IV syring (has no administration in time range)   cetirizine tablet 10 mg (has no administration in time range)   simethicone chewable tablet 80 mg (has no administration in time range)  "  nicotine 21 mg/24 hr 1 patch (has no administration in time range)     Medical Decision Making  Amount and/or Complexity of Data Reviewed  Labs: ordered. Decision-making details documented in ED Course.  Radiology: ordered.              Attending Attestation:     Physician Attestation Statement for NP/PA:   I have directed and reviewed the workup performed by the PA/NP.  I performed the substantive portion of the medical decision making.     Other NP/PA Attestation Additions:    History of Present Illness: 43-year-old male past history of insulin-dependent diabetes presents with low blood sugars over the past couple of days.  Reports the blood sugar usually low 1st thing in the morning but then goes up throughout the day even after receiving his routine insulin injections.  States he was not had any change in his insulin dosage recently or change in his diet.   Physical Exam: Well-appearing in no acute distress.   Medical Decision Makin-year-old male known history of poorly controlled diabetes usually has issues with high blood sugars but states over the past 2 days his blood sugars have been running low.  He reports that usually they are only low 1st thing in the morning and then go up throughout the day even with the additional insulin administration.  Upon arrival was fingerstick glucose was 93 but upon lab draw his chemistry panel showed his glucose to be 53.  He was given juice and blood sugar was rechecked and found to be even lower at 46.  Given his history of multiple admissions for DKA, want to be cautious about glucose replacement so as not to push him into DKA.  It seems he is a "brittle" diabetic, so will admit for further medical evaluation and treatment.    I have spoken with the patient and/or caregivers. I have explained the patient's condition, diagnoses and treatment plan based on the information available to me at this time. I have answered the patient's and/or caregiver's questions and " addressed any concerns. The patient and/or caregivers have as good an understanding of the patient's diagnosis, condition and treatment plan as can be expected at this point. The patient has been stabilized within the capability of the emergency department. The patient will be transported for further care and management or will be moved to an observation or inpatient service. I have communicated with the staff or medical practitioner taking over this patient's care.              ED Course as of 12/22/23 2118   Fri Dec 22, 2023   1930 Patient CBG at triage was 93, however CMP showed a glucose of 53 at this time nursing staff administered 2 boxes of orange/apple juice, upon recheck his CBG was 46 at this we decided to order dextrose as well as give the patient a sandwich and decided to consult Medicine [AL]   2025 POCT Glucose(!): 165 [IB]   2034 Transitioned to Dr. Man [AL]      ED Course User Index  [AL] Boni Vazquez PA  [IB] Adalid Man DO                             Clinical Impression:  Final diagnoses:  [G62.9] Neuropathy (Primary)  [R52] Pain - dropped table onto anterior aspct  [E16.2] Hypoglycemia          ED Disposition Condition    Observation                 Adalid Man DO  12/22/23 2118

## 2023-12-23 NOTE — H&P
[facility] Wards History & Physical Note    Resident Team: [facility] Medicine List 2  Attending Physician: Emilio Ramirez MD  Resident: Jc Sheridan MD  Intern: Davide Anaya MD    Subjective:      History of Present Illness:  Jose Francisco Romero is a 43 y.o.  American male with PMH of diabetes mellitus type I on insulin complicated by repeat hospitalizations due to DKA, diabetic polyneuropathy, chronic inflammatory demyelinating polyneuropathy s/p failed IVIG therapy currently not on treatment, hypertension, who presented to [facility] ED (12/22/2023) due to hypoglycemia. Patient reports compliance with outpatient diabetic care plan including cbg checks four times daily with insulin glargine 25 units qhs amd aspart 7 units in a.m. plus 12 units with lunch and supper. When checking cbgs for the past three days patient noted his blood glucose measurements to be low (cbgs 30-40s) particularly in the a.m. Patient also noted fatigue, malaise, lethargy, lightheadedness/dizziness without syncope or seizure, and chills associated with low cbgs. Denies any changes to insulin regimen and dietary changes. However upon further questioning patient appeared to be taking his insulin as scheduled even when noting low blood glucose on personal, home cbg checks. He also endorses a three day history of abdominal pain/cramping, nausea without vomiting, and diarrhea which he describes as loose, watery without hematochezia or melena occurring 3-4 times per day. Denies any other acute complaints at this time.    ED course: Patient hypertensive but remaining vital signs stable. CBC unremarkable. CMP showed blood glucose 53 but otherwise unremarkable. Hgb A1c 7.4. LA 1.5. COVID/FLU/RSV negative. TFT wnl. Urinalysis unremarkable. Patient given juice boxes x 2, food, and D5 infusion in ED for hypoglycemia. Internal medicine consulted for admission for work up and management of hypoglycemia.    Past Medical History:  Past Medical  History:   Diagnosis Date    Diabetes mellitus type I     Hypertension      Past Surgical History:  Past Surgical History:   Procedure Laterality Date    CLAVICLE SURGERY      ESOPHAGOGASTRODUODENOSCOPY Left 2023    Procedure: EGD (ESOPHAGOGASTRODUODENOSCOPY);  Surgeon: Daiana Chilel MD;  Location: [facility] ENDOSCOPY;  Service: Gastroenterology;  Laterality: Left;     Family History:  Family History   Problem Relation Age of Onset    Diabetes Mother     Diabetes Father     Heart disease Father      Social History:  Social History     Tobacco Use    Smoking status: Every Day     Current packs/day: 0.00     Types: Cigarettes     Last attempt to quit: 5/15/2023     Years since quittin.6     Passive exposure: Past    Smokeless tobacco: Never   Substance Use Topics    Alcohol use: Never    Drug use: Never     Allergies:  Review of patient's allergies indicates:   Allergen Reactions    Penicillins Shortness Of Breath    Sulfa (sulfonamide antibiotics)      Home Medications:  Prior to Admission medications    Medication Sig Start Date End Date Taking? Authorizing Provider   albuterol (PROVENTIL/VENTOLIN HFA) 90 mcg/actuation inhaler Inhale into the lungs. 23   Provider, Historical   blood sugar diagnostic Strp To check BG 4 times daily, to use with insurance preferred meter 23   Janiya Thomas NP   blood sugar diagnostic Strp To check BG 4 times daily, to use with insurance preferred meter 23   Janiya Thomas NP   blood-glucose meter kit To check BG 4 times daily, to use with insurance preferred meter 23  Janiya Thomas, NP   buPROPion (WELLBUTRIN XL) 300 MG 24 hr tablet Take 300 mg by mouth once daily. 23   Provider, Historical   clonazePAM (KLONOPIN) 2 MG Tab Take 2 mg by mouth every evening. 23   Provider, Historical   FLUoxetine 40 MG capsule Take 40 mg by mouth once daily.    Provider, Historical   insulin (LANTUS SOLOSTAR U-100 INSULIN) glargine 100 units/mL  "SubQ pen Inject 25 Units into the skin once daily.  Patient taking differently: Inject 28 Units into the skin every evening. 7/3/23   Julio Cesar Esposito MD   insulin aspart U-100 (NOVOLOG FLEXPEN U-100 INSULIN) 100 unit/mL (3 mL) InPn pen 12 units TID with meals with correction 1:50 >150 Max dose 50 units  Patient taking differently: 7 units in the am, then 12 units lunch and supper; sliding scale with correction 1:50 >150 Max dose 50 units 5/22/23   Janiya Thomas, NP   LIDOcaine (XYLOCAINE) 5 % Oint ointment Apply 2 g topically 2 (two) times daily as needed. 12/5/23   Provider, Historical   lisinopriL (PRINIVIL,ZESTRIL) 5 MG tablet Take 5 mg by mouth once daily. 7/5/23   Provider, Historical   pantoprazole (PROTONIX) 40 MG tablet Take 1 tablet (40 mg total) by mouth once daily. 9/2/23 9/1/24  Marvin Warren MD   pen needle, diabetic 31 gauge x 3/16" Ndle Use 4 times a day for insulin injection (BD Mini) 5/22/23   Janiya Thomas, NP   pregabalin (LYRICA) 50 MG capsule Take 50 mg by mouth 2 (two) times daily. 7/8/23   Provider, Historical   traZODone (DESYREL) 100 MG tablet Take 100 mg by mouth every evening. 9/1/23   Provider, Historical   TRUEPLUS LANCETS 28 gauge Misc Apply topically 4 (four) times daily. 11/30/23   Provider, Historical     Review of Systems:  Review of Systems   Constitutional:  Positive for chills and fatigue. Negative for diaphoresis and fever.   HENT:  Negative for ear pain, hearing loss, rhinorrhea, sore throat, tinnitus and trouble swallowing.    Eyes:  Negative for pain, redness and visual disturbance.   Respiratory:  Negative for cough, chest tightness and shortness of breath.    Cardiovascular:  Positive for chest pain. Negative for palpitations.   Gastrointestinal:  Positive for diarrhea and nausea. Negative for abdominal pain, constipation and vomiting.   Genitourinary:  Negative for difficulty urinating, dysuria, frequency, hematuria and urgency.   Musculoskeletal:  " "Negative for arthralgias and myalgias.   Skin:  Negative for rash and wound.   Neurological:  Negative for dizziness, seizures, syncope, weakness, light-headedness, numbness and headaches.   Psychiatric/Behavioral:  Negative for confusion.         Objective:   Last 24 Hour Vital Signs:  BP  Min: 131/91  Max: 158/105  Temp  Av.9 °F (36.6 °C)  Min: 97.9 °F (36.6 °C)  Max: 97.9 °F (36.6 °C)  Pulse  Av.3  Min: 96  Max: 118  Resp  Av  Min: 16  Max: 16  SpO2  Av.8 %  Min: 98 %  Max: 99 %  Height  Av' 0.5" (184.2 cm)  Min: 6' (182.9 cm)  Max: 6' 1" (185.4 cm)  Weight  Av.2 kg (172 lb 8 oz)  Min: 77.1 kg (170 lb)  Max: 79.4 kg (175 lb)  Body mass index is 22.43 kg/m².  No intake/output data recorded.    Physical Examination:  Physical Exam  Vitals reviewed.   Constitutional:       General: He is not in acute distress.     Appearance: Normal appearance. He is normal weight. He is ill-appearing.      Comments: Notable chills with associated shaking of bilateral upper extremities   HENT:      Head: Normocephalic and atraumatic.      Right Ear: External ear normal.      Left Ear: External ear normal.   Eyes:      General: No scleral icterus.        Right eye: No discharge.         Left eye: No discharge.      Extraocular Movements: Extraocular movements intact.      Conjunctiva/sclera: Conjunctivae normal.      Pupils: Pupils are equal, round, and reactive to light.   Cardiovascular:      Rate and Rhythm: Regular rhythm. Tachycardia present.      Pulses: Normal pulses.      Heart sounds: Normal heart sounds. No murmur heard.     No friction rub. No gallop.   Pulmonary:      Effort: Pulmonary effort is normal. No respiratory distress.      Breath sounds: Normal breath sounds. No stridor. No wheezing, rhonchi or rales.   Abdominal:      General: Abdomen is flat. Bowel sounds are normal. There is no distension.      Palpations: Abdomen is soft.      Tenderness: There is no abdominal tenderness. There " is no guarding.   Musculoskeletal:         General: No swelling, tenderness, deformity or signs of injury. Normal range of motion.      Right lower leg: No edema.      Left lower leg: No edema.   Skin:     General: Skin is warm.      Capillary Refill: Capillary refill takes less than 2 seconds.      Coloration: Skin is not jaundiced.      Findings: No bruising, erythema or rash.   Neurological:      Mental Status: He is alert.      Comments: AAO to person, place, time, and situation; CN II-XII grossly intact         Laboratory:  Most Recent Data:  CBC:   Lab Results   Component Value Date    WBC 7.27 12/22/2023    HGB 12.6 (L) 12/22/2023    HCT 38.3 (L) 12/22/2023     12/22/2023    MCV 80.8 12/22/2023    RDW 13.4 12/22/2023     WBC Differential:   Recent Labs   Lab 12/22/23  1834   WBC 7.27   HGB 12.6*   HCT 38.3*      MCV 80.8     BMP:   Lab Results   Component Value Date     12/22/2023    K 3.8 12/22/2023     11/24/2022    CO2 25 12/22/2023    BUN 22.8 (H) 12/22/2023    CREATININE 1.04 12/22/2023    CALCIUM 9.3 12/22/2023    MG 1.60 12/22/2023    PHOS 3.2 12/22/2023     LFTs:   Lab Results   Component Value Date    ALBUMIN 3.5 12/22/2023    BILITOT 0.3 12/22/2023    AST 15 12/22/2023    ALKPHOS 99 12/22/2023    ALT 12 12/22/2023     Coags:   Lab Results   Component Value Date    INR 1.1 11/23/2022    PROTIME 14.3 11/24/2018    PTT 30.9 11/24/2018     FLP:   Lab Results   Component Value Date    CHOL 137 12/02/2021    HDL 46 12/02/2021    TRIG 152 (H) 12/02/2021     DM:   Lab Results   Component Value Date    HGBA1C 7.4 (H) 12/22/2023    HGBA1C 10.4 (H) 06/24/2023    HGBA1C 11.6 (H) 12/01/2021    CREATININE 1.04 12/22/2023     Thyroid:   Lab Results   Component Value Date    TSH 0.969 12/22/2023     Anemia:   Lab Results   Component Value Date    IRON 37 (L) 08/31/2023    TIBC 190 (L) 08/31/2023    FERRITIN 299.48 (H) 08/31/2023    HCHXRROJ23 1,053 (H) 07/01/2023     Cardiac:   Lab Results  "  Component Value Date    TROPONINI <0.010 08/30/2023    BNP 12.1 06/23/2023     Urinalysis:   Lab Results   Component Value Date    COLORU LIGHT YELLOW 11/30/2021    PHUA 6.0 12/22/2023    NITRITE Negative 11/30/2021    KETONESU 60 (A) 11/30/2021    UROBILINOGEN Normal 12/22/2023    WBCUA 0-5 12/22/2023     Trended Lab Data:  Recent Labs   Lab 12/22/23  1834   WBC 7.27   HGB 12.6*   HCT 38.3*      MCV 80.8   RDW 13.4      K 3.8   CO2 25   BUN 22.8*   CREATININE 1.04   ALBUMIN 3.5   BILITOT 0.3   AST 15   ALKPHOS 99   ALT 12     Trended Cardiac Data:  No results for input(s): "TROPONINI", "CKTOTAL", "CKMB", "BNP" in the last 168 hours.  Radiology:  Imaging Results              X-Ray Ankle Complete Left (Final result)  Result time 12/22/23 19:49:58      Final result by Josie Ruff MD (12/22/23 19:49:58)                   Impression:      No acute bony abnormality.      Electronically signed by: Josie Ruff  Date:    12/22/2023  Time:    19:49               Narrative:    EXAMINATION:  XR ANKLE COMPLETE 3 VIEW LEFT    CLINICAL HISTORY:  Pain, unspecified    COMPARISON:  None.    FINDINGS:  There is no acute fracture or malalignment.  The soft tissues are unremarkable.                                       Assessment & Plan:     Diabetes mellitus type I on insulin  Hypoglycemia  -patient reports multiple day history of home cbg 30-50s particularly in a.m.  -endorses continued insulin administration despite low cbg  -cbg on admission 93 -> 60s -> 53  -administered food and fruit juices plus D5 per hypoglycemia protocol  -repeat cbg 165  -holding home insulin regimen for now   -continue glucose checks q6h with hypoglycemia protocol prn hypoglycemia and low dose ssi prn hyperglycemia  -hypoglycemia does not appear to 2/2 to infection as patient does not meet SIRS criteria, has no suspicious, identifiable source for infection, and LA wnl  -TFTs wnl  -will check c peptide, am cortisol, and perform " infectious work up    Abdominal pain  Nausea  Nonbloody, watery diarrhea  -patient reports a multi history of GI distress  -has had multiple loose, watery stools since being admitted  -will obtain GI panel and C. Dif toxin  -continue c diff precautions  -continue IVF resuscitation  -avoiding antidiarrheal agents at this time until infectious and/or inflammatory causes can be rule out    Diabetic polyneuropathy  Chronic inflammatory demyelinating polyneuropathy s/p failed IVIG therapy currently not on treatment  -underwent LP during previous hospitalization which was deemed consistent with CIDP which patient underwent IVIG treatment for but symptoms did not improve  -previously on gabapentin and lyrica as outpatient but unable to tolerate medications so patient self discontinued medications  -will initiate duloxetine 30 mg q.d.  -will continue to monitor    Hypertension  -bp hypertensive  -restarted home lisinopril 5 mg q.d.  -will continue to monitor    Code Status: Full code  GI Access: PO  Feeding: Diabetic  IV Access: PIV  Fluids: None  Analgesia: Acetaminophen  Thromboprophylaxis: Lovenox    Disposition: Continue inpatient status for work up and management of hypoglycemia. Patient noted to primary team that he took his insulin even when he noticed his blood sugars were low for multiple days. Suspect hypoglycemia may be 2/2 to insipidus insulin use but will still perform complete work up as patient does not appear to have presented with hypoglycemia before.    Davide Anaya MD  [employer] [facility], PGY-1

## 2023-12-23 NOTE — DISCHARGE SUMMARY
[facility] Discharge Summary    Admitting physician: Emilio Ramirez MD  Attending physician: Emilio Ramirez MD  Date of admit: 12/22/2023  Date of discharge: 12/24/2023    Condition: Stable  Outcome: Condition has improved and patient is now ready for discharge.  Disposition: Home or Self Care    Discharge Diagnoses     Principal Problem:  Hypoglycemia    Patient Active Problem List   Diagnosis    DM (diabetes mellitus) type 2, uncontrolled, with ketoacidosis    Diabetic polyneuropathy    Severe malnutrition    Neuropathy    Moderate malnutrition    Melena    Diarrhea    Encounter for diabetic foot exam    Pain     Consultants and Procedures     Consultants:  Consults (From admission, onward)          Status Ordering Provider     Inpatient consult to Internal Medicine  Once        Provider:  Davide Anaya MD    Acknowledged JENNIFER LILLY           Procedures:  * No surgery found *    Brief History of Present Illness      Jose Francisco Romero is a 43 y.o.  American male with PMH of diabetes mellitus type I on insulin complicated by repeat hospitalizations due to DKA, diabetic polyneuropathy, chronic inflammatory demyelinating polyneuropathy s/p failed IVIG therapy currently not on treatment, hypertension, who presented to [facility] ED (12/22/2023) due to hypoglycemia. Patient reports compliance with outpatient diabetic care plan including cbg checks four times daily with insulin glargine 25 units qhs amd aspart 7 units in a.m. plus 12 units with lunch and supper. When checking cbgs for the past three days patient noted his blood glucose measurements to be low (cbgs 30-40s) particularly in the a.m. Patient also noted fatigue, malaise, lethargy, lightheadedness/dizziness without syncope or seizure, and chills associated with low cbgs. Denies any changes to insulin regimen and dietary changes. However upon further questioning patient appeared to be taking his insulin as scheduled even when noting  low blood glucose on personal, home cbg checks. He also endorses a three day history of abdominal pain/cramping, nausea without vomiting, and diarrhea which he describes as loose, watery without hematochezia or melena occurring 3-4 times per day. Denies any other acute complaints at this time.     Hospital Course with Pertinent Findings     ED course: Patient hypertensive but remaining vital signs stable. CBC unremarkable. CMP showed blood glucose 53 but otherwise unremarkable. Hgb A1c 7.4. LA 1.5. COVID/FLU/RSV negative. TFT wnl. Urinalysis unremarkable. Patient given juice boxes x 2, food, and D5 infusion in ED for hypoglycemia. Internal medicine consulted for admission for work up and management of hypoglycemia.     Hospital course: Held home insulin regimen. Hypoglycemia protocol initiated with appropriate rebound in cbgs. C. Dif negative. GI panel negative. Hypoglycemia did not appear to 2/2 to infection as patient does not meet SIRS criteria, has no suspicious, identifiable source for infection, and LA wnl. AM cortisol 14 but was indeterminate due lab draw timing error. Hypoglycemia unlikely due to adrenal insufficiency with patient notably hypertensive rather than hypotensive during hospitalization. Unable to perform c peptide studies due lab being a send out and there being no transportation available for lab send out at this time however hypoglycemia unlikely to due to endogenous insulin secretion as blood sugars increased after home insulin regimen was held. Considering hypoglycemia likely 2/2 surreptious insulin use in combination with gastro enteritis at this time. Started patient on cipro 500 mg q12h and loperamide 5 mg prn diarrhea with improvement in symptoms. Patient is currently stable for discharge and is being discharged to home. Restarted home medications. Rx cipro 500 mg bid x 4 days and loperamide 5 mg prn diarrhea. Rx doxycycline 100 mg bid x 5 days for impetigo. Patient will follow up with  "PCP for post hospitalization follow up.     Objective     Vital Signs:  Vitals  BP: (!) 151/85  Temp: 98.3 °F (36.8 °C)  Temp Source: Oral  Pulse: 98  Resp: 18  SpO2: 98 %  Height: 6' 1" (185.4 cm)  Weight: 77.1 kg (170 lb)    Physical Exam  Physical Exam  Vitals reviewed.   Constitutional:       General: He is not in acute distress.     Appearance: Normal appearance. He is normal weight. He is not ill-appearing.   HENT:      Head: Normocephalic and atraumatic.      Right Ear: External ear normal.      Left Ear: External ear normal.   Eyes:      General: No scleral icterus.        Right eye: No discharge.         Left eye: No discharge.      Extraocular Movements: Extraocular movements intact.      Conjunctiva/sclera: Conjunctivae normal.      Pupils: Pupils are equal, round, and reactive to light.   Cardiovascular:      Rate and Rhythm: Normal rate and regular rhythm.      Pulses: Normal pulses.      Heart sounds: Normal heart sounds. No murmur heard.     No friction rub. No gallop.   Pulmonary:      Effort: Pulmonary effort is normal. No respiratory distress.      Breath sounds: Normal breath sounds. No stridor. No wheezing, rhonchi or rales.   Abdominal:      General: Abdomen is flat. Bowel sounds are normal. There is no distension.      Palpations: Abdomen is soft.      Tenderness: There is no abdominal tenderness. There is no guarding.   Musculoskeletal:         General: No swelling, tenderness, deformity or signs of injury. Normal range of motion.      Right lower leg: No edema.      Left lower leg: No edema.   Skin:     General: Skin is warm and dry.      Capillary Refill: Capillary refill takes less than 2 seconds.      Coloration: Skin is not jaundiced.      Findings: No bruising, erythema or rash.   Neurological:      Mental Status: He is alert.      Comments: AAO to person, place, time, and situation; CN II-XII grossly intact          Media Information    File Link            Key Information    Document " "ID File Type Document Type Description   [id number] Image Photographs      Import Information    Attached At Date Time User Dept   Encounter Level 12/24/2023  7:25 AM Jc Sheridan MD [address] Medical Surgery Telemetry     Encounter    Hospital Encounter on 12/22/23       Discharge Medications        Medication List        ASK your doctor about these medications      albuterol 90 mcg/actuation inhaler  Commonly known as: PROVENTIL/VENTOLIN HFA     * blood sugar diagnostic Strp  To check BG 4 times daily, to use with insurance preferred meter     * blood sugar diagnostic Strp  To check BG 4 times daily, to use with insurance preferred meter     blood-glucose meter kit  To check BG 4 times daily, to use with insurance preferred meter     buPROPion 300 MG 24 hr tablet  Commonly known as: WELLBUTRIN XL     clonazePAM 2 MG Tab  Commonly known as: KlonoPIN     FLUoxetine 40 MG capsule     insulin aspart U-100 100 unit/mL (3 mL) Inpn pen  Commonly known as: NovoLOG Flexpen U-100 Insulin  12 units TID with meals with correction 1:50 >150 Max dose 50 units     insulin glargine 100 units/mL SubQ pen  Commonly known as: LANTUS SOLOSTAR U-100 INSULIN  Inject 25 Units into the skin once daily.     LIDOcaine 5 % Oint ointment  Commonly known as: XYLOCAINE     lisinopriL 5 MG tablet  Commonly known as: PRINIVIL,ZESTRIL     pantoprazole 40 MG tablet  Commonly known as: PROTONIX  Take 1 tablet (40 mg total) by mouth once daily.     pen needle, diabetic 31 gauge x 3/16" Ndle  Use 4 times a day for insulin injection (BD Mini)     pregabalin 50 MG capsule  Commonly known as: LYRICA     traZODone 100 MG tablet  Commonly known as: DESYREL     TRUEPLUS LANCETS 28 gauge Misc  Generic drug: lancets           * This list has 2 medication(s) that are the same as other medications prescribed for you. Read the directions carefully, and ask your doctor or other care provider to review them with you.                Discharge " Information:     Mr. Jose Francisco Romero is being discharged .    No discharge procedures on file.     Follow-Up Appointments:      To address at follow-up:  -Recommend the following labs be drawn at follow up visit: None  -Recommend the following imaging studies be ordered at follow up visit: None    At this time, Jose Francisco Romero is determined to have maximized benefits of inpatient hospitalization. Hospital course and discharge care plan has been discussed with patient at length, including prescriptions that were started this admission and prescriptions to be continued post discharge. All questions answered, and patient verbalized agreement with the plan of care. They were given return precautions prior to discharge including alarm symptoms that should prompt return to ED or call to PCP.    TIME SPENT ON DISCHARGE: 60 minutes    Davide Anaya MD  [facility], PGY-1

## 2023-12-24 VITALS
RESPIRATION RATE: 18 BRPM | OXYGEN SATURATION: 97 % | WEIGHT: 170 LBS | HEIGHT: 73 IN | BODY MASS INDEX: 22.53 KG/M2 | HEART RATE: 98 BPM | DIASTOLIC BLOOD PRESSURE: 99 MMHG | SYSTOLIC BLOOD PRESSURE: 151 MMHG | TEMPERATURE: 98 F

## 2023-12-24 PROBLEM — E16.2 HYPOGLYCEMIA: Status: RESOLVED | Noted: 2023-12-22 | Resolved: 2023-12-24

## 2023-12-24 LAB
ALBUMIN SERPL-MCNC: 3.3 G/DL (ref 3.5–5)
ALBUMIN/GLOB SERPL: 1 RATIO (ref 1.1–2)
ALP SERPL-CCNC: 80 UNIT/L (ref 40–150)
ALT SERPL-CCNC: 11 UNIT/L (ref 0–55)
AST SERPL-CCNC: 12 UNIT/L (ref 5–34)
BASOPHILS # BLD AUTO: 0.08 X10(3)/MCL
BASOPHILS NFR BLD AUTO: 1.4 %
BILIRUB SERPL-MCNC: 0.7 MG/DL
BUN SERPL-MCNC: 16.2 MG/DL (ref 8.9–20.6)
CALCIUM SERPL-MCNC: 9.3 MG/DL (ref 8.4–10.2)
CHLORIDE SERPL-SCNC: 100 MMOL/L (ref 98–107)
CO2 SERPL-SCNC: 25 MMOL/L (ref 22–29)
CREAT SERPL-MCNC: 1.04 MG/DL (ref 0.73–1.18)
EOSINOPHIL # BLD AUTO: 0.58 X10(3)/MCL (ref 0–0.9)
EOSINOPHIL NFR BLD AUTO: 10 %
ERYTHROCYTE [DISTWIDTH] IN BLOOD BY AUTOMATED COUNT: 13.2 % (ref 11.5–17)
GFR SERPLBLD CREATININE-BSD FMLA CKD-EPI: >60 MLS/MIN/1.73/M2
GLOBULIN SER-MCNC: 3.4 GM/DL (ref 2.4–3.5)
GLUCOSE SERPL-MCNC: 271 MG/DL (ref 74–100)
HCT VFR BLD AUTO: 41.7 % (ref 42–52)
HGB BLD-MCNC: 13.7 G/DL (ref 14–18)
HOLD SPECIMEN: NORMAL
IMM GRANULOCYTES # BLD AUTO: 0.01 X10(3)/MCL (ref 0–0.04)
IMM GRANULOCYTES NFR BLD AUTO: 0.2 %
LYMPHOCYTES # BLD AUTO: 2.36 X10(3)/MCL (ref 0.6–4.6)
LYMPHOCYTES NFR BLD AUTO: 40.8 %
MAGNESIUM SERPL-MCNC: 1.5 MG/DL (ref 1.6–2.6)
MCH RBC QN AUTO: 26.2 PG (ref 27–31)
MCHC RBC AUTO-ENTMCNC: 32.9 G/DL (ref 33–36)
MCV RBC AUTO: 79.7 FL (ref 80–94)
MONOCYTES # BLD AUTO: 0.36 X10(3)/MCL (ref 0.1–1.3)
MONOCYTES NFR BLD AUTO: 6.2 %
NEUTROPHILS # BLD AUTO: 2.39 X10(3)/MCL (ref 2.1–9.2)
NEUTROPHILS NFR BLD AUTO: 41.4 %
NRBC BLD AUTO-RTO: 0 %
PHOSPHATE SERPL-MCNC: 3.4 MG/DL (ref 2.3–4.7)
PLATELET # BLD AUTO: 267 X10(3)/MCL (ref 130–400)
PMV BLD AUTO: 9.7 FL (ref 7.4–10.4)
POCT GLUCOSE: 250 MG/DL (ref 70–110)
POCT GLUCOSE: 378 MG/DL (ref 70–110)
POTASSIUM SERPL-SCNC: 5.1 MMOL/L (ref 3.5–5.1)
PROT SERPL-MCNC: 6.7 GM/DL (ref 6.4–8.3)
RBC # BLD AUTO: 5.23 X10(6)/MCL (ref 4.7–6.1)
SODIUM SERPL-SCNC: 133 MMOL/L (ref 136–145)
WBC # SPEC AUTO: 5.78 X10(3)/MCL (ref 4.5–11.5)

## 2023-12-24 PROCEDURE — 84100 ASSAY OF PHOSPHORUS: CPT

## 2023-12-24 PROCEDURE — 25000003 PHARM REV CODE 250

## 2023-12-24 PROCEDURE — 80053 COMPREHEN METABOLIC PANEL: CPT

## 2023-12-24 PROCEDURE — 83735 ASSAY OF MAGNESIUM: CPT

## 2023-12-24 PROCEDURE — 63600175 PHARM REV CODE 636 W HCPCS

## 2023-12-24 PROCEDURE — 96361 HYDRATE IV INFUSION ADD-ON: CPT

## 2023-12-24 PROCEDURE — 96372 THER/PROPH/DIAG INJ SC/IM: CPT

## 2023-12-24 PROCEDURE — 85025 COMPLETE CBC W/AUTO DIFF WBC: CPT

## 2023-12-24 PROCEDURE — G0378 HOSPITAL OBSERVATION PER HR: HCPCS

## 2023-12-24 PROCEDURE — 96366 THER/PROPH/DIAG IV INF ADDON: CPT

## 2023-12-24 RX ORDER — MAGNESIUM SULFATE HEPTAHYDRATE 40 MG/ML
4 INJECTION, SOLUTION INTRAVENOUS ONCE
Status: CANCELLED | OUTPATIENT
Start: 2023-12-24 | End: 2023-12-24

## 2023-12-24 RX ORDER — AMLODIPINE BESYLATE 10 MG/1
10 TABLET ORAL DAILY
Qty: 30 TABLET | Refills: 11 | Status: SHIPPED | OUTPATIENT
Start: 2023-12-25 | End: 2024-12-24

## 2023-12-24 RX ORDER — DOXYCYCLINE 100 MG/1
100 CAPSULE ORAL 2 TIMES DAILY
Qty: 14 CAPSULE | Refills: 0 | Status: SHIPPED | OUTPATIENT
Start: 2023-12-24 | End: 2023-12-31

## 2023-12-24 RX ORDER — CIPROFLOXACIN 500 MG/1
500 TABLET ORAL EVERY 12 HOURS
Qty: 10 TABLET | Refills: 0 | Status: SHIPPED | OUTPATIENT
Start: 2023-12-24 | End: 2023-12-29

## 2023-12-24 RX ORDER — AMLODIPINE BESYLATE 10 MG/1
10 TABLET ORAL DAILY
Status: DISCONTINUED | OUTPATIENT
Start: 2023-12-24 | End: 2023-12-24 | Stop reason: HOSPADM

## 2023-12-24 RX ORDER — DULOXETIN HYDROCHLORIDE 30 MG/1
30 CAPSULE, DELAYED RELEASE ORAL DAILY
Qty: 30 CAPSULE | Refills: 11 | Status: SHIPPED | OUTPATIENT
Start: 2023-12-25 | End: 2024-12-24

## 2023-12-24 RX ORDER — LOPERAMIDE HYDROCHLORIDE 2 MG/1
2 CAPSULE ORAL 3 TIMES DAILY PRN
Qty: 12 CAPSULE | Refills: 0 | Status: SHIPPED | OUTPATIENT
Start: 2023-12-24 | End: 2023-12-28

## 2023-12-24 RX ORDER — LISINOPRIL 10 MG/1
10 TABLET ORAL DAILY
Status: DISCONTINUED | OUTPATIENT
Start: 2023-12-24 | End: 2023-12-24 | Stop reason: HOSPADM

## 2023-12-24 RX ORDER — MAGNESIUM SULFATE HEPTAHYDRATE 40 MG/ML
4 INJECTION, SOLUTION INTRAVENOUS ONCE
Status: COMPLETED | OUTPATIENT
Start: 2023-12-24 | End: 2023-12-24

## 2023-12-24 RX ORDER — LISINOPRIL 10 MG/1
10 TABLET ORAL DAILY
Qty: 30 TABLET | Refills: 11 | Status: SHIPPED | OUTPATIENT
Start: 2023-12-25 | End: 2024-12-24

## 2023-12-24 RX ORDER — LOPERAMIDE HYDROCHLORIDE 2 MG/1
4 CAPSULE ORAL ONCE
Status: COMPLETED | OUTPATIENT
Start: 2023-12-24 | End: 2023-12-24

## 2023-12-24 RX ADMIN — SODIUM CHLORIDE: 9 INJECTION, SOLUTION INTRAVENOUS at 05:12

## 2023-12-24 RX ADMIN — INSULIN ASPART 4 UNITS: 100 INJECTION, SOLUTION INTRAVENOUS; SUBCUTANEOUS at 12:12

## 2023-12-24 RX ADMIN — AMLODIPINE BESYLATE 10 MG: 10 TABLET ORAL at 08:12

## 2023-12-24 RX ADMIN — CIPROFLOXACIN HYDROCHLORIDE 500 MG: 500 TABLET, FILM COATED ORAL at 08:12

## 2023-12-24 RX ADMIN — LISINOPRIL 10 MG: 10 TABLET ORAL at 08:12

## 2023-12-24 RX ADMIN — INSULIN ASPART 10 UNITS: 100 INJECTION, SOLUTION INTRAVENOUS; SUBCUTANEOUS at 08:12

## 2023-12-24 RX ADMIN — DULOXETINE HYDROCHLORIDE 30 MG: 30 CAPSULE, DELAYED RELEASE ORAL at 08:12

## 2023-12-24 RX ADMIN — MAGNESIUM SULFATE HEPTAHYDRATE 4 G: 40 INJECTION, SOLUTION INTRAVENOUS at 06:12

## 2023-12-24 RX ADMIN — LOPERAMIDE HYDROCHLORIDE 4 MG: 2 CAPSULE ORAL at 03:12

## 2023-12-24 NOTE — CARE UPDATE
Was informed by nurse that the patient had 5 bouts of diarrhea earlier.  Patient was given 2 mg of Imodium.  Since then he has had 4 additional bouts of diarrhea.  We will give 4 mg of Imodium.  Patient was hemodynamically stable pulses 101 blood pressure is 151/85.  Patient on maintenance fluid at 75 mL an hour.  Physical exam not suggestive of dehydration.  Patient recount of volume lost in stool was inconsistent.  We will not pursue Imodium further.  We will consider Pepto-Bismol if patient continues to have diarrhea in order to  provided bulk in stool.    Theo Lerma M.D  [facility] PGY-1

## 2023-12-27 ENCOUNTER — HOSPITAL ENCOUNTER (EMERGENCY)
Facility: HOSPITAL | Age: 43
Discharge: HOME | End: 2023-12-27
Attending: EMERGENCY MEDICINE
Payer: MEDICAID

## 2023-12-27 VITALS
DIASTOLIC BLOOD PRESSURE: 77 MMHG | HEART RATE: 82 BPM | OXYGEN SATURATION: 100 % | TEMPERATURE: 98 F | SYSTOLIC BLOOD PRESSURE: 121 MMHG | RESPIRATION RATE: 18 BRPM | BODY MASS INDEX: 23.19 KG/M2 | HEIGHT: 73 IN | WEIGHT: 175 LBS

## 2023-12-27 DIAGNOSIS — R73.9 HYPERGLYCEMIA: ICD-10-CM

## 2023-12-27 DIAGNOSIS — L08.9 SKIN INFECTION: ICD-10-CM

## 2023-12-27 DIAGNOSIS — M79.10 MYALGIA: Primary | ICD-10-CM

## 2023-12-27 LAB
ALBUMIN SERPL-MCNC: 4 G/DL (ref 3.5–5)
ALBUMIN/GLOB SERPL: 1.1 RATIO (ref 1.1–2)
ALP SERPL-CCNC: 95 UNIT/L (ref 40–150)
ALT SERPL-CCNC: 17 UNIT/L (ref 0–55)
APPEARANCE UR: CLEAR
AST SERPL-CCNC: 17 UNIT/L (ref 5–34)
BACTERIA #/AREA URNS AUTO: ABNORMAL /HPF
BASOPHILS # BLD AUTO: 0.07 X10(3)/MCL
BASOPHILS NFR BLD AUTO: 1.3 %
BILIRUB SERPL-MCNC: 0.8 MG/DL
BILIRUB UR QL STRIP.AUTO: NEGATIVE
BUN SERPL-MCNC: 32.8 MG/DL (ref 8.9–20.6)
CALCIUM SERPL-MCNC: 10.1 MG/DL (ref 8.4–10.2)
CHLORIDE SERPL-SCNC: 100 MMOL/L (ref 98–107)
CO2 SERPL-SCNC: 28 MMOL/L (ref 22–29)
COLOR UR AUTO: YELLOW
CREAT SERPL-MCNC: 1.36 MG/DL (ref 0.73–1.18)
EOSINOPHIL # BLD AUTO: 0.35 X10(3)/MCL (ref 0–0.9)
EOSINOPHIL NFR BLD AUTO: 6.4 %
ERYTHROCYTE [DISTWIDTH] IN BLOOD BY AUTOMATED COUNT: 13.4 % (ref 11.5–17)
FLUAV AG UPPER RESP QL IA.RAPID: NOT DETECTED
FLUBV AG UPPER RESP QL IA.RAPID: NOT DETECTED
GFR SERPLBLD CREATININE-BSD FMLA CKD-EPI: >60 MLS/MIN/1.73/M2
GLOBULIN SER-MCNC: 3.8 GM/DL (ref 2.4–3.5)
GLUCOSE SERPL-MCNC: 269 MG/DL (ref 74–100)
GLUCOSE UR QL STRIP.AUTO: 500
HCT VFR BLD AUTO: 43.4 % (ref 42–52)
HGB BLD-MCNC: 14.6 G/DL (ref 14–18)
HYALINE CASTS URNS QL MICRO: ABNORMAL /LPF
IMM GRANULOCYTES # BLD AUTO: 0.01 X10(3)/MCL (ref 0–0.04)
IMM GRANULOCYTES NFR BLD AUTO: 0.2 %
KETONES UR QL STRIP.AUTO: NEGATIVE
LEUKOCYTE ESTERASE UR QL STRIP.AUTO: NEGATIVE
LYMPHOCYTES # BLD AUTO: 2.19 X10(3)/MCL (ref 0.6–4.6)
LYMPHOCYTES NFR BLD AUTO: 40.3 %
MCH RBC QN AUTO: 26.4 PG (ref 27–31)
MCHC RBC AUTO-ENTMCNC: 33.6 G/DL (ref 33–36)
MCV RBC AUTO: 78.5 FL (ref 80–94)
MONOCYTES # BLD AUTO: 0.37 X10(3)/MCL (ref 0.1–1.3)
MONOCYTES NFR BLD AUTO: 6.8 %
MUCOUS THREADS URNS QL MICRO: ABNORMAL /LPF
NEUTROPHILS # BLD AUTO: 2.44 X10(3)/MCL (ref 2.1–9.2)
NEUTROPHILS NFR BLD AUTO: 45 %
NITRITE UR QL STRIP.AUTO: NEGATIVE
NRBC BLD AUTO-RTO: 0 %
PH UR STRIP.AUTO: 5.5 [PH]
PLATELET # BLD AUTO: 307 X10(3)/MCL (ref 130–400)
PMV BLD AUTO: 9.6 FL (ref 7.4–10.4)
POCT GLUCOSE: 192 MG/DL (ref 70–110)
POCT GLUCOSE: 212 MG/DL (ref 70–110)
POCT GLUCOSE: 242 MG/DL (ref 70–110)
POTASSIUM SERPL-SCNC: 5 MMOL/L (ref 3.5–5.1)
PROT SERPL-MCNC: 7.8 GM/DL (ref 6.4–8.3)
PROT UR QL STRIP.AUTO: 100
RBC # BLD AUTO: 5.53 X10(6)/MCL (ref 4.7–6.1)
RBC #/AREA URNS AUTO: ABNORMAL /HPF
RBC UR QL AUTO: ABNORMAL
SARS-COV-2 RNA RESP QL NAA+PROBE: NOT DETECTED
SODIUM SERPL-SCNC: 134 MMOL/L (ref 136–145)
SP GR UR STRIP.AUTO: >=1.03 (ref 1–1.03)
SQUAMOUS #/AREA URNS AUTO: ABNORMAL /HPF
STREP A PCR (OHS): NOT DETECTED
UROBILINOGEN UR STRIP-ACNC: 0.2
WBC # SPEC AUTO: 5.43 X10(3)/MCL (ref 4.5–11.5)
WBC #/AREA URNS AUTO: ABNORMAL /HPF

## 2023-12-27 PROCEDURE — 96374 THER/PROPH/DIAG INJ IV PUSH: CPT

## 2023-12-27 PROCEDURE — 99284 EMERGENCY DEPT VISIT MOD MDM: CPT

## 2023-12-27 PROCEDURE — 82962 GLUCOSE BLOOD TEST: CPT

## 2023-12-27 PROCEDURE — 85025 COMPLETE CBC W/AUTO DIFF WBC: CPT | Performed by: EMERGENCY MEDICINE

## 2023-12-27 PROCEDURE — 0240U COVID/FLU A&B PCR: CPT | Performed by: EMERGENCY MEDICINE

## 2023-12-27 PROCEDURE — 25000003 PHARM REV CODE 250: Performed by: EMERGENCY MEDICINE

## 2023-12-27 PROCEDURE — 80053 COMPREHEN METABOLIC PANEL: CPT | Performed by: EMERGENCY MEDICINE

## 2023-12-27 PROCEDURE — 96361 HYDRATE IV INFUSION ADD-ON: CPT

## 2023-12-27 PROCEDURE — 81003 URINALYSIS AUTO W/O SCOPE: CPT | Performed by: EMERGENCY MEDICINE

## 2023-12-27 PROCEDURE — 63600175 PHARM REV CODE 636 W HCPCS: Performed by: EMERGENCY MEDICINE

## 2023-12-27 PROCEDURE — 87651 STREP A DNA AMP PROBE: CPT | Performed by: EMERGENCY MEDICINE

## 2023-12-27 RX ORDER — MUPIROCIN 20 MG/G
OINTMENT TOPICAL 3 TIMES DAILY
Qty: 15 G | Refills: 0 | Status: SHIPPED | OUTPATIENT
Start: 2023-12-27

## 2023-12-27 RX ORDER — CLINDAMYCIN HYDROCHLORIDE 300 MG/1
300 CAPSULE ORAL
Status: COMPLETED | OUTPATIENT
Start: 2023-12-27 | End: 2023-12-27

## 2023-12-27 RX ORDER — CLINDAMYCIN HYDROCHLORIDE 150 MG/1
300 CAPSULE ORAL 4 TIMES DAILY
Qty: 56 CAPSULE | Refills: 0 | Status: SHIPPED | OUTPATIENT
Start: 2023-12-27 | End: 2024-01-03

## 2023-12-27 RX ORDER — SODIUM CHLORIDE 9 MG/ML
1000 INJECTION, SOLUTION INTRAVENOUS
Status: COMPLETED | OUTPATIENT
Start: 2023-12-27 | End: 2023-12-27

## 2023-12-27 RX ADMIN — SODIUM CHLORIDE 1000 ML: 9 INJECTION, SOLUTION INTRAVENOUS at 08:12

## 2023-12-27 RX ADMIN — CLINDAMYCIN HYDROCHLORIDE 300 MG: 300 CAPSULE ORAL at 09:12

## 2023-12-27 RX ADMIN — INSULIN HUMAN 2 UNITS: 100 INJECTION, SOLUTION PARENTERAL at 09:12

## 2023-12-27 NOTE — ED PROVIDER NOTES
Encounter Date: 2023       History     Chief Complaint   Patient presents with    Generalized Body Aches     Pt complaint (1) body aches and family exposure to flu (2) infection to chin at beard (3) burning with urination     Patient is a 42 yo M presenting with sore throat, body aches, chills x 2 days. He is  a type 1 diabetic. He did take his insulin today. No vomiting. He has some diarrhea that is mild; some increased urination. His immediate family members have been sick with Flu. He has subjective fevers. Also noticed an ingrown hair on his chin that has resulted in a tender sore. No focal chest pain, abdominal pain, rashes, or leg swelling.         Review of patient's allergies indicates:   Allergen Reactions    Penicillins Shortness Of Breath    Sulfa (sulfonamide antibiotics)      Past Medical History:   Diagnosis Date    Diabetes mellitus type I     Hypertension      Past Surgical History:   Procedure Laterality Date    CLAVICLE SURGERY      ESOPHAGOGASTRODUODENOSCOPY Left 2023    Procedure: EGD (ESOPHAGOGASTRODUODENOSCOPY);  Surgeon: Daiana Chilel MD;  Location: [facility] ENDOSCOPY;  Service: Gastroenterology;  Laterality: Left;     Family History   Problem Relation Age of Onset    Diabetes Mother     Diabetes Father     Heart disease Father      Social History     Tobacco Use    Smoking status: Every Day     Current packs/day: 0.00     Types: Cigarettes     Last attempt to quit: 5/15/2023     Years since quittin.6     Passive exposure: Past    Smokeless tobacco: Never   Substance Use Topics    Alcohol use: Never    Drug use: Never     Review of Systems   Constitutional:  Positive for chills.   HENT:  Positive for congestion, postnasal drip, rhinorrhea, sneezing and sore throat.    Respiratory:  Positive for cough. Negative for shortness of breath.    Cardiovascular:  Negative for chest pain.   Gastrointestinal:  Negative for nausea.   Genitourinary:  Negative for dysuria.    Musculoskeletal:  Negative for back pain.   Skin:  Positive for wound. Negative for rash.   Neurological:  Negative for weakness.       Physical Exam     Initial Vitals [12/27/23 0719]   BP Pulse Resp Temp SpO2   102/89 (!) 118 18 98.3 °F (36.8 °C) 100 %      MAP       --         Physical Exam    Nursing note and vitals reviewed.  Constitutional: He appears well-developed and well-nourished. He is not diaphoretic. No distress.   HENT:   Head: Normocephalic and atraumatic.   Neck: Neck supple.   Cardiovascular:  Normal rate, regular rhythm and normal heart sounds.           Pulmonary/Chest: Breath sounds normal. No respiratory distress. He has no wheezes. He has no rhonchi.   Abdominal: Abdomen is soft. Bowel sounds are normal. He exhibits no distension. There is no abdominal tenderness. There is no rebound and no guarding.   Musculoskeletal:         General: No edema. Normal range of motion.      Cervical back: Neck supple.     Neurological: He is alert and oriented to person, place, and time.   Skin: Skin is warm and dry. There is erythema.   Small area of cellulitis on lower chin, no fluctuance   Psychiatric: He has a normal mood and affect. Thought content normal.         ED Course   Procedures  Labs Reviewed   URINALYSIS, REFLEX TO URINE CULTURE - Abnormal; Notable for the following components:       Result Value    Protein,  (*)     Glucose,  (*)     Blood, UA Trace (*)     All other components within normal limits   URINALYSIS, MICROSCOPIC - Abnormal; Notable for the following components:    Hyaline Casts, UA Rare (*)     Mucous, UA Moderate (*)     All other components within normal limits   COMPREHENSIVE METABOLIC PANEL - Abnormal; Notable for the following components:    Sodium Level 134 (*)     Glucose Level 269 (*)     Blood Urea Nitrogen 32.8 (*)     Creatinine 1.36 (*)     Globulin 3.8 (*)     All other components within normal limits   CBC WITH DIFFERENTIAL - Abnormal; Notable for the  following components:    MCV 78.5 (*)     MCH 26.4 (*)     All other components within normal limits   POCT GLUCOSE - Abnormal; Notable for the following components:    POCT Glucose 212 (*)     All other components within normal limits   POCT GLUCOSE - Abnormal; Notable for the following components:    POCT Glucose 242 (*)     All other components within normal limits   POCT GLUCOSE - Abnormal; Notable for the following components:    POCT Glucose 192 (*)     All other components within normal limits   COVID/FLU A&B PCR - Normal    Narrative:     The Xpert Xpress SARS-CoV-2/FLU/RSV plus is a rapid, multiplexed real-time PCR test intended for the simultaneous qualitative detection and differentiation of SARS-CoV-2, Influenza A, Influenza B, and respiratory syncytial virus (RSV) viral RNA in either nasopharyngeal swab or nasal swab specimens.         STREP GROUP A BY PCR - Normal    Narrative:     The Xpert Xpress Strep A test is a rapid, qualitative in vitro diagnostic test for the detection of Streptococcus pyogenes (Group A ß-hemolytic Streptococcus, Strep A) in throat swab specimens from patients with signs and symptoms of pharyngitis.     CBC W/ AUTO DIFFERENTIAL    Narrative:     The following orders were created for panel order CBC auto differential.  Procedure                               Abnormality         Status                     ---------                               -----------         ------                     CBC with Differential[[id number]]       Abnormal            Final result                 Please view results for these tests on the individual orders.          Imaging Results    None          Medications   0.9%  NaCl infusion (0 mLs Intravenous Stopped 12/27/23 0938)   clindamycin capsule 300 mg (300 mg Oral Given 12/27/23 0930)   insulin regular injection 2 Units 0.02 mL (2 Units Intravenous Given 12/27/23 0951)     Medical Decision Making  Results were reviewed with patient. Questions were  answered along with a discussion of signs and symptoms to return for. Patient hyperglycemic but not in DKA. Discussed management and treatment. Suspect URI and cellulitis as cause. Patient comfortable with plan of discharge.      Problems Addressed:  Myalgia: acute illness or injury  Skin infection: acute illness or injury    Amount and/or Complexity of Data Reviewed  Labs: ordered. Decision-making details documented in ED Course.    Risk  OTC drugs.  Prescription drug management.               ED Course as of 01/02/24 0323   Wed Dec 27, 2023   0806 Glu 212 [GM]   0854 No anion gap. [GM]   0856 Flu, covid, strep negative. Source could be skin infection on chin. Will re-evaluate [GM]   0943 Glu 242 after fluids. Discussed with patient. HR improved. BP improved. With his sliding scale he typically takes 2-3 units for this. Will give 2 U and recheck.  [GM]   1040 192 [GM]      ED Course User Index  [] Melani Rothman MD                             Clinical Impression:  Final diagnoses:  [M79.10] Myalgia (Primary)  [L08.9] Skin infection  [R73.9] Hyperglycemia          ED Disposition Condition    Discharge Stable          ED Prescriptions       Medication Sig Dispense Start Date End Date Auth. Provider    mupirocin (BACTROBAN) 2 % ointment Apply topically 3 (three) times daily. 15 g 12/27/2023 -- Melani Rothman MD    clindamycin (CLEOCIN) 150 MG capsule Take 2 capsules (300 mg total) by mouth 4 (four) times daily. for 7 days 56 capsule 12/27/2023 1/3/2024 Melani Rothman MD          Follow-up Information       Follow up With Specialties Details Why Contact Info    Antoine Garner MD Internal Medicine In 2 days If symptoms worsen, return to the  [facility] [address]  [phone]               Melani Rothman MD  01/02/24 0323

## 2023-12-27 NOTE — ED TRIAGE NOTES
Pt complaint (1) body aches and family exposure to flu (2) infection to chin at beard (3) burning with urination

## 2023-12-27 NOTE — Clinical Note
"Jose Francisco"Anne Craftreaux was seen and treated in our emergency department on 12/27/2023.  He may return to work on 12/31/2023.       If you have any questions or concerns, please don't hesitate to call.      Melani Rothman MD"

## 2023-12-29 ENCOUNTER — TELEPHONE (OUTPATIENT)
Dept: DIABETES | Facility: CLINIC | Age: 43
End: 2023-12-29
Payer: MEDICAID

## 2023-12-29 DIAGNOSIS — E10.65 UNCONTROLLED TYPE 1 DIABETES MELLITUS WITH HYPERGLYCEMIA: Primary | ICD-10-CM

## 2023-12-29 NOTE — TELEPHONE ENCOUNTER
Jose Francisco Denson called to say that he tried to get his next shipment of Dexcom G7 supplies but was told that his orders needed to be sent to pharmacy.  He needs both sensors & a new .  He uses [facility] in [place].  I called and verified with the pharmacy that they had both in stock.  Would you be able to send in those orders for him?    Thanks!

## 2024-01-02 RX ORDER — BLOOD-GLUCOSE SENSOR
EACH MISCELLANEOUS
Qty: 3 EACH | Refills: 11 | Status: SHIPPED | OUTPATIENT
Start: 2024-01-02

## 2024-01-02 RX ORDER — BLOOD-GLUCOSE,RECEIVER,CONT
EACH MISCELLANEOUS
Qty: 1 EACH | Refills: 0 | Status: SHIPPED | OUTPATIENT
Start: 2024-01-02

## 2024-01-03 ENCOUNTER — TELEPHONE (OUTPATIENT)
Dept: ENDOCRINOLOGY | Facility: CLINIC | Age: 44
End: 2024-01-03
Payer: MEDICAID

## 2024-01-03 NOTE — TELEPHONE ENCOUNTER
Called [employer] because G7  was approved but the Sensors did not. Spoke with Juliana. PA has been approved 01/03/24 - 07/01/24  PA#: [id number]    Approved  Prior Authorization Portal   G7    Prior authorization approved Case ID: [id number]      Payer: [employer] [place] [employer] - Medicaid   PA has been Approved. PA End Date: 07/01/2024

## 2024-01-03 NOTE — TELEPHONE ENCOUNTER
PA Approved G7 /sensors  (Newest Message First)  January 3, 2024  Gabo RICE    1/3/24  8:23 AM  Note  Called [employer] because G7  was approved but the Sensors did not. Spoke with Juliana. PA has been approved 01/03/24 - 07/01/24  PA#: [id number]     Approved  Prior Authorization Portal   G7    Prior authorization approved Case ID: [id number]      Payer: [employer] [place] [employer] - Medicaid   PA has been Approved. PA End Date: 07/01/2024

## 2024-01-05 NOTE — PROGRESS NOTES
I have reviewed and concur with the resident's history, physical, assessment, and plan.  I have discussed with him all issues related to the diagnosis, workup and treatment plan. Care provided as reasonable and necessary.     Emilio Ramirez MD  [facility]

## 2024-01-08 NOTE — TELEPHONE ENCOUNTER
Janiya Thomas NP Ardoin, Daffany, LPN; Vanessa Moore RDYesterday (8:45 AM)     DD  Please check with pt Monday and ensure he gets it     Vanessa Moore RD Duval, Danette M, FOZIA; Ann Buckner LPN3 days ago     JV  Called & spoke with the patient to make sure he had gotten his Dexcom supplies.  He still had not gotten them but he will contact the pharmacy now to check on the status.  Thanks!     Ann Buckner LPN5 days ago     DA   PA Approved G7 /sensors  (Newest Message First)  January 3, 2024  Me     DA    1/3/24  8:23 AM  Note  Called [employer] because G7  was approved but the Sensors did not. Spoke with Juliana. PA has been approved 01/03/24 - 07/01/24  PA#: [id number]     Approved  Prior Authorization Portal   G7    Prior authorization approved Case ID: [id number]      Payer: [employer] [place] [employer] - Medicaid   PA has been Approved. PA End Date: 07/01/2024                      Note     Janiya Thomas NP Ardoin, Daffany, LPN; Vanessa Moore RD6 days ago     DD  Rxs sent in will need a PA

## 2024-01-08 NOTE — TELEPHONE ENCOUNTER
Return call received from pt who stated he has not called to pharmacy to check the status of his supplies. I encouraged pt that HE needs to call the pharmacy re supplies as they may need additional information from him. Pt verbalizes understanding and stated he will call

## 2024-01-11 ENCOUNTER — OFFICE VISIT (OUTPATIENT)
Dept: ENDOCRINOLOGY | Facility: CLINIC | Age: 44
End: 2024-01-11
Payer: MEDICAID

## 2024-01-11 ENCOUNTER — CLINICAL SUPPORT (OUTPATIENT)
Dept: ENDOCRINOLOGY | Facility: CLINIC | Age: 44
End: 2024-01-11
Payer: MEDICAID

## 2024-01-11 ENCOUNTER — CLINICAL SUPPORT (OUTPATIENT)
Dept: DIABETES | Facility: CLINIC | Age: 44
End: 2024-01-11
Payer: MEDICAID

## 2024-01-11 VITALS
TEMPERATURE: 98 F | RESPIRATION RATE: 18 BRPM | HEART RATE: 84 BPM | OXYGEN SATURATION: 100 % | SYSTOLIC BLOOD PRESSURE: 127 MMHG | BODY MASS INDEX: 22.05 KG/M2 | DIASTOLIC BLOOD PRESSURE: 83 MMHG | WEIGHT: 166.38 LBS | HEIGHT: 73 IN

## 2024-01-11 DIAGNOSIS — E10.65 UNCONTROLLED TYPE 1 DIABETES MELLITUS WITH HYPERGLYCEMIA: Primary | ICD-10-CM

## 2024-01-11 DIAGNOSIS — Z01.00 DIABETIC EYE EXAM: Primary | ICD-10-CM

## 2024-01-11 DIAGNOSIS — E11.9 DIABETIC EYE EXAM: Primary | ICD-10-CM

## 2024-01-11 DIAGNOSIS — G62.9 NEUROPATHY: ICD-10-CM

## 2024-01-11 LAB
CREAT UR-MCNC: 158.2 MG/DL (ref 63–166)
MICROALBUMIN UR-MCNC: 566.8 UG/ML
MICROALBUMIN/CREAT RATIO PNL UR: 358.3 MG/GM CR (ref 0–30)

## 2024-01-11 PROCEDURE — 1160F RVW MEDS BY RX/DR IN RCRD: CPT | Mod: CPTII,,, | Performed by: NURSE PRACTITIONER

## 2024-01-11 PROCEDURE — 99215 OFFICE O/P EST HI 40 MIN: CPT | Mod: PBBFAC | Performed by: NURSE PRACTITIONER

## 2024-01-11 PROCEDURE — 99214 OFFICE O/P EST MOD 30 MIN: CPT | Mod: S$PBB,,, | Performed by: NURSE PRACTITIONER

## 2024-01-11 PROCEDURE — 82043 UR ALBUMIN QUANTITATIVE: CPT | Performed by: NURSE PRACTITIONER

## 2024-01-11 PROCEDURE — 95251 CONT GLUC MNTR ANALYSIS I&R: CPT | Mod: ,,, | Performed by: NURSE PRACTITIONER

## 2024-01-11 PROCEDURE — 3079F DIAST BP 80-89 MM HG: CPT | Mod: CPTII,,, | Performed by: NURSE PRACTITIONER

## 2024-01-11 PROCEDURE — 1159F MED LIST DOCD IN RCRD: CPT | Mod: CPTII,,, | Performed by: NURSE PRACTITIONER

## 2024-01-11 PROCEDURE — G0108 DIAB MANAGE TRN  PER INDIV: HCPCS | Mod: PBBFAC

## 2024-01-11 PROCEDURE — 3074F SYST BP LT 130 MM HG: CPT | Mod: CPTII,,, | Performed by: NURSE PRACTITIONER

## 2024-01-11 PROCEDURE — 99211 OFF/OP EST MAY X REQ PHY/QHP: CPT | Mod: PBBFAC,27

## 2024-01-11 PROCEDURE — 3008F BODY MASS INDEX DOCD: CPT | Mod: CPTII,,, | Performed by: NURSE PRACTITIONER

## 2024-01-11 NOTE — LETTER
January 11, 2024      [facility]  [address] [address]  Phone: [phone]       Patient: Jose Francisco Romero   YOB: 1980  Date of Visit: 01/11/2024    To Whom It May Concern:    Jose Francisco Romero  was at [facility] on 01/11/2024. The patient may return to work/school on 01/11/2024 with no restrictions. If you have any questions or concerns, or if I can be of further assistance, please do not hesitate to contact me.    Sincerely,    Janiya Thomas NP.

## 2024-01-11 NOTE — PROGRESS NOTES
Jose Francisco Romero is a 43 y.o. male here for a diabetic eye screening with non-dilated fundus photos per Janiya Thomas NP.    Patient cooperative?: Yes  Small pupils?: No  Last eye exam: unknown     For exam results, see Encounter Report.     Adopted mother/Patient

## 2024-01-11 NOTE — PROGRESS NOTES
Subjective     Patient ID: Jose Francisco Romero is a 43 y.o. male.    Chief Complaint: Diabetes    Endocrine clinic note 04/12/2023:  42 year male scheduled today as new patient referral to endocrine clinic for history of uncontrolled diabetes type 1 with multiple episodes of DKA.  Patient was recently hospitalized with DKA one-week ago and reports to clinic today as new patient referral.  Patient states he was diagnosed with type 1 diabetes about 5 years ago when he went in the hospital with DKA patient has never been establish with an endocrinologist in his currently on basal insulin with a sliding scale with prandial insulin that he checks his sugar after eating and gives insulin after blood glucoses elevated.  Patient's PCP had written a Dexcom patient did not know how to use patient has not with him today Dexcom was placed on patient educated on placement by Noemí bee LPN.  Patient has not had formal education on type 1 diabetes or insulin.  Patient states he checks his CBGS 3 to 4 times a day and states his lowest blood glucoses 80 in the morning blood sugars range as high as the 400s.  He states occasionally forgets his basal insulin at night.  Discussed with patient in depth today that we will check a C-peptide and becky 65 also be referring him to Diabetes Education for insulin medication will adjust his regimen today.      Endocrine clinic note 05/22/2023:  42 year male scheduled today as one-month follow-up for endocrine clinic for insulin titration for history of uncontrolled type 1 diabetes with multiple episodes of DKA.  Previously patient's regimen was adjusted and Dexcom was restarted. Dexcom interpretation 05/06/2023-05/19/2023.  Days with CGM data 100% 14/14 day.  Insulin data is lacking limiting interpretation.  Previously patient was having volatile blood sugars and treating glucose after eating patient's since has been giving insulin before eating and has had more stable blood sugars at times he  has prandial spikes at night patient states he is eating higher carb meals with rice and potatoes at night.  Patient has 2 episodes of hypoglycemia around lunchtime he states he does not eat a large meal like at nighttime.  Decrease lunchtime insulin to 10 units patient has appointment with Diabetes Education tomorrow will start carb counting and will work towards a pump.      Endocrine clinic note 07/18/2023:  42-year-old male scheduled today for endocrine clinic follow-up.  History of uncontrolled type 1 diabetes with recent DKA hospitalized for sepsis of unknown origin.  Patient was hospitalized with a white blood cell count on 06/29/2023 of 38.21 with sepsis of unknown origin.  Lumbar puncture was attempted unsuccessful. Pt returned for out pt lumbar puncture CSF containing protein 102.8 high in glucose 100 high indicating CIDP.  On admit white blood cell 38.2.  Patient states he is lost over 30 lb and also his extreme pain through his extremities and is weak and having to walk with a cane.  Patient has weakness difficulty walking and 20-30 pound weight loss and severe fatigue unable to sleep due to pain.   Call was made Alexia Jacinto NP case was reviewed possible CIDP also discussed since pt is extremely symptomatic patient may need to be admitted IVIG treatment.  Type 1 diabetes recent A1c 10.7.  Patient is not on a Dexcom due to shipping issues started on G7 but G6 shipped. Patient was given 2 samples of Dexcom G7 today until order is figured out. Medicine team was called patient was admitted the hospital.     Endocrine clinic note 1/11/2024:  43-year-old male scheduled today for endocrine clinic follow-up.  History of uncontrolled type 1 diabetes on a Dexcom G7.  Uncontrolled type 1 diabetes current A1C 7.4 Previous A1C 10.7.  Patient is currently on Diabetes Education and has an appointment today.     Patient has a Dexcom interpretation 12/09/2023-12/22/2023.  Days with CGM data 100% 14/14 days.  Average  glucose 197.  Time in range 27% very high, 25% high, 43% in range, 3% low, 2% very low.  Insulin data is lacking limiting interpretation at times.  Patient has hypoglycemia in the early morning hours and at time after prandial dosing indicating at times patient basal rate is too high.  On other days patient has prandial spikes for multiple hours getting prandial insulin on days where patient forgets prandial insulin patient does not have hypoglycemia hypoglycemia resolved patient had reduced basal insulin.    Patient will meet with Diabetes Education today and will start carb counting and will work through towards an insulin pump.  Neuropathy patient is currently established in Neurology Clinic seen by Dr. 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  Foot exam performed today see documentation patient reports chronic neuropathic pain and pain throughout his whole-body.           Review of Systems   Constitutional:  Negative for activity change, appetite change, chills and fatigue.   HENT: Negative.  Negative for dental problem, hearing loss, rhinorrhea, sneezing and goiter.    Eyes: Negative.  Negative for photophobia and visual disturbance.   Respiratory: Negative.  Negative for cough, chest tightness and shortness of breath.    Cardiovascular:  Negative for chest pain, palpitations and leg swelling.   Gastrointestinal: Negative.  Negative for abdominal distention, abdominal pain, change in bowel habit, constipation, diarrhea, nausea and vomiting.   Endocrine: Negative.  Negative for cold intolerance, heat intolerance, polydipsia, polyphagia and polyuria.   Genitourinary: Negative.  Negative for difficulty urinating and erectile dysfunction.   Musculoskeletal:  Negative for back pain, joint swelling, leg pain, myalgias and joint deformity.   Integumentary:  Negative for color change, pallor and rash. Negative.   Allergic/Immunologic: Negative.  Negative for  environmental allergies, food allergies and frequent infections.   Neurological: Negative.  Negative for tremors, syncope, headaches and coordination difficulties.   Hematological: Negative.  Does not bruise/bleed easily.   Psychiatric/Behavioral:  Negative for agitation and behavioral problems. The patient is not nervous/anxious and is not hyperactive.           Objective     Physical Exam  Constitutional:       General: He is not in acute distress.     Appearance: Normal appearance. He is normal weight. He is not ill-appearing.   HENT:      Head: Normocephalic.      Right Ear: External ear normal.      Left Ear: External ear normal.      Nose: No congestion or rhinorrhea.      Mouth/Throat:      Mouth: Mucous membranes are moist.      Pharynx: Oropharynx is clear.   Eyes:      Conjunctiva/sclera: Conjunctivae normal.      Pupils: Pupils are equal, round, and reactive to light.   Neck:      Thyroid: No thyroid mass, thyromegaly or thyroid tenderness.   Cardiovascular:      Rate and Rhythm: Normal rate and regular rhythm.      Pulses:           Dorsalis pedis pulses are 1+ on the right side and 1+ on the left side.        Posterior tibial pulses are 1+ on the right side and 1+ on the left side.      Heart sounds: Normal heart sounds. No murmur heard.  Pulmonary:      Effort: Pulmonary effort is normal. No respiratory distress.      Breath sounds: Normal breath sounds.   Abdominal:      General: Abdomen is flat. Bowel sounds are normal. There is no distension.      Palpations: Abdomen is soft.      Tenderness: There is no abdominal tenderness.   Genitourinary:     Testes: Normal.   Musculoskeletal:         General: Normal range of motion.      Cervical back: Normal range of motion and neck supple.      Right lower leg: No edema.      Left lower leg: No edema.      Right foot: Prominent metatarsal heads present.      Left foot: Prominent metatarsal heads present.   Feet:      Right foot:      Protective Sensation: 5  sites tested.  5 sites sensed.      Skin integrity: Callus and dry skin present.      Toenail Condition: Right toenails are abnormally thick. Fungal disease present.     Left foot:      Protective Sensation: 5 sites tested.  5 sites sensed.      Skin integrity: Callus and dry skin present.      Toenail Condition: Left toenails are abnormally thick. Fungal disease present.  Lymphadenopathy:      Cervical: No cervical adenopathy.   Skin:     General: Skin is warm and dry.      Coloration: Skin is not jaundiced or pale.   Neurological:      General: No focal deficit present.      Mental Status: He is alert and oriented to person, place, and time.      Motor: No weakness.      Coordination: Coordination normal.      Gait: Gait normal.   Psychiatric:         Mood and Affect: Mood normal.         Behavior: Behavior normal.         Thought Content: Thought content normal.         Judgment: Judgment normal.            Assessment and Plan     1. Uncontrolled Type 1 diabetes mellitus with hyperglycemia  Current A1C 7.4   Previous A1C 10.7   A1C goal <7.0   Medications: Lantus 25 units BID, NovoLog 7 units with breakfast 12 units with lunch 12 units with dinner with a correction scale 1:50 > 150 Changes: See DM Ed today to start Carb Count   Follow ADA diet, avoid soda, simple sweets, and limit rice, breads and starches  Yearly Foot Exam: 01/11/2024   Yearly Eye Exam: 01/11/2024  -     Diabetic Eye Screening Photo  -     Microalbumin/Creatinine Ratio, Urine  -     Hemoglobin A1C, POCT    2. Neuropathy  Chronic inflammatory Demyelinating polyneuropathy   See below         Follow up in about 6 months (around 7/11/2024) for type 1 diabetes, w/ Dexcom.    I spent a total of 30 minutes on the day of the visit.This includes face to face time and non-face to face time preparing to see the patient (eg, review of tests), obtaining and/or reviewing separately obtained history, documenting clinical information in the electronic or other  health record, independently interpreting results and communicating results to the patient/family/caregiver, or care coordinator.

## 2024-01-12 ENCOUNTER — TELEPHONE (OUTPATIENT)
Dept: ENDOCRINOLOGY | Facility: CLINIC | Age: 44
End: 2024-01-12
Payer: MEDICAID

## 2024-01-12 DIAGNOSIS — H35.00 RETINOPATHY: Primary | ICD-10-CM

## 2024-01-12 LAB
LEFT EYE DM RETINOPATHY: POSITIVE
RIGHT EYE DM RETINOPATHY: POSITIVE

## 2024-01-12 NOTE — TELEPHONE ENCOUNTER
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  Referral placed to [facility] ophthalmology clinic. please inform the patient that his results in the referral

## 2024-01-12 NOTE — TELEPHONE ENCOUNTER
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  Referral placed to [facility] ophthalmology clinic. please inform the patient that his results in the referral

## 2024-01-12 NOTE — TELEPHONE ENCOUNTER
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  Referral placed to [facility] ophthalmology clinic. please inform the patient that his results in the referral

## 2024-01-12 NOTE — TELEPHONE ENCOUNTER
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  Referral placed to [facility] ophthalmology clinic. please inform the patient that his results in the referral

## 2024-01-16 VITALS — WEIGHT: 169.81 LBS | BODY MASS INDEX: 22.5 KG/M2 | HEIGHT: 73 IN

## 2024-02-22 ENCOUNTER — TELEPHONE (OUTPATIENT)
Dept: DIABETES | Facility: CLINIC | Age: 44
End: 2024-02-22
Payer: MEDICAID

## 2024-02-22 NOTE — TELEPHONE ENCOUNTER
Called to check in with pt after missed diabetes education appt this AM.  Call not going through x2.  Unable to leave voicemail.

## 2024-04-20 ENCOUNTER — HOSPITAL ENCOUNTER (INPATIENT)
Facility: HOSPITAL | Age: 44
LOS: 2 days | Discharge: HOME | DRG: 637 | End: 2024-04-22
Attending: EMERGENCY MEDICINE | Admitting: STUDENT IN AN ORGANIZED HEALTH CARE EDUCATION/TRAINING PROGRAM
Payer: MEDICAID

## 2024-04-20 DIAGNOSIS — E86.0 DEHYDRATION: Primary | ICD-10-CM

## 2024-04-20 DIAGNOSIS — R06.02 SOB (SHORTNESS OF BREATH): ICD-10-CM

## 2024-04-20 DIAGNOSIS — N17.9 AKI (ACUTE KIDNEY INJURY): ICD-10-CM

## 2024-04-20 DIAGNOSIS — I10 HYPERTENSION, UNSPECIFIED TYPE: ICD-10-CM

## 2024-04-20 DIAGNOSIS — R73.9 HYPERGLYCEMIA: ICD-10-CM

## 2024-04-20 LAB
ALBUMIN SERPL-MCNC: 3.7 G/DL (ref 3.5–5)
ALBUMIN/GLOB SERPL: 1.3 RATIO (ref 1.1–2)
ALP SERPL-CCNC: 109 UNIT/L (ref 40–150)
ALT SERPL-CCNC: 14 UNIT/L (ref 0–55)
AMPHET UR QL SCN: POSITIVE
APPEARANCE UR: CLEAR
AST SERPL-CCNC: 20 UNIT/L (ref 5–34)
B-OH-BUTYR SERPL-MCNC: 1.6 MMOL/L
BACTERIA #/AREA URNS AUTO: ABNORMAL /HPF
BARBITURATE SCN PRESENT UR: NEGATIVE
BASOPHILS # BLD AUTO: 0.06 X10(3)/MCL
BASOPHILS NFR BLD AUTO: 1 %
BENZODIAZ UR QL SCN: NEGATIVE
BILIRUB SERPL-MCNC: 0.6 MG/DL
BILIRUB UR QL STRIP.AUTO: NEGATIVE
BUN SERPL-MCNC: 36.3 MG/DL (ref 8.9–20.6)
CALCIUM SERPL-MCNC: 9.4 MG/DL (ref 8.4–10.2)
CANNABINOIDS UR QL SCN: POSITIVE
CHLORIDE SERPL-SCNC: 98 MMOL/L (ref 98–107)
CK SERPL-CCNC: 142 U/L (ref 30–200)
CO2 SERPL-SCNC: 22 MMOL/L (ref 22–29)
COCAINE UR QL SCN: NEGATIVE
COLOR UR AUTO: ABNORMAL
CREAT SERPL-MCNC: 2.13 MG/DL (ref 0.73–1.18)
EOSINOPHIL # BLD AUTO: 0.38 X10(3)/MCL (ref 0–0.9)
EOSINOPHIL NFR BLD AUTO: 6.4 %
ERYTHROCYTE [DISTWIDTH] IN BLOOD BY AUTOMATED COUNT: 12.8 % (ref 11.5–17)
EST. AVERAGE GLUCOSE BLD GHB EST-MCNC: 228.8 MG/DL
FENTANYL UR QL SCN: NEGATIVE
FERRITIN SERPL-MCNC: 172.73 NG/ML (ref 21.81–274.66)
FLUAV AG UPPER RESP QL IA.RAPID: NOT DETECTED
FLUBV AG UPPER RESP QL IA.RAPID: NOT DETECTED
GFR SERPLBLD CREATININE-BSD FMLA CKD-EPI: 39 MLS/MIN/1.73/M2
GLOBULIN SER-MCNC: 2.8 GM/DL (ref 2.4–3.5)
GLUCOSE SERPL-MCNC: 584 MG/DL (ref 74–100)
GLUCOSE UR QL STRIP.AUTO: ABNORMAL
HBA1C MFR BLD: 9.6 %
HCT VFR BLD AUTO: 36.2 % (ref 42–52)
HGB BLD-MCNC: 12.3 G/DL (ref 14–18)
HOLD SPECIMEN: NORMAL
HYALINE CASTS #/AREA URNS LPF: ABNORMAL /LPF
IMM GRANULOCYTES # BLD AUTO: 0.02 X10(3)/MCL (ref 0–0.04)
IMM GRANULOCYTES NFR BLD AUTO: 0.3 %
IRON SATN MFR SERPL: 46 % (ref 20–50)
IRON SERPL-MCNC: 98 UG/DL (ref 65–175)
KETONES UR QL STRIP.AUTO: ABNORMAL
LEUKOCYTE ESTERASE UR QL STRIP.AUTO: NEGATIVE
LYMPHOCYTES # BLD AUTO: 1.82 X10(3)/MCL (ref 0.6–4.6)
LYMPHOCYTES NFR BLD AUTO: 30.5 %
MCH RBC QN AUTO: 27.5 PG (ref 27–31)
MCHC RBC AUTO-ENTMCNC: 34 G/DL (ref 33–36)
MCV RBC AUTO: 81 FL (ref 80–94)
MDMA UR QL SCN: NEGATIVE
MONOCYTES # BLD AUTO: 0.39 X10(3)/MCL (ref 0.1–1.3)
MONOCYTES NFR BLD AUTO: 6.5 %
MUCOUS THREADS URNS QL MICRO: ABNORMAL /LPF
NEUTROPHILS # BLD AUTO: 3.29 X10(3)/MCL (ref 2.1–9.2)
NEUTROPHILS NFR BLD AUTO: 55.3 %
NITRITE UR QL STRIP.AUTO: NEGATIVE
NRBC BLD AUTO-RTO: 0 %
OPIATES UR QL SCN: NEGATIVE
PCP UR QL: NEGATIVE
PH UR STRIP.AUTO: 5 [PH]
PH UR: 5 [PH] (ref 3–11)
PLATELET # BLD AUTO: 252 X10(3)/MCL (ref 130–400)
PMV BLD AUTO: 10.2 FL (ref 7.4–10.4)
POCT GLUCOSE: 231 MG/DL (ref 70–110)
POCT GLUCOSE: 384 MG/DL (ref 70–110)
POCT GLUCOSE: 490 MG/DL (ref 70–110)
POCT GLUCOSE: 80 MG/DL (ref 70–110)
POTASSIUM SERPL-SCNC: 4.9 MMOL/L (ref 3.5–5.1)
PROT SERPL-MCNC: 6.5 GM/DL (ref 6.4–8.3)
PROT UR QL STRIP.AUTO: ABNORMAL
RBC # BLD AUTO: 4.47 X10(6)/MCL (ref 4.7–6.1)
RBC #/AREA URNS AUTO: ABNORMAL /HPF
RBC UR QL AUTO: ABNORMAL
SARS-COV-2 RNA RESP QL NAA+PROBE: NOT DETECTED
SODIUM SERPL-SCNC: 130 MMOL/L (ref 136–145)
SP GR UR STRIP.AUTO: 1.03 (ref 1–1.03)
SPECIFIC GRAVITY, URINE AUTO (.000) (OHS): 1.03 (ref 1–1.03)
SQUAMOUS #/AREA URNS LPF: ABNORMAL /HPF
STREP A PCR (OHS): NOT DETECTED
TIBC SERPL-MCNC: 115 UG/DL (ref 69–240)
TIBC SERPL-MCNC: 213 UG/DL (ref 250–450)
TRANSFERRIN SERPL-MCNC: 192 MG/DL (ref 174–364)
UROBILINOGEN UR STRIP-ACNC: NORMAL
VIT B12 SERPL-MCNC: 784 PG/ML (ref 213–816)
WBC # SPEC AUTO: 5.96 X10(3)/MCL (ref 4.5–11.5)
WBC #/AREA URNS AUTO: ABNORMAL /HPF

## 2024-04-20 PROCEDURE — 83036 HEMOGLOBIN GLYCOSYLATED A1C: CPT | Performed by: EMERGENCY MEDICINE

## 2024-04-20 PROCEDURE — 85025 COMPLETE CBC W/AUTO DIFF WBC: CPT | Performed by: EMERGENCY MEDICINE

## 2024-04-20 PROCEDURE — 25000003 PHARM REV CODE 250

## 2024-04-20 PROCEDURE — 96374 THER/PROPH/DIAG INJ IV PUSH: CPT

## 2024-04-20 PROCEDURE — 87651 STREP A DNA AMP PROBE: CPT | Performed by: EMERGENCY MEDICINE

## 2024-04-20 PROCEDURE — 96376 TX/PRO/DX INJ SAME DRUG ADON: CPT

## 2024-04-20 PROCEDURE — 82607 VITAMIN B-12: CPT

## 2024-04-20 PROCEDURE — 96361 HYDRATE IV INFUSION ADD-ON: CPT

## 2024-04-20 PROCEDURE — 11000001 HC ACUTE MED/SURG PRIVATE ROOM

## 2024-04-20 PROCEDURE — 93005 ELECTROCARDIOGRAM TRACING: CPT

## 2024-04-20 PROCEDURE — 82550 ASSAY OF CK (CPK): CPT

## 2024-04-20 PROCEDURE — 83540 ASSAY OF IRON: CPT

## 2024-04-20 PROCEDURE — 80053 COMPREHEN METABOLIC PANEL: CPT | Performed by: EMERGENCY MEDICINE

## 2024-04-20 PROCEDURE — 80307 DRUG TEST PRSMV CHEM ANLYZR: CPT

## 2024-04-20 PROCEDURE — 0240U COVID/FLU A&B PCR: CPT | Performed by: EMERGENCY MEDICINE

## 2024-04-20 PROCEDURE — 81001 URINALYSIS AUTO W/SCOPE: CPT | Mod: XB | Performed by: EMERGENCY MEDICINE

## 2024-04-20 PROCEDURE — 82962 GLUCOSE BLOOD TEST: CPT

## 2024-04-20 PROCEDURE — 21400001 HC TELEMETRY ROOM

## 2024-04-20 PROCEDURE — 82010 KETONE BODYS QUAN: CPT | Performed by: EMERGENCY MEDICINE

## 2024-04-20 PROCEDURE — 25000003 PHARM REV CODE 250: Performed by: EMERGENCY MEDICINE

## 2024-04-20 PROCEDURE — 63600175 PHARM REV CODE 636 W HCPCS

## 2024-04-20 PROCEDURE — 82728 ASSAY OF FERRITIN: CPT

## 2024-04-20 PROCEDURE — 99285 EMERGENCY DEPT VISIT HI MDM: CPT | Mod: 25

## 2024-04-20 PROCEDURE — 63600175 PHARM REV CODE 636 W HCPCS: Performed by: EMERGENCY MEDICINE

## 2024-04-20 RX ORDER — INSULIN ASPART 100 [IU]/ML
12 INJECTION, SOLUTION INTRAVENOUS; SUBCUTANEOUS
Status: DISCONTINUED | OUTPATIENT
Start: 2024-04-21 | End: 2024-04-22 | Stop reason: HOSPADM

## 2024-04-20 RX ORDER — SODIUM CHLORIDE, SODIUM LACTATE, POTASSIUM CHLORIDE, CALCIUM CHLORIDE 600; 310; 30; 20 MG/100ML; MG/100ML; MG/100ML; MG/100ML
INJECTION, SOLUTION INTRAVENOUS CONTINUOUS
Status: DISCONTINUED | OUTPATIENT
Start: 2024-04-20 | End: 2024-04-22

## 2024-04-20 RX ORDER — PREGABALIN 25 MG/1
50 CAPSULE ORAL 2 TIMES DAILY
Status: DISCONTINUED | OUTPATIENT
Start: 2024-04-20 | End: 2024-04-20

## 2024-04-20 RX ORDER — LISINOPRIL 10 MG/1
10 TABLET ORAL DAILY
Status: DISCONTINUED | OUTPATIENT
Start: 2024-04-21 | End: 2024-04-20

## 2024-04-20 RX ORDER — IBUPROFEN 200 MG
24 TABLET ORAL
Status: DISCONTINUED | OUTPATIENT
Start: 2024-04-20 | End: 2024-04-22 | Stop reason: HOSPADM

## 2024-04-20 RX ORDER — ONDANSETRON 4 MG/1
4 TABLET, ORALLY DISINTEGRATING ORAL EVERY 6 HOURS PRN
Status: DISCONTINUED | OUTPATIENT
Start: 2024-04-20 | End: 2024-04-22 | Stop reason: HOSPADM

## 2024-04-20 RX ORDER — TALC
6 POWDER (GRAM) TOPICAL NIGHTLY PRN
Status: DISCONTINUED | OUTPATIENT
Start: 2024-04-20 | End: 2024-04-22 | Stop reason: HOSPADM

## 2024-04-20 RX ORDER — ACETAMINOPHEN 500 MG
1000 TABLET ORAL
Status: COMPLETED | OUTPATIENT
Start: 2024-04-20 | End: 2024-04-20

## 2024-04-20 RX ORDER — INSULIN ASPART 100 [IU]/ML
0-10 INJECTION, SOLUTION INTRAVENOUS; SUBCUTANEOUS
Status: DISCONTINUED | OUTPATIENT
Start: 2024-04-20 | End: 2024-04-22 | Stop reason: HOSPADM

## 2024-04-20 RX ORDER — GLUCAGON 1 MG
1 KIT INJECTION
Status: DISCONTINUED | OUTPATIENT
Start: 2024-04-20 | End: 2024-04-22 | Stop reason: HOSPADM

## 2024-04-20 RX ORDER — IBUPROFEN 600 MG/1
600 TABLET ORAL
Status: COMPLETED | OUTPATIENT
Start: 2024-04-20 | End: 2024-04-20

## 2024-04-20 RX ORDER — IBUPROFEN 200 MG
16 TABLET ORAL
Status: DISCONTINUED | OUTPATIENT
Start: 2024-04-20 | End: 2024-04-22 | Stop reason: HOSPADM

## 2024-04-20 RX ORDER — SODIUM CHLORIDE 0.9 % (FLUSH) 0.9 %
10 SYRINGE (ML) INJECTION
Status: DISCONTINUED | OUTPATIENT
Start: 2024-04-20 | End: 2024-04-22 | Stop reason: HOSPADM

## 2024-04-20 RX ORDER — DULOXETIN HYDROCHLORIDE 30 MG/1
30 CAPSULE, DELAYED RELEASE ORAL DAILY
Status: DISCONTINUED | OUTPATIENT
Start: 2024-04-21 | End: 2024-04-22 | Stop reason: HOSPADM

## 2024-04-20 RX ORDER — PREGABALIN 50 MG/1
50 CAPSULE ORAL 2 TIMES DAILY
Status: DISCONTINUED | OUTPATIENT
Start: 2024-04-20 | End: 2024-04-22 | Stop reason: HOSPADM

## 2024-04-20 RX ORDER — HEPARIN SODIUM 5000 [USP'U]/ML
5000 INJECTION, SOLUTION INTRAVENOUS; SUBCUTANEOUS EVERY 12 HOURS
Status: DISCONTINUED | OUTPATIENT
Start: 2024-04-21 | End: 2024-04-22 | Stop reason: HOSPADM

## 2024-04-20 RX ORDER — ACETAMINOPHEN 325 MG/1
650 TABLET ORAL EVERY 8 HOURS PRN
Status: DISCONTINUED | OUTPATIENT
Start: 2024-04-20 | End: 2024-04-22 | Stop reason: HOSPADM

## 2024-04-20 RX ORDER — INSULIN ASPART 100 [IU]/ML
12 INJECTION, SOLUTION INTRAVENOUS; SUBCUTANEOUS
Status: DISCONTINUED | OUTPATIENT
Start: 2024-04-20 | End: 2024-04-22 | Stop reason: HOSPADM

## 2024-04-20 RX ORDER — INSULIN ASPART 100 [IU]/ML
7 INJECTION, SOLUTION INTRAVENOUS; SUBCUTANEOUS
Status: DISCONTINUED | OUTPATIENT
Start: 2024-04-21 | End: 2024-04-22 | Stop reason: HOSPADM

## 2024-04-20 RX ADMIN — ACETAMINOPHEN 1000 MG: 500 TABLET ORAL at 03:04

## 2024-04-20 RX ADMIN — IBUPROFEN 600 MG: 600 TABLET, FILM COATED ORAL at 03:04

## 2024-04-20 RX ADMIN — SODIUM CHLORIDE 1000 ML: 9 INJECTION, SOLUTION INTRAVENOUS at 02:04

## 2024-04-20 RX ADMIN — HUMAN INSULIN 8 UNITS: 100 INJECTION, SOLUTION SUBCUTANEOUS at 03:04

## 2024-04-20 RX ADMIN — PREGABALIN 50 MG: 50 CAPSULE ORAL at 05:04

## 2024-04-20 RX ADMIN — INSULIN ASPART 12 UNITS: 100 INJECTION, SOLUTION INTRAVENOUS; SUBCUTANEOUS at 05:04

## 2024-04-20 RX ADMIN — SODIUM CHLORIDE 1000 ML: 9 INJECTION, SOLUTION INTRAVENOUS at 03:04

## 2024-04-20 RX ADMIN — INSULIN ASPART 4 UNITS: 100 INJECTION, SOLUTION INTRAVENOUS; SUBCUTANEOUS at 05:04

## 2024-04-20 RX ADMIN — SODIUM CHLORIDE, POTASSIUM CHLORIDE, SODIUM LACTATE AND CALCIUM CHLORIDE: 600; 310; 30; 20 INJECTION, SOLUTION INTRAVENOUS at 05:04

## 2024-04-20 RX ADMIN — HUMAN INSULIN 8 UNITS: 100 INJECTION, SOLUTION SUBCUTANEOUS at 02:04

## 2024-04-20 NOTE — LETTER
April 22, 2024         [address] [address]  Phone: [phone]  Fax: [phone]       Patient: Jose Francisco Romero   YOB: 1980  Date of Visit: 04/22/2024    To Whom It May Concern:    Paco Romero  was at [facility] on 04/20/2024- 04/22/2024. The patient may return to work/school on 4/24/24   restrictions. If you have any questions or concerns, or if I can be of further assistance, please do not hesitate to contact me.    Sincerely,    Patricia Rosenbaum LPN

## 2024-04-20 NOTE — H&P
[facility] Medicine Wards History & Physical Note     Resident Team: [facility] List 3  Attending Physician: Sudhir Velasquez MD  Resident: Silvio  Intern: Chente Fountain    Date of Admit: 4/20/2024    Chief Complaint     Hyperglycemia and Dizziness (Pt reports uncontrolled high blood sugar secondary to feeling weak and dizzy. )     Subjective:      History of Present Illness:  Jose Francisco Romero is a 43 y.o. male with a past medical history of T1DM and HTN who presented to the ED on 4/20/2024  with a primary complaint of generalized weakness and fatigue.  Patient states that over the last 3 days he has been feeling weak and fatigued.  He reports elevated blood glucose readings over the last several days as well.  He denies any inciting/eliciting events before these symptoms started.  He states that this is normally how he feels when he goes into DKA which brought him to the ED. Patient does report 2 episodes of nonbloody diarrhea over the course of last 3 days but denies any episodes before this.  He also denies any other associated symptoms such as fever, chills, chest pain, palpitations, cough, sputum production, nausea/vomiting, abdominal pain, constipation, urinary symptoms including dysuria, urgency, and frequency.  Also denies any new skin lesions or oral lesions.  He does report some associated shortness of breath on rest that is worse on exertion.  Patient reports that he is fully compliant with his home insulin regimen which consists of Lantus 26U QHS and Novalog 7U w/ breakfast, 12U with lunch/dinner.  He reports no recent missed doses in insulin.  He reports no recent change in insulin regimen.    In the ED, patient mildly hypotensive with a blood pressure of 108/70.  Otherwise, vital signs unremarkable.  CBC significant for normocytic anemia currently at patient's baseline.  CMP significant for mild hyponatremia, RODOLFO with a creatinine of 2.13, and hyperglycemia with a CBG of 584.  BOHB 1.6.  CXR displayed no  acute chest disease.  Patient was given 2 L bolus NS and 16 units of regular insulin in total.  Internal medicine was consulted for further care and management moving forward.    Past Medical History:  Past Medical History:   Diagnosis Date    Diabetes mellitus type I     Hypertension      Past Surgical History:  Past Surgical History:   Procedure Laterality Date    CLAVICLE SURGERY      ESOPHAGOGASTRODUODENOSCOPY Left 2023    Procedure: EGD (ESOPHAGOGASTRODUODENOSCOPY);  Surgeon: Daiana Chilel MD;  Location: [facility] ENDOSCOPY;  Service: Gastroenterology;  Laterality: Left;       Family History:  Family History   Problem Relation Name Age of Onset    Diabetes Mother      Diabetes Father      Heart disease Father         Social History:  Social History     Tobacco Use    Smoking status: Former     Current packs/day: 0.00     Types: Cigarettes     Quit date: 5/15/2023     Years since quittin.9     Passive exposure: Past    Smokeless tobacco: Never   Substance Use Topics    Alcohol use: Never    Drug use: Never       Allergies:  Review of patient's allergies indicates:   Allergen Reactions    Penicillins Shortness Of Breath    Sulfa (sulfonamide antibiotics)        Home Medications:  Prior to Admission medications    Medication Sig Start Date End Date Taking? Authorizing Provider   albuterol (PROVENTIL/VENTOLIN HFA) 90 mcg/actuation inhaler Inhale into the lungs. 23   Provider, Historical   amLODIPine (NORVASC) 10 MG tablet Take 1 tablet (10 mg total) by mouth once daily.  Patient not taking: Reported on 23  Davide Anaya MD   blood sugar diagnostic Strp To check BG 4 times daily, to use with insurance preferred meter 23   Janiya Thomas NP   blood sugar diagnostic Strp To check BG 4 times daily, to use with insurance preferred meter 23   Janiya Thomas NP   blood-glucose meter kit To check BG 4 times daily, to use with insurance preferred meter 23  " Janiya Thomas NP   blood-glucose meter,continuous (DEXCOM G7 ) Misc Dexcom G7  uses directed 1/2/24   Janiya Thomas NP   blood-glucose sensor (DEXCOM G7 SENSOR) Trinh Use every 10 days as directed 1/2/24   Janiya Thomas NP   buPROPion (WELLBUTRIN XL) 300 MG 24 hr tablet Take 300 mg by mouth once daily. 9/1/23   Provider, Historical   clonazePAM (KLONOPIN) 2 MG Tab Take 2 mg by mouth every evening. 5/22/23   Provider, Historical   DULoxetine (CYMBALTA) 30 MG capsule Take 1 capsule (30 mg total) by mouth once daily.  Patient not taking: Reported on 1/11/2024 12/25/23 12/24/24  Davide Anaya MD   insulin (LANTUS SOLOSTAR U-100 INSULIN) glargine 100 units/mL SubQ pen Inject 25 Units into the skin once daily.  Patient taking differently: Inject 25 Units into the skin 2 (two) times a day. 7/3/23   Julio Cesar Esposito MD   insulin aspart U-100 (NOVOLOG FLEXPEN U-100 INSULIN) 100 unit/mL (3 mL) InPn pen 12 units TID with meals with correction 1:50 >150 Max dose 50 units  Patient taking differently: 7 units in the am, then 12 units lunch and supper; sliding scale with correction 1:50 >150 Max dose 50 units 5/22/23   Janiya Thomas NP   LIDOcaine (XYLOCAINE) 5 % Oint ointment Apply 2 g topically 2 (two) times daily as needed. 12/5/23   Provider, Historical   lisinopriL 10 MG tablet Take 1 tablet (10 mg total) by mouth once daily. 12/25/23 12/24/24  Davide Anaya MD   mupirocin (BACTROBAN) 2 % ointment Apply topically 3 (three) times daily. 12/27/23   Melani Rothman MD   pantoprazole (PROTONIX) 40 MG tablet Take 1 tablet (40 mg total) by mouth once daily. 9/2/23 9/1/24  Marvin Warren MD   pen needle, diabetic 31 gauge x 3/16" Ndle Use 4 times a day for insulin injection (BD Mini) 5/22/23   Janiya Thomas, NP   pregabalin (LYRICA) 50 MG capsule Take 50 mg by mouth 2 (two) times daily. 7/8/23   Provider, Historical   traZODone (DESYREL) 100 MG tablet Take 100 mg by mouth every " "evening. 23   Provider, Historical   TRUEPLUS LANCETS 28 gauge Misc Apply topically 4 (four) times daily. 23   Provider, Historical         Review of Systems:  Review of Systems   Constitutional:  Negative for chills, fever and malaise/fatigue.   Respiratory:  Positive for shortness of breath. Negative for cough, hemoptysis and sputum production.    Cardiovascular:  Negative for chest pain, palpitations and leg swelling.   Gastrointestinal:  Positive for diarrhea. Negative for abdominal pain, blood in stool, constipation, nausea and vomiting.   Genitourinary:  Negative for dysuria, frequency and urgency.   Musculoskeletal:  Negative for myalgias.   Neurological:  Positive for dizziness, weakness and headaches. Negative for tingling, tremors, sensory change and focal weakness.   Psychiatric/Behavioral:  Negative for substance abuse. The patient is nervous/anxious.        Objective:   Last 24 Hour Vital Signs:  BP  Min: 108/70  Max: 129/89  Temp  Av.4 °F (36.9 °C)  Min: 98.4 °F (36.9 °C)  Max: 98.4 °F (36.9 °C)  Pulse  Av.3  Min: 86  Max: 94  Resp  Av.3  Min: 16  Max: 20  SpO2  Av %  Min: 98 %  Max: 100 %  Height  Av' 10.87" (180 cm)  Min: 5' 10.87" (180 cm)  Max: 5' 10.87" (180 cm)  Weight  Av kg (169 lb 12.1 oz)  Min: 77 kg (169 lb 12.1 oz)  Max: 77 kg (169 lb 12.1 oz)  Body mass index is 23.77 kg/m².  No intake/output data recorded.    Physical Exam  Vitals reviewed.   Constitutional:       General: He is not in acute distress.  HENT:      Mouth/Throat:      Mouth: Mucous membranes are dry.      Comments: Poor dentition  Cardiovascular:      Rate and Rhythm: Normal rate and regular rhythm.      Pulses: Normal pulses.      Heart sounds: No murmur heard.     No friction rub. No gallop.   Pulmonary:      Breath sounds: Normal breath sounds. No wheezing, rhonchi or rales.   Abdominal:      General: There is no distension.      Palpations: Abdomen is soft.      Tenderness: There is " no abdominal tenderness.   Musculoskeletal:         General: No swelling or tenderness.      Cervical back: Neck supple.   Skin:     Findings: Lesion present.      Comments: Many 0.5-1 cm circular lesions of bilateral lower extremities, no lesions apparent on palms or soles   Neurological:      General: No focal deficit present.      Mental Status: He is alert and oriented to person, place, and time.   Psychiatric:         Mood and Affect: Mood normal.         Behavior: Behavior normal.         Laboratory:  Most Recent Data:  CBC:   Lab Results   Component Value Date    WBC 5.96 04/20/2024    HGB 12.3 (L) 04/20/2024    HCT 36.2 (L) 04/20/2024     04/20/2024    MCV 81.0 04/20/2024    RDW 12.8 04/20/2024     WBC Differential:   Recent Labs   Lab 04/20/24  1452   WBC 5.96   HGB 12.3*   HCT 36.2*      MCV 81.0     BMP:   Lab Results   Component Value Date     (L) 04/20/2024    K 4.9 04/20/2024     11/24/2022    CO2 22 04/20/2024    BUN 36.3 (H) 04/20/2024    CREATININE 2.13 (H) 04/20/2024    CALCIUM 9.4 04/20/2024    MG 1.50 (L) 12/24/2023    PHOS 3.4 12/24/2023     LFTs:   Lab Results   Component Value Date    ALBUMIN 3.7 04/20/2024    BILITOT 0.6 04/20/2024    AST 20 04/20/2024    ALKPHOS 109 04/20/2024    ALT 14 04/20/2024     Coags:   Lab Results   Component Value Date    INR 1.1 11/23/2022    PROTIME 14.3 11/24/2018    PTT 30.9 11/24/2018     FLP:   Lab Results   Component Value Date    CHOL 137 12/02/2021    HDL 46 12/02/2021    TRIG 152 (H) 12/02/2021     DM:   Lab Results   Component Value Date    HGBA1C 9.6 (H) 04/20/2024    HGBA1C 7.4 (H) 12/22/2023    HGBA1C 10.4 (H) 06/24/2023    CREATININE 2.13 (H) 04/20/2024     Thyroid:   Lab Results   Component Value Date    TSH 0.969 12/22/2023     Anemia:   Lab Results   Component Value Date    IRON 37 (L) 08/31/2023    TIBC 190 (L) 08/31/2023    FERRITIN 299.48 (H) 08/31/2023    MNTTPAMX25 1,053 (H) 07/01/2023     Cardiac:   Lab Results  "  Component Value Date    TROPONINI <0.010 08/30/2023    BNP 12.1 06/23/2023     Urinalysis:   Lab Results   Component Value Date    COLORU LIGHT YELLOW 11/30/2021    PHUA 5.0 04/20/2024    NITRITE Negative 11/30/2021    KETONESU 60 (A) 11/30/2021    UROBILINOGEN Normal 04/20/2024    WBCUA 0-5 04/20/2024       Trended Lab Data:  Recent Labs   Lab 04/20/24  1452   WBC 5.96   HGB 12.3*   HCT 36.2*      MCV 81.0   RDW 12.8   *   K 4.9   CO2 22   BUN 36.3*   CREATININE 2.13*   ALBUMIN 3.7   BILITOT 0.6   AST 20   ALKPHOS 109   ALT 14       Trended Cardiac Data:  No results for input(s): "TROPONINI", "CKTOTAL", "CKMB", "BNP" in the last 168 hours.    Radiology:  Imaging Results              X-Ray Chest AP Portable (Final result)  Result time 04/20/24 15:10:14      Final result by Trey Smith MD (04/20/24 15:10:14)                   Impression:      No acute findings.      Electronically signed by: Trey Smith  Date:    04/20/2024  Time:    15:10               Narrative:    EXAMINATION:  XR CHEST AP PORTABLE    CLINICAL HISTORY:  hyperglycemia;    COMPARISON:  30 August 2023    FINDINGS:  Frontal view of the chest was obtained. The heart is not enlarged.  Lungs are clear.  There is no pneumothorax or significant effusion.  There is plate and screw left clavicle.  There are old left rib deformities.                                      Assessment & Plan:     Uncontrolled Type I DM   Hyperglycemia   Ketosis   Diabetic neuropathy   - Patient with CBG of 584 in the ED; given 12U of regular insulin   - Home insulin: Lantus 26U QHS; Aspart 7U w/ breakfast, 12U with lunch/dinner  - reports full compliance with home insulin regimen, reports no recent changes  - A1c 9.6% on 4/20; previously 7.4% on 12/22/23  - Not in DKA on admission: No AG acidosis present   - BOHB 1.6, UA with 1+ ketones; likely representative of starvation ketosis with poor PO intake over last several days  - schedule patient's home insulin " regimen with moderate SSI for correction  - patient will need follow up outpatient for further titration of insulin regimen  - restart Lyrica 50 mg b.i.d. for diabetic neuropathy     RODOLFO, likely secondary to volume depletion   Hyponatremia   - BUN/creatinine 36.3/2.13 on admission; previous baseline creatinine <1  - sodium 130 on admission; suspect hypovolemic hyponatremia  - RODOLFO likely secondary to polyuria from hyperglycemia although patient denied the symptom   - UA with hyaline cast suggesting volume depletion   - patient given 2L NS in ED; may develop NAGMA on repeat labs secondary to this   - avoid nephrotoxic medications  - continue with LR at 150 mL/hr  - CK pending     Normocytic anemia   - H/H 12.3/36.2 (MCV 81.0 on admission); this is patient's approximate baseline  - iron panel did not display iron-deficiency  - B12 and folate WNL    HTN  - patient currently prescribed amlodipine and lisinopril for management of hypertensive although he reports noncompliance  - patient not hypertensive on admission  - will hold lisinopril for RODOLFO  - consider initiation of antihypertensives if needed and up titrate accordingly    Anxiety  - patient reports noncompliance with all prescribed medications  - restart home duloxetine and add other agents as needed  - will need outpatient psychiatry follow-up for long-term management    CODE STATUS: Full  Access: PIV  Antibiotics: N/A  Diet: Diabetic, bland  DVT Prophylaxis: Heparin, SCDs  GI Prophylaxis: N/A  Fluids: LR at 150 mL/hr    Disposition:  Patient admitted to the Internal Medicine team for uncontrolled type 1 diabetes mellitus and RODOLFO likely secondary to volume depletion.  Patient currently receiving IVF for RODOLFO.  We will start patient on home insulin regimen with sliding scale correction and up titrate as needed.  Further disposition pending upcoming hospitalization course.    Chente Fountain,   Internal Medicine HO-1

## 2024-04-20 NOTE — ED PROVIDER NOTES
Encounter Date: 2024       History     Chief Complaint   Patient presents with    Hyperglycemia    Dizziness     Pt reports uncontrolled high blood sugar secondary to feeling weak and dizzy.      Diabetic for about 10 years, has required hospitalization for hyperglycemia and DKA, reports good compliance with medication and no recent change in medicines.  He uses Lantus at night and NovoLog on a sliding scale during the day.  No significant recent illness although he does recall mild sore throat, generalized achiness, dizziness, weakness, and mild diarrhea in recent days.  No vomiting, fever, dyspnea, chest pain, dysuria, abdominal pain, skin lesion, or other complaints.  Blood sugars by report have been well controlled until today, 300s now at home and for 90 on arrival to the ER.  No other complaints.    The history is provided by the patient. No  was used.     Review of patient's allergies indicates:   Allergen Reactions    Penicillins Shortness Of Breath    Sulfa (sulfonamide antibiotics)      Past Medical History:   Diagnosis Date    Diabetes mellitus type I     Hypertension      Past Surgical History:   Procedure Laterality Date    CLAVICLE SURGERY      ESOPHAGOGASTRODUODENOSCOPY Left 2023    Procedure: EGD (ESOPHAGOGASTRODUODENOSCOPY);  Surgeon: Daiana Chilel MD;  Location: [facility] ENDOSCOPY;  Service: Gastroenterology;  Laterality: Left;     Family History   Problem Relation Name Age of Onset    Diabetes Mother      Diabetes Father      Heart disease Father       Social History     Tobacco Use    Smoking status: Former     Current packs/day: 0.00     Types: Cigarettes     Quit date: 5/15/2023     Years since quittin.9     Passive exposure: Past    Smokeless tobacco: Never   Substance Use Topics    Alcohol use: Never    Drug use: Never     Review of Systems   Constitutional:  Positive for appetite change and fatigue. Negative for chills and fever.   HENT:  Positive for  sore throat. Negative for congestion, facial swelling, nosebleeds and sinus pressure.    Eyes:  Negative for pain and redness.   Respiratory:  Negative for chest tightness, shortness of breath and wheezing.    Cardiovascular:  Negative for chest pain, palpitations and leg swelling.   Gastrointestinal:  Positive for diarrhea and nausea. Negative for abdominal distention, abdominal pain and vomiting.   Endocrine: Positive for polydipsia and polyuria. Negative for cold intolerance and polyphagia.   Genitourinary:  Negative for difficulty urinating, dysuria, frequency and hematuria.   Musculoskeletal:  Negative for arthralgias, back pain, myalgias and neck pain.   Skin:  Negative for color change and rash.   Neurological:  Positive for dizziness and weakness. Negative for numbness and headaches.   Hematological:  Negative for adenopathy. Does not bruise/bleed easily.   Psychiatric/Behavioral:  Negative for agitation and behavioral problems.    All other systems reviewed and are negative.      Physical Exam     Initial Vitals [04/20/24 1406]   BP Pulse Resp Temp SpO2   108/70 90 16 98.4 °F (36.9 °C) 100 %      MAP       --         Physical Exam    Nursing note and vitals reviewed.  Constitutional: He appears well-developed and well-nourished. He is not diaphoretic. He appears distressed.   Mild general discomfort, no acute distress   HENT:   Head: Normocephalic and atraumatic.   Mouth/Throat: Oropharynx is clear and moist. No oropharyngeal exudate.   Eyes: Conjunctivae and EOM are normal. Pupils are equal, round, and reactive to light. Right eye exhibits no discharge. Left eye exhibits no discharge. No scleral icterus.   Neck: Neck supple. No thyromegaly present. No tracheal deviation present. No JVD present.   Normal range of motion.  Cardiovascular:  Normal rate, regular rhythm and normal heart sounds.     Exam reveals no gallop and no friction rub.       No murmur heard.  Pulmonary/Chest: Breath sounds normal. No  respiratory distress. He has no wheezes. He has no rhonchi. He has no rales. He exhibits no tenderness.   Not displaying Kussmaul respirations   Abdominal: Abdomen is soft. Bowel sounds are normal. He exhibits no distension and no mass. There is no abdominal tenderness. There is no rebound and no guarding.   Musculoskeletal:         General: No tenderness or edema. Normal range of motion.      Cervical back: Normal range of motion and neck supple.     Lymphadenopathy:     He has no cervical adenopathy.   Neurological: He is alert and oriented to person, place, and time. He has normal strength. No cranial nerve deficit.   Skin: Skin is warm and dry. No rash noted. No erythema.   No lesions, particularly feet are free of wounds or other signs of infection   Psychiatric: He has a normal mood and affect. His behavior is normal. Judgment and thought content normal.         ED Course   Critical Care    Date/Time: 4/20/2024 3:34 PM    Performed by: Trey Sullivan MD  Authorized by: Trey Sullivan MD  Direct patient critical care time: 10 minutes  Additional history critical care time: 10 minutes  Ordering / reviewing critical care time: 10 minutes  Documentation critical care time: 10 minutes  Consulting other physicians critical care time: 5 minutes  Total critical care time (exclusive of procedural time) : 45 minutes  Critical care time was exclusive of separately billable procedures and treating other patients and teaching time.  Critical care was necessary to treat or prevent imminent or life-threatening deterioration of the following conditions: metabolic crisis and dehydration.  Critical care was time spent personally by me on the following activities: development of treatment plan with patient or surrogate, evaluation of patient's response to treatment, examination of patient, obtaining history from patient or surrogate, ordering and performing treatments and interventions, ordering and review of laboratory  studies, ordering and review of radiographic studies, pulse oximetry, re-evaluation of patient's condition and review of old charts.        Labs Reviewed   COMPREHENSIVE METABOLIC PANEL - Abnormal; Notable for the following components:       Result Value    Sodium Level 130 (*)     Glucose Level 584 (*)     Blood Urea Nitrogen 36.3 (*)     Creatinine 2.13 (*)     All other components within normal limits   BETA - HYDROXYBUTYRATE, SERUM - Abnormal; Notable for the following components:    Beta Hydroxybutyrate 1.60 (*)     All other components within normal limits   URINALYSIS, REFLEX TO URINE CULTURE - Abnormal; Notable for the following components:    Protein, UA Trace (*)     Glucose, UA 4+ (*)     Ketones, UA 1+ (*)     Blood, UA Trace (*)     Mucous, UA Trace (*)     Hyaline Casts, UA 11-20 (*)     All other components within normal limits   CBC WITH DIFFERENTIAL - Abnormal; Notable for the following components:    RBC 4.47 (*)     Hgb 12.3 (*)     Hct 36.2 (*)     All other components within normal limits   HEMOGLOBIN A1C - Abnormal; Notable for the following components:    Hemoglobin A1c 9.6 (*)     All other components within normal limits   IRON AND TIBC - Abnormal; Notable for the following components:    Iron Binding Capacity Total 213 (*)     All other components within normal limits   POCT GLUCOSE - Abnormal; Notable for the following components:    POCT Glucose 490 (*)     All other components within normal limits   POCT GLUCOSE - Abnormal; Notable for the following components:    POCT Glucose 384 (*)     All other components within normal limits   POCT GLUCOSE - Abnormal; Notable for the following components:    POCT Glucose 231 (*)     All other components within normal limits   COVID/FLU A&B PCR - Normal    Narrative:     The Xpert Xpress SARS-CoV-2/FLU/RSV plus is a rapid, multiplexed real-time PCR test intended for the simultaneous qualitative detection and differentiation of SARS-CoV-2, Influenza  A, Influenza B, and respiratory syncytial virus (RSV) viral RNA in either nasopharyngeal swab or nasal swab specimens.         STREP GROUP A BY PCR - Normal    Narrative:     The Xpert Xpress Strep A test is a rapid, qualitative in vitro diagnostic test for the detection of Streptococcus pyogenes (Group A ß-hemolytic Streptococcus, Strep A) in throat swab specimens from patients with signs and symptoms of pharyngitis.     FERRITIN - Normal   VITAMIN B12 - Normal   CBC W/ AUTO DIFFERENTIAL    Narrative:     The following orders were created for panel order CBC auto differential.  Procedure                               Abnormality         Status                     ---------                               -----------         ------                     CBC with Differential[[id number]]       Abnormal            Final result                 Please view results for these tests on the individual orders.   EXTRA TUBES    Narrative:     The following orders were created for panel order EXTRA TUBES.  Procedure                               Abnormality         Status                     ---------                               -----------         ------                     Light Blue Top Hold[[id number]]                             Final result               Red Top Hold[[id number]]                                    Final result               Lavender Top Hold[[id number]]                               Final result               Gold Top Hold[[id number]]                                   Final result                 Please view results for these tests on the individual orders.   LIGHT BLUE TOP HOLD   RED TOP HOLD   LAVENDER TOP HOLD   GOLD TOP HOLD   BASIC METABOLIC PANEL   CK   DRUG SCREEN, URINE (BEAKER)   POCT GLUCOSE, HAND-HELD DEVICE   POCT GLUCOSE, HAND-HELD DEVICE   POCT GLUCOSE, HAND-HELD DEVICE   POCT GLUCOSE, HAND-HELD DEVICE   POCT GLUCOSE MONITORING CONTINUOUS     EKG Readings: (Independently Interpreted)   Initial  Reading: No STEMI. Rhythm: Normal Sinus Rhythm. Heart Rate: 93. Ectopy: No Ectopy. Conduction: Normal. ST Segments: Normal ST Segments. T Waves: Normal. Axis: Normal. Clinical Impression: Normal Sinus Rhythm     ECG Results              EKG 12-lead (In process)        Collection Time Result Time QRS Duration OHS QTC Calculation    04/20/24 14:38:10 04/20/24 16:18:28 92 417                     In process by Interface, Lab In [facility] (04/20/24 16:18:31)                   Narrative:    Test Reason : R73.9,    Vent. Rate : 093 BPM     Atrial Rate : 093 BPM     P-R Int : 136 ms          QRS Dur : 092 ms      QT Int : 336 ms       P-R-T Axes : 084 083 073 degrees     QTc Int : 417 ms    Normal sinus rhythm  Normal ECG  When compared with ECG of 22-DEC-2023 21:05,  No significant change was found    Referred By: [email or web address]   SELF           Confirmed By:                                   Imaging Results              X-Ray Chest AP Portable (Final result)  Result time 04/20/24 15:10:14      Final result by Trey Smith MD (04/20/24 15:10:14)                   Impression:      No acute findings.      Electronically signed by: Trey Smith  Date:    04/20/2024  Time:    15:10               Narrative:    EXAMINATION:  XR CHEST AP PORTABLE    CLINICAL HISTORY:  hyperglycemia;    COMPARISON:  30 August 2023    FINDINGS:  Frontal view of the chest was obtained. The heart is not enlarged.  Lungs are clear.  There is no pneumothorax or significant effusion.  There is plate and screw left clavicle.  There are old left rib deformities.                                       Medications   sodium chloride 0.9% bolus 1,000 mL 1,000 mL (1,000 mLs Intravenous New Bag 4/20/24 1542)   insulin detemir U-100 injection 25 Units (has no administration in time range)   insulin aspart U-100 injection 7 Units (has no administration in time range)   insulin aspart U-100 injection 12 Units (has no administration in time range)   insulin aspart  U-100 injection 12 Units (has no administration in time range)   sodium chloride 0.9% flush 10 mL (has no administration in time range)   melatonin tablet 6 mg (has no administration in time range)   glucose chewable tablet 16 g (has no administration in time range)   glucose chewable tablet 24 g (has no administration in time range)   glucagon (human recombinant) injection 1 mg (has no administration in time range)   acetaminophen tablet 650 mg (has no administration in time range)   ondansetron disintegrating tablet 4 mg (has no administration in time range)   insulin aspart U-100 injection 0-10 Units (has no administration in time range)   dextrose 10% bolus 125 mL 125 mL (has no administration in time range)   dextrose 10% bolus 250 mL 250 mL (has no administration in time range)   lactated ringers infusion (has no administration in time range)   DULoxetine DR capsule 30 mg (has no administration in time range)   lisinopriL tablet 10 mg (has no administration in time range)   pregabalin capsule 50 mg (has no administration in time range)   sodium chloride 0.9% bolus 1,000 mL 1,000 mL (0 mLs Intravenous Stopped 4/20/24 1539)   insulin regular injection 8 Units 0.08 mL (8 Units Intravenous Given 4/20/24 1438)   acetaminophen tablet 1,000 mg (1,000 mg Oral Given 4/20/24 1543)   ibuprofen tablet 600 mg (600 mg Oral Given 4/20/24 1543)   insulin regular injection 8 Units 0.08 mL (8 Units Intravenous Given 4/20/24 1543)         3:35 PM Significant RODOLFO, dehydration, hyperglycemia, ketosis.  Not frankly acidotic.  Consult Internal Medicine, giving additional IV fluids and insulin.    Medical Decision Making  Problems Addressed:  RODOLFO (acute kidney injury): acute illness or injury  Dehydration: acute illness or injury  Hyperglycemia: acute illness or injury    Amount and/or Complexity of Data Reviewed  Labs: ordered. Decision-making details documented in ED Course.  Radiology: ordered. Decision-making details documented  in ED Course.    Risk  OTC drugs.  Prescription drug management.  Decision regarding hospitalization.      Additional MDM:   Differential Diagnosis:   Hyperglycemia, dehydration, DKA, other metabolic derangements                                    Clinical Impression:  Final diagnoses:  [R73.9] Hyperglycemia  [E86.0] Dehydration (Primary)  [N17.9] RODOLFO (acute kidney injury)          ED Disposition Condition    Admit                 Trey Sullivan MD  04/20/24 1536       Trey Sullivan MD  04/20/24 [id number]

## 2024-04-21 LAB
ALBUMIN SERPL-MCNC: 2.9 G/DL (ref 3.5–5)
ALBUMIN/GLOB SERPL: 1.2 RATIO (ref 1.1–2)
ALP SERPL-CCNC: 91 UNIT/L (ref 40–150)
ALT SERPL-CCNC: 21 UNIT/L (ref 0–55)
ANION GAP SERPL CALC-SCNC: 12 MEQ/L
ANION GAP SERPL CALC-SCNC: 7 MEQ/L
AST SERPL-CCNC: 35 UNIT/L (ref 5–34)
BASOPHILS # BLD AUTO: 0.05 X10(3)/MCL
BASOPHILS NFR BLD AUTO: 0.9 %
BILIRUB SERPL-MCNC: 0.3 MG/DL
BUN SERPL-MCNC: 27.1 MG/DL (ref 8.9–20.6)
BUN SERPL-MCNC: 30.2 MG/DL (ref 8.9–20.6)
BUN SERPL-MCNC: 31.6 MG/DL (ref 8.9–20.6)
CALCIUM SERPL-MCNC: 8.5 MG/DL (ref 8.4–10.2)
CALCIUM SERPL-MCNC: 8.6 MG/DL (ref 8.4–10.2)
CALCIUM SERPL-MCNC: 8.9 MG/DL (ref 8.4–10.2)
CHLORIDE SERPL-SCNC: 103 MMOL/L (ref 98–107)
CHLORIDE SERPL-SCNC: 105 MMOL/L (ref 98–107)
CHLORIDE SERPL-SCNC: 106 MMOL/L (ref 98–107)
CO2 SERPL-SCNC: 18 MMOL/L (ref 22–29)
CO2 SERPL-SCNC: 22 MMOL/L (ref 22–29)
CO2 SERPL-SCNC: 22 MMOL/L (ref 22–29)
CREAT SERPL-MCNC: 1.15 MG/DL (ref 0.73–1.18)
CREAT SERPL-MCNC: 1.27 MG/DL (ref 0.73–1.18)
CREAT SERPL-MCNC: 1.29 MG/DL (ref 0.73–1.18)
CREAT/UREA NIT SERPL: 24
CREAT/UREA NIT SERPL: 24
EOSINOPHIL # BLD AUTO: 0.58 X10(3)/MCL (ref 0–0.9)
EOSINOPHIL NFR BLD AUTO: 10.5 %
ERYTHROCYTE [DISTWIDTH] IN BLOOD BY AUTOMATED COUNT: 13 % (ref 11.5–17)
FOLATE SERPL-MCNC: 8.2 NG/ML (ref 7–31.4)
GFR SERPLBLD CREATININE-BSD FMLA CKD-EPI: >60 MLS/MIN/1.73/M2
GLOBULIN SER-MCNC: 2.4 GM/DL (ref 2.4–3.5)
GLUCOSE SERPL-MCNC: 299 MG/DL (ref 74–100)
GLUCOSE SERPL-MCNC: 325 MG/DL (ref 74–100)
GLUCOSE SERPL-MCNC: 430 MG/DL (ref 74–100)
HAV IGM SERPL QL IA: NONREACTIVE
HBV CORE IGM SERPL QL IA: NONREACTIVE
HBV SURFACE AG SERPL QL IA: NONREACTIVE
HCT VFR BLD AUTO: 34.2 % (ref 42–52)
HCV AB SERPL QL IA: NONREACTIVE
HGB BLD-MCNC: 11.2 G/DL (ref 14–18)
HIV 1+2 AB+HIV1 P24 AG SERPL QL IA: NONREACTIVE
HOLD SPECIMEN: NORMAL
HOLD SPECIMEN: NORMAL
IMM GRANULOCYTES # BLD AUTO: 0.01 X10(3)/MCL (ref 0–0.04)
IMM GRANULOCYTES NFR BLD AUTO: 0.2 %
LYMPHOCYTES # BLD AUTO: 2.46 X10(3)/MCL (ref 0.6–4.6)
LYMPHOCYTES NFR BLD AUTO: 44.6 %
MCH RBC QN AUTO: 27 PG (ref 27–31)
MCHC RBC AUTO-ENTMCNC: 32.7 G/DL (ref 33–36)
MCV RBC AUTO: 82.4 FL (ref 80–94)
MONOCYTES # BLD AUTO: 0.33 X10(3)/MCL (ref 0.1–1.3)
MONOCYTES NFR BLD AUTO: 6 %
NEUTROPHILS # BLD AUTO: 2.09 X10(3)/MCL (ref 2.1–9.2)
NEUTROPHILS NFR BLD AUTO: 37.8 %
NRBC BLD AUTO-RTO: 0 %
PLATELET # BLD AUTO: 194 X10(3)/MCL (ref 130–400)
PMV BLD AUTO: 10.2 FL (ref 7.4–10.4)
POCT GLUCOSE: 181 MG/DL (ref 70–110)
POCT GLUCOSE: 235 MG/DL (ref 70–110)
POCT GLUCOSE: 264 MG/DL (ref 70–110)
POCT GLUCOSE: 352 MG/DL (ref 70–110)
POCT GLUCOSE: 357 MG/DL (ref 70–110)
POCT GLUCOSE: 432 MG/DL (ref 70–110)
POCT GLUCOSE: 93 MG/DL (ref 70–110)
POTASSIUM SERPL-SCNC: 4.1 MMOL/L (ref 3.5–5.1)
POTASSIUM SERPL-SCNC: 5.4 MMOL/L (ref 3.5–5.1)
POTASSIUM SERPL-SCNC: 5.5 MMOL/L (ref 3.5–5.1)
PROT SERPL-MCNC: 5.3 GM/DL (ref 6.4–8.3)
RBC # BLD AUTO: 4.15 X10(6)/MCL (ref 4.7–6.1)
SODIUM SERPL-SCNC: 133 MMOL/L (ref 136–145)
SODIUM SERPL-SCNC: 134 MMOL/L (ref 136–145)
SODIUM SERPL-SCNC: 135 MMOL/L (ref 136–145)
T PALLIDUM AB SER QL: NONREACTIVE
WBC # SPEC AUTO: 5.52 X10(3)/MCL (ref 4.5–11.5)

## 2024-04-21 PROCEDURE — 21400001 HC TELEMETRY ROOM

## 2024-04-21 PROCEDURE — 94640 AIRWAY INHALATION TREATMENT: CPT

## 2024-04-21 PROCEDURE — 80074 ACUTE HEPATITIS PANEL: CPT | Performed by: STUDENT IN AN ORGANIZED HEALTH CARE EDUCATION/TRAINING PROGRAM

## 2024-04-21 PROCEDURE — 25000003 PHARM REV CODE 250

## 2024-04-21 PROCEDURE — 63600175 PHARM REV CODE 636 W HCPCS

## 2024-04-21 PROCEDURE — 87389 HIV-1 AG W/HIV-1&-2 AB AG IA: CPT

## 2024-04-21 PROCEDURE — 63600175 PHARM REV CODE 636 W HCPCS: Performed by: STUDENT IN AN ORGANIZED HEALTH CARE EDUCATION/TRAINING PROGRAM

## 2024-04-21 PROCEDURE — 11000001 HC ACUTE MED/SURG PRIVATE ROOM

## 2024-04-21 PROCEDURE — 85025 COMPLETE CBC W/AUTO DIFF WBC: CPT

## 2024-04-21 PROCEDURE — 86780 TREPONEMA PALLIDUM: CPT

## 2024-04-21 PROCEDURE — 80053 COMPREHEN METABOLIC PANEL: CPT

## 2024-04-21 PROCEDURE — 94761 N-INVAS EAR/PLS OXIMETRY MLT: CPT

## 2024-04-21 PROCEDURE — 82746 ASSAY OF FOLIC ACID SERUM: CPT

## 2024-04-21 PROCEDURE — 25000003 PHARM REV CODE 250: Performed by: STUDENT IN AN ORGANIZED HEALTH CARE EDUCATION/TRAINING PROGRAM

## 2024-04-21 PROCEDURE — 25000242 PHARM REV CODE 250 ALT 637 W/ HCPCS: Performed by: STUDENT IN AN ORGANIZED HEALTH CARE EDUCATION/TRAINING PROGRAM

## 2024-04-21 RX ORDER — CLONAZEPAM 1 MG/1
2 TABLET ORAL NIGHTLY
Status: DISCONTINUED | OUTPATIENT
Start: 2024-04-21 | End: 2024-04-22 | Stop reason: HOSPADM

## 2024-04-21 RX ORDER — ALBUTEROL SULFATE 0.83 MG/ML
10 SOLUTION RESPIRATORY (INHALATION) ONCE
Status: COMPLETED | OUTPATIENT
Start: 2024-04-21 | End: 2024-04-21

## 2024-04-21 RX ADMIN — INSULIN DETEMIR 25 UNITS: 100 INJECTION, SOLUTION SUBCUTANEOUS at 08:04

## 2024-04-21 RX ADMIN — HUMAN INSULIN 10 UNITS: 100 INJECTION, SOLUTION SUBCUTANEOUS at 08:04

## 2024-04-21 RX ADMIN — PREGABALIN 50 MG: 50 CAPSULE ORAL at 08:04

## 2024-04-21 RX ADMIN — INSULIN ASPART 12 UNITS: 100 INJECTION, SOLUTION INTRAVENOUS; SUBCUTANEOUS at 05:04

## 2024-04-21 RX ADMIN — INSULIN ASPART 12 UNITS: 100 INJECTION, SOLUTION INTRAVENOUS; SUBCUTANEOUS at 12:04

## 2024-04-21 RX ADMIN — SODIUM CHLORIDE, POTASSIUM CHLORIDE, SODIUM LACTATE AND CALCIUM CHLORIDE: 600; 310; 30; 20 INJECTION, SOLUTION INTRAVENOUS at 12:04

## 2024-04-21 RX ADMIN — HEPARIN SODIUM 5000 UNITS: 5000 INJECTION, SOLUTION INTRAVENOUS; SUBCUTANEOUS at 08:04

## 2024-04-21 RX ADMIN — ALBUTEROL SULFATE 10 MG: 2.5 SOLUTION RESPIRATORY (INHALATION) at 08:04

## 2024-04-21 RX ADMIN — DULOXETINE HYDROCHLORIDE 30 MG: 30 CAPSULE, DELAYED RELEASE ORAL at 08:04

## 2024-04-21 RX ADMIN — INSULIN ASPART 2 UNITS: 100 INJECTION, SOLUTION INTRAVENOUS; SUBCUTANEOUS at 05:04

## 2024-04-21 RX ADMIN — INSULIN ASPART 7 UNITS: 100 INJECTION, SOLUTION INTRAVENOUS; SUBCUTANEOUS at 08:04

## 2024-04-21 RX ADMIN — INSULIN ASPART 10 UNITS: 100 INJECTION, SOLUTION INTRAVENOUS; SUBCUTANEOUS at 08:04

## 2024-04-21 RX ADMIN — CLONAZEPAM 2 MG: 1 TABLET ORAL at 08:04

## 2024-04-21 RX ADMIN — SODIUM ZIRCONIUM CYCLOSILICATE 10 G: 10 POWDER, FOR SUSPENSION ORAL at 08:04

## 2024-04-21 RX ADMIN — INSULIN ASPART 6 UNITS: 100 INJECTION, SOLUTION INTRAVENOUS; SUBCUTANEOUS at 12:04

## 2024-04-21 NOTE — CARE UPDATE
Patient asymptomatic this morning. Hyperglycemic this morning. Lantus held overnight because CBG 80. Discussed with nurse to communicate to night nurse to call MD if concern with giving long acting insulin. Give long acting insulin unless patient has significant symptomatic hypoglycemia at time of administration.    Hyperkalemic with potassium 5.5. Shift with 10u regular insulin and give lokelma x 1. Repeat BMP in 4 hours. Continuous telemetry.    Resume home lyrica and klonipin.    Jena Gauthier MD  [facility] Internal Medicine, PRG-2  4/21/2024

## 2024-04-22 VITALS
SYSTOLIC BLOOD PRESSURE: 153 MMHG | HEART RATE: 83 BPM | TEMPERATURE: 97 F | HEIGHT: 71 IN | RESPIRATION RATE: 17 BRPM | DIASTOLIC BLOOD PRESSURE: 90 MMHG | WEIGHT: 169.75 LBS | BODY MASS INDEX: 23.77 KG/M2 | OXYGEN SATURATION: 97 %

## 2024-04-22 PROBLEM — I10 HYPERTENSION: Status: ACTIVE | Noted: 2024-04-22

## 2024-04-22 LAB
ALBUMIN SERPL-MCNC: 2.8 G/DL (ref 3.5–5)
ALBUMIN/GLOB SERPL: 1.2 RATIO (ref 1.1–2)
ALP SERPL-CCNC: 87 UNIT/L (ref 40–150)
ALT SERPL-CCNC: 21 UNIT/L (ref 0–55)
ANION GAP SERPL CALC-SCNC: 4 MEQ/L
AST SERPL-CCNC: 20 UNIT/L (ref 5–34)
BASOPHILS # BLD AUTO: 0.04 X10(3)/MCL
BASOPHILS NFR BLD AUTO: 0.6 %
BILIRUB SERPL-MCNC: 0.2 MG/DL
BUN SERPL-MCNC: 18 MG/DL (ref 8.9–20.6)
BUN SERPL-MCNC: 21.4 MG/DL (ref 8.9–20.6)
CALCIUM SERPL-MCNC: 8.7 MG/DL (ref 8.4–10.2)
CALCIUM SERPL-MCNC: 8.7 MG/DL (ref 8.4–10.2)
CHLORIDE SERPL-SCNC: 105 MMOL/L (ref 98–107)
CHLORIDE SERPL-SCNC: 107 MMOL/L (ref 98–107)
CO2 SERPL-SCNC: 26 MMOL/L (ref 22–29)
CO2 SERPL-SCNC: 26 MMOL/L (ref 22–29)
CREAT SERPL-MCNC: 0.85 MG/DL (ref 0.73–1.18)
CREAT SERPL-MCNC: 1.19 MG/DL (ref 0.73–1.18)
CREAT/UREA NIT SERPL: 21
EOSINOPHIL # BLD AUTO: 0.46 X10(3)/MCL (ref 0–0.9)
EOSINOPHIL NFR BLD AUTO: 6.5 %
ERYTHROCYTE [DISTWIDTH] IN BLOOD BY AUTOMATED COUNT: 12.7 % (ref 11.5–17)
GFR SERPLBLD CREATININE-BSD FMLA CKD-EPI: >60 MLS/MIN/1.73/M2
GFR SERPLBLD CREATININE-BSD FMLA CKD-EPI: >60 MLS/MIN/1.73/M2
GLOBULIN SER-MCNC: 2.4 GM/DL (ref 2.4–3.5)
GLUCOSE SERPL-MCNC: 242 MG/DL (ref 74–100)
GLUCOSE SERPL-MCNC: 80 MG/DL (ref 74–100)
HCT VFR BLD AUTO: 32.8 % (ref 42–52)
HGB BLD-MCNC: 11 G/DL (ref 14–18)
HOLD SPECIMEN: NORMAL
IMM GRANULOCYTES # BLD AUTO: 0.01 X10(3)/MCL (ref 0–0.04)
IMM GRANULOCYTES NFR BLD AUTO: 0.1 %
LYMPHOCYTES # BLD AUTO: 2.69 X10(3)/MCL (ref 0.6–4.6)
LYMPHOCYTES NFR BLD AUTO: 37.8 %
MCH RBC QN AUTO: 27.4 PG (ref 27–31)
MCHC RBC AUTO-ENTMCNC: 33.5 G/DL (ref 33–36)
MCV RBC AUTO: 81.8 FL (ref 80–94)
MONOCYTES # BLD AUTO: 0.38 X10(3)/MCL (ref 0.1–1.3)
MONOCYTES NFR BLD AUTO: 5.3 %
NEUTROPHILS # BLD AUTO: 3.54 X10(3)/MCL (ref 2.1–9.2)
NEUTROPHILS NFR BLD AUTO: 49.7 %
NRBC BLD AUTO-RTO: 0 %
PLATELET # BLD AUTO: 211 X10(3)/MCL (ref 130–400)
PMV BLD AUTO: 10.3 FL (ref 7.4–10.4)
POCT GLUCOSE: 141 MG/DL (ref 70–110)
POCT GLUCOSE: 146 MG/DL (ref 70–110)
POCT GLUCOSE: 279 MG/DL (ref 70–110)
POCT GLUCOSE: 45 MG/DL (ref 70–110)
POCT GLUCOSE: 47 MG/DL (ref 70–110)
POTASSIUM SERPL-SCNC: 4 MMOL/L (ref 3.5–5.1)
POTASSIUM SERPL-SCNC: 4.3 MMOL/L (ref 3.5–5.1)
PROT SERPL-MCNC: 5.2 GM/DL (ref 6.4–8.3)
RBC # BLD AUTO: 4.01 X10(6)/MCL (ref 4.7–6.1)
SODIUM SERPL-SCNC: 137 MMOL/L (ref 136–145)
SODIUM SERPL-SCNC: 137 MMOL/L (ref 136–145)
WBC # SPEC AUTO: 7.12 X10(3)/MCL (ref 4.5–11.5)

## 2024-04-22 PROCEDURE — 85025 COMPLETE CBC W/AUTO DIFF WBC: CPT

## 2024-04-22 PROCEDURE — 63600175 PHARM REV CODE 636 W HCPCS

## 2024-04-22 PROCEDURE — 25000003 PHARM REV CODE 250

## 2024-04-22 PROCEDURE — 94761 N-INVAS EAR/PLS OXIMETRY MLT: CPT

## 2024-04-22 PROCEDURE — 80053 COMPREHEN METABOLIC PANEL: CPT

## 2024-04-22 RX ORDER — AMLODIPINE BESYLATE 10 MG/1
10 TABLET ORAL DAILY
Qty: 30 TABLET | Refills: 0 | Status: SHIPPED | OUTPATIENT
Start: 2024-04-22 | End: 2024-05-22

## 2024-04-22 RX ORDER — PREGABALIN 50 MG/1
50 CAPSULE ORAL NIGHTLY
Qty: 14 CAPSULE | Refills: 0 | Status: SHIPPED | OUTPATIENT
Start: 2024-04-22 | End: 2024-04-22

## 2024-04-22 RX ORDER — PREGABALIN 50 MG/1
50 CAPSULE ORAL NIGHTLY
Qty: 14 CAPSULE | Refills: 0 | Status: SHIPPED | OUTPATIENT
Start: 2024-04-22 | End: 2024-05-06

## 2024-04-22 RX ORDER — AMLODIPINE BESYLATE 10 MG/1
10 TABLET ORAL DAILY
Qty: 30 TABLET | Refills: 0 | Status: SHIPPED | OUTPATIENT
Start: 2024-04-22 | End: 2024-04-22

## 2024-04-22 RX ORDER — AMLODIPINE BESYLATE 5 MG/1
5 TABLET ORAL DAILY
Status: DISCONTINUED | OUTPATIENT
Start: 2024-04-22 | End: 2024-04-22 | Stop reason: HOSPADM

## 2024-04-22 RX ADMIN — DULOXETINE HYDROCHLORIDE 30 MG: 30 CAPSULE, DELAYED RELEASE ORAL at 08:04

## 2024-04-22 RX ADMIN — SODIUM CHLORIDE, POTASSIUM CHLORIDE, SODIUM LACTATE AND CALCIUM CHLORIDE 1000 ML: 600; 310; 30; 20 INJECTION, SOLUTION INTRAVENOUS at 05:04

## 2024-04-22 RX ADMIN — INSULIN ASPART 7 UNITS: 100 INJECTION, SOLUTION INTRAVENOUS; SUBCUTANEOUS at 08:04

## 2024-04-22 RX ADMIN — PREGABALIN 50 MG: 50 CAPSULE ORAL at 08:04

## 2024-04-22 RX ADMIN — HEPARIN SODIUM 5000 UNITS: 5000 INJECTION, SOLUTION INTRAVENOUS; SUBCUTANEOUS at 08:04

## 2024-04-22 RX ADMIN — AMLODIPINE BESYLATE 5 MG: 5 TABLET ORAL at 08:04

## 2024-04-22 NOTE — PROGRESS NOTES
[facility] Medicine Wards Progress Note     Resident Team: [facility] Medicine List 3  Attending Physician: Sudhir Velasquez MD  Resident: Linh  Intern: Александр    Subjective:      Brief HPI:  Jose Francisco Romero is a 43 y.o. male with a past medical history of T1DM and HTN who presented to the ED on 4/20/2024  with a primary complaint of generalized weakness and fatigue.  Patient states that over the last 3 days he has been feeling weak and fatigued.  He reports elevated blood glucose readings over the last several days as well.  He denies any inciting/eliciting events before these symptoms started.  He states that this is normally how he feels when he goes into DKA which brought him to the ED. Patient does report 2 episodes of nonbloody diarrhea over the course of last 3 days but denies any episodes before this.  He also denies any other associated symptoms such as fever, chills, chest pain, palpitations, cough, sputum production, nausea/vomiting, abdominal pain, constipation, urinary symptoms including dysuria, urgency, and frequency.  Also denies any new skin lesions or oral lesions.  He does report some associated shortness of breath on rest that is worse on exertion.  Patient reports that he is fully compliant with his home insulin regimen which consists of Lantus 26U QHS and Novalog 7U w/ breakfast, 12U with lunch/dinner.  He reports no recent missed doses in insulin.  He reports no recent change in insulin regimen.     In the ED, patient mildly hypotensive with a blood pressure of 108/70.  Otherwise, vital signs unremarkable.  CBC significant for normocytic anemia currently at patient's baseline.  CMP significant for mild hyponatremia, RODOLFO with a creatinine of 2.13, and hyperglycemia with a CBG of 584.  BOHB 1.6.  CXR displayed no acute chest disease.  Patient was given 2 L bolus NS and 16 units of regular insulin in total.  Internal medicine was consulted for further care and management moving forward.    Interval  History: No acute events overnight. Patient denies any acute symptoms and states that he is feeling well overall.  Blood glucose levels remain suboptimally controlled but overall improved from admission.  Long-acting insulin was held on 1st night of admission, team communicated with nursing staff yesterday withhold from holding this dose.  Creatinine displayed slight interval increased to 1.19 this morning.  Start low maintenance LR once again.  Possible discharge today with correction in insulin regimen.    Review of Systems:  ROS completed and negative except as indicated above.     Objective:     Last 24 Hour Vital Signs:  BP  Min: 134/80  Max: 168/96  Temp  Av.3 °F (36.8 °C)  Min: 97.9 °F (36.6 °C)  Max: 98.5 °F (36.9 °C)  Pulse  Av.1  Min: 70  Max: 90  Resp  Av  Min: 18  Max: 18  SpO2  Av.9 %  Min: 97 %  Max: 99 %  I/O last 3 completed shifts:  In: 6639.4 [P.O.:3050; I.V.:2590.4; IV Piggyback:999]  Out: 925 [Urine:925]    Physical Examination:  Physical Exam  Vitals reviewed.   Constitutional:       General: He is not in acute distress.  Cardiovascular:      Rate and Rhythm: Normal rate and regular rhythm.      Pulses: Normal pulses.      Heart sounds: No murmur heard.     No friction rub. No gallop.   Pulmonary:      Breath sounds: Normal breath sounds. No wheezing, rhonchi or rales.   Abdominal:      General: There is no distension.      Palpations: Abdomen is soft.      Tenderness: There is no abdominal tenderness.   Musculoskeletal:         General: No swelling or tenderness.   Skin:     General: Skin is warm and dry.   Neurological:      General: No focal deficit present.      Mental Status: He is alert. Mental status is at baseline.   Psychiatric:         Mood and Affect: Mood normal.         Behavior: Behavior normal.       Laboratory:  Most Recent Data:  CBC:   Lab Results   Component Value Date    WBC 7.12 2024    HGB 11.0 (L) 2024    HCT 32.8 (L) 2024      04/22/2024    MCV 81.8 04/22/2024    RDW 12.7 04/22/2024     WBC Differential:   Recent Labs   Lab 04/20/24  1452 04/21/24  0407 04/22/24  0326   WBC 5.96 5.52 7.12   HGB 12.3* 11.2* 11.0*   HCT 36.2* 34.2* 32.8*    194 211   MCV 81.0 82.4 81.8     BMP:   Lab Results   Component Value Date     04/22/2024    K 4.3 04/22/2024     11/24/2022    CO2 26 04/22/2024    BUN 21.4 (H) 04/22/2024    CREATININE 1.19 (H) 04/22/2024    CALCIUM 8.7 04/22/2024    MG 1.50 (L) 12/24/2023    PHOS 3.4 12/24/2023     LFTs:   Lab Results   Component Value Date    ALBUMIN 2.8 (L) 04/22/2024    BILITOT 0.2 04/22/2024    AST 20 04/22/2024    ALKPHOS 87 04/22/2024    ALT 21 04/22/2024     Coags:   Lab Results   Component Value Date    INR 1.1 11/23/2022    PROTIME 14.3 11/24/2018    PTT 30.9 11/24/2018     FLP:   Lab Results   Component Value Date    CHOL 137 12/02/2021    HDL 46 12/02/2021    TRIG 152 (H) 12/02/2021     DM:   Lab Results   Component Value Date    HGBA1C 9.6 (H) 04/20/2024    HGBA1C 7.4 (H) 12/22/2023    HGBA1C 10.4 (H) 06/24/2023    CREATININE 1.19 (H) 04/22/2024     Thyroid:   Lab Results   Component Value Date    TSH 0.969 12/22/2023     Anemia:   Lab Results   Component Value Date    IRON 98 04/20/2024    TIBC 213 (L) 04/20/2024    FERRITIN 172.73 04/20/2024    DEFTBJWZ60 784 04/20/2024    FOLATE 8.2 04/21/2024     Cardiac:   Lab Results   Component Value Date    TROPONINI <0.010 08/30/2023    BNP 12.1 06/23/2023     Radiology:  Imaging Results              X-Ray Chest AP Portable (Final result)  Result time 04/20/24 15:10:14      Final result by Trey Smith MD (04/20/24 15:10:14)                   Impression:      No acute findings.      Electronically signed by: Trey Smith  Date:    04/20/2024  Time:    15:10               Narrative:    EXAMINATION:  XR CHEST AP PORTABLE    CLINICAL HISTORY:  hyperglycemia;    COMPARISON:  30 August 2023    FINDINGS:  Frontal view of the chest was obtained.  The heart is not enlarged.  Lungs are clear.  There is no pneumothorax or significant effusion.  There is plate and screw left clavicle.  There are old left rib deformities.                                      Current Medications:     Infusions:  Current Facility-Administered Medications   Medication Dose Route Frequency Last Rate Last Admin        Scheduled:  Current Facility-Administered Medications   Medication Dose Route Frequency    amLODIPine  5 mg Oral Daily    clonazePAM  2 mg Oral QHS    DULoxetine  30 mg Oral Daily    heparin (porcine)  5,000 Units Subcutaneous Q12H    insulin aspart U-100  12 Units Subcutaneous with dinner    insulin aspart U-100  12 Units Subcutaneous with lunch    insulin aspart U-100  7 Units Subcutaneous with breakfast    insulin detemir U-100  30 Units Subcutaneous QHS    lactated ringers  1,000 mL Intravenous Once    pregabalin  50 mg Oral BID        PRN:    Current Facility-Administered Medications:     acetaminophen, 650 mg, Oral, Q8H PRN    dextrose 10%, 12.5 g, Intravenous, PRN    dextrose 10%, 25 g, Intravenous, PRN    glucagon (human recombinant), 1 mg, Intramuscular, PRN    glucose, 16 g, Oral, PRN    glucose, 24 g, Oral, PRN    insulin aspart U-100, 0-10 Units, Subcutaneous, QID (AC + HS) PRN    melatonin, 6 mg, Oral, Nightly PRN    ondansetron, 4 mg, Oral, Q6H PRN    sodium chloride 0.9%, 10 mL, Intravenous, PRN    Assessment & Plan:     Uncontrolled Type I DM   Hyperglycemia   Ketosis   Diabetic neuropathy   - Patient with CBG of 584 in the ED; given 12U of regular insulin   - Home insulin: Lantus 26U QHS; Aspart 7U w/ breakfast, 12U with lunch/dinner  - reports full compliance with home insulin regimen, reports no recent changes  - A1c 9.6% on 4/20; previously 7.4% on 12/22/23  - Not in DKA on admission: No AG acidosis present   - BOHB 1.6, UA with 1+ ketones; likely representative of starvation ketosis with poor PO intake over last several days  - schedule patient's  home insulin regimen with moderate SSI for correction; increasing long-acting insulin dose to 30 units secondary to a.m. hypoglycemia  - patient will need follow up outpatient for further titration of insulin regimen  - restart Lyrica 50 mg b.i.d. for diabetic neuropathy      RODOLFO, likely secondary to volume depletion, improved  Hyponatremia, resolved  - BUN/creatinine 36.3/2.13 on admission; previous baseline creatinine <1  - sodium 130 on admission; suspect hypovolemic hyponatremia  - RODOLFO likely secondary to polyuria from hyperglycemia although patient denied the symptom   - UA with hyaline cast suggesting volume depletion   - improved renal indices this morning.  However, slight increase in serum creatinine to 1.19 in interval from yesterday.  - avoid nephrotoxic medications  - continue LR this morning     Normocytic anemia   - H/H 12.3/36.2 (MCV 81.0 on admission); this is patient's approximate baseline  - iron panel did not display iron-deficiency  - B12 and folate WNL     HTN  - patient currently prescribed amlodipine and lisinopril for management of hypertensive although he reports noncompliance  - patient not hypertensive on admission  - will hold lisinopril for RODOLFO  - added amlodipine 5 mg daily for blood pressure control, up titrate as needed on outpatient basis     Anxiety  - patient reports noncompliance with all prescribed medications  - restart home duloxetine and add other agents as needed  - will need outpatient psychiatry follow-up for long-term management     CODE STATUS: Full  Access: PIV  Antibiotics: N/A  Diet: Diabetic  DVT Prophylaxis: Heparin, SCDs  GI Prophylaxis: N/A  Fluids: LR     Disposition:  Patient admitted to the Internal Medicine team for uncontrolled type 1 diabetes mellitus and RODOLFO likely secondary to volume depletion.  Patient currently receiving IVF for RODOLFO which has significantly improved from admission.  We will need further fine titration of insulin regimen for more optimal  control once discharged.    Chente Fountain, DO  Internal Medicine -1

## 2024-04-22 NOTE — PROGRESS NOTES
Inpatient Nutrition Evaluation    Admit Date: 4/20/2024   Total duration of encounter: 2 days   Patient Age: 43 y.o.    Nutrition Recommendation/Prescription     Diabetic diet  Monitor Weight Weekly     Nutrition Assessment     Chart Review    Reason Seen: continuous nutrition monitoring    Malnutrition Screening Tool Results   Have you recently lost weight without trying?: No  Have you been eating poorly because of a decreased appetite?: No   MST Score: 0   Diagnosis:  DM, DKA    Relevant Medical History: DM, THN    Scheduled Medications:  amLODIPine, 5 mg, Daily  clonazePAM, 2 mg, QHS  DULoxetine, 30 mg, Daily  heparin (porcine), 5,000 Units, Q12H  insulin aspart U-100, 12 Units, with dinner  insulin aspart U-100, 12 Units, with lunch  insulin aspart U-100, 7 Units, with breakfast  insulin detemir U-100, 30 Units, QHS  pregabalin, 50 mg, BID    Continuous Infusions:   PRN Medications:   Current Facility-Administered Medications:     acetaminophen, 650 mg, Oral, Q8H PRN    dextrose 10%, 12.5 g, Intravenous, PRN    dextrose 10%, 25 g, Intravenous, PRN    glucagon (human recombinant), 1 mg, Intramuscular, PRN    glucose, 16 g, Oral, PRN    glucose, 24 g, Oral, PRN    insulin aspart U-100, 0-10 Units, Subcutaneous, QID (AC + HS) PRN    melatonin, 6 mg, Oral, Nightly PRN    ondansetron, 4 mg, Oral, Q6H PRN    sodium chloride 0.9%, 10 mL, Intravenous, PRN    Recent Labs   Lab 04/20/24  1452 04/20/24  1523 04/21/24  0407 04/21/24  0805 04/21/24  1146 04/22/24  0326 04/22/24  1050   *  --  134* 133* 135* 137 137   K 4.9  --  5.5* 5.4* 4.1 4.3 4.0   CALCIUM 9.4  --  8.5 8.9 8.6 8.7 8.7   CHLORIDE 98  --  105 103 106 105 107   CO2 22  --  22 18* 22 26 26   BUN 36.3*  --  31.6* 30.2* 27.1* 21.4* 18.0   CREATININE 2.13*  --  1.29* 1.27* 1.15 1.19* 0.85   EGFRNORACEVR 39  --  >60 >60 >60 >60 >60   GLUCOSE 584*  --  325* 430* 299* 242* 80   BILITOT 0.6  --  0.3  --   --  0.2  --    ALKPHOS 109  --  91  --   --  87  --   "  ALT 14  --  21  --   --  21  --    AST 20  --  35*  --   --  20  --    ALBUMIN 3.7  --  2.9*  --   --  2.8*  --    HGBA1C  --  9.6*  --   --   --   --   --    WBC 5.96  --  5.52  --   --  7.12  --    HGB 12.3*  --  11.2*  --   --  11.0*  --    HCT 36.2*  --  34.2*  --   --  32.8*  --      Nutrition Orders:  Diet diabetic 2000 Calorie      Appetite/Oral Intake: good/% of meals  Factors Affecting Nutritional Intake: none identified  Food/Jewish/Cultural Preferences: none reported  Food Allergies: no known food allergies  Last Bowel Movement: 24  Wound(s):  skin intact    Comments    24 -- Pt reports good appetite this am, 100% meal intake documented; Hgb A1C (H) - h/o DM, pt denies need for diet education, reviewed & encouraged diabetic diet; no indication of weight loss per EMR weigh history    Anthropometrics    Height: 5' 10.87" (180 cm), Height Method: Stated  Last Weight: 77 kg (169 lb 12.1 oz) (24 140), Weight Method: Standard Scale  BMI (Calculated): 23.8  BMI Classification: normal (BMI 18.5-24.9)        Ideal Body Weight (IBW), Male: 171.22 lb     % Ideal Body Weight, Male (lb): 99.15 %                 Usual Body Weight (UBW), k.1 kg  % Usual Body Weight: 100.08     Usual Weight Provided By: patient and EMR weight history    Wt Readings from Last 5 Encounters:   24 77 kg (169 lb 12.1 oz)   24 77 kg (169 lb 12.8 oz)   24 75.5 kg (166 lb 6.4 oz)   23 79.4 kg (175 lb)   23 77.1 kg (170 lb)     Weight Change(s) Since Admission:   Wt Readings from Last 1 Encounters:   24 1406 77 kg (169 lb 12.1 oz)   Admit Weight: 77 kg (169 lb 12.1 oz) (24 1406), Weight Method: Standard Scale    Patient Education     Education Provided: diabetic diet  Teaching Method: explanation  Comprehension: verbalizes understanding  Barriers to Learning: none evident  Expected Compliance: fair  Comments: All questions were answered and dietitian's contact information " was provided.     Nutrition Goals & Monitoring     Dietitian will monitor: food and beverage intake, weight change, food/nutrition knowledge skill, and glucose/endocrine profile    Nutrition Risk/Follow-Up: low (follow-up in 5-7 days)  Patients assigned 'low nutrition risk' status do not qualify for a full nutritional assessment but will be monitored and re-evaluated in a 5-7 day time period. Please consult if re-evaluation needed sooner.

## 2024-04-22 NOTE — DISCHARGE SUMMARY
[facility] Internal Medicine Discharge Summary    Admitting Physician: Candy Miguel MD  Attending Physician: Sudhir Velasquez MD  Date of Admit: 4/20/2024  Date of Discharge: 4/22/2024    Condition: Good  Outcome: Condition has improved and patient is now ready for discharge.  DISPOSITION: Home or Self Care    Discharge Diagnoses     Patient Active Problem List   Diagnosis    DM (diabetes mellitus) type 2, uncontrolled, with ketoacidosis    Diabetic polyneuropathy    Severe malnutrition    Neuropathy    Moderate malnutrition    Melena    Diarrhea    Encounter for diabetic foot exam    Pain       Principal Problem:  DM (diabetes mellitus) type 2, uncontrolled, with ketoacidosis    Consultants and Procedures     Consultants:  IP CONSULT TO INTERNAL MEDICINE    Procedures:   * No surgery found *     Brief Admission History      Jose Francisco Romero is a 43 y.o. male with a past medical history of T1DM and HTN who presented to the ED on 4/20/2024  with a primary complaint of generalized weakness and fatigue.  Patient states that over the last 3 days he has been feeling weak and fatigued.  He reports elevated blood glucose readings over the last several days as well.  He denies any inciting/eliciting events before these symptoms started.  He states that this is normally how he feels when he goes into DKA which brought him to the ED. Patient does report 2 episodes of nonbloody diarrhea over the course of last 3 days but denies any episodes before this.  He also denies any other associated symptoms such as fever, chills, chest pain, palpitations, cough, sputum production, nausea/vomiting, abdominal pain, constipation, urinary symptoms including dysuria, urgency, and frequency.  Also denies any new skin lesions or oral lesions.  He does report some associated shortness of breath on rest that is worse on exertion.  Patient reports that he is fully compliant with his home insulin regimen which consists of Lantus 26U QHS  "and Novalog 7U w/ breakfast, 12U with lunch/dinner.  He reports no recent missed doses in insulin.  He reports no recent change in insulin regimen.     In the ED, patient mildly hypotensive with a blood pressure of 108/70.  Otherwise, vital signs unremarkable.  CBC significant for normocytic anemia currently at patient's baseline.  CMP significant for mild hyponatremia, RODOLFO with a creatinine of 2.13, and hyperglycemia with a CBG of 584.  BOHB 1.6.  CXR displayed no acute chest disease.  Patient was given 2 L bolus NS and 16 units of regular insulin in total.  Internal medicine was consulted for further care and management moving forward.    Hospital Course with Pertinent Findings     Patient admitted to the Internal Medicine team for RODOLFO complicated by uncontrolled type 1 diabetes mellitus.  Patient with hyperglycemia and elevated BOHB on admission but not in DKA with no acidosis present.  Patient restarted on home insulin regimen with sliding scale for correction.  Insulin regimen effective in controlling patient's blood glucose.  Questioning compliance of patient at home given initial CBG.  Patient could benefit from some small up titration and insulin regimen on outpatient basis.  RODOLFO resolved with aggressive IVF resuscitation.  Patient also reported that he was noncompliant with home antihypertensives.  Remained hypertensive throughout hospitalization.  We will reinitiate amlodipine 10 mg daily at discharge.  Additional antihypertensive agents can be added outpatient if needed.    Discharge physical exam:  Vitals  BP: (!) 143/83  Temp: 97.5 °F (36.4 °C)  Temp Source: Oral  Pulse: 77  Resp: 17  SpO2: 96 %  Height: 5' 10.87" (180 cm)  Weight: 77 kg (169 lb 12.1 oz)    Physical Exam  Vitals reviewed.   Constitutional:       General: He is not in acute distress.  Cardiovascular:      Rate and Rhythm: Normal rate and regular rhythm.      Pulses: Normal pulses.      Heart sounds: No murmur heard.     No friction rub. " No gallop.   Pulmonary:      Breath sounds: Normal breath sounds. No wheezing or rhonchi.   Abdominal:      General: There is no distension.      Palpations: Abdomen is soft.      Tenderness: There is no abdominal tenderness.   Musculoskeletal:         General: No swelling or tenderness.   Skin:     General: Skin is warm and dry.   Neurological:      General: No focal deficit present.      Mental Status: He is alert. Mental status is at baseline.   Psychiatric:         Mood and Affect: Mood normal.         Behavior: Behavior normal.       TIME SPENT ON DISCHARGE: 60 minutes    Discharge Medications        Medication List        START taking these medications      amLODIPine 10 MG tablet  Commonly known as: NORVASC  Take 1 tablet (10 mg total) by mouth once daily.     * pregabalin 50 MG capsule  Commonly known as: LYRICA     * pregabalin 50 MG capsule  Commonly known as: LYRICA  Take 1 capsule (50 mg total) by mouth every evening. for 14 days           * This list has 2 medication(s) that are the same as other medications prescribed for you. Read the directions carefully, and ask your doctor or other care provider to review them with you.                CHANGE how you take these medications      insulin aspart U-100 100 unit/mL (3 mL) Inpn pen  Commonly known as: NovoLOG Flexpen U-100 Insulin  12 units TID with meals with correction 1:50 >150 Max dose 50 units  What changed: additional instructions     insulin glargine 100 units/mL SubQ pen  Commonly known as: LANTUS SOLOSTAR U-100 INSULIN  Inject 25 Units into the skin once daily.  What changed: when to take this            CONTINUE taking these medications      albuterol 90 mcg/actuation inhaler  Commonly known as: PROVENTIL/VENTOLIN HFA     * blood sugar diagnostic Strp  To check BG 4 times daily, to use with insurance preferred meter     * blood sugar diagnostic Strp  To check BG 4 times daily, to use with insurance preferred meter     blood-glucose meter kit  To  "check BG 4 times daily, to use with insurance preferred meter     buPROPion 300 MG 24 hr tablet  Commonly known as: WELLBUTRIN XL     clonazePAM 2 MG Tab  Commonly known as: KlonoPIN     DEXCOM G7  Misc  Generic drug: blood-glucose meter,continuous  Dexcom G7  uses directed     DEXCOM G7 SENSOR Trinh  Generic drug: blood-glucose sensor  Use every 10 days as directed     LIDOcaine 5 % Oint ointment  Commonly known as: XYLOCAINE     lisinopriL 10 MG tablet  Take 1 tablet (10 mg total) by mouth once daily.     mupirocin 2 % ointment  Commonly known as: BACTROBAN  Apply topically 3 (three) times daily.     pantoprazole 40 MG tablet  Commonly known as: PROTONIX  Take 1 tablet (40 mg total) by mouth once daily.     pen needle, diabetic 31 gauge x 3/16" Ndle  Use 4 times a day for insulin injection (BD Mini)     traZODone 100 MG tablet  Commonly known as: DESYREL     TRUEPLUS LANCETS 28 gauge Misc  Generic drug: lancets           * This list has 2 medication(s) that are the same as other medications prescribed for you. Read the directions carefully, and ask your doctor or other care provider to review them with you.                ASK your doctor about these medications      DULoxetine 30 MG capsule  Commonly known as: CYMBALTA  Take 1 capsule (30 mg total) by mouth once daily.               Where to Get Your Medications        These medications were sent to [facility] - [address] [address]      Phone: [phone]   amLODIPine 10 MG tablet  pregabalin 50 MG capsule         Discharge Information:     - patient is to continue taking/start taking medications as listed above  - discussed proper use of Lyrica in detail with patient  - discussed with the patient the importance of taking insulin regimen as prescribed for more optimal blood sugar control  - instructed patient to contact PCP for post hospitalization appointment in " approximately 1-2 weeks  - instructed patient to contact endocrinologist for post hospitalization appointment asap  - strict ED precautions given to patient.  Warning signs/symptoms explained in detail.    Chente Fountain DO  [facility] Internal Medicine PGY-1

## 2024-04-24 LAB
OHS QRS DURATION: 92 MS
OHS QRS DURATION: 94 MS
OHS QTC CALCULATION: 417 MS
OHS QTC CALCULATION: 429 MS

## 2024-05-04 ENCOUNTER — HOSPITAL ENCOUNTER (EMERGENCY)
Facility: HOSPITAL | Age: 44
Discharge: HOME | End: 2024-05-04
Attending: EMERGENCY MEDICINE
Payer: MEDICAID

## 2024-05-04 VITALS
DIASTOLIC BLOOD PRESSURE: 88 MMHG | HEIGHT: 75 IN | RESPIRATION RATE: 15 BRPM | OXYGEN SATURATION: 99 % | WEIGHT: 169.75 LBS | TEMPERATURE: 98 F | SYSTOLIC BLOOD PRESSURE: 155 MMHG | BODY MASS INDEX: 21.11 KG/M2 | HEART RATE: 59 BPM

## 2024-05-04 DIAGNOSIS — M62.838 MUSCLE SPASM: ICD-10-CM

## 2024-05-04 DIAGNOSIS — R10.9 FLANK PAIN: Primary | ICD-10-CM

## 2024-05-04 LAB
ALBUMIN SERPL-MCNC: 3.8 G/DL (ref 3.5–5)
ALBUMIN/GLOB SERPL: 1.1 RATIO (ref 1.1–2)
ALP SERPL-CCNC: 116 UNIT/L (ref 40–150)
ALT SERPL-CCNC: 22 UNIT/L (ref 0–55)
AMPHET UR QL SCN: NEGATIVE
APPEARANCE UR: CLEAR
AST SERPL-CCNC: 16 UNIT/L (ref 5–34)
BACTERIA #/AREA URNS AUTO: ABNORMAL /HPF
BARBITURATE SCN PRESENT UR: NEGATIVE
BASOPHILS # BLD AUTO: 0.06 X10(3)/MCL
BASOPHILS NFR BLD AUTO: 0.9 %
BENZODIAZ UR QL SCN: NEGATIVE
BILIRUB SERPL-MCNC: 0.3 MG/DL
BILIRUB UR QL STRIP.AUTO: NEGATIVE
BUN SERPL-MCNC: 16.2 MG/DL (ref 8.9–20.6)
CALCIUM SERPL-MCNC: 9.9 MG/DL (ref 8.4–10.2)
CANNABINOIDS UR QL SCN: POSITIVE
CHLORIDE SERPL-SCNC: 102 MMOL/L (ref 98–107)
CO2 SERPL-SCNC: 28 MMOL/L (ref 22–29)
COCAINE UR QL SCN: NEGATIVE
COLOR UR AUTO: ABNORMAL
CREAT SERPL-MCNC: 1.03 MG/DL (ref 0.73–1.18)
EOSINOPHIL # BLD AUTO: 0.71 X10(3)/MCL (ref 0–0.9)
EOSINOPHIL NFR BLD AUTO: 10.9 %
ERYTHROCYTE [DISTWIDTH] IN BLOOD BY AUTOMATED COUNT: 13.3 % (ref 11.5–17)
FENTANYL UR QL SCN: NEGATIVE
GFR SERPLBLD CREATININE-BSD FMLA CKD-EPI: >60 MLS/MIN/1.73/M2
GLOBULIN SER-MCNC: 3.4 GM/DL (ref 2.4–3.5)
GLUCOSE SERPL-MCNC: 160 MG/DL (ref 74–100)
GLUCOSE UR QL STRIP.AUTO: ABNORMAL
HCT VFR BLD AUTO: 38.9 % (ref 42–52)
HGB BLD-MCNC: 13 G/DL (ref 14–18)
HYALINE CASTS #/AREA URNS LPF: ABNORMAL /LPF
IMM GRANULOCYTES # BLD AUTO: 0.01 X10(3)/MCL (ref 0–0.04)
IMM GRANULOCYTES NFR BLD AUTO: 0.2 %
KETONES UR QL STRIP.AUTO: NEGATIVE
LEUKOCYTE ESTERASE UR QL STRIP.AUTO: NEGATIVE
LIPASE SERPL-CCNC: 7 U/L
LYMPHOCYTES # BLD AUTO: 2.53 X10(3)/MCL (ref 0.6–4.6)
LYMPHOCYTES NFR BLD AUTO: 38.7 %
MAGNESIUM SERPL-MCNC: 1.7 MG/DL (ref 1.6–2.6)
MCH RBC QN AUTO: 27.8 PG (ref 27–31)
MCHC RBC AUTO-ENTMCNC: 33.4 G/DL (ref 33–36)
MCV RBC AUTO: 83.1 FL (ref 80–94)
MDMA UR QL SCN: NEGATIVE
MONOCYTES # BLD AUTO: 0.38 X10(3)/MCL (ref 0.1–1.3)
MONOCYTES NFR BLD AUTO: 5.8 %
MUCOUS THREADS URNS QL MICRO: ABNORMAL /LPF
NEUTROPHILS # BLD AUTO: 2.84 X10(3)/MCL (ref 2.1–9.2)
NEUTROPHILS NFR BLD AUTO: 43.5 %
NITRITE UR QL STRIP.AUTO: NEGATIVE
NRBC BLD AUTO-RTO: 0 %
OPIATES UR QL SCN: NEGATIVE
PCP UR QL: NEGATIVE
PH UR STRIP.AUTO: 7 [PH]
PH UR: 7 [PH] (ref 3–11)
PLATELET # BLD AUTO: 297 X10(3)/MCL (ref 130–400)
PMV BLD AUTO: 9.6 FL (ref 7.4–10.4)
POTASSIUM SERPL-SCNC: 4 MMOL/L (ref 3.5–5.1)
PROT SERPL-MCNC: 7.2 GM/DL (ref 6.4–8.3)
PROT UR QL STRIP.AUTO: ABNORMAL
RBC # BLD AUTO: 4.68 X10(6)/MCL (ref 4.7–6.1)
RBC #/AREA URNS AUTO: ABNORMAL /HPF
RBC UR QL AUTO: NEGATIVE
SODIUM SERPL-SCNC: 138 MMOL/L (ref 136–145)
SP GR UR STRIP.AUTO: 1.03 (ref 1–1.03)
SPECIFIC GRAVITY, URINE AUTO (.000) (OHS): 1.03 (ref 1–1.03)
SQUAMOUS #/AREA URNS LPF: ABNORMAL /HPF
UROBILINOGEN UR STRIP-ACNC: NORMAL
WBC # SPEC AUTO: 6.53 X10(3)/MCL (ref 4.5–11.5)
WBC #/AREA URNS AUTO: ABNORMAL /HPF

## 2024-05-04 PROCEDURE — 63600175 PHARM REV CODE 636 W HCPCS: Performed by: EMERGENCY MEDICINE

## 2024-05-04 PROCEDURE — 25000003 PHARM REV CODE 250: Performed by: EMERGENCY MEDICINE

## 2024-05-04 PROCEDURE — 83690 ASSAY OF LIPASE: CPT | Performed by: EMERGENCY MEDICINE

## 2024-05-04 PROCEDURE — 96374 THER/PROPH/DIAG INJ IV PUSH: CPT

## 2024-05-04 PROCEDURE — 80053 COMPREHEN METABOLIC PANEL: CPT | Performed by: EMERGENCY MEDICINE

## 2024-05-04 PROCEDURE — 85025 COMPLETE CBC W/AUTO DIFF WBC: CPT | Performed by: EMERGENCY MEDICINE

## 2024-05-04 PROCEDURE — 81001 URINALYSIS AUTO W/SCOPE: CPT | Mod: XB | Performed by: EMERGENCY MEDICINE

## 2024-05-04 PROCEDURE — 80307 DRUG TEST PRSMV CHEM ANLYZR: CPT | Performed by: EMERGENCY MEDICINE

## 2024-05-04 PROCEDURE — 96361 HYDRATE IV INFUSION ADD-ON: CPT

## 2024-05-04 PROCEDURE — 83735 ASSAY OF MAGNESIUM: CPT | Performed by: EMERGENCY MEDICINE

## 2024-05-04 PROCEDURE — 99285 EMERGENCY DEPT VISIT HI MDM: CPT | Mod: 25

## 2024-05-04 RX ORDER — IBUPROFEN 600 MG/1
600 TABLET ORAL EVERY 6 HOURS PRN
Qty: 20 TABLET | Refills: 0 | Status: SHIPPED | OUTPATIENT
Start: 2024-05-04

## 2024-05-04 RX ORDER — CYCLOBENZAPRINE HCL 10 MG
10 TABLET ORAL 3 TIMES DAILY PRN
Qty: 15 TABLET | Refills: 0 | Status: SHIPPED | OUTPATIENT
Start: 2024-05-04 | End: 2024-05-09

## 2024-05-04 RX ORDER — KETOROLAC TROMETHAMINE 30 MG/ML
30 INJECTION, SOLUTION INTRAMUSCULAR; INTRAVENOUS
Status: COMPLETED | OUTPATIENT
Start: 2024-05-04 | End: 2024-05-04

## 2024-05-04 RX ADMIN — SODIUM CHLORIDE 1000 ML: 9 INJECTION, SOLUTION INTRAVENOUS at 08:05

## 2024-05-04 RX ADMIN — KETOROLAC TROMETHAMINE 30 MG: 30 INJECTION, SOLUTION INTRAMUSCULAR; INTRAVENOUS at 08:05

## 2024-05-04 NOTE — ED PROVIDER NOTES
Encounter Date: 2024       History     Chief Complaint   Patient presents with    Flank Pain     Pt reports right sided flank pain that radiates to the lower back x 3 days. No urinary problems reported.      Patient is here endorsing right flank pain radiating to the right lower quadrant with some frequency, urinary hesitancy.  No nausea or vomiting are reported.  Patient is denying fever, chills, change in bowel habits.  Patient reports good compliance with home medications since recent hospital discharge.  Patient denies a history of renal colic.  Today's symptoms are intermittent but mostly constant over the past week, varying intensity.  Symptoms are worse with palpation of the body wall, twisting.  Patient denies history of trauma.  No obvious strain events are reported.  There are no neurological symptoms.        Review of patient's allergies indicates:   Allergen Reactions    Penicillins Shortness Of Breath    Sulfa (sulfonamide antibiotics)      Past Medical History:   Diagnosis Date    Diabetes mellitus type I     Hypertension      Past Surgical History:   Procedure Laterality Date    CLAVICLE SURGERY      ESOPHAGOGASTRODUODENOSCOPY Left 2023    Procedure: EGD (ESOPHAGOGASTRODUODENOSCOPY);  Surgeon: Daiana Chilel MD;  Location: [facility] ENDOSCOPY;  Service: Gastroenterology;  Laterality: Left;     Family History   Problem Relation Name Age of Onset    Diabetes Mother      Diabetes Father      Heart disease Father       Social History     Tobacco Use    Smoking status: Former     Current packs/day: 0.00     Types: Cigarettes     Quit date: 5/15/2023     Years since quittin.9     Passive exposure: Past    Smokeless tobacco: Never   Substance Use Topics    Alcohol use: Never    Drug use: Never     Review of Systems    Physical Exam     Initial Vitals [24 0749]   BP Pulse Resp Temp SpO2   (!) 165/94 64 18 98.2 °F (36.8 °C) 100 %      MAP       --         Physical Exam    Nursing note and  vitals reviewed.  Constitutional: He appears well-developed and well-nourished. He is not diaphoretic. No distress.   HENT:   Head: Normocephalic and atraumatic.   Eyes: EOM are normal. Pupils are equal, round, and reactive to light. Right eye exhibits no discharge. Left eye exhibits no discharge.   Neck: Neck supple. No thyromegaly present. No tracheal deviation present. No JVD present.   Normal range of motion.  Cardiovascular:  Normal rate, regular rhythm, normal heart sounds and intact distal pulses.           No murmur heard.  Pulmonary/Chest: Breath sounds normal. No stridor. No respiratory distress. He has no wheezes. He has no rhonchi. He has no rales.   Abdominal: Abdomen is soft. He exhibits no distension. There is no abdominal tenderness. There is no rebound and no guarding.   Musculoskeletal:         General: No tenderness or edema. Normal range of motion.      Cervical back: Normal range of motion and neck supple.     Neurological: He is alert and oriented to person, place, and time. He has normal strength. No cranial nerve deficit. GCS score is 15. GCS eye subscore is 4. GCS verbal subscore is 5. GCS motor subscore is 6.   Skin: Skin is warm and dry. Capillary refill takes less than 2 seconds. No rash and no abscess noted. No erythema. No pallor.   Psychiatric: He has a normal mood and affect. His behavior is normal. Judgment and thought content normal.         ED Course   Procedures  Labs Reviewed   COMPREHENSIVE METABOLIC PANEL - Abnormal; Notable for the following components:       Result Value    Glucose Level 160 (*)     All other components within normal limits   URINALYSIS, REFLEX TO URINE CULTURE - Abnormal; Notable for the following components:    Protein, UA 2+ (*)     Glucose, UA 4+ (*)     Bacteria, UA Trace (*)     Squamous Epithelial Cells, UA Trace (*)     Mucous, UA Few (*)     All other components within normal limits   DRUG SCREEN, URINE (BEAKER) - Abnormal; Notable for the following  components:    Cannabinoids, Urine Positive (*)     All other components within normal limits    Narrative:     Cut off concentrations:    Amphetamines - 1000 ng/ml  Barbiturates - 200 ng/ml  Benzodiazepine - 200 ng/ml  Cannabinoids (THC) - 50 ng/ml  Cocaine - 300 ng/ml  Fentanyl - 1.0 ng/ml  MDMA - 500 ng/ml  Opiates - 300 ng/ml   Phencyclidine (PCP) - 25 ng/ml    Specimen submitted for drug analysis and tested for pH and specific gravity in order to evaluate sample integrity. Suspect tampering if specific gravity is <1.003 and/or pH is not within the range of 4.5 - 8.0  False negatives may result form substances such as bleach added to urine.  False positives may result for the presence of a substance with similar chemical structure to the drug or its metabolite.    This test provides only a PRELIMINARY analytical test result. A more specific alternate chemical method must be used in order to obtain a confirmed analytical result. Gas chromatography/mass spectrometry (GC/MS) is the preferred confirmatory method. Other chemical confirmation methods are available. Clinical consideration and professional judgement should be applied to any drug of abuse test result, particularly when preliminary positive results are used.    Positive results will be confirmed only at the physicians request. Unconfirmed screening results are to be used only for medical purposes (treatment).        CBC WITH DIFFERENTIAL - Abnormal; Notable for the following components:    RBC 4.68 (*)     Hgb 13.0 (*)     Hct 38.9 (*)     All other components within normal limits   MAGNESIUM - Normal   LIPASE - Normal   CBC W/ AUTO DIFFERENTIAL    Narrative:     The following orders were created for panel order CBC auto differential.  Procedure                               Abnormality         Status                     ---------                               -----------         ------                     CBC with Differential[[id number]]       Abnormal             Final result                 Please view results for these tests on the individual orders.          Imaging Results              CT Abdomen Pelvis  Without Contrast (Final result)  Result time 05/04/24 09:13:06      Final result by Gay Hussein MD (05/04/24 09:13:06)                   Impression:      1. No renal or ureteral stone.  No hydronephrosis.  2. No appreciable acute intra-abdominal abnormality by noncontrast CT evaluation      Electronically signed by: Gay Hussein  Date:    05/04/2024  Time:    09:13               Narrative:    EXAMINATION:  CT ABDOMEN PELVIS WITHOUT CONTRAST    CLINICAL HISTORY:  Flank pain, kidney stone suspected;    TECHNIQUE:  Helically acquired axial images, sagittal and coronal reformations were obtained from the lung bases to the pubic symphysis without the IV administration of contrast.    Automated tube current modulation, weight-based exposure dosing, and/or iterative reconstruction technique utilized to reach lowest reasonably achievable exposure rate.    DLP: 222 mGy*cm    COMPARISON:  CT abdomen pelvis 08/30/2023    FINDINGS:  HEART: Normal in size. No pericardial effusion.    LUNG BASES: Well aerated.    LIVER: Normal attenuation. No appreciable focal hepatic lesion.    BILIARY: No calcified gallstones.    PANCREAS: No inflammatory change.    SPLEEN: Normal in size    ADRENALS: No mass.    KIDNEYS/URETERS: No renal or ureteral calculus. No hydronephrosis.    GI TRACT/MESENTERY: Evaluation of the bowel is limited without contrast.  No evidence of bowel obstruction or inflammation.    PERITONEUM: No free fluid.No free air.    LYMPH NODES: No enlarged lymph nodes by size criteria.    VASCULATURE: No significant atherosclerosis or aneurysm.    BLADDER: Normal appearance given degree of distention.    REPRODUCTIVE ORGANS: Calcification of the vas deferens as is commonly seen in diabetics.    ABDOMINAL WALL: Unremarkable.    BONES: No acute osseous  abnormality.                                    X-Rays:   Independently Interpreted Readings:   Other Readings:  CT abdomen pelvis without acute abnormal findings ;    Medications   sodium chloride 0.9% bolus 1,000 mL 1,000 mL (0 mLs Intravenous Stopped 5/4/24 0916)   ketorolac injection 30 mg (30 mg Intravenous Given 5/4/24 0816)     Medical Decision Making  43-year-old male presents with right-sided flank pain, worse with twisting, worse with palpation; features most compatible with muscle strain, secondary spasm.  Differential diagnosis includes muscle strain and secondary spasm, also includes renal colic, biliary colic, pyelonephritis, others ....    Amount and/or Complexity of Data Reviewed  External Data Reviewed: notes.     Details: Recent admission and discharge reviewed;  Labs: ordered. Decision-making details documented in ED Course.     Details: As above;  Radiology: ordered and independent interpretation performed. Decision-making details documented in ED Course.     Details: CT abdomen pelvis without acute abnormal findings ;  Discussion of management or test interpretation with external provider(s): Risk found sufficient to warrant expanded evaluation with objective data.  The data resulted in found reassuring.  Patient is improved with conservative interventions.  Patient is discharged with prescriptions, anticipatory guidance, return precautions, follow-up instructions.  Home in stable condition without event.    Risk  Prescription drug management.               ED Course as of 05/04/24 0950   Sat May 04, 2024   0821 Reassuring hemogram; [CT]   0834 Glycosuria on ua ; [CT]   0849 U tox positive for cannabinoids; [CT]   0849 Normal lipase; [CT]   0849 Normal magnesium; [CT]   0849 Generally reassuring chemistries, mild hyperglycemia, no anion gap; [CT]      ED Course User Index  [CT] Jam Wisdom MD                           Clinical Impression:  Final diagnoses:  [R10.9] Flank pain  (Primary)  [M62.838] Muscle spasm          ED Disposition Condition    Discharge Stable          ED Prescriptions       Medication Sig Dispense Start Date End Date Auth. Provider    ibuprofen (ADVIL,MOTRIN) 600 MG tablet Take 1 tablet (600 mg total) by mouth every 6 (six) hours as needed for Pain. 20 tablet 5/4/2024 -- Jam Wisdom MD    cyclobenzaprine (FLEXERIL) 10 MG tablet Take 1 tablet (10 mg total) by mouth 3 (three) times daily as needed for Muscle spasms. 15 tablet 5/4/2024 5/9/2024 Jam Wisdom MD          Follow-up Information       Follow up With Specialties Details Why Contact Info    [facility] Emergency Medicine  As needed, If symptoms worsen [address] [facility] [address] [phone]    Antoine Garner MD Internal Medicine Call   [address] [facility] [address] [phone]               Jam Wisdom MD  05/04/24 0945

## 2024-05-04 NOTE — Clinical Note
"Jose Francisco Romero (Kyle) was seen and treated in our emergency department on 5/4/2024.  He may return to work on 05/06/2024.       If you have any questions or concerns, please don't hesitate to call.      Ree BLOOM    "

## 2024-05-23 ENCOUNTER — HOSPITAL ENCOUNTER (INPATIENT)
Facility: HOSPITAL | Age: 44
LOS: 3 days | Discharge: HOME | DRG: 637 | End: 2024-05-26
Attending: EMERGENCY MEDICINE | Admitting: INTERNAL MEDICINE
Payer: MEDICAID

## 2024-05-23 DIAGNOSIS — E10.65 UNCONTROLLED TYPE 1 DIABETES MELLITUS WITH HYPERGLYCEMIA: ICD-10-CM

## 2024-05-23 DIAGNOSIS — R73.9 HYPERGLYCEMIA: ICD-10-CM

## 2024-05-23 DIAGNOSIS — E13.10 DIABETIC KETOACIDOSIS WITHOUT COMA ASSOCIATED WITH OTHER SPECIFIED DIABETES MELLITUS: Primary | ICD-10-CM

## 2024-05-23 DIAGNOSIS — R07.9 CHEST PAIN: ICD-10-CM

## 2024-05-23 DIAGNOSIS — E10.10 DIABETIC KETOACIDOSIS WITHOUT COMA ASSOCIATED WITH TYPE 1 DIABETES MELLITUS: ICD-10-CM

## 2024-05-23 DIAGNOSIS — R53.83 FATIGUE, UNSPECIFIED TYPE: ICD-10-CM

## 2024-05-23 DIAGNOSIS — R52 PAIN: ICD-10-CM

## 2024-05-23 DIAGNOSIS — K29.70 GASTRITIS, PRESENCE OF BLEEDING UNSPECIFIED, UNSPECIFIED CHRONICITY, UNSPECIFIED GASTRITIS TYPE: ICD-10-CM

## 2024-05-23 PROBLEM — M54.50 CHRONIC MIDLINE LOW BACK PAIN WITHOUT SCIATICA: Status: ACTIVE | Noted: 2024-05-23

## 2024-05-23 PROBLEM — G89.29 CHRONIC MIDLINE LOW BACK PAIN WITHOUT SCIATICA: Status: ACTIVE | Noted: 2024-05-23

## 2024-05-23 LAB
ALBUMIN SERPL-MCNC: 3.5 G/DL (ref 3.5–5)
ALBUMIN SERPL-MCNC: 3.5 G/DL (ref 3.5–5)
ALBUMIN SERPL-MCNC: 3.9 G/DL (ref 3.5–5)
ALBUMIN SERPL-MCNC: 4 G/DL (ref 3.5–5)
ALBUMIN/GLOB SERPL: 1.2 RATIO (ref 1.1–2)
ALBUMIN/GLOB SERPL: 1.3 RATIO (ref 1.1–2)
ALLENS TEST BLOOD GAS (OHS): ABNORMAL
ALP SERPL-CCNC: 108 UNIT/L (ref 40–150)
ALP SERPL-CCNC: 113 UNIT/L (ref 40–150)
ALP SERPL-CCNC: 127 UNIT/L (ref 40–150)
ALP SERPL-CCNC: 130 UNIT/L (ref 40–150)
ALT SERPL-CCNC: 21 UNIT/L (ref 0–55)
ALT SERPL-CCNC: 23 UNIT/L (ref 0–55)
ALT SERPL-CCNC: 24 UNIT/L (ref 0–55)
ALT SERPL-CCNC: 26 UNIT/L (ref 0–55)
AMPHET UR QL SCN: POSITIVE
ANION GAP SERPL CALC-SCNC: 10 MEQ/L
ANION GAP SERPL CALC-SCNC: 20 MEQ/L
ANION GAP SERPL CALC-SCNC: 25 MEQ/L
ANION GAP SERPL CALC-SCNC: 26 MEQ/L
AST SERPL-CCNC: 17 UNIT/L (ref 5–34)
AST SERPL-CCNC: 21 UNIT/L (ref 5–34)
AST SERPL-CCNC: 23 UNIT/L (ref 5–34)
AST SERPL-CCNC: 27 UNIT/L (ref 5–34)
B-OH-BUTYR SERPL-MCNC: 9.3 MMOL/L
BACTERIA #/AREA URNS AUTO: ABNORMAL /HPF
BARBITURATE SCN PRESENT UR: NEGATIVE
BASE EXCESS BLD CALC-SCNC: -12.8 MMOL/L
BASOPHILS # BLD AUTO: 0.1 X10(3)/MCL
BASOPHILS NFR BLD AUTO: 0.6 %
BENZODIAZ UR QL SCN: NEGATIVE
BILIRUB SERPL-MCNC: 0.5 MG/DL
BILIRUB SERPL-MCNC: 0.6 MG/DL
BILIRUB SERPL-MCNC: 0.8 MG/DL
BILIRUB SERPL-MCNC: 1 MG/DL
BILIRUB UR QL STRIP.AUTO: NEGATIVE
BLOOD GAS SAMPLE TYPE (OHS): ABNORMAL
BNP BLD-MCNC: 43.2 PG/ML
BUN SERPL-MCNC: 54.9 MG/DL (ref 8.9–20.6)
BUN SERPL-MCNC: 59.5 MG/DL (ref 8.9–20.6)
BUN SERPL-MCNC: 59.6 MG/DL (ref 8.9–20.6)
BUN SERPL-MCNC: 59.9 MG/DL (ref 8.9–20.6)
CALCIUM SERPL-MCNC: 8.9 MG/DL (ref 8.4–10.2)
CALCIUM SERPL-MCNC: 9.2 MG/DL (ref 8.4–10.2)
CALCIUM SERPL-MCNC: 9.3 MG/DL (ref 8.4–10.2)
CALCIUM SERPL-MCNC: 9.7 MG/DL (ref 8.4–10.2)
CANNABINOIDS UR QL SCN: NEGATIVE
CHLORIDE SERPL-SCNC: 101 MMOL/L (ref 98–107)
CHLORIDE SERPL-SCNC: 107 MMOL/L (ref 98–107)
CHLORIDE SERPL-SCNC: 92 MMOL/L (ref 98–107)
CHLORIDE SERPL-SCNC: 95 MMOL/L (ref 98–107)
CHOLEST SERPL-MCNC: 207 MG/DL
CHOLEST/HDLC SERPL: 5 {RATIO} (ref 0–5)
CK SERPL-CCNC: 48 U/L (ref 30–200)
CLARITY UR: CLEAR
CO2 BLDA-SCNC: 14.3 MMOL/L
CO2 SERPL-SCNC: 11 MMOL/L (ref 22–29)
CO2 SERPL-SCNC: 11 MMOL/L (ref 22–29)
CO2 SERPL-SCNC: 20 MMOL/L (ref 22–29)
CO2 SERPL-SCNC: 7 MMOL/L (ref 22–29)
COCAINE UR QL SCN: NEGATIVE
COHGB MFR BLDA: 2.7 %
COLOR UR AUTO: COLORLESS
CREAT SERPL-MCNC: 2.18 MG/DL (ref 0.73–1.18)
CREAT SERPL-MCNC: 2.8 MG/DL (ref 0.73–1.18)
CREAT SERPL-MCNC: 2.83 MG/DL (ref 0.73–1.18)
CREAT SERPL-MCNC: 2.93 MG/DL (ref 0.73–1.18)
CREAT/UREA NIT SERPL: 20
CREAT/UREA NIT SERPL: 21
CREAT/UREA NIT SERPL: 21
CREAT/UREA NIT SERPL: 25
DRAWN BY BLOOD GAS (OHS): ABNORMAL
EOSINOPHIL # BLD AUTO: 0.12 X10(3)/MCL (ref 0–0.9)
EOSINOPHIL NFR BLD AUTO: 0.8 %
ERYTHROCYTE [DISTWIDTH] IN BLOOD BY AUTOMATED COUNT: 13.2 % (ref 11.5–17)
FENTANYL UR QL SCN: NEGATIVE
GFR SERPLBLD CREATININE-BSD FMLA CKD-EPI: 27 ML/MIN/1.73/M2
GFR SERPLBLD CREATININE-BSD FMLA CKD-EPI: 28 ML/MIN/1.73/M2
GFR SERPLBLD CREATININE-BSD FMLA CKD-EPI: 28 ML/MIN/1.73/M2
GFR SERPLBLD CREATININE-BSD FMLA CKD-EPI: 38 ML/MIN/1.73/M2
GLOBULIN SER-MCNC: 2.8 GM/DL (ref 2.4–3.5)
GLOBULIN SER-MCNC: 2.9 GM/DL (ref 2.4–3.5)
GLOBULIN SER-MCNC: 3.1 GM/DL (ref 2.4–3.5)
GLOBULIN SER-MCNC: 3.2 GM/DL (ref 2.4–3.5)
GLUCOSE SERPL-MCNC: 261 MG/DL (ref 74–100)
GLUCOSE SERPL-MCNC: 637 MG/DL (ref 74–100)
GLUCOSE SERPL-MCNC: 792 MG/DL (ref 74–100)
GLUCOSE SERPL-MCNC: 795 MG/DL (ref 74–100)
GLUCOSE UR QL STRIP: ABNORMAL
HCO3 BLDA-SCNC: 13.3 MMOL/L
HCT VFR BLD AUTO: 43 % (ref 42–52)
HDLC SERPL-MCNC: 44 MG/DL (ref 35–60)
HGB BLD-MCNC: 14.9 G/DL (ref 14–18)
HGB UR QL STRIP: NEGATIVE
HOLD SPECIMEN: NORMAL
HOLD SPECIMEN: NORMAL
HYALINE CASTS #/AREA URNS LPF: ABNORMAL /LPF
IMM GRANULOCYTES # BLD AUTO: 0.06 X10(3)/MCL (ref 0–0.04)
IMM GRANULOCYTES NFR BLD AUTO: 0.4 %
KETONES UR QL STRIP: ABNORMAL
LACTATE SERPL-SCNC: 2.3 MMOL/L (ref 0.5–2.2)
LACTATE SERPL-SCNC: 4.5 MMOL/L (ref 0.5–2.2)
LDLC SERPL CALC-MCNC: 117 MG/DL (ref 50–140)
LEUKOCYTE ESTERASE UR QL STRIP: NEGATIVE
LIPASE SERPL-CCNC: 6 U/L
LYMPHOCYTES # BLD AUTO: 2.76 X10(3)/MCL (ref 0.6–4.6)
LYMPHOCYTES NFR BLD AUTO: 17.8 %
MAGNESIUM SERPL-MCNC: 2.1 MG/DL (ref 1.6–2.6)
MCH RBC QN AUTO: 27.9 PG (ref 27–31)
MCHC RBC AUTO-ENTMCNC: 34.7 G/DL (ref 33–36)
MCV RBC AUTO: 80.5 FL (ref 80–94)
MDMA UR QL SCN: NEGATIVE
METHGB MFR BLDA: 0.6 %
MONOCYTES # BLD AUTO: 0.79 X10(3)/MCL (ref 0.1–1.3)
MONOCYTES NFR BLD AUTO: 5.1 %
MUCOUS THREADS URNS QL MICRO: ABNORMAL /LPF
NEUTROPHILS # BLD AUTO: 11.7 X10(3)/MCL (ref 2.1–9.2)
NEUTROPHILS NFR BLD AUTO: 75.3 %
NITRITE UR QL STRIP: NEGATIVE
NRBC BLD AUTO-RTO: 0 %
O2 HB BLOOD GAS (OHS): 83.1 %
OPIATES UR QL SCN: NEGATIVE
OXYHGB MFR BLDA: 15.2 G/DL
PCO2 BLDA: 31 MMHG (ref 40–50)
PCP UR QL: NEGATIVE
PH BLDA: 7.24 [PH] (ref 7.3–7.4)
PH UR STRIP: 5 [PH]
PH UR: 5 [PH] (ref 3–11)
PHOSPHATE SERPL-MCNC: 5.4 MG/DL (ref 2.3–4.7)
PLATELET # BLD AUTO: 323 X10(3)/MCL (ref 130–400)
PMV BLD AUTO: 10.9 FL (ref 7.4–10.4)
PO2 BLDA: 53 MMHG (ref 30–40)
POCT GLUCOSE: 130 MG/DL (ref 70–110)
POCT GLUCOSE: 162 MG/DL (ref 70–110)
POCT GLUCOSE: 198 MG/DL (ref 70–110)
POCT GLUCOSE: 277 MG/DL (ref 70–110)
POCT GLUCOSE: 316 MG/DL (ref 70–110)
POCT GLUCOSE: 408 MG/DL (ref 70–110)
POCT GLUCOSE: 487 MG/DL (ref 70–110)
POCT GLUCOSE: >500 MG/DL (ref 70–110)
POTASSIUM SERPL-SCNC: 3.9 MMOL/L (ref 3.5–5.1)
POTASSIUM SERPL-SCNC: 4.3 MMOL/L (ref 3.5–5.1)
POTASSIUM SERPL-SCNC: 5.9 MMOL/L (ref 3.5–5.1)
POTASSIUM SERPL-SCNC: 6.4 MMOL/L (ref 3.5–5.1)
PROT SERPL-MCNC: 6.3 GM/DL (ref 6.4–8.3)
PROT SERPL-MCNC: 6.4 GM/DL (ref 6.4–8.3)
PROT SERPL-MCNC: 7 GM/DL (ref 6.4–8.3)
PROT SERPL-MCNC: 7.2 GM/DL (ref 6.4–8.3)
PROT UR QL STRIP: ABNORMAL
RBC # BLD AUTO: 5.34 X10(6)/MCL (ref 4.7–6.1)
RBC #/AREA URNS AUTO: ABNORMAL /HPF
SAO2 % BLDA: 85.8 %
SODIUM SERPL-SCNC: 128 MMOL/L (ref 136–145)
SODIUM SERPL-SCNC: 128 MMOL/L (ref 136–145)
SODIUM SERPL-SCNC: 132 MMOL/L (ref 136–145)
SODIUM SERPL-SCNC: 137 MMOL/L (ref 136–145)
SP GR UR STRIP.AUTO: 1.02 (ref 1–1.03)
SPECIFIC GRAVITY, URINE AUTO (.000) (OHS): 1.02 (ref 1–1.03)
SQUAMOUS #/AREA URNS LPF: ABNORMAL /HPF
TRIGL SERPL-MCNC: 229 MG/DL (ref 34–140)
TROPONIN I SERPL-MCNC: <0.01 NG/ML (ref 0–0.04)
UROBILINOGEN UR STRIP-ACNC: NORMAL
VLDLC SERPL CALC-MCNC: 46 MG/DL
WBC # SPEC AUTO: 15.53 X10(3)/MCL (ref 4.5–11.5)
WBC #/AREA URNS AUTO: ABNORMAL /HPF

## 2024-05-23 PROCEDURE — 25000003 PHARM REV CODE 250

## 2024-05-23 PROCEDURE — 82010 KETONE BODYS QUAN: CPT | Performed by: EMERGENCY MEDICINE

## 2024-05-23 PROCEDURE — 84484 ASSAY OF TROPONIN QUANT: CPT

## 2024-05-23 PROCEDURE — 81001 URINALYSIS AUTO W/SCOPE: CPT

## 2024-05-23 PROCEDURE — 96374 THER/PROPH/DIAG INJ IV PUSH: CPT

## 2024-05-23 PROCEDURE — 25000003 PHARM REV CODE 250: Performed by: INTERNAL MEDICINE

## 2024-05-23 PROCEDURE — 99223 1ST HOSP IP/OBS HIGH 75: CPT | Mod: AI,,, | Performed by: INTERNAL MEDICINE

## 2024-05-23 PROCEDURE — 20000000 HC ICU ROOM

## 2024-05-23 PROCEDURE — 96375 TX/PRO/DX INJ NEW DRUG ADDON: CPT

## 2024-05-23 PROCEDURE — 99291 CRITICAL CARE FIRST HOUR: CPT

## 2024-05-23 PROCEDURE — 80053 COMPREHEN METABOLIC PANEL: CPT

## 2024-05-23 PROCEDURE — S5010 5% DEXTROSE AND 0.45% SALINE: HCPCS

## 2024-05-23 PROCEDURE — 85025 COMPLETE CBC W/AUTO DIFF WBC: CPT | Performed by: EMERGENCY MEDICINE

## 2024-05-23 PROCEDURE — 84100 ASSAY OF PHOSPHORUS: CPT

## 2024-05-23 PROCEDURE — 80053 COMPREHEN METABOLIC PANEL: CPT | Performed by: INTERNAL MEDICINE

## 2024-05-23 PROCEDURE — 63600175 PHARM REV CODE 636 W HCPCS: Performed by: EMERGENCY MEDICINE

## 2024-05-23 PROCEDURE — 93005 ELECTROCARDIOGRAM TRACING: CPT

## 2024-05-23 PROCEDURE — 25000003 PHARM REV CODE 250: Performed by: EMERGENCY MEDICINE

## 2024-05-23 PROCEDURE — 80053 COMPREHEN METABOLIC PANEL: CPT | Performed by: EMERGENCY MEDICINE

## 2024-05-23 PROCEDURE — 83880 ASSAY OF NATRIURETIC PEPTIDE: CPT | Performed by: EMERGENCY MEDICINE

## 2024-05-23 PROCEDURE — 83605 ASSAY OF LACTIC ACID: CPT

## 2024-05-23 PROCEDURE — 80307 DRUG TEST PRSMV CHEM ANLYZR: CPT

## 2024-05-23 PROCEDURE — 63600175 PHARM REV CODE 636 W HCPCS

## 2024-05-23 PROCEDURE — 83735 ASSAY OF MAGNESIUM: CPT

## 2024-05-23 PROCEDURE — 82550 ASSAY OF CK (CPK): CPT

## 2024-05-23 PROCEDURE — 36415 COLL VENOUS BLD VENIPUNCTURE: CPT

## 2024-05-23 PROCEDURE — 83690 ASSAY OF LIPASE: CPT

## 2024-05-23 PROCEDURE — 96361 HYDRATE IV INFUSION ADD-ON: CPT

## 2024-05-23 PROCEDURE — 80061 LIPID PANEL: CPT

## 2024-05-23 RX ORDER — TRAZODONE HYDROCHLORIDE 100 MG/1
100 TABLET ORAL NIGHTLY
Status: DISCONTINUED | OUTPATIENT
Start: 2024-05-23 | End: 2024-05-26 | Stop reason: HOSPADM

## 2024-05-23 RX ORDER — HEPARIN SODIUM 5000 [USP'U]/ML
5000 INJECTION, SOLUTION INTRAVENOUS; SUBCUTANEOUS EVERY 12 HOURS
Status: DISCONTINUED | OUTPATIENT
Start: 2024-05-23 | End: 2024-05-26 | Stop reason: HOSPADM

## 2024-05-23 RX ORDER — ONDANSETRON HYDROCHLORIDE 2 MG/ML
4 INJECTION, SOLUTION INTRAVENOUS
Status: COMPLETED | OUTPATIENT
Start: 2024-05-23 | End: 2024-05-23

## 2024-05-23 RX ORDER — LABETALOL HCL 20 MG/4 ML
10 SYRINGE (ML) INTRAVENOUS EVERY 6 HOURS PRN
Status: DISCONTINUED | OUTPATIENT
Start: 2024-05-23 | End: 2024-05-26 | Stop reason: HOSPADM

## 2024-05-23 RX ORDER — SODIUM CHLORIDE 9 MG/ML
1000 INJECTION, SOLUTION INTRAVENOUS
Status: COMPLETED | OUTPATIENT
Start: 2024-05-23 | End: 2024-05-23

## 2024-05-23 RX ORDER — HYDRALAZINE HYDROCHLORIDE 20 MG/ML
10 INJECTION INTRAMUSCULAR; INTRAVENOUS EVERY 6 HOURS PRN
Status: DISCONTINUED | OUTPATIENT
Start: 2024-05-23 | End: 2024-05-26 | Stop reason: HOSPADM

## 2024-05-23 RX ORDER — SUCRALFATE 1 G/10ML
1 SUSPENSION ORAL EVERY 6 HOURS
Status: DISCONTINUED | OUTPATIENT
Start: 2024-05-23 | End: 2024-05-23

## 2024-05-23 RX ORDER — DEXTROSE MONOHYDRATE 100 MG/ML
INJECTION, SOLUTION INTRAVENOUS
Status: DISCONTINUED | OUTPATIENT
Start: 2024-05-23 | End: 2024-05-26 | Stop reason: HOSPADM

## 2024-05-23 RX ORDER — SODIUM CHLORIDE 9 MG/ML
1000 INJECTION, SOLUTION INTRAVENOUS CONTINUOUS
Status: ACTIVE | OUTPATIENT
Start: 2024-05-23 | End: 2024-05-23

## 2024-05-23 RX ORDER — MUPIROCIN 20 MG/G
OINTMENT TOPICAL 2 TIMES DAILY
Status: DISCONTINUED | OUTPATIENT
Start: 2024-05-23 | End: 2024-05-26 | Stop reason: HOSPADM

## 2024-05-23 RX ORDER — ALUMINUM HYDROXIDE, MAGNESIUM HYDROXIDE, AND SIMETHICONE 1200; 120; 1200 MG/30ML; MG/30ML; MG/30ML
30 SUSPENSION ORAL
Status: DISCONTINUED | OUTPATIENT
Start: 2024-05-23 | End: 2024-05-23

## 2024-05-23 RX ORDER — AMLODIPINE BESYLATE 10 MG/1
10 TABLET ORAL DAILY
Status: DISCONTINUED | OUTPATIENT
Start: 2024-05-23 | End: 2024-05-26 | Stop reason: HOSPADM

## 2024-05-23 RX ORDER — DEXTROSE MONOHYDRATE, SODIUM CHLORIDE, AND POTASSIUM CHLORIDE 50; 1.49; 4.5 G/1000ML; G/1000ML; G/1000ML
INJECTION, SOLUTION INTRAVENOUS CONTINUOUS PRN
Status: DISCONTINUED | OUTPATIENT
Start: 2024-05-23 | End: 2024-05-24

## 2024-05-23 RX ORDER — CLONAZEPAM 1 MG/1
2 TABLET ORAL NIGHTLY
Status: DISCONTINUED | OUTPATIENT
Start: 2024-05-23 | End: 2024-05-26 | Stop reason: HOSPADM

## 2024-05-23 RX ORDER — SODIUM CHLORIDE 0.9 % (FLUSH) 0.9 %
10 SYRINGE (ML) INJECTION
Status: DISCONTINUED | OUTPATIENT
Start: 2024-05-23 | End: 2024-05-24

## 2024-05-23 RX ORDER — DEXTROSE MONOHYDRATE AND SODIUM CHLORIDE 5; .45 G/100ML; G/100ML
INJECTION, SOLUTION INTRAVENOUS CONTINUOUS PRN
Status: DISCONTINUED | OUTPATIENT
Start: 2024-05-23 | End: 2024-05-24

## 2024-05-23 RX ADMIN — INSULIN HUMAN 0.2 UNITS/KG/HR: 1 INJECTION, SOLUTION INTRAVENOUS at 07:05

## 2024-05-23 RX ADMIN — TRAZODONE HYDROCHLORIDE 100 MG: 100 TABLET ORAL at 08:05

## 2024-05-23 RX ADMIN — MUPIROCIN: 20 OINTMENT TOPICAL at 08:05

## 2024-05-23 RX ADMIN — SODIUM CHLORIDE 1000 ML: 9 INJECTION, SOLUTION INTRAVENOUS at 11:05

## 2024-05-23 RX ADMIN — SODIUM CHLORIDE 1000 ML: 9 INJECTION, SOLUTION INTRAVENOUS at 12:05

## 2024-05-23 RX ADMIN — AMLODIPINE BESYLATE 10 MG: 10 TABLET ORAL at 04:05

## 2024-05-23 RX ADMIN — INSULIN HUMAN 0.1 UNITS/KG/HR: 1 INJECTION, SOLUTION INTRAVENOUS at 01:05

## 2024-05-23 RX ADMIN — DEXTROSE AND SODIUM CHLORIDE: 5; 450 INJECTION, SOLUTION INTRAVENOUS at 09:05

## 2024-05-23 RX ADMIN — ONDANSETRON 4 MG: 2 INJECTION INTRAMUSCULAR; INTRAVENOUS at 11:05

## 2024-05-23 RX ADMIN — HEPARIN SODIUM 5000 UNITS: 5000 INJECTION, SOLUTION INTRAVENOUS; SUBCUTANEOUS at 08:05

## 2024-05-23 RX ADMIN — CLONAZEPAM 2 MG: 1 TABLET ORAL at 08:05

## 2024-05-23 NOTE — Clinical Note
Saranya Sheikh accompanied their spouse to the emergency department on 5/23/2024. They may return to work on 05/23/2024.  She was here with the patient while he was being treated.    If you have any questions or concerns, please don't hesitate to call.      Jose Francisco Spangler RN

## 2024-05-23 NOTE — ED PROVIDER NOTES
ED PROVIDER NOTE  2024    CHIEF COMPLAINT:   Chief Complaint   Patient presents with    Hyperglycemia     Pt c/o high blood sugar with vomiting today.  Hx insulin dependent DM.         HISTORY OF PRESENT ILLNESS:   Jose Francisco Romero is a 43 y.o. male who presents with chief complaint High blood sugar.  Onset was today whenever he states it was blood sugar was greater than 500.  He states blood sugar normally runs in the low 200s and was not as high yesterday.  He reports over the past 2 days having lightheadedness with generalized weakness, decreased appetite, and nausea and this morning had some vomiting.  States he still feels a little nauseated even after given Zofran by EMS.  He states it feels like when he has been in DKA in the past.    The history is provided by the patient and the EMS personnel.         REVIEW OF SYSTEMS: as noted in the HPI.  NURSING NOTES REVIEWED      PAST MEDICAL/SURGICAL HISTORY:   Past Medical History:   Diagnosis Date    Diabetes mellitus type I     Hypertension       Past Surgical History:   Procedure Laterality Date    CLAVICLE SURGERY      ESOPHAGOGASTRODUODENOSCOPY Left 2023    Procedure: EGD (ESOPHAGOGASTRODUODENOSCOPY);  Surgeon: Daiana Chilel MD;  Location: [facility] ENDOSCOPY;  Service: Gastroenterology;  Laterality: Left;       FAMILY HISTORY:   Family History   Problem Relation Name Age of Onset    Diabetes Mother      Diabetes Father      Heart disease Father         SOCIAL HISTORY:   Social History     Tobacco Use    Smoking status: Former     Current packs/day: 0.00     Types: Cigarettes     Quit date: 5/15/2023     Years since quittin.0     Passive exposure: Past    Smokeless tobacco: Never   Substance Use Topics    Alcohol use: Never    Drug use: Never       ALLERGIES:   Review of patient's allergies indicates:   Allergen Reactions    Penicillins Shortness Of Breath    Sulfa (sulfonamide antibiotics)        PHYSICAL EXAM:  Initial Vitals [24 1036]    BP Pulse Resp Temp SpO2   97/64 (!) 112 18 98.1 °F (36.7 °C) 97 %      MAP       --         Physical Exam    Nursing note and vitals reviewed.  Constitutional: He appears well-developed and well-nourished. He appears lethargic. No distress.   HENT:   Head: Normocephalic and atraumatic.   Nose: Nose normal.   Mouth/Throat: Mucous membranes are dry.   Eyes: Conjunctivae and EOM are normal. Pupils are equal, round, and reactive to light.   Neck: Neck supple. No tracheal deviation present.   Cardiovascular:  Regular rhythm, normal heart sounds, intact distal pulses and normal pulses.   Tachycardia present.         Pulmonary/Chest: Effort normal and breath sounds normal. No respiratory distress.   Abdominal: Abdomen is soft. There is no abdominal tenderness. There is no rebound and no guarding.   Musculoskeletal:         General: Normal range of motion.      Cervical back: Neck supple.     Neurological: He is oriented to person, place, and time. He appears lethargic. GCS eye subscore is 4. GCS verbal subscore is 5. GCS motor subscore is 6.   CN II-XII intact. Moves all extremities. No gross sensory or motor deficits.   Skin: Skin is warm, dry and intact.   Psychiatric: He has a normal mood and affect. His speech is normal and behavior is normal. Judgment and thought content normal. Cognition and memory are normal.         RESULTS:  Labs Reviewed   COMPREHENSIVE METABOLIC PANEL - Abnormal; Notable for the following components:       Result Value    Sodium 128 (*)     Potassium 5.9 (*)     Chloride 92 (*)     CO2 11 (*)     Glucose 792 (*)     Blood Urea Nitrogen 59.5 (*)     Creatinine 2.83 (*)     All other components within normal limits   CBC WITH DIFFERENTIAL - Abnormal; Notable for the following components:    WBC 15.53 (*)     MPV 10.9 (*)     Neut # 11.70 (*)     IG# 0.06 (*)     All other components within normal limits   BLOOD GAS - Abnormal; Notable for the following components:    pH, Blood gas 7.240 (*)      pCO2, Blood gas 31.0 (*)     pO2, Blood gas 53.0 (*)     All other components within normal limits   POCT GLUCOSE - Abnormal; Notable for the following components:    POCT Glucose >500 (*)     All other components within normal limits   B-TYPE NATRIURETIC PEPTIDE - Normal   CBC W/ AUTO DIFFERENTIAL    Narrative:     The following orders were created for panel order CBC auto differential.  Procedure                               Abnormality         Status                     ---------                               -----------         ------                     CBC with Differential[[id number]]       Abnormal            Final result                 Please view results for these tests on the individual orders.   BETA - HYDROXYBUTYRATE, SERUM   URINALYSIS, REFLEX TO URINE CULTURE   EXTRA TUBES    Narrative:     The following orders were created for panel order EXTRA TUBES.  Procedure                               Abnormality         Status                     ---------                               -----------         ------                     Light Blue Top Hold[[id number]]                             In process                 Gold Top Hold[[id number]]                                   In process                   Please view results for these tests on the individual orders.   LIGHT BLUE TOP HOLD   GOLD TOP HOLD   POCT GLUCOSE, HAND-HELD DEVICE   POCT GLUCOSE MONITORING CONTINUOUS     Imaging Results              X-Ray Chest AP Portable (Preliminary result)  Result time 05/23/24 10:55:02      Wet Read by Adalid Man DO (05/23/24 10:55:02, [facility], Emergency Medicine)    No dense lobar consolidation or pneumothorax.                                    PROCEDURES:  Critical Care    Date/Time: 5/23/2024 11:39 AM    Performed by: Adalid Man DO  Authorized by: Adalid Man DO  Direct patient critical care time: 20 minutes  Additional history critical care time: 2 minutes  Ordering /  reviewing critical care time: 7 minutes  Documentation critical care time: 5 minutes  Total critical care time (exclusive of procedural time) : 34 minutes  Critical care time was exclusive of separately billable procedures and treating other patients.  Critical care was necessary to treat or prevent imminent or life-threatening deterioration of the following conditions: dehydration, metabolic crisis and renal failure.  Critical care was time spent personally by me on the following activities: development of treatment plan with patient or surrogate, interpretation of cardiac output measurements, evaluation of patient's response to treatment, examination of patient, obtaining history from patient or surrogate, ordering and performing treatments and interventions, ordering and review of radiographic studies, re-evaluation of patient's condition, ordering and review of laboratory studies and review of old charts.          ECG:  EKG Readings: (Independently Interpreted)   Initial Reading: No STEMI. Rhythm: Sinus Tachycardia. Heart Rate: 128. Ectopy: No Ectopy. Conduction: Normal. Axis: Right Axis Deviation.       ED COURSE AND MEDICAL DECISION MAKING:  Medications   sodium chloride 0.9% bolus 1,000 mL 1,000 mL (1,000 mLs Intravenous New Bag 5/23/24 1100)   0.9%  NaCl infusion (has no administration in time range)   sodium chloride 0.9% flush 10 mL (has no administration in time range)   0.9%  NaCl infusion (has no administration in time range)   dextrose 5 % and 0.45 % NaCl infusion (has no administration in time range)   dextrose 10 % infusion (has no administration in time range)   dextrose 10 % infusion (has no administration in time range)   insulin regular bolus from bag/infusion 8.16 Units 8.16 mL (has no administration in time range)   insulin regular in 0.9 % NaCl 100 unit/100 mL (1 unit/mL) infusion (has no administration in time range)   ondansetron injection 4 mg (4 mg Intravenous Given 5/23/24 1121)     ED  Course as of 05/23/24 1142   u May 23, 2024   1110 POC PH(!): 7.240 [IB]   1110 WBC(!): 15.53 [IB]   1110 Hemoglobin: 14.9 [IB]   1110 Platelet Count: 323 [IB]   1132 BNP: 43.2 [IB]   1136 Sodium(!): 128 [IB]   1136 Potassium(!): 5.9 [IB]   1136 Glucose(!!): 792 [IB]   1136 BUN(!): 59.5 [IB]   1136 Creatinine(!): 2.83 [IB]   1137 CO2(!): 11  Anion gap 25 [IB]      ED Course User Index  [IB] Adalid Man, DO        Medical Decision Making  43-year-old male who presents with high blood sugar this morning preceded by 2 days of not feeling well with decreased appetite having some nausea and vomiting this morning as well.  Differential diagnosis includes DKA, HHS, dehydration.  DKA workup initiated.  VBG shows pH 7.24.  CBC shows a leukocytosis of 15.5 otherwise unremarkable.  CMP shows bicarb 11, glucose 792, creatinine 2.83 what she was previously 1.032 weeks ago, calculated anion gap 25.  Insulin bolus and drip initiated and given IV normal saline.  Medicine consulted for admission.  I have spoken with the patient and/or caregivers. I have explained the patient's condition, diagnoses and treatment plan based on the information available to me at this time. I have answered the patient's and/or caregiver's questions and addressed any concerns. The patient and/or caregivers have as good an understanding of the patient's diagnosis, condition and treatment plan as can be expected at this point. The patient has been stabilized within the capability of the emergency department. The patient will be transported for further care and management or will be moved to an observation or inpatient service. I have communicated with the staff or medical practitioner taking over this patient's care.    Amount and/or Complexity of Data Reviewed  External Data Reviewed: labs and notes.  Labs: ordered. Decision-making details documented in ED Course.  Radiology: ordered and independent interpretation performed.  ECG/medicine tests:  ordered and independent interpretation performed.    Risk  Prescription drug management.  Drug therapy requiring intensive monitoring for toxicity.  Decision regarding hospitalization.        CLINICAL IMPRESSION:  1. Diabetic ketoacidosis without coma associated with other specified diabetes mellitus    2. Hyperglycemia        DISPOSITION:   ED Disposition Condition    Admit Stable                    Adalid Man DO  05/23/24 1142

## 2024-05-23 NOTE — H&P
[facility] - Emergency Dept  Pulmonary Critical Care Note    Patient Name: Jose Francisco Romero  MRN: [id number]  Admission Date: 5/23/2024  Hospital Length of Stay: 0 days  Code Status: Prior  Attending Provider: Adalid Man DO  Primary Care Provider: Antoine Garner MD     Subjective:     HPI:   Patient is a 43 year old male with PMH of Type 1 diabetes mellitus, HTN, history of drug abuse presented to the [facility] ED with complaints of nausea, generalized aches/pain and elevated blood sugar. He mentions that his blood sugar is usually around 200 however, he noted it >500 this morning. Moreover, patient states he has not been eating for the past 3 days 2/2 loss of appetite. Denies any dysphagia, GERD or nausea. There is discrepancy on his insulin use as he states he had not been taking the full Novolog dose as prescribed as he has not been eating lately but was not aware of his blood glucose levels. States he was taking his Lantus of 25 units and took it last night as well. Currently, also endorses generalized abdominal pain, lumbar back pain, non radiating non specific chest pain. Back pain worsens with urination. Endorses mild episodes of non bloody and non bilious emesis yesterday x2 and once this morning. He denies any fever, chills, dysuria, frequency, edema. Denies any new tattoos, skin rash or any ulcers. Endorses 1/2 ppd cigarette smoking, occassional EtOH use with last use about 6 days ago, history of heroin use but denies any recent drug use.     In the ED, patient tachycardic but afebrile.  CBC noted for leukocytosis otherwise unremarkable.  CMP notable for hyponatremia, hyperkalemia, anion gap metabolic acidosis with anion gap of 25.  Elevated renal indices with BUN/creatinine at 59.5/2.8 3.  LFTs within normal limits.  Beta hydroxybutyrate noted at 9.30.  Creatinine kinase negative and troponin within normal limits.  VBG remarkable for 7.240/31/53/30.3. Urinalysis pending. Critical care medicine  consulted for admission and management of DKA.     Hospital Course/Significant events:    2024: Admitted to ICU for DKA      Past Medical History:   Diagnosis Date    Diabetes mellitus type I     Hypertension        Past Surgical History:   Procedure Laterality Date    CLAVICLE SURGERY      ESOPHAGOGASTRODUODENOSCOPY Left 2023    Procedure: EGD (ESOPHAGOGASTRODUODENOSCOPY);  Surgeon: Daiana Chilel MD;  Location: [facility] ENDOSCOPY;  Service: Gastroenterology;  Laterality: Left;       Social History     Socioeconomic History    Marital status: Single   Tobacco Use    Smoking status: Former     Current packs/day: 0.00     Types: Cigarettes     Quit date: 5/15/2023     Years since quittin.0     Passive exposure: Past    Smokeless tobacco: Never   Substance and Sexual Activity    Alcohol use: Never    Drug use: Never    Sexual activity: Yes     Partners: Female     Birth control/protection: Condom     Social Determinants of Health     Financial Resource Strain: Low Risk  (2023)    Overall Financial Resource Strain (CARDIA)     Difficulty of Paying Living Expenses: Not hard at all   Recent Concern: Financial Resource Strain - High Risk (2023)    Overall Financial Resource Strain (CARDIA)     Difficulty of Paying Living Expenses: Hard   Food Insecurity: No Food Insecurity (2023)    Hunger Vital Sign     Worried About Running Out of Food in the Last Year: Never true     Ran Out of Food in the Last Year: Never true   Recent Concern: Food Insecurity - Food Insecurity Present (2023)    Hunger Vital Sign     Worried About Running Out of Food in the Last Year: Sometimes true     Ran Out of Food in the Last Year: Never true   Transportation Needs: No Transportation Needs (2023)    PRAPARE - Transportation     Lack of Transportation (Medical): No     Lack of Transportation (Non-Medical): No   Physical Activity: Inactive (2023)    Exercise Vital Sign     Days of Exercise per Week: 0 days  "    Minutes of Exercise per Session: 0 min   Stress: No Stress Concern Present (9/1/2023)    [employer] - Occupational Stress Questionnaire     Feeling of Stress : Not at all   Recent Concern: Stress - Stress Concern Present (6/30/2023)    [employer] - Occupational Stress Questionnaire     Feeling of Stress : To some extent   Housing Stability: Low Risk  (9/1/2023)    Housing Stability Vital Sign     Unable to Pay for Housing in the Last Year: No     Number of Places Lived in the Last Year: 1     Unstable Housing in the Last Year: No           Current Outpatient Medications   Medication Instructions    albuterol (PROVENTIL/VENTOLIN HFA) 90 mcg/actuation inhaler Inhalation    amLODIPine (NORVASC) 10 mg, Oral, Daily    blood sugar diagnostic Strp To check BG 4 times daily, to use with insurance preferred meter    blood sugar diagnostic Strp To check BG 4 times daily, to use with insurance preferred meter    blood-glucose meter kit To check BG 4 times daily, to use with insurance preferred meter    blood-glucose meter,continuous (DEXCOM G7 ) Misc Dexcom G7  uses directed    blood-glucose sensor (DEXCOM G7 SENSOR) Trinh Use every 10 days as directed    buPROPion (WELLBUTRIN XL) 300 mg, Oral, Daily    clonazePAM (KLONOPIN) 2 mg, Oral, Nightly    DULoxetine (CYMBALTA) 30 mg, Oral, Daily    ibuprofen (ADVIL,MOTRIN) 600 mg, Oral, Every 6 hours PRN    insulin aspart U-100 (NOVOLOG FLEXPEN U-100 INSULIN) 100 unit/mL (3 mL) InPn pen 12 units TID with meals with correction 1:50 >150 Max dose 50 units    insulin glargine U-100 (Lantus) (LANTUS SOLOSTAR U-100 INSULIN) 25 Units, Subcutaneous, Daily    LIDOcaine (XYLOCAINE) 2 g, Topical (Top), 2 times daily PRN    lisinopriL 10 mg, Oral, Daily    mupirocin (BACTROBAN) 2 % ointment Topical (Top), 3 times daily    pantoprazole (PROTONIX) 40 mg, Oral, Daily    pen needle, diabetic 31 gauge x 3/16" Ndle Use 4 " times a day for insulin injection (BD Mini)    pregabalin (LYRICA) 50 mg, Oral, 2 times daily    traZODone (DESYREL) 100 mg, Oral, Nightly    TRUEPLUS LANCETS 28 gauge Misc Topical (Top), 4 times daily       Current Inpatient Medications   insulin regular  0.1 Units/kg Intravenous Once       Current Intravenous Infusions   sodium chloride 0.9%  1,000 mL Intravenous Continuous 125 mL/hr at 05/23/24 1251 1,000 mL at 05/23/24 1251    dextrose 5 % and 0.45 % NaCl   Intravenous Continuous PRN        insulin regular 1 units/mL infusion orderable (DKA)  0-0.2 Units/kg/hr Intravenous Continuous             Review of Systems   Constitutional:  Positive for weight loss (7 lbs). Negative for chills and fever.        Loss of appetite   HENT:  Negative for congestion.    Respiratory:  Negative for cough, shortness of breath and wheezing.    Cardiovascular:  Positive for chest pain. Negative for palpitations and leg swelling.   Gastrointestinal:  Positive for abdominal pain and vomiting. Negative for blood in stool, constipation, diarrhea, heartburn and nausea.   Genitourinary:  Negative for dysuria, frequency and urgency.   Musculoskeletal:  Positive for back pain. Negative for myalgias.   Neurological:  Negative for focal weakness, weakness and headaches.   Psychiatric/Behavioral:  The patient does not have insomnia.           Objective:       Intake/Output Summary (Last 24 hours) at 5/23/2024 1331  Last data filed at 5/23/2024 1150  Gross per 24 hour   Intake 1499 ml   Output --   Net 1499 ml         Vital Signs (Most Recent):  Temp: 98.1 °F (36.7 °C) (05/23/24 1036)  Pulse: (!) 122 (05/23/24 1200)  Resp: 18 (05/23/24 1036)  BP: 131/73 (05/23/24 1200)  SpO2: 99 % (05/23/24 1200)  Body mass index is 23.75 kg/m².  Weight: 81.6 kg (180 lb) Vital Signs (24h Range):  Temp:  [98.1 °F (36.7 °C)] 98.1 °F (36.7 °C)  Pulse:  [112-124] 122  Resp:  [18] 18  SpO2:  [93 %-99 %] 99 %  BP: ()/(57-73) 131/73     Physical Exam  Nursing  note reviewed.   Constitutional:       General: He is in acute distress (moderate).      Appearance: Normal appearance.   HENT:      Head: Normocephalic and atraumatic.      Mouth/Throat:      Mouth: Mucous membranes are dry.   Eyes:      General: No scleral icterus.     Pupils: Pupils are equal, round, and reactive to light.   Cardiovascular:      Rate and Rhythm: Regular rhythm. Tachycardia present.      Pulses: Normal pulses.      Heart sounds: No murmur heard.  Pulmonary:      Effort: No respiratory distress.      Breath sounds: No wheezing or rales.   Abdominal:      General: Bowel sounds are normal. There is no distension.      Palpations: Abdomen is soft.      Tenderness: There is abdominal tenderness.   Musculoskeletal:      Right lower leg: No edema.      Left lower leg: No edema.   Skin:     General: Skin is warm.      Capillary Refill: Capillary refill takes less than 2 seconds.      Coloration: Skin is not jaundiced.      Findings: No lesion or rash.      Comments: Multiple tattoos     Neurological:      General: No focal deficit present.      Mental Status: He is alert and oriented to person, place, and time.   Psychiatric:         Mood and Affect: Mood normal.         Behavior: Behavior normal.         Thought Content: Thought content normal.         Judgment: Judgment normal.           Lines/Drains/Airways       Peripheral Intravenous Line  Duration                  Peripheral IV - Single Lumen 22 G Left;Posterior Hand -- days                    Significant Labs:    Lab Results   Component Value Date    WBC 15.53 (H) 05/23/2024    HGB 14.9 05/23/2024    HCT 43.0 05/23/2024    MCV 80.5 05/23/2024     05/23/2024           BMP  Lab Results   Component Value Date     (L) 05/23/2024    K 5.9 (H) 05/23/2024    CHLORIDE 92 (L) 05/23/2024    CO2 11 (L) 05/23/2024    BUN 59.5 (H) 05/23/2024    CREATININE 2.83 (H) 05/23/2024    CALCIUM 9.7 05/23/2024    AGAP 25.0 05/23/2024    ESTGFRAFRICA 99  2022    EGFRNONAA >60 2022         ABG  Recent Labs   Lab 24  1058   PH 7.240*   PO2 53.0*   PCO2 31.0*   HCO3 13.3   POCBASEDEF -12.80       Mechanical Ventilation Support:         Significant Imaging:  I have reviewed the pertinent imaging within the past 24 hours.        Assessment/Plan:     Assessment  Diabetic ketoacidosis with hyperglycemia likely with medication non compliance  High anion gap metabolic acidosis  SIRS (2/) but sepsis unlikely given tachycardia and leukocytosis likely reactive from DKA and no other signs of infection  Acute Kidney Injury  Leukocytosis  Hyponatremia  Hyperkalemia  Hypertension  Chest pain  Generalized abdominal pain  Chronic lumbar back pain      Plan  Admitted to ICU for DKA  On arrival: CB  Urine Ketones: pending   Bicarb: 11  Gap: 25  BHB: 9.30  pH: 7.240 (on VBG)  HbA1c of 9.6 in 2024  Likely 2/2 medication non compliance   Home DM regimen: Lantus 25 units nightly and Novolog 7 units in the morning and 12 units with lunch and supper  Start insulin gtt per protocol with q1h accuchecks while on the drip.    Transition to SQ insulin and continue the drip for additional 2 hours if able to tolerate PO w/o N/V  Bicarb > 18  Anion gap < 12  Glucose < 250 on 2 consecutive readings.    Start  cc/hr. Once blood sugar reaches 200, transition to D5 1/2 NS @ 150 cc/h  Monitor electrolytes with BMP q4h, replete per DKA protocol. Keep K > 4, Mg > 2, Phos > 3  Bariatric clear liquid diet while on insulin gtt then change to Diabetic diet when initiating subq insulin with accuchecks 4x daily before meals and at bedtime (AC and HS)  Corrected sodium for hyperglycemia of 139  Resume Amlodipine 10 mg once daily. Prn antihypertensives ordered  CT Abd/ pel w/o contrast ordered    DVT Prophylaxis: Heparin   GI Prophylaxis: none     32 minutes of critical care was time spent personally by me on the following activities: development of treatment plan with  patient or surrogate and bedside caregivers, discussions with consultants, evaluation of patient's response to treatment, examination of patient, ordering and performing treatments and interventions, ordering and review of laboratory studies, ordering and review of radiographic studies, pulse oximetry, re-evaluation of patient's condition.  This critical care time did not overlap with that of any other provider or involve time for any procedures.     Anthony Hassan MD  Pulmonary Critical Care Medicine  [facility] - Emergency Dept  DOS: 05/23/2024

## 2024-05-24 PROBLEM — E46 MALNUTRITION: Status: ACTIVE | Noted: 2024-05-24

## 2024-05-24 LAB
ALBUMIN SERPL-MCNC: 3.1 G/DL (ref 3.5–5)
ALBUMIN SERPL-MCNC: 3.1 G/DL (ref 3.5–5)
ALBUMIN SERPL-MCNC: 3.3 G/DL (ref 3.5–5)
ALBUMIN/GLOB SERPL: 1.2 RATIO (ref 1.1–2)
ALBUMIN/GLOB SERPL: 1.2 RATIO (ref 1.1–2)
ALBUMIN/GLOB SERPL: 1.3 RATIO (ref 1.1–2)
ALP SERPL-CCNC: 89 UNIT/L (ref 40–150)
ALP SERPL-CCNC: 93 UNIT/L (ref 40–150)
ALP SERPL-CCNC: 93 UNIT/L (ref 40–150)
ALT SERPL-CCNC: 19 UNIT/L (ref 0–55)
ALT SERPL-CCNC: 19 UNIT/L (ref 0–55)
ALT SERPL-CCNC: 20 UNIT/L (ref 0–55)
ANION GAP SERPL CALC-SCNC: 5 MEQ/L
ANION GAP SERPL CALC-SCNC: 7 MEQ/L
ANION GAP SERPL CALC-SCNC: 8 MEQ/L
AST SERPL-CCNC: 14 UNIT/L (ref 5–34)
AST SERPL-CCNC: 15 UNIT/L (ref 5–34)
AST SERPL-CCNC: 15 UNIT/L (ref 5–34)
BASOPHILS # BLD AUTO: 0.06 X10(3)/MCL
BASOPHILS NFR BLD AUTO: 0.4 %
BILIRUB SERPL-MCNC: 0.5 MG/DL
BILIRUB SERPL-MCNC: 0.5 MG/DL
BILIRUB SERPL-MCNC: 0.6 MG/DL
BUN SERPL-MCNC: 44.5 MG/DL (ref 8.9–20.6)
BUN SERPL-MCNC: 48.2 MG/DL (ref 8.9–20.6)
BUN SERPL-MCNC: 50.6 MG/DL (ref 8.9–20.6)
CALCIUM SERPL-MCNC: 8.6 MG/DL (ref 8.4–10.2)
CALCIUM SERPL-MCNC: 8.7 MG/DL (ref 8.4–10.2)
CALCIUM SERPL-MCNC: 9 MG/DL (ref 8.4–10.2)
CHLORIDE SERPL-SCNC: 107 MMOL/L (ref 98–107)
CHLORIDE SERPL-SCNC: 108 MMOL/L (ref 98–107)
CHLORIDE SERPL-SCNC: 110 MMOL/L (ref 98–107)
CO2 SERPL-SCNC: 20 MMOL/L (ref 22–29)
CO2 SERPL-SCNC: 21 MMOL/L (ref 22–29)
CO2 SERPL-SCNC: 22 MMOL/L (ref 22–29)
CREAT SERPL-MCNC: 1.48 MG/DL (ref 0.73–1.18)
CREAT SERPL-MCNC: 1.56 MG/DL (ref 0.73–1.18)
CREAT SERPL-MCNC: 1.73 MG/DL (ref 0.73–1.18)
CREAT/UREA NIT SERPL: 29
CREAT/UREA NIT SERPL: 30
CREAT/UREA NIT SERPL: 31
EOSINOPHIL # BLD AUTO: 0.15 X10(3)/MCL (ref 0–0.9)
EOSINOPHIL NFR BLD AUTO: 0.9 %
ERYTHROCYTE [DISTWIDTH] IN BLOOD BY AUTOMATED COUNT: 13.4 % (ref 11.5–17)
GFR SERPLBLD CREATININE-BSD FMLA CKD-EPI: 50 ML/MIN/1.73/M2
GFR SERPLBLD CREATININE-BSD FMLA CKD-EPI: 57 ML/MIN/1.73/M2
GFR SERPLBLD CREATININE-BSD FMLA CKD-EPI: 60 ML/MIN/1.73/M2
GLOBULIN SER-MCNC: 2.5 GM/DL (ref 2.4–3.5)
GLOBULIN SER-MCNC: 2.5 GM/DL (ref 2.4–3.5)
GLOBULIN SER-MCNC: 2.6 GM/DL (ref 2.4–3.5)
GLUCOSE SERPL-MCNC: 117 MG/DL (ref 74–100)
GLUCOSE SERPL-MCNC: 117 MG/DL (ref 74–100)
GLUCOSE SERPL-MCNC: 123 MG/DL (ref 74–100)
HCT VFR BLD AUTO: 33.7 % (ref 42–52)
HGB BLD-MCNC: 11.8 G/DL (ref 14–18)
HOLD SPECIMEN: NORMAL
IMM GRANULOCYTES # BLD AUTO: 0.06 X10(3)/MCL (ref 0–0.04)
IMM GRANULOCYTES NFR BLD AUTO: 0.4 %
LYMPHOCYTES # BLD AUTO: 2.43 X10(3)/MCL (ref 0.6–4.6)
LYMPHOCYTES NFR BLD AUTO: 14.5 %
MAGNESIUM SERPL-MCNC: 1.9 MG/DL (ref 1.6–2.6)
MCH RBC QN AUTO: 27.5 PG (ref 27–31)
MCHC RBC AUTO-ENTMCNC: 35 G/DL (ref 33–36)
MCV RBC AUTO: 78.6 FL (ref 80–94)
MONOCYTES # BLD AUTO: 0.85 X10(3)/MCL (ref 0.1–1.3)
MONOCYTES NFR BLD AUTO: 5.1 %
NEUTROPHILS # BLD AUTO: 13.17 X10(3)/MCL (ref 2.1–9.2)
NEUTROPHILS NFR BLD AUTO: 78.7 %
NRBC BLD AUTO-RTO: 0 %
OHS QRS DURATION: 108 MS
OHS QTC CALCULATION: 478 MS
PHOSPHATE SERPL-MCNC: 3.6 MG/DL (ref 2.3–4.7)
PLATELET # BLD AUTO: 250 X10(3)/MCL (ref 130–400)
PMV BLD AUTO: 10.1 FL (ref 7.4–10.4)
POCT GLUCOSE: 102 MG/DL (ref 70–110)
POCT GLUCOSE: 102 MG/DL (ref 70–110)
POCT GLUCOSE: 110 MG/DL (ref 70–110)
POCT GLUCOSE: 114 MG/DL (ref 70–110)
POCT GLUCOSE: 131 MG/DL (ref 70–110)
POCT GLUCOSE: 198 MG/DL (ref 70–110)
POCT GLUCOSE: 209 MG/DL (ref 70–110)
POCT GLUCOSE: 281 MG/DL (ref 70–110)
POTASSIUM SERPL-SCNC: 3.9 MMOL/L (ref 3.5–5.1)
POTASSIUM SERPL-SCNC: 4 MMOL/L (ref 3.5–5.1)
POTASSIUM SERPL-SCNC: 4.2 MMOL/L (ref 3.5–5.1)
PROT SERPL-MCNC: 5.6 GM/DL (ref 6.4–8.3)
PROT SERPL-MCNC: 5.6 GM/DL (ref 6.4–8.3)
PROT SERPL-MCNC: 5.9 GM/DL (ref 6.4–8.3)
RBC # BLD AUTO: 4.29 X10(6)/MCL (ref 4.7–6.1)
SODIUM SERPL-SCNC: 135 MMOL/L (ref 136–145)
SODIUM SERPL-SCNC: 136 MMOL/L (ref 136–145)
SODIUM SERPL-SCNC: 137 MMOL/L (ref 136–145)
WBC # SPEC AUTO: 16.72 X10(3)/MCL (ref 4.5–11.5)

## 2024-05-24 PROCEDURE — 63600175 PHARM REV CODE 636 W HCPCS: Performed by: STUDENT IN AN ORGANIZED HEALTH CARE EDUCATION/TRAINING PROGRAM

## 2024-05-24 PROCEDURE — 25000003 PHARM REV CODE 250

## 2024-05-24 PROCEDURE — 80053 COMPREHEN METABOLIC PANEL: CPT

## 2024-05-24 PROCEDURE — 84100 ASSAY OF PHOSPHORUS: CPT

## 2024-05-24 PROCEDURE — 63600175 PHARM REV CODE 636 W HCPCS

## 2024-05-24 PROCEDURE — 25000003 PHARM REV CODE 250: Performed by: INTERNAL MEDICINE

## 2024-05-24 PROCEDURE — 85025 COMPLETE CBC W/AUTO DIFF WBC: CPT

## 2024-05-24 PROCEDURE — 63600175 PHARM REV CODE 636 W HCPCS: Performed by: FAMILY MEDICINE

## 2024-05-24 PROCEDURE — 21400001 HC TELEMETRY ROOM

## 2024-05-24 PROCEDURE — 36415 COLL VENOUS BLD VENIPUNCTURE: CPT

## 2024-05-24 PROCEDURE — 94761 N-INVAS EAR/PLS OXIMETRY MLT: CPT

## 2024-05-24 PROCEDURE — 83735 ASSAY OF MAGNESIUM: CPT

## 2024-05-24 RX ORDER — DEXTROSE MONOHYDRATE AND SODIUM CHLORIDE 5; .45 G/100ML; G/100ML
INJECTION, SOLUTION INTRAVENOUS CONTINUOUS
Status: DISCONTINUED | OUTPATIENT
Start: 2024-05-24 | End: 2024-05-24

## 2024-05-24 RX ORDER — INSULIN GLARGINE 100 [IU]/ML
18 INJECTION, SOLUTION SUBCUTANEOUS DAILY
Status: DISCONTINUED | OUTPATIENT
Start: 2024-05-24 | End: 2024-05-24

## 2024-05-24 RX ORDER — GLUCAGON 1 MG
1 KIT INJECTION
Status: DISCONTINUED | OUTPATIENT
Start: 2024-05-24 | End: 2024-05-26 | Stop reason: HOSPADM

## 2024-05-24 RX ORDER — IBUPROFEN 200 MG
16 TABLET ORAL
Status: CANCELLED | OUTPATIENT
Start: 2024-05-24

## 2024-05-24 RX ORDER — ACETAMINOPHEN 500 MG
1000 TABLET ORAL ONCE
Status: DISCONTINUED | OUTPATIENT
Start: 2024-05-24 | End: 2024-05-26 | Stop reason: HOSPADM

## 2024-05-24 RX ORDER — IBUPROFEN 200 MG
24 TABLET ORAL
Status: DISCONTINUED | OUTPATIENT
Start: 2024-05-24 | End: 2024-05-26 | Stop reason: HOSPADM

## 2024-05-24 RX ORDER — PREGABALIN 25 MG/1
50 CAPSULE ORAL 2 TIMES DAILY
Status: DISCONTINUED | OUTPATIENT
Start: 2024-05-24 | End: 2024-05-26 | Stop reason: HOSPADM

## 2024-05-24 RX ORDER — INSULIN ASPART 100 [IU]/ML
6 INJECTION, SOLUTION INTRAVENOUS; SUBCUTANEOUS
Status: DISCONTINUED | OUTPATIENT
Start: 2024-05-24 | End: 2024-05-25

## 2024-05-24 RX ORDER — IBUPROFEN 200 MG
24 TABLET ORAL
Status: CANCELLED | OUTPATIENT
Start: 2024-05-24

## 2024-05-24 RX ORDER — IBUPROFEN 200 MG
16 TABLET ORAL
Status: DISCONTINUED | OUTPATIENT
Start: 2024-05-24 | End: 2024-05-26 | Stop reason: HOSPADM

## 2024-05-24 RX ORDER — GLUCAGON 1 MG
1 KIT INJECTION
Status: CANCELLED | OUTPATIENT
Start: 2024-05-24

## 2024-05-24 RX ORDER — INSULIN GLARGINE 100 [IU]/ML
18 INJECTION, SOLUTION SUBCUTANEOUS DAILY
Status: DISCONTINUED | OUTPATIENT
Start: 2024-05-24 | End: 2024-05-25

## 2024-05-24 RX ORDER — INSULIN ASPART 100 [IU]/ML
0-5 INJECTION, SOLUTION INTRAVENOUS; SUBCUTANEOUS
Status: DISCONTINUED | OUTPATIENT
Start: 2024-05-24 | End: 2024-05-26 | Stop reason: HOSPADM

## 2024-05-24 RX ADMIN — MUPIROCIN: 20 OINTMENT TOPICAL at 08:05

## 2024-05-24 RX ADMIN — INSULIN ASPART 6 UNITS: 100 INJECTION, SOLUTION INTRAVENOUS; SUBCUTANEOUS at 12:05

## 2024-05-24 RX ADMIN — AMLODIPINE BESYLATE 10 MG: 10 TABLET ORAL at 08:05

## 2024-05-24 RX ADMIN — HEPARIN SODIUM 5000 UNITS: 5000 INJECTION, SOLUTION INTRAVENOUS; SUBCUTANEOUS at 08:05

## 2024-05-24 RX ADMIN — INSULIN ASPART 6 UNITS: 100 INJECTION, SOLUTION INTRAVENOUS; SUBCUTANEOUS at 07:05

## 2024-05-24 RX ADMIN — TRAZODONE HYDROCHLORIDE 100 MG: 100 TABLET ORAL at 08:05

## 2024-05-24 RX ADMIN — CLONAZEPAM 2 MG: 1 TABLET ORAL at 08:05

## 2024-05-24 RX ADMIN — PREGABALIN 50 MG: 25 CAPSULE ORAL at 08:05

## 2024-05-24 RX ADMIN — INSULIN ASPART 6 UNITS: 100 INJECTION, SOLUTION INTRAVENOUS; SUBCUTANEOUS at 06:05

## 2024-05-24 RX ADMIN — INSULIN ASPART 2 UNITS: 100 INJECTION, SOLUTION INTRAVENOUS; SUBCUTANEOUS at 06:05

## 2024-05-24 RX ADMIN — INSULIN ASPART 3 UNITS: 100 INJECTION, SOLUTION INTRAVENOUS; SUBCUTANEOUS at 12:05

## 2024-05-24 RX ADMIN — INSULIN GLARGINE 18 UNITS: 100 INJECTION, SOLUTION SUBCUTANEOUS at 02:05

## 2024-05-24 NOTE — ASSESSMENT & PLAN NOTE
Patient meets ASPEN criteria for moderate malnutrition of chronic illness per RD assessment as evidenced by:     Weight Loss (Malnutrition): greater than 5% in 1 month     Muscle Mass (Malnutrition): mild depletion           A minimum of two characteristics is recommended for diagnosis of either severe or non-severe malnutrition.

## 2024-05-24 NOTE — PLAN OF CARE
05/24/24 1158   Discharge Assessment   Assessment Type Discharge Planning Assessment   Confirmed/corrected address, phone number and insurance Yes   Confirmed Demographics Correct on Facesheet   Source of Information family   When was your last doctors appointment?   (Antoine Garner)   Reason For Admission Hyperglycemia, Chest pain, Diabetic ketoacidosis without coma   People in Home child(jacquelyn), dependent;parent(s)   Facility Arrived From: Home   Do you expect to return to your current living situation? Yes   Do you have help at home or someone to help you manage your care at home? Yes   Who are your caregiver(s) and their phone number(s)? Jake Romero (Father)  [phone]; Mother, Treasure Romero ([phone])   Prior to hospitilization cognitive status: Unable to Assess   Current cognitive status: Unable to Assess   Walking or Climbing Stairs Difficulty yes   Walking or Climbing Stairs ambulation difficulty, requires equipment   Mobility Management Cane   Dressing/Bathing Difficulty no   Home Layout Able to live on 1st floor   Equipment Currently Used at Home cane, straight   Readmission within 30 days? No   Patient currently being followed by outpatient case management? No   Do you currently have service(s) that help you manage your care at home? No   Do you take prescription medications? Yes  ([facility] [place])   Do you have prescription coverage? Yes   Coverage [facility] M/D   Do you have any problems affording any of your prescribed medications? No   Is the patient taking medications as prescribed? yes   Who is going to help you get home at discharge? Family   How do you get to doctors appointments? car, drives self;family or friend will provide   Are you on dialysis? No   Discharge Plan A Home with family;Home Health   DME Needed Upon Discharge  none   Discharge Plan discussed with: Parent(s)   Name(s) and Number(s) Mother, Treasure Romero ([phone])   Transition of Care Barriers None    OTHER   Name(s) of People in Home Jake Romero (Father)  [phone]; Mother, Treasure Romero ([phone]); 4 minor children (17, 12, 10 & 5 yrs)     Pt single with 4 children; Assisted by parents as needed; Worked as a  until he became ill; Now employed with [employer]; Independent with ADL's; Hx obtained from mother while pt slept; CM to follow for d/c planning needs.

## 2024-05-24 NOTE — PLAN OF CARE
Problem: Diabetes Comorbidity  Goal: Blood Glucose Level Within Targeted Range  Outcome: Progressing  Intervention: Monitor and Manage Glycemia  Flowsheets (Taken 5/24/2024 6587)  Glycemic Management:   blood glucose monitored   supplemental insulin given

## 2024-05-24 NOTE — PROGRESS NOTES
Inpatient Nutrition Assessment    Admit Date: 5/23/2024   Total duration of encounter: 1 day   Patient Age: 43 y.o.    Nutrition Recommendation/Prescription     Will change diet to 2000 diabetic /no added salt diet   Will order vanilla boost glucose control tid ; Boost Glucose Control (provides 190 kcal, 16 g protein per serving)   MVI/fe  Biweekly wt  5. Pt education on diet completed  Will monitor nutrition status     Communication of Recommendations: reviewed with nurse and reviewed with patient    Nutrition Assessment     Malnutrition Assessment/Nutrition-Focused Physical Exam    Malnutrition Context: chronic illness (05/24/24 1237)  Malnutrition Level: moderate (05/24/24 1237)     Weight Loss (Malnutrition): greater than 5% in 1 month (05/24/24 1237)              Muscle Mass (Malnutrition): mild depletion (05/24/24 1237)     Clavicle Bone Region (Muscle Loss): mild depletion  Clavicle and Acromion Bone Region (Muscle Loss): mild depletion  Scapular Bone Region (Muscle Loss): mild depletion                       A minimum of two characteristics is recommended for diagnosis of either severe or non-severe malnutrition.    Chart Review    Reason Seen: continuous nutrition monitoring    Malnutrition Screening Tool Results   Have you recently lost weight without trying?: No  Have you been eating poorly because of a decreased appetite?: No   MST Score: 0   Diagnosis:  DKA, metabolic acidosis, SIRS< RODOLFO, leukocytosis, hyponatremia, hyperkalemia, HTN, CP, abdominal pain     Relevant Medical History: DM, HTN, drug abuse     Scheduled Medications:  amLODIPine, 10 mg, Daily  clonazePAM, 2 mg, QHS  heparin (porcine), 5,000 Units, Q12H  insulin aspart U-100, 6 Units, TIDWM  insulin glargine U-100, 18 Units, Daily  mupirocin, , BID  traZODone, 100 mg, QHS    Continuous Infusions:   PRN Medications:  dextrose 10 % in water (D10W), , PRN  dextrose 10 % in water (D10W), , PRN  dextrose 10%, 12.5 g, PRN  dextrose 10%, 25 g,  PRN  glucagon (human recombinant), 1 mg, PRN  glucose, 16 g, PRN  glucose, 24 g, PRN  hydrALAZINE, 10 mg, Q6H PRN  insulin aspart U-100, 0-5 Units, QID (AC + HS) PRN  labetalol, 10 mg, Q6H PRN    Calorie Containing IV Medications: no significant kcals from medications at this time    Recent Labs   Lab 05/23/24  1041 05/23/24  1315 05/23/24  1550 05/23/24  2035 05/23/24  2354 05/24/24  0313 05/24/24  0750   * 128* 132* 137 137 136 135*   K 5.9* 6.4* 4.3 3.9 4.0 3.9 4.2   CALCIUM 9.7 9.2 8.9 9.3 8.6 8.7 9.0   PHOS  --  5.4*  --   --   --  3.6  --    MG  --  2.10  --   --   --  1.90  --    CHLORIDE 92* 95* 101 107 110* 108* 107   CO2 11* 7* 11* 20* 22 21* 20*   BUN 59.5* 59.6* 59.9* 54.9* 50.6* 48.2* 44.5*   CREATININE 2.83* 2.93* 2.80* 2.18* 1.73* 1.56* 1.48*   EGFRNORACEVR 28 27 28 38 50 57 60   GLUCOSE 792* 795* 637* 261* 123* 117* 117*   BILITOT 1.0 0.8 0.5 0.6 0.5 0.5 0.6   ALKPHOS 130 127 113 108 93 89 93   ALT 23 26 24 21 19 19 20   AST 23 27 21 17 14 15 15   ALBUMIN 4.0 3.9 3.5 3.5 3.1* 3.1* 3.3*   TRIG  --  229*  --   --   --   --   --    LIPASE  --  6  --   --   --   --   --    WBC 15.53*  --   --   --   --  16.72*  --    HGB 14.9  --   --   --   --  11.8*  --    HCT 43.0  --   --   --   --  33.7*  --      Nutrition Orders:  Diet diabetic 1800 Calorie      Appetite/Oral Intake: poor/25-50% of meals  Factors Affecting Nutritional Intake: abdominal pain, decreased appetite, and nausea  Social Needs Impacting Access to Food: none identified  Food/Temple/Cultural Preferences: none reported  Food Allergies: none reported  Last Bowel Movement: 05/23/24  Wound(s):  none reported     Comments    (5/24) Pt not very talkative during rounds; reported not eating well past 3 days; + quick wt loss--7% < month; Nausea/abdominal pain upon admit. Will increase calorie level and add oral supplement to help meet nutrient needs. Abnormal labs noted: Bun/Cr--decreasing; Gluc (H) --improved from admit. Pt with mild muscle  "wasting     Anthropometrics    Height: 6' 1" (185.4 cm), Height Method: Stated  Last Weight: 72 kg (158 lb 11.7 oz) (24), Weight Method: Bed Scale  BMI (Calculated): 20.9  BMI Classification: normal (BMI 18.5-24.9)        Ideal Body Weight (IBW), Male: 184 lb     % Ideal Body Weight, Male (lb): 86.27 %                 Usual Body Weight (UBW), k kg  % Usual Body Weight: 93.7     Usual Weight Provided By: patient and EMR weight history    Wt Readings from Last 5 Encounters:   24 72 kg (158 lb 11.7 oz)   24 77 kg (169 lb 12.1 oz)   24 77 kg (169 lb 12.1 oz)   24 77 kg (169 lb 12.8 oz)   24 75.5 kg (166 lb 6.4 oz)     Weight Change(s) Since Admission: #; 7% wt loss < month   Wt Readings from Last 1 Encounters:   24 72 kg (158 lb 11.7 oz)   24 1033 81.6 kg (180 lb)   Admit Weight: 81.6 kg (180 lb) (24 1033), Weight Method: Stated    Estimated Needs    Weight Used For Calorie Calculations: 72 kg (158 lb 11.7 oz)  Energy Calorie Requirements (kcal): 2016 kcal/d; 28 yanick/kg  Energy Need Method: Kcal/kg  Weight Used For Protein Calculations: 72 kg (158 lb 11.7 oz)  Protein Requirements: 86 gm protein/d; 1.2 gm/kg  Fluid Requirements (mL): 2016 ml/d; 1ml/yanick  CHO Requirement: 227 gm CHO/d     Enteral Nutrition     Patient not receiving enteral nutrition at this time.    Parenteral Nutrition     Patient not receiving parenteral nutrition support at this time.    Evaluation of Received Nutrient Intake    Calories: not meeting estimated needs  Protein: not meeting estimated needs    Patient Education     Education Provided: diabetic diet and low sodium diet  Teaching Method: explanation and printed materials  Comprehension: verbalizes understanding and needs reinforcement  Barriers to Learning: acuity of illness  Expected Compliance: fair  Comments: All questions were answered and dietitian's contact information was provided.     Nutrition Diagnosis "     PES: Inadequate oral intake related to chronic illness as evidenced by eating < 50% meals x 3 days . (new)     PES: Moderate chronic disease or condition related malnutrition related to chronic illness as evidenced by mild muscle depletion and greater than 5% weight loss in 1 month. (new)    Nutrition Interventions     Intervention(s): modified composition of meals/snacks, commercial beverage, multivitamin/mineral supplement therapy, purpose of nutrition education, and collaboration with other providers    Goal: Meet greater than 80% of nutritional needs by follow-up. (new)  Goal: Maintain weight throughout hospitalization. (new)    Nutrition Goals & Monitoring     Dietitian will monitor: food and beverage intake, weight, food/nutrition knowledge skill, and glucose/endocrine profile  Discharge planning: continue 2000 diabetic/low Na diet with boost glucose control  oral supplements  Nutrition Risk/Follow-Up: moderate (follow-up in 3-5 days)   Please consult if re-assessment needed sooner.      Detail Level: Simple Detail Level: Zone

## 2024-05-25 LAB
ALBUMIN SERPL-MCNC: 3 G/DL (ref 3.5–5)
ALBUMIN/GLOB SERPL: 1.2 RATIO (ref 1.1–2)
ALP SERPL-CCNC: 86 UNIT/L (ref 40–150)
ALT SERPL-CCNC: 16 UNIT/L (ref 0–55)
ANION GAP SERPL CALC-SCNC: 4 MEQ/L
AST SERPL-CCNC: 13 UNIT/L (ref 5–34)
BASOPHILS # BLD AUTO: 0.04 X10(3)/MCL
BASOPHILS NFR BLD AUTO: 0.5 %
BILIRUB SERPL-MCNC: 0.4 MG/DL
BUN SERPL-MCNC: 27.4 MG/DL (ref 8.9–20.6)
CALCIUM SERPL-MCNC: 9 MG/DL (ref 8.4–10.2)
CHLORIDE SERPL-SCNC: 104 MMOL/L (ref 98–107)
CO2 SERPL-SCNC: 26 MMOL/L (ref 22–29)
CREAT SERPL-MCNC: 1.22 MG/DL (ref 0.73–1.18)
CREAT/UREA NIT SERPL: 22
EOSINOPHIL # BLD AUTO: 0.13 X10(3)/MCL (ref 0–0.9)
EOSINOPHIL NFR BLD AUTO: 1.5 %
ERYTHROCYTE [DISTWIDTH] IN BLOOD BY AUTOMATED COUNT: 13.4 % (ref 11.5–17)
GFR SERPLBLD CREATININE-BSD FMLA CKD-EPI: >60 ML/MIN/1.73/M2
GLOBULIN SER-MCNC: 2.6 GM/DL (ref 2.4–3.5)
GLUCOSE SERPL-MCNC: 298 MG/DL (ref 74–100)
HCT VFR BLD AUTO: 34.7 % (ref 42–52)
HGB BLD-MCNC: 12.1 G/DL (ref 14–18)
IMM GRANULOCYTES # BLD AUTO: 0.02 X10(3)/MCL (ref 0–0.04)
IMM GRANULOCYTES NFR BLD AUTO: 0.2 %
LACTATE SERPL-SCNC: 0.9 MMOL/L (ref 0.5–2.2)
LYMPHOCYTES # BLD AUTO: 2.19 X10(3)/MCL (ref 0.6–4.6)
LYMPHOCYTES NFR BLD AUTO: 25.9 %
MCH RBC QN AUTO: 27.9 PG (ref 27–31)
MCHC RBC AUTO-ENTMCNC: 34.9 G/DL (ref 33–36)
MCV RBC AUTO: 80.1 FL (ref 80–94)
MONOCYTES # BLD AUTO: 0.28 X10(3)/MCL (ref 0.1–1.3)
MONOCYTES NFR BLD AUTO: 3.3 %
NEUTROPHILS # BLD AUTO: 5.81 X10(3)/MCL (ref 2.1–9.2)
NEUTROPHILS NFR BLD AUTO: 68.6 %
NRBC BLD AUTO-RTO: 0 %
PLATELET # BLD AUTO: 220 X10(3)/MCL (ref 130–400)
PMV BLD AUTO: 10.3 FL (ref 7.4–10.4)
POCT GLUCOSE: 100 MG/DL (ref 70–110)
POCT GLUCOSE: 173 MG/DL (ref 70–110)
POCT GLUCOSE: 336 MG/DL (ref 70–110)
POCT GLUCOSE: 361 MG/DL (ref 70–110)
POTASSIUM SERPL-SCNC: 4.8 MMOL/L (ref 3.5–5.1)
PROT SERPL-MCNC: 5.6 GM/DL (ref 6.4–8.3)
RBC # BLD AUTO: 4.33 X10(6)/MCL (ref 4.7–6.1)
SODIUM SERPL-SCNC: 134 MMOL/L (ref 136–145)
T4 FREE SERPL-MCNC: 0.97 NG/DL (ref 0.7–1.48)
TSH SERPL-ACNC: 0.3 UIU/ML (ref 0.35–4.94)
WBC # SPEC AUTO: 8.47 X10(3)/MCL (ref 4.5–11.5)

## 2024-05-25 PROCEDURE — 63600175 PHARM REV CODE 636 W HCPCS: Performed by: FAMILY MEDICINE

## 2024-05-25 PROCEDURE — 36415 COLL VENOUS BLD VENIPUNCTURE: CPT

## 2024-05-25 PROCEDURE — 85025 COMPLETE CBC W/AUTO DIFF WBC: CPT

## 2024-05-25 PROCEDURE — 25000003 PHARM REV CODE 250

## 2024-05-25 PROCEDURE — 94761 N-INVAS EAR/PLS OXIMETRY MLT: CPT

## 2024-05-25 PROCEDURE — 36415 COLL VENOUS BLD VENIPUNCTURE: CPT | Performed by: FAMILY MEDICINE

## 2024-05-25 PROCEDURE — 84439 ASSAY OF FREE THYROXINE: CPT | Performed by: FAMILY MEDICINE

## 2024-05-25 PROCEDURE — 84443 ASSAY THYROID STIM HORMONE: CPT | Performed by: FAMILY MEDICINE

## 2024-05-25 PROCEDURE — 63600175 PHARM REV CODE 636 W HCPCS

## 2024-05-25 PROCEDURE — 21400001 HC TELEMETRY ROOM

## 2024-05-25 PROCEDURE — 83605 ASSAY OF LACTIC ACID: CPT | Performed by: FAMILY MEDICINE

## 2024-05-25 PROCEDURE — 25000003 PHARM REV CODE 250: Performed by: FAMILY MEDICINE

## 2024-05-25 PROCEDURE — 80053 COMPREHEN METABOLIC PANEL: CPT

## 2024-05-25 RX ORDER — FAMOTIDINE 20 MG/1
20 TABLET, FILM COATED ORAL 2 TIMES DAILY
Status: DISCONTINUED | OUTPATIENT
Start: 2024-05-25 | End: 2024-05-26 | Stop reason: HOSPADM

## 2024-05-25 RX ORDER — INSULIN GLARGINE 100 [IU]/ML
24 INJECTION, SOLUTION SUBCUTANEOUS DAILY
Status: DISCONTINUED | OUTPATIENT
Start: 2024-05-25 | End: 2024-05-26 | Stop reason: HOSPADM

## 2024-05-25 RX ORDER — INSULIN ASPART 100 [IU]/ML
8 INJECTION, SOLUTION INTRAVENOUS; SUBCUTANEOUS
Status: DISCONTINUED | OUTPATIENT
Start: 2024-05-25 | End: 2024-05-26 | Stop reason: HOSPADM

## 2024-05-25 RX ORDER — DOCUSATE SODIUM 100 MG/1
100 CAPSULE, LIQUID FILLED ORAL 2 TIMES DAILY
Status: DISCONTINUED | OUTPATIENT
Start: 2024-05-25 | End: 2024-05-26 | Stop reason: HOSPADM

## 2024-05-25 RX ADMIN — HEPARIN SODIUM 5000 UNITS: 5000 INJECTION, SOLUTION INTRAVENOUS; SUBCUTANEOUS at 08:05

## 2024-05-25 RX ADMIN — MUPIROCIN: 20 OINTMENT TOPICAL at 08:05

## 2024-05-25 RX ADMIN — INSULIN ASPART 5 UNITS: 100 INJECTION, SOLUTION INTRAVENOUS; SUBCUTANEOUS at 09:05

## 2024-05-25 RX ADMIN — FAMOTIDINE 20 MG: 20 TABLET, FILM COATED ORAL at 08:05

## 2024-05-25 RX ADMIN — AMLODIPINE BESYLATE 10 MG: 10 TABLET ORAL at 08:05

## 2024-05-25 RX ADMIN — TRAZODONE HYDROCHLORIDE 50 MG: 100 TABLET ORAL at 08:05

## 2024-05-25 RX ADMIN — ALUMINUM HYDROXIDE AND MAGNESIUM HYDROXIDE 30 ML: 200; 200 SUSPENSION ORAL at 09:05

## 2024-05-25 RX ADMIN — DOCUSATE SODIUM 100 MG: 100 CAPSULE, LIQUID FILLED ORAL at 08:05

## 2024-05-25 RX ADMIN — INSULIN GLARGINE 24 UNITS: 100 INJECTION, SOLUTION SUBCUTANEOUS at 08:05

## 2024-05-25 RX ADMIN — MUPIROCIN: 20 OINTMENT TOPICAL at 09:05

## 2024-05-25 RX ADMIN — INSULIN ASPART 8 UNITS: 100 INJECTION, SOLUTION INTRAVENOUS; SUBCUTANEOUS at 12:05

## 2024-05-25 RX ADMIN — PREGABALIN 50 MG: 25 CAPSULE ORAL at 08:05

## 2024-05-25 RX ADMIN — INSULIN ASPART 4 UNITS: 100 INJECTION, SOLUTION INTRAVENOUS; SUBCUTANEOUS at 12:05

## 2024-05-25 RX ADMIN — SODIUM CHLORIDE, POTASSIUM CHLORIDE, SODIUM LACTATE AND CALCIUM CHLORIDE 1000 ML: 600; 310; 30; 20 INJECTION, SOLUTION INTRAVENOUS at 06:05

## 2024-05-25 RX ADMIN — CLONAZEPAM 2 MG: 1 TABLET ORAL at 08:05

## 2024-05-25 RX ADMIN — INSULIN ASPART 8 UNITS: 100 INJECTION, SOLUTION INTRAVENOUS; SUBCUTANEOUS at 05:05

## 2024-05-25 NOTE — PROGRESS NOTES
[facility]  INTERNAL MEDICINE PROGRESS NOTE    Patient Name: Jose Francisco Romero  MRN: [id number]  Admission Date: 5/23/2024  Hospital Length of Stay: 2 days  Code Status: Full Code  Attending Provider: Munira Linn MD  Primary Care Provider: Antoine Garner MD     Subjective:     HPI:   Patient is a 43 year old male with PMH of Type 1 diabetes mellitus, HTN, history of drug abuse presented to the [facility] ED with complaints of nausea, generalized aches/pain and elevated blood sugar. He mentions that his blood sugar is usually around 200 however, he noted it >500 this morning. Moreover, patient states he has not been eating for the past 3 days 2/2 loss of appetite. Denies any dysphagia, GERD or nausea. There is discrepancy on his insulin use as he states he had not been taking the full Novolog dose as prescribed as he has not been eating lately but was not aware of his blood glucose levels. States he was taking his Lantus of 25 units and took it last night as well. Currently, also endorses generalized abdominal pain, lumbar back pain, non radiating non specific chest pain. Back pain worsens with urination. Endorses mild episodes of non bloody and non bilious emesis yesterday x2 and once this morning. He denies any fever, chills, dysuria, frequency, edema. Denies any new tattoos, skin rash or any ulcers. Endorses 1/2 ppd cigarette smoking, occassional EtOH use with last use about 6 days ago, history of heroin use but denies any recent drug use.     In the ED, patient tachycardic but afebrile.  CBC noted for leukocytosis otherwise unremarkable.  CMP notable for hyponatremia, hyperkalemia, anion gap metabolic acidosis with anion gap of 25.  Elevated renal indices with BUN/creatinine at 59.5/2.8 3.  LFTs within normal limits.  Beta hydroxybutyrate noted at 9.30.  Creatinine kinase negative and troponin within normal limits.  VBG remarkable for 7.240/31/53/30.3.  Urinalysis pending. Critical care medicine consulted for admission and management of DKA.     Hospital Course/Significant events:    2024: Admitted to ICU for DKA    24 hour update:  CARA. Pt reports continued mild abdominal pain. His bg is in the 290s this am, he notes he was able to eat some sherbert last night. He also notes significant fatigue.       Past Medical History:   Diagnosis Date    Diabetes mellitus type I     Hypertension        Past Surgical History:   Procedure Laterality Date    CLAVICLE SURGERY      ESOPHAGOGASTRODUODENOSCOPY Left 2023    Procedure: EGD (ESOPHAGOGASTRODUODENOSCOPY);  Surgeon: Daiana Chilel MD;  Location: [facility] ENDOSCOPY;  Service: Gastroenterology;  Laterality: Left;       Social History     Socioeconomic History    Marital status: Single   Tobacco Use    Smoking status: Former     Current packs/day: 0.00     Types: Cigarettes     Quit date: 5/15/2023     Years since quittin.0     Passive exposure: Past    Smokeless tobacco: Never   Substance and Sexual Activity    Alcohol use: Never    Drug use: Never    Sexual activity: Yes     Partners: Female     Birth control/protection: Condom     Social Determinants of Health     Financial Resource Strain: Low Risk  (2023)    Overall Financial Resource Strain (CARDIA)     Difficulty of Paying Living Expenses: Not hard at all   Recent Concern: Financial Resource Strain - High Risk (2023)    Overall Financial Resource Strain (CARDIA)     Difficulty of Paying Living Expenses: Hard   Food Insecurity: No Food Insecurity (2023)    Hunger Vital Sign     Worried About Running Out of Food in the Last Year: Never true     Ran Out of Food in the Last Year: Never true   Recent Concern: Food Insecurity - Food Insecurity Present (2023)    Hunger Vital Sign     Worried About Running Out of Food in the Last Year: Sometimes true     Ran Out of Food in the Last Year: Never true   Transportation Needs: No Transportation  Needs (9/1/2023)    PRAPARE - Transportation     Lack of Transportation (Medical): No     Lack of Transportation (Non-Medical): No   Physical Activity: Inactive (9/1/2023)    Exercise Vital Sign     Days of Exercise per Week: 0 days     Minutes of Exercise per Session: 0 min   Stress: No Stress Concern Present (9/1/2023)    [employer] - Occupational Stress Questionnaire     Feeling of Stress : Not at all   Recent Concern: Stress - Stress Concern Present (6/30/2023)    [employer] - Occupational Stress Questionnaire     Feeling of Stress : To some extent   Housing Stability: Low Risk  (9/1/2023)    Housing Stability Vital Sign     Unable to Pay for Housing in the Last Year: No     Number of Places Lived in the Last Year: 1     Unstable Housing in the Last Year: No           Current Outpatient Medications   Medication Instructions    albuterol (PROVENTIL/VENTOLIN HFA) 90 mcg/actuation inhaler Inhalation    amLODIPine (NORVASC) 10 mg, Oral, Daily    blood sugar diagnostic Strp To check BG 4 times daily, to use with insurance preferred meter    blood sugar diagnostic Strp To check BG 4 times daily, to use with insurance preferred meter    blood-glucose meter kit To check BG 4 times daily, to use with insurance preferred meter    blood-glucose meter,continuous (DEXCOM G7 ) Misc Dexcom G7  uses directed    blood-glucose sensor (DEXCOM G7 SENSOR) Trinh Use every 10 days as directed    buPROPion (WELLBUTRIN XL) 300 mg, Oral, Daily    clonazePAM (KLONOPIN) 2 mg, Oral, Nightly    DULoxetine (CYMBALTA) 30 mg, Oral, Daily    ibuprofen (ADVIL,MOTRIN) 600 mg, Oral, Every 6 hours PRN    insulin aspart U-100 (NOVOLOG FLEXPEN U-100 INSULIN) 100 unit/mL (3 mL) InPn pen 12 units TID with meals with correction 1:50 >150 Max dose 50 units    insulin glargine U-100 (Lantus) (LANTUS SOLOSTAR U-100 INSULIN) 25 Units, Subcutaneous, Daily    LIDOcaine (XYLOCAINE) 2 g,  "Topical (Top), 2 times daily PRN    lisinopriL 10 mg, Oral, Daily    mupirocin (BACTROBAN) 2 % ointment Topical (Top), 3 times daily    pantoprazole (PROTONIX) 40 mg, Oral, Daily    pen needle, diabetic 31 gauge x 3/16" Ndle Use 4 times a day for insulin injection (BD Mini)    pregabalin (LYRICA) 50 mg, Oral, 2 times daily    traZODone (DESYREL) 100 mg, Oral, Nightly    TRUEPLUS LANCETS 28 gauge Misc Topical (Top), 4 times daily       Current Inpatient Medications   acetaminophen  1,000 mg Oral Once    amLODIPine  10 mg Oral Daily    clonazePAM  2 mg Oral QHS    heparin (porcine)  5,000 Units Subcutaneous Q12H    insulin aspart U-100  6 Units Subcutaneous TIDWM    insulin glargine U-100  18 Units Subcutaneous Daily    mupirocin   Nasal BID    pregabalin  50 mg Oral BID    traZODone  100 mg Oral QHS       Current Intravenous Infusions          Review of Systems   Constitutional:  Positive for weight loss (7 lbs). Negative for chills and fever.        Loss of appetite   HENT:  Negative for congestion.    Respiratory:  Negative for cough, shortness of breath and wheezing.    Cardiovascular:  Positive for chest pain. Negative for palpitations and leg swelling.   Gastrointestinal:  Positive for abdominal pain and vomiting. Negative for blood in stool, constipation, diarrhea, heartburn and nausea.   Genitourinary:  Negative for dysuria, frequency and urgency.   Musculoskeletal:  Positive for back pain. Negative for myalgias.   Neurological:  Negative for focal weakness, weakness and headaches.   Psychiatric/Behavioral:  The patient does not have insomnia.           Objective:       Intake/Output Summary (Last 24 hours) at 5/25/2024 0625  Last data filed at 5/24/2024 2250  Gross per 24 hour   Intake 671.25 ml   Output 1000 ml   Net -328.75 ml         Vital Signs (Most Recent):  Temp: 98.1 °F (36.7 °C) (05/25/24 0402)  Pulse: 101 (05/25/24 0402)  Resp: 18 (05/25/24 0402)  BP: (!) 142/89 (05/25/24 0402)  SpO2: 98 % (05/25/24 " 0402)  Body mass index is 20.94 kg/m².  Weight: 72 kg (158 lb 11.7 oz) Vital Signs (24h Range):  Temp:  [97.2 °F (36.2 °C)-98.3 °F (36.8 °C)] 98.1 °F (36.7 °C)  Pulse:  [] 101  Resp:  [0-18] 18  SpO2:  [97 %-99 %] 98 %  BP: (104-149)/(57-89) 142/89     Physical Exam  Nursing note reviewed.   Constitutional:       General: He is not in acute distress (moderate).     Appearance: Normal appearance.   HENT:      Head: Normocephalic and atraumatic.      Mouth/Throat:      Mouth: Mucous membranes are dry.   Eyes:      General: No scleral icterus.     Pupils: Pupils are equal, round, and reactive to light.   Cardiovascular:      Rate and Rhythm: Regular rhythm. Tachycardia present.      Pulses: Normal pulses.      Heart sounds: No murmur heard.  Pulmonary:      Effort: No respiratory distress.      Breath sounds: No wheezing or rales.   Abdominal:      General: Bowel sounds are normal. There is no distension.      Palpations: Abdomen is soft.      Tenderness: There is no abdominal tenderness.   Musculoskeletal:      Right lower leg: No edema.      Left lower leg: No edema.   Skin:     General: Skin is warm.      Capillary Refill: Capillary refill takes less than 2 seconds.      Coloration: Skin is not jaundiced.      Findings: No lesion or rash.      Comments: Multiple tattoos     Neurological:      General: No focal deficit present.      Mental Status: He is alert and oriented to person, place, and time.   Psychiatric:         Mood and Affect: Mood normal.         Behavior: Behavior normal.         Thought Content: Thought content normal.         Judgment: Judgment normal.           Lines/Drains/Airways       Peripheral Intravenous Line  Duration                  Peripheral IV - Single Lumen 22 G Left;Posterior Hand -- days                    Significant Labs:    Lab Results   Component Value Date    WBC 8.47 05/25/2024    HGB 12.1 (L) 05/25/2024    HCT 34.7 (L) 05/25/2024    MCV 80.1 05/25/2024      2024           BMP  Lab Results   Component Value Date     (L) 2024    K 4.8 2024    CHLORIDE 104 2024    CO2 26 2024    BUN 27.4 (H) 2024    CREATININE 1.22 (H) 2024    CALCIUM 9.0 2024    AGAP 4.0 2024    ESTGFRAFRICA 99 2022    EGFRNONAA >60 2022         ABG  Recent Labs   Lab 24  1058   PH 7.240*   PO2 53.0*   PCO2 31.0*   HCO3 13.3   POCBASEDEF -12.80       Mechanical Ventilation Support:         Significant Imaging:  I have reviewed the pertinent imaging within the past 24 hours.        Assessment/Plan:     Assessment  Diabetic ketoacidosis with hyperglycemia likely with medication non compliance  High anion gap metabolic acidosis  SIRS (2/) but sepsis unlikely given tachycardia and leukocytosis likely reactive from DKA and no other signs of infection  Acute Kidney Injury  Leukocytosis  Hyponatremia  Hyperkalemia  Hypertension  Chest pain  Generalized abdominal pain  Chronic lumbar back pain  Polysubstance use disorder  Gastritis      Plan  Downgraded from ICU  On arrival: CB  Urine Ketones: positive  Bicarb: 11  Gap: 25  BHB: 9.30  pH: 7.240 (on VBG)  HbA1c of 9.6 in 2024  Likely 2/2 medication non compliance + methamphetamine use  Home DM regimen: Lantus 25 units nightly and Novolog 7 units in the morning and 12 units with lunch and supper  Start insulin gtt per protocol with q1h accuchecks while on the drip.    Off insulin gtt currently on 18 u lantus will increase to 24, and 8 U TID aspart  Resume Amlodipine 10 mg once daily. Prn antihypertensives ordered  CT Abd/ pel w/o contrast showing constipation and gastritis, started pepcid, 1 dose maalox, and colace bid    DVT Prophylaxis: Heparin   GI Prophylaxis: n/a     32 minutes of critical care was time spent personally by me on the following activities: development of treatment plan with patient or surrogate and bedside caregivers, discussions with consultants,  evaluation of patient's response to treatment, examination of patient, ordering and performing treatments and interventions, ordering and review of laboratory studies, ordering and review of radiographic studies, pulse oximetry, re-evaluation of patient's condition.  This critical care time did not overlap with that of any other provider or involve time for any procedures.     Sunday Lester MD - PGY3  [facility]

## 2024-05-26 VITALS
OXYGEN SATURATION: 99 % | TEMPERATURE: 98 F | DIASTOLIC BLOOD PRESSURE: 84 MMHG | SYSTOLIC BLOOD PRESSURE: 142 MMHG | RESPIRATION RATE: 20 BRPM | BODY MASS INDEX: 21.04 KG/M2 | WEIGHT: 158.75 LBS | HEART RATE: 92 BPM | HEIGHT: 73 IN

## 2024-05-26 LAB
ALBUMIN SERPL-MCNC: 2.9 G/DL (ref 3.5–5)
ALBUMIN/GLOB SERPL: 1.1 RATIO (ref 1.1–2)
ALP SERPL-CCNC: 96 UNIT/L (ref 40–150)
ALT SERPL-CCNC: 16 UNIT/L (ref 0–55)
ANION GAP SERPL CALC-SCNC: 8 MEQ/L
AST SERPL-CCNC: 17 UNIT/L (ref 5–34)
BASOPHILS # BLD AUTO: 0.03 X10(3)/MCL
BASOPHILS NFR BLD AUTO: 0.6 %
BILIRUB SERPL-MCNC: 0.4 MG/DL
BUN SERPL-MCNC: 19.9 MG/DL (ref 8.9–20.6)
CALCIUM SERPL-MCNC: 9.1 MG/DL (ref 8.4–10.2)
CHLORIDE SERPL-SCNC: 102 MMOL/L (ref 98–107)
CO2 SERPL-SCNC: 27 MMOL/L (ref 22–29)
CREAT SERPL-MCNC: 1.08 MG/DL (ref 0.73–1.18)
CREAT/UREA NIT SERPL: 18
EOSINOPHIL # BLD AUTO: 0.23 X10(3)/MCL (ref 0–0.9)
EOSINOPHIL NFR BLD AUTO: 4.8 %
ERYTHROCYTE [DISTWIDTH] IN BLOOD BY AUTOMATED COUNT: 13.1 % (ref 11.5–17)
GFR SERPLBLD CREATININE-BSD FMLA CKD-EPI: >60 ML/MIN/1.73/M2
GLOBULIN SER-MCNC: 2.7 GM/DL (ref 2.4–3.5)
GLUCOSE SERPL-MCNC: 290 MG/DL (ref 74–100)
HCT VFR BLD AUTO: 35.8 % (ref 42–52)
HGB BLD-MCNC: 12.1 G/DL (ref 14–18)
HOLD SPECIMEN: NORMAL
IMM GRANULOCYTES # BLD AUTO: 0.01 X10(3)/MCL (ref 0–0.04)
IMM GRANULOCYTES NFR BLD AUTO: 0.2 %
LYMPHOCYTES # BLD AUTO: 1.7 X10(3)/MCL (ref 0.6–4.6)
LYMPHOCYTES NFR BLD AUTO: 35.7 %
MCH RBC QN AUTO: 26.9 PG (ref 27–31)
MCHC RBC AUTO-ENTMCNC: 33.8 G/DL (ref 33–36)
MCV RBC AUTO: 79.7 FL (ref 80–94)
MONOCYTES # BLD AUTO: 0.25 X10(3)/MCL (ref 0.1–1.3)
MONOCYTES NFR BLD AUTO: 5.3 %
NEUTROPHILS # BLD AUTO: 2.54 X10(3)/MCL (ref 2.1–9.2)
NEUTROPHILS NFR BLD AUTO: 53.4 %
NRBC BLD AUTO-RTO: 0 %
PLATELET # BLD AUTO: 202 X10(3)/MCL (ref 130–400)
PMV BLD AUTO: 10.4 FL (ref 7.4–10.4)
POCT GLUCOSE: 190 MG/DL (ref 70–110)
POCT GLUCOSE: 368 MG/DL (ref 70–110)
POTASSIUM SERPL-SCNC: 4.4 MMOL/L (ref 3.5–5.1)
PROT SERPL-MCNC: 5.6 GM/DL (ref 6.4–8.3)
RBC # BLD AUTO: 4.49 X10(6)/MCL (ref 4.7–6.1)
SODIUM SERPL-SCNC: 137 MMOL/L (ref 136–145)
WBC # SPEC AUTO: 4.76 X10(3)/MCL (ref 4.5–11.5)

## 2024-05-26 PROCEDURE — 94761 N-INVAS EAR/PLS OXIMETRY MLT: CPT

## 2024-05-26 PROCEDURE — 63600175 PHARM REV CODE 636 W HCPCS: Performed by: FAMILY MEDICINE

## 2024-05-26 PROCEDURE — 25000003 PHARM REV CODE 250: Performed by: FAMILY MEDICINE

## 2024-05-26 PROCEDURE — 25000003 PHARM REV CODE 250

## 2024-05-26 PROCEDURE — 36415 COLL VENOUS BLD VENIPUNCTURE: CPT

## 2024-05-26 PROCEDURE — 63600175 PHARM REV CODE 636 W HCPCS

## 2024-05-26 PROCEDURE — 80053 COMPREHEN METABOLIC PANEL: CPT

## 2024-05-26 PROCEDURE — 85025 COMPLETE CBC W/AUTO DIFF WBC: CPT

## 2024-05-26 RX ORDER — INSULIN ASPART 100 [IU]/ML
INJECTION, SOLUTION INTRAVENOUS; SUBCUTANEOUS
Qty: 15 ML | Refills: 11 | Status: ON HOLD | OUTPATIENT
Start: 2024-05-26 | End: 2024-06-03

## 2024-05-26 RX ORDER — PANTOPRAZOLE SODIUM 40 MG/1
40 TABLET, DELAYED RELEASE ORAL DAILY
Qty: 90 TABLET | Refills: 3 | Status: SHIPPED | OUTPATIENT
Start: 2024-05-26 | End: 2025-05-26

## 2024-05-26 RX ADMIN — INSULIN ASPART 8 UNITS: 100 INJECTION, SOLUTION INTRAVENOUS; SUBCUTANEOUS at 12:05

## 2024-05-26 RX ADMIN — AMLODIPINE BESYLATE 10 MG: 10 TABLET ORAL at 09:05

## 2024-05-26 RX ADMIN — PREGABALIN 50 MG: 25 CAPSULE ORAL at 09:05

## 2024-05-26 RX ADMIN — MUPIROCIN: 20 OINTMENT TOPICAL at 09:05

## 2024-05-26 RX ADMIN — INSULIN ASPART 5 UNITS: 100 INJECTION, SOLUTION INTRAVENOUS; SUBCUTANEOUS at 08:05

## 2024-05-26 RX ADMIN — FAMOTIDINE 20 MG: 20 TABLET, FILM COATED ORAL at 09:05

## 2024-05-26 RX ADMIN — HEPARIN SODIUM 5000 UNITS: 5000 INJECTION, SOLUTION INTRAVENOUS; SUBCUTANEOUS at 09:05

## 2024-05-26 RX ADMIN — INSULIN ASPART 8 UNITS: 100 INJECTION, SOLUTION INTRAVENOUS; SUBCUTANEOUS at 08:05

## 2024-05-26 RX ADMIN — INSULIN GLARGINE 24 UNITS: 100 INJECTION, SOLUTION SUBCUTANEOUS at 09:05

## 2024-05-26 NOTE — DISCHARGE SUMMARY
[facility] Discharge Summary    Admitting Physician: Олег Lakhani Jr., MD, [facility]  Attending Physician: Munira Linn MD  Date of Admit: 5/23/2024  Date of Discharge: 5/26/2024    Condition: Good  Outcome: Condition has improved and patient is now ready for discharge.  DISPOSITION: Home or Self Care    Discharge Diagnoses     Patient Active Problem List   Diagnosis    Diabetic ketoacidosis without coma associated with type 1 diabetes mellitus    Diabetic polyneuropathy    Severe malnutrition    Neuropathy    Moderate malnutrition    Melena    Diarrhea    Encounter for diabetic foot exam    Pain    Hypertension    Chronic midline low back pain without sciatica    Malnutrition       Principal Problem:  Diabetic ketoacidosis without coma associated with type 1 diabetes mellitus    Consultants and Procedures     Consultants:  Consults (From admission, onward)          Status Ordering Provider     Inpatient consult to Hospitalist  Once        Provider:  Jesustia Mahmood MD Acknowledged RAJKARNIKAR, AMIT             Procedures:   * No surgery found *     Brief History of Present Illness      Patient is a 43 year old male with PMH of Type 1 diabetes mellitus, HTN, history of drug abuse presented to the [facility] ED with complaints of nausea, generalized aches/pain and elevated blood sugar. He mentions that his blood sugar is usually around 200 however, he noted it >500 this morning. Moreover, patient states he has not been eating for the past 3 days 2/2 loss of appetite. Denies any dysphagia, GERD or nausea. There is discrepancy on his insulin use as he states he had not been taking the full Novolog dose as prescribed as he has not been eating lately but was not aware of his blood glucose levels. States he was taking his Lantus of 25 units and took it last night as well. Currently, also endorses generalized abdominal pain, lumbar back pain, non radiating non specific chest pain. Back pain worsens with urination.  Endorses mild episodes of non bloody and non bilious emesis yesterday x2 and once this morning. He denies any fever, chills, dysuria, frequency, edema. Denies any new tattoos, skin rash or any ulcers. Endorses 1/2 ppd cigarette smoking, occassional EtOH use with last use about 6 days ago, history of heroin use but denies any recent drug use.      In the ED, patient tachycardic but afebrile.  CBC noted for leukocytosis otherwise unremarkable.  CMP notable for hyponatremia, hyperkalemia, anion gap metabolic acidosis with anion gap of 25.  Elevated renal indices with BUN/creatinine at 59.5/2.8 3.  LFTs within normal limits.  Beta hydroxybutyrate noted at 9.30.  Creatinine kinase negative and troponin within normal limits.  VBG remarkable for 7.240/31/53/30.3. Urinalysis pending. Critical care medicine consulted for admission and management of DKA.     Hospital Course with Pertinent Findings     Patient had improvement in his diabetic ketoacidosis with improved bicarb and anion gap closed.  He was eventually downgraded from ICU.  Patient continued to have mild abdominal pain.  CT scan of the abdomen and pelvis revealed some mild wall thickening in the distal esophagus possibly reflux esophagitis.  He was started on famotidine twice daily.  Thereafter, patient had improvement of his abdominal symptoms.  Patient was also provided resources for drug cessation and rehab facility.  However patient states that he has been sober for a long time and just had a minor relapse and not interested in any rehab program site he has 4 kids to take care of.  Patient instructed to resume his home regimen of Lantus 25 units nightly and NovoLog insulin of 7 units with breakfast and 12 units with lunch and dinner.  Patient expressed understanding.  Patient was also stressed importance for continue glucose monitoring despite oral/food intake and to continue his insulin regimen.  He was advised to hold insulin for blood glucose less than 60  "and to monitor for hypoglycemic events.  He was also informed about following up with gastroenterology services for further workup of reflux esophagitis.  Patient had no other acute complaints and was deemed stable for discharge.    Vitals  BP: (!) 142/84  Temp: 97.8 °F (36.6 °C)  Temp Source: Oral  Pulse: 92  Resp: 20  SpO2: 99 %  Height: 6' 1" (185.4 cm)  Weight: 72 kg (158 lb 11.7 oz)    Physical Exam  Nursing note reviewed.   Constitutional:       Appearance: Normal appearance.   HENT:      Head: Normocephalic and atraumatic.      Mouth/Throat:      Mouth: Mucous membranes are moist.   Eyes:      General: No scleral icterus.     Pupils: Pupils are equal, round, and reactive to light.   Cardiovascular:      Rate and Rhythm: Normal rate and regular rhythm.      Pulses: Normal pulses.      Heart sounds: No murmur heard.  Pulmonary:      Effort: No respiratory distress.      Breath sounds: No wheezing or rales.   Abdominal:      General: Bowel sounds are normal. There is no distension.      Palpations: Abdomen is soft.      Tenderness: There is no abdominal tenderness.   Musculoskeletal:      Right lower leg: No edema.      Left lower leg: No edema.   Skin:     General: Skin is warm.      Capillary Refill: Capillary refill takes less than 2 seconds.      Coloration: Skin is not jaundiced.      Findings: No lesion or rash.      Comments: Multiple tattoos     Neurological:      General: No focal deficit present.      Mental Status: He is alert and oriented to person, place, and time.   Psychiatric:         Mood and Affect: Mood normal.         Behavior: Behavior normal.         Thought Content: Thought content normal.         Judgment: Judgment normal.         TIME SPENT ON DISCHARGE: 60 minutes    Discharge Medications        Medication List        CONTINUE taking these medications      albuterol 90 mcg/actuation inhaler  Commonly known as: PROVENTIL/VENTOLIN HFA     amLODIPine 10 MG tablet  Commonly known as: " "NORVASC  Take 1 tablet (10 mg total) by mouth once daily.     * blood sugar diagnostic Strp  To check BG 4 times daily, to use with insurance preferred meter     * blood sugar diagnostic Strp  To check BG 4 times daily, to use with insurance preferred meter     blood-glucose meter kit  To check BG 4 times daily, to use with insurance preferred meter     buPROPion 300 MG 24 hr tablet  Commonly known as: WELLBUTRIN XL     clonazePAM 2 MG Tab  Commonly known as: KlonoPIN     DEXCOM G7  Misc  Generic drug: blood-glucose meter,continuous  Dexcom G7  uses directed     DEXCOM G7 SENSOR Trinh  Generic drug: blood-glucose sensor  Use every 10 days as directed     DULoxetine 30 MG capsule  Commonly known as: CYMBALTA  Take 1 capsule (30 mg total) by mouth once daily.     ibuprofen 600 MG tablet  Commonly known as: ADVIL,MOTRIN  Take 1 tablet (600 mg total) by mouth every 6 (six) hours as needed for Pain.     insulin aspart U-100 100 unit/mL (3 mL) Inpn pen  Commonly known as: NovoLOG Flexpen U-100 Insulin  7 units in the am, then 12 units lunch and supper; sliding scale with correction 1:50 >150 Max dose 50 units     insulin glargine U-100 (Lantus) 100 unit/mL (3 mL) Inpn pen  Commonly known as: LANTUS SOLOSTAR U-100 INSULIN  Inject 25 Units into the skin once daily.     LIDOcaine 5 % Oint ointment  Commonly known as: XYLOCAINE     lisinopriL 10 MG tablet  Take 1 tablet (10 mg total) by mouth once daily.     mupirocin 2 % ointment  Commonly known as: BACTROBAN  Apply topically 3 (three) times daily.     pantoprazole 40 MG tablet  Commonly known as: PROTONIX  Take 1 tablet (40 mg total) by mouth once daily.     pen needle, diabetic 31 gauge x 3/16" Ndle  Use 4 times a day for insulin injection (BD Mini)     pregabalin 50 MG capsule  Commonly known as: LYRICA     traZODone 100 MG tablet  Commonly known as: DESYREL     TRUEPLUS LANCETS 28 gauge Misc  Generic drug: lancets           * This list has 2 medication(s) " that are the same as other medications prescribed for you. Read the directions carefully, and ask your doctor or other care provider to review them with you.                   Where to Get Your Medications        These medications were sent to [id number] - [place], [place] - [address]  [address] [address]      Phone: [phone]   insulin aspart U-100 100 unit/mL (3 mL) Inpn pen  pantoprazole 40 MG tablet         Discharge Information:   Jose Francisco Romero is being discharged Home or Self Care.    Discharge Procedure Orders   Ambulatory referral/consult to Gastroenterology   Standing Status: Future   Referral Priority: Routine Referral Type: Consultation   Referral Reason: Specialty Services Required   Requested Specialty: Gastroenterology   Number of Visits Requested: 1     Diet diabetic        Follow-Up Appointments:   Follow-up Information       [facility] - Emergency Dept Follow up.    Specialty: Emergency Medicine  Why: As needed, If symptoms worsen  Contact information:  [address] [place] [id number] [phone]             Antoine Garner MD. Schedule an appointment as soon as possible for a visit in 1 week(s).    Specialty: Internal Medicine  Why: For hospital follow up  Contact information:  [address] [facility] [address] [phone]               [facility] - Gastroenterology. Schedule an appointment as soon as possible for a visit.    Specialty: Gastroenterology  Why: 1-2 weeks for stomach pain  Contact information:  [address] [place] [id number]                             To address at follow-up:  -The following labs are to be drawn at the [facility] visit: none  -The following imaging studies are to be ordered at the [facility] visit: none    At this time, Jose Francisco Romero is determined to have maximized benefits of IP hospitalization. he is discharged in stable  condition with OP f/u recommendations and instructions. All questions answered, and patient verbalized agreement with the plan of care. They were given return precautions prior to d/c including symptoms that should prompt return to ED or to call PCP.       Anthony Hassan  Internal Medicine - PGY-1

## 2024-05-29 ENCOUNTER — HOSPITAL ENCOUNTER (INPATIENT)
Facility: HOSPITAL | Age: 44
LOS: 5 days | Discharge: HOME | DRG: 637 | End: 2024-06-03
Attending: STUDENT IN AN ORGANIZED HEALTH CARE EDUCATION/TRAINING PROGRAM | Admitting: INTERNAL MEDICINE
Payer: MEDICAID

## 2024-05-29 DIAGNOSIS — R00.0 TACHYCARDIA: ICD-10-CM

## 2024-05-29 DIAGNOSIS — R07.9 CHEST PAIN: ICD-10-CM

## 2024-05-29 DIAGNOSIS — N17.9 AKI (ACUTE KIDNEY INJURY): ICD-10-CM

## 2024-05-29 DIAGNOSIS — E10.10 DIABETIC KETOACIDOSIS WITHOUT COMA ASSOCIATED WITH TYPE 1 DIABETES MELLITUS: Primary | ICD-10-CM

## 2024-05-29 DIAGNOSIS — E10.65 UNCONTROLLED TYPE 1 DIABETES MELLITUS WITH HYPERGLYCEMIA: ICD-10-CM

## 2024-05-29 DIAGNOSIS — E87.5 HYPERKALEMIA: ICD-10-CM

## 2024-05-29 LAB — POCT GLUCOSE: >500 MG/DL (ref 70–110)

## 2024-05-29 PROCEDURE — 93005 ELECTROCARDIOGRAM TRACING: CPT

## 2024-05-29 PROCEDURE — 99291 CRITICAL CARE FIRST HOUR: CPT

## 2024-05-29 PROCEDURE — 82962 GLUCOSE BLOOD TEST: CPT

## 2024-05-29 PROCEDURE — 83690 ASSAY OF LIPASE: CPT | Performed by: STUDENT IN AN ORGANIZED HEALTH CARE EDUCATION/TRAINING PROGRAM

## 2024-05-29 PROCEDURE — 20000000 HC ICU ROOM

## 2024-05-29 PROCEDURE — 83735 ASSAY OF MAGNESIUM: CPT | Performed by: STUDENT IN AN ORGANIZED HEALTH CARE EDUCATION/TRAINING PROGRAM

## 2024-05-29 PROCEDURE — 85025 COMPLETE CBC W/AUTO DIFF WBC: CPT | Performed by: STUDENT IN AN ORGANIZED HEALTH CARE EDUCATION/TRAINING PROGRAM

## 2024-05-29 PROCEDURE — 96361 HYDRATE IV INFUSION ADD-ON: CPT

## 2024-05-29 PROCEDURE — 83605 ASSAY OF LACTIC ACID: CPT | Performed by: STUDENT IN AN ORGANIZED HEALTH CARE EDUCATION/TRAINING PROGRAM

## 2024-05-29 PROCEDURE — 84484 ASSAY OF TROPONIN QUANT: CPT | Performed by: STUDENT IN AN ORGANIZED HEALTH CARE EDUCATION/TRAINING PROGRAM

## 2024-05-29 PROCEDURE — 82010 KETONE BODYS QUAN: CPT | Performed by: STUDENT IN AN ORGANIZED HEALTH CARE EDUCATION/TRAINING PROGRAM

## 2024-05-29 PROCEDURE — 96365 THER/PROPH/DIAG IV INF INIT: CPT

## 2024-05-29 PROCEDURE — 96375 TX/PRO/DX INJ NEW DRUG ADDON: CPT

## 2024-05-29 PROCEDURE — 96360 HYDRATION IV INFUSION INIT: CPT

## 2024-05-29 PROCEDURE — 80053 COMPREHEN METABOLIC PANEL: CPT | Performed by: STUDENT IN AN ORGANIZED HEALTH CARE EDUCATION/TRAINING PROGRAM

## 2024-05-29 RX ORDER — ONDANSETRON HYDROCHLORIDE 2 MG/ML
4 INJECTION, SOLUTION INTRAVENOUS
Status: COMPLETED | OUTPATIENT
Start: 2024-05-29 | End: 2024-05-30

## 2024-05-29 NOTE — LETTER
Nayeli 3, 2024         [address] [facility] [address]  Phone: [phone]  Fax: [phone]       Patient: Jose Francisco Romero   YOB: 1980  Date of Visit: 06/03/2024    To Whom It May Concern:    Paco Romero  was at [facility] on 05/29/2024 - 06/03/2024. The patient may return to work/school on 06/06/2024 with no restrictions. If you have any questions or concerns, or if I can be of further assistance, please do not hesitate to contact me.    Sincerely,    Bharathi Phan LPN

## 2024-05-30 LAB
ALBUMIN SERPL-MCNC: 4.2 G/DL (ref 3.5–5)
ALBUMIN/GLOB SERPL: 1.2 RATIO (ref 1.1–2)
ALLENS TEST BLOOD GAS (OHS): ABNORMAL
ALP SERPL-CCNC: 127 UNIT/L (ref 40–150)
ALT SERPL-CCNC: 29 UNIT/L (ref 0–55)
AMPHET UR QL SCN: POSITIVE
ANION GAP SERPL CALC-SCNC: 11 MEQ/L
ANION GAP SERPL CALC-SCNC: 26 MEQ/L
ANION GAP SERPL CALC-SCNC: 5 MEQ/L
ANION GAP SERPL CALC-SCNC: 6 MEQ/L
AST SERPL-CCNC: 31 UNIT/L (ref 5–34)
B-OH-BUTYR SERPL-MCNC: 8.3 MMOL/L
BACTERIA #/AREA URNS AUTO: ABNORMAL /HPF
BARBITURATE SCN PRESENT UR: NEGATIVE
BASE EXCESS BLD CALC-SCNC: -5.4 MMOL/L
BASOPHILS # BLD AUTO: 0.07 X10(3)/MCL
BASOPHILS # BLD AUTO: 0.09 X10(3)/MCL
BASOPHILS NFR BLD AUTO: 0.6 %
BASOPHILS NFR BLD AUTO: 0.8 %
BENZODIAZ UR QL SCN: NEGATIVE
BILIRUB SERPL-MCNC: 0.7 MG/DL
BILIRUB UR QL STRIP.AUTO: NEGATIVE
BLOOD GAS SAMPLE TYPE (OHS): ABNORMAL
BUN SERPL-MCNC: 28.6 MG/DL (ref 8.9–20.6)
BUN SERPL-MCNC: 32.4 MG/DL (ref 8.9–20.6)
BUN SERPL-MCNC: 36.8 MG/DL (ref 8.9–20.6)
BUN SERPL-MCNC: 45 MG/DL (ref 8.9–20.6)
CALCIUM SERPL-MCNC: 10.3 MG/DL (ref 8.4–10.2)
CALCIUM SERPL-MCNC: 8.8 MG/DL (ref 8.4–10.2)
CANNABINOIDS UR QL SCN: NEGATIVE
CHLORIDE SERPL-SCNC: 103 MMOL/L (ref 98–107)
CHLORIDE SERPL-SCNC: 104 MMOL/L (ref 98–107)
CHLORIDE SERPL-SCNC: 106 MMOL/L (ref 98–107)
CHLORIDE SERPL-SCNC: 89 MMOL/L (ref 98–107)
CLARITY UR: CLEAR
CO2 BLDA-SCNC: 18.6 MMOL/L
CO2 SERPL-SCNC: 15 MMOL/L (ref 22–29)
CO2 SERPL-SCNC: 21 MMOL/L (ref 22–29)
CO2 SERPL-SCNC: 24 MMOL/L (ref 22–29)
CO2 SERPL-SCNC: 25 MMOL/L (ref 22–29)
COCAINE UR QL SCN: NEGATIVE
COHGB MFR BLDA: 2.8 %
COLOR UR AUTO: COLORLESS
CREAT SERPL-MCNC: 1.17 MG/DL (ref 0.73–1.18)
CREAT SERPL-MCNC: 1.2 MG/DL (ref 0.73–1.18)
CREAT SERPL-MCNC: 1.5 MG/DL (ref 0.73–1.18)
CREAT SERPL-MCNC: 2.08 MG/DL (ref 0.73–1.18)
CREAT/UREA NIT SERPL: 22
CREAT/UREA NIT SERPL: 24
CREAT/UREA NIT SERPL: 25
CREAT/UREA NIT SERPL: 27
DRAWN BY BLOOD GAS (OHS): ABNORMAL
EOSINOPHIL # BLD AUTO: 0.25 X10(3)/MCL (ref 0–0.9)
EOSINOPHIL # BLD AUTO: 0.42 X10(3)/MCL (ref 0–0.9)
EOSINOPHIL NFR BLD AUTO: 2.1 %
EOSINOPHIL NFR BLD AUTO: 3.9 %
ERYTHROCYTE [DISTWIDTH] IN BLOOD BY AUTOMATED COUNT: 13.4 % (ref 11.5–17)
ERYTHROCYTE [DISTWIDTH] IN BLOOD BY AUTOMATED COUNT: 13.7 % (ref 11.5–17)
FENTANYL UR QL SCN: NEGATIVE
GFR SERPLBLD CREATININE-BSD FMLA CKD-EPI: 40 ML/MIN/1.73/M2
GFR SERPLBLD CREATININE-BSD FMLA CKD-EPI: 59 ML/MIN/1.73/M2
GFR SERPLBLD CREATININE-BSD FMLA CKD-EPI: >60 ML/MIN/1.73/M2
GFR SERPLBLD CREATININE-BSD FMLA CKD-EPI: >60 ML/MIN/1.73/M2
GLOBULIN SER-MCNC: 3.6 GM/DL (ref 2.4–3.5)
GLUCOSE SERPL-MCNC: 105 MG/DL (ref 74–100)
GLUCOSE SERPL-MCNC: 166 MG/DL (ref 74–100)
GLUCOSE SERPL-MCNC: 219 MG/DL (ref 74–100)
GLUCOSE SERPL-MCNC: 676 MG/DL (ref 74–100)
GLUCOSE UR QL STRIP: ABNORMAL
HCO3 BLDA-SCNC: 17.7 MMOL/L
HCT VFR BLD AUTO: 35.3 % (ref 42–52)
HCT VFR BLD AUTO: 42.4 % (ref 42–52)
HGB BLD-MCNC: 12 G/DL (ref 14–18)
HGB BLD-MCNC: 14.3 G/DL (ref 14–18)
HGB UR QL STRIP: NEGATIVE
HOLD SPECIMEN: NORMAL
HYALINE CASTS #/AREA URNS LPF: ABNORMAL /LPF
IMM GRANULOCYTES # BLD AUTO: 0.04 X10(3)/MCL (ref 0–0.04)
IMM GRANULOCYTES # BLD AUTO: 0.05 X10(3)/MCL (ref 0–0.04)
IMM GRANULOCYTES NFR BLD AUTO: 0.3 %
IMM GRANULOCYTES NFR BLD AUTO: 0.5 %
KETONES UR QL STRIP: ABNORMAL
LACTATE SERPL-SCNC: 1.8 MMOL/L (ref 0.5–2.2)
LACTATE SERPL-SCNC: 2.3 MMOL/L (ref 0.5–2.2)
LEUKOCYTE ESTERASE UR QL STRIP: NEGATIVE
LIPASE SERPL-CCNC: 9 U/L
LYMPHOCYTES # BLD AUTO: 2.37 X10(3)/MCL (ref 0.6–4.6)
LYMPHOCYTES # BLD AUTO: 2.72 X10(3)/MCL (ref 0.6–4.6)
LYMPHOCYTES NFR BLD AUTO: 22.1 %
LYMPHOCYTES NFR BLD AUTO: 23.1 %
MAGNESIUM SERPL-MCNC: 2.1 MG/DL (ref 1.6–2.6)
MCH RBC QN AUTO: 27.7 PG (ref 27–31)
MCH RBC QN AUTO: 27.9 PG (ref 27–31)
MCHC RBC AUTO-ENTMCNC: 33.7 G/DL (ref 33–36)
MCHC RBC AUTO-ENTMCNC: 34 G/DL (ref 33–36)
MCV RBC AUTO: 82.1 FL (ref 80–94)
MCV RBC AUTO: 82.2 FL (ref 80–94)
MDMA UR QL SCN: NEGATIVE
METHGB MFR BLDA: 1 %
MONOCYTES # BLD AUTO: 0.52 X10(3)/MCL (ref 0.1–1.3)
MONOCYTES # BLD AUTO: 0.83 X10(3)/MCL (ref 0.1–1.3)
MONOCYTES NFR BLD AUTO: 4.8 %
MONOCYTES NFR BLD AUTO: 7 %
NEUTROPHILS # BLD AUTO: 7.28 X10(3)/MCL (ref 2.1–9.2)
NEUTROPHILS # BLD AUTO: 7.88 X10(3)/MCL (ref 2.1–9.2)
NEUTROPHILS NFR BLD AUTO: 66.9 %
NEUTROPHILS NFR BLD AUTO: 67.9 %
NITRITE UR QL STRIP: NEGATIVE
NRBC BLD AUTO-RTO: 0 %
NRBC BLD AUTO-RTO: 0 %
O2 HB BLOOD GAS (OHS): 71.2 %
OHS QRS DURATION: 100 MS
OHS QTC CALCULATION: 464 MS
OPIATES UR QL SCN: NEGATIVE
OXYHGB MFR BLDA: 14.8 G/DL
PCO2 BLDA: 28 MMHG (ref 40–50)
PCP UR QL: NEGATIVE
PH BLDA: 7.41 [PH] (ref 7.3–7.4)
PH UR STRIP: 5.5 [PH]
PH UR: 5.5 [PH] (ref 3–11)
PLATELET # BLD AUTO: 263 X10(3)/MCL (ref 130–400)
PLATELET # BLD AUTO: 323 X10(3)/MCL (ref 130–400)
PMV BLD AUTO: 10.6 FL (ref 7.4–10.4)
PMV BLD AUTO: 10.7 FL (ref 7.4–10.4)
PO2 BLDA: 38 MMHG (ref 30–40)
POCT GLUCOSE: 114 MG/DL (ref 70–110)
POCT GLUCOSE: 125 MG/DL (ref 70–110)
POCT GLUCOSE: 132 MG/DL (ref 70–110)
POCT GLUCOSE: 158 MG/DL (ref 70–110)
POCT GLUCOSE: 158 MG/DL (ref 70–110)
POCT GLUCOSE: 189 MG/DL (ref 70–110)
POCT GLUCOSE: 193 MG/DL (ref 70–110)
POCT GLUCOSE: 236 MG/DL (ref 70–110)
POCT GLUCOSE: 355 MG/DL (ref 70–110)
POCT GLUCOSE: 98 MG/DL (ref 70–110)
POCT GLUCOSE: >500 MG/DL (ref 70–110)
POTASSIUM SERPL-SCNC: 3.9 MMOL/L (ref 3.5–5.1)
POTASSIUM SERPL-SCNC: 4.2 MMOL/L (ref 3.5–5.1)
POTASSIUM SERPL-SCNC: 4.6 MMOL/L (ref 3.5–5.1)
POTASSIUM SERPL-SCNC: 5.6 MMOL/L (ref 3.5–5.1)
PROT SERPL-MCNC: 7.8 GM/DL (ref 6.4–8.3)
PROT UR QL STRIP: NEGATIVE
RBC # BLD AUTO: 4.3 X10(6)/MCL (ref 4.7–6.1)
RBC # BLD AUTO: 5.16 X10(6)/MCL (ref 4.7–6.1)
RBC #/AREA URNS AUTO: ABNORMAL /HPF
SAO2 % BLDA: 74 %
SODIUM SERPL-SCNC: 130 MMOL/L (ref 136–145)
SODIUM SERPL-SCNC: 132 MMOL/L (ref 136–145)
SODIUM SERPL-SCNC: 136 MMOL/L (ref 136–145)
SODIUM SERPL-SCNC: 137 MMOL/L (ref 136–145)
SP GR UR STRIP.AUTO: 1.02 (ref 1–1.03)
SPECIFIC GRAVITY, URINE AUTO (.000) (OHS): 1.02 (ref 1–1.03)
SQUAMOUS #/AREA URNS LPF: ABNORMAL /HPF
TROPONIN I SERPL-MCNC: <0.01 NG/ML (ref 0–0.04)
UROBILINOGEN UR STRIP-ACNC: NORMAL
WBC # SPEC AUTO: 10.73 X10(3)/MCL (ref 4.5–11.5)
WBC # SPEC AUTO: 11.79 X10(3)/MCL (ref 4.5–11.5)
WBC #/AREA URNS AUTO: ABNORMAL /HPF

## 2024-05-30 PROCEDURE — 82962 GLUCOSE BLOOD TEST: CPT

## 2024-05-30 PROCEDURE — 25000003 PHARM REV CODE 250: Performed by: STUDENT IN AN ORGANIZED HEALTH CARE EDUCATION/TRAINING PROGRAM

## 2024-05-30 PROCEDURE — 85025 COMPLETE CBC W/AUTO DIFF WBC: CPT | Performed by: STUDENT IN AN ORGANIZED HEALTH CARE EDUCATION/TRAINING PROGRAM

## 2024-05-30 PROCEDURE — 80307 DRUG TEST PRSMV CHEM ANLYZR: CPT | Performed by: STUDENT IN AN ORGANIZED HEALTH CARE EDUCATION/TRAINING PROGRAM

## 2024-05-30 PROCEDURE — 81001 URINALYSIS AUTO W/SCOPE: CPT | Performed by: STUDENT IN AN ORGANIZED HEALTH CARE EDUCATION/TRAINING PROGRAM

## 2024-05-30 PROCEDURE — 25000003 PHARM REV CODE 250: Performed by: INTERNAL MEDICINE

## 2024-05-30 PROCEDURE — 83605 ASSAY OF LACTIC ACID: CPT | Performed by: STUDENT IN AN ORGANIZED HEALTH CARE EDUCATION/TRAINING PROGRAM

## 2024-05-30 PROCEDURE — 63600175 PHARM REV CODE 636 W HCPCS: Performed by: STUDENT IN AN ORGANIZED HEALTH CARE EDUCATION/TRAINING PROGRAM

## 2024-05-30 PROCEDURE — 99900035 HC TECH TIME PER 15 MIN (STAT)

## 2024-05-30 PROCEDURE — 36415 COLL VENOUS BLD VENIPUNCTURE: CPT | Performed by: STUDENT IN AN ORGANIZED HEALTH CARE EDUCATION/TRAINING PROGRAM

## 2024-05-30 PROCEDURE — 36415 COLL VENOUS BLD VENIPUNCTURE: CPT | Performed by: INTERNAL MEDICINE

## 2024-05-30 PROCEDURE — 21400001 HC TELEMETRY ROOM

## 2024-05-30 PROCEDURE — 82803 BLOOD GASES ANY COMBINATION: CPT

## 2024-05-30 PROCEDURE — 99223 1ST HOSP IP/OBS HIGH 75: CPT | Mod: AI,,, | Performed by: INTERNAL MEDICINE

## 2024-05-30 PROCEDURE — S5010 5% DEXTROSE AND 0.45% SALINE: HCPCS | Performed by: STUDENT IN AN ORGANIZED HEALTH CARE EDUCATION/TRAINING PROGRAM

## 2024-05-30 PROCEDURE — 80048 BASIC METABOLIC PNL TOTAL CA: CPT | Performed by: STUDENT IN AN ORGANIZED HEALTH CARE EDUCATION/TRAINING PROGRAM

## 2024-05-30 RX ORDER — SUCRALFATE 1 G/10ML
1 SUSPENSION ORAL EVERY 6 HOURS
Status: DISCONTINUED | OUTPATIENT
Start: 2024-05-30 | End: 2024-05-30

## 2024-05-30 RX ORDER — DEXTROSE MONOHYDRATE 100 MG/ML
INJECTION, SOLUTION INTRAVENOUS
Status: DISCONTINUED | OUTPATIENT
Start: 2024-05-30 | End: 2024-05-30

## 2024-05-30 RX ORDER — MORPHINE SULFATE 2 MG/ML
4 INJECTION, SOLUTION INTRAMUSCULAR; INTRAVENOUS
Status: COMPLETED | OUTPATIENT
Start: 2024-05-30 | End: 2024-05-30

## 2024-05-30 RX ORDER — HYDRALAZINE HYDROCHLORIDE 20 MG/ML
10 INJECTION INTRAMUSCULAR; INTRAVENOUS EVERY 4 HOURS PRN
Status: DISCONTINUED | OUTPATIENT
Start: 2024-05-30 | End: 2024-06-03 | Stop reason: HOSPADM

## 2024-05-30 RX ORDER — IBUPROFEN 200 MG
16 TABLET ORAL
Status: DISCONTINUED | OUTPATIENT
Start: 2024-05-30 | End: 2024-06-03 | Stop reason: HOSPADM

## 2024-05-30 RX ORDER — DEXTROSE MONOHYDRATE AND SODIUM CHLORIDE 5; .45 G/100ML; G/100ML
INJECTION, SOLUTION INTRAVENOUS CONTINUOUS PRN
Status: DISCONTINUED | OUTPATIENT
Start: 2024-05-30 | End: 2024-06-03 | Stop reason: HOSPADM

## 2024-05-30 RX ORDER — MAGNESIUM SULFATE HEPTAHYDRATE 40 MG/ML
2 INJECTION, SOLUTION INTRAVENOUS ONCE
Status: COMPLETED | OUTPATIENT
Start: 2024-05-30 | End: 2024-05-30

## 2024-05-30 RX ORDER — BUPROPION HYDROCHLORIDE 150 MG/1
300 TABLET ORAL DAILY
Status: DISCONTINUED | OUTPATIENT
Start: 2024-05-30 | End: 2024-06-03 | Stop reason: HOSPADM

## 2024-05-30 RX ORDER — POTASSIUM CHLORIDE 7.45 MG/ML
40 INJECTION INTRAVENOUS
Status: DISCONTINUED | OUTPATIENT
Start: 2024-05-30 | End: 2024-05-30

## 2024-05-30 RX ORDER — SODIUM CHLORIDE 0.9 % (FLUSH) 0.9 %
10 SYRINGE (ML) INJECTION EVERY 12 HOURS PRN
Status: DISCONTINUED | OUTPATIENT
Start: 2024-05-30 | End: 2024-06-03 | Stop reason: HOSPADM

## 2024-05-30 RX ORDER — ALBUTEROL SULFATE 90 UG/1
2 AEROSOL, METERED RESPIRATORY (INHALATION) EVERY 6 HOURS PRN
Status: DISCONTINUED | OUTPATIENT
Start: 2024-05-30 | End: 2024-06-03 | Stop reason: HOSPADM

## 2024-05-30 RX ORDER — INSULIN ASPART 100 [IU]/ML
0-5 INJECTION, SOLUTION INTRAVENOUS; SUBCUTANEOUS
Status: DISCONTINUED | OUTPATIENT
Start: 2024-05-30 | End: 2024-05-31

## 2024-05-30 RX ORDER — SODIUM CHLORIDE, SODIUM LACTATE, POTASSIUM CHLORIDE, CALCIUM CHLORIDE 600; 310; 30; 20 MG/100ML; MG/100ML; MG/100ML; MG/100ML
INJECTION, SOLUTION INTRAVENOUS CONTINUOUS
Status: DISCONTINUED | OUTPATIENT
Start: 2024-05-30 | End: 2024-05-30

## 2024-05-30 RX ORDER — DEXTROSE MONOHYDRATE 100 MG/ML
INJECTION, SOLUTION INTRAVENOUS
Status: DISCONTINUED | OUTPATIENT
Start: 2024-05-30 | End: 2024-06-03 | Stop reason: HOSPADM

## 2024-05-30 RX ORDER — IBUPROFEN 200 MG
24 TABLET ORAL
Status: DISCONTINUED | OUTPATIENT
Start: 2024-05-30 | End: 2024-06-03 | Stop reason: HOSPADM

## 2024-05-30 RX ORDER — LABETALOL HCL 20 MG/4 ML
10 SYRINGE (ML) INTRAVENOUS EVERY 4 HOURS PRN
Status: DISCONTINUED | OUTPATIENT
Start: 2024-05-30 | End: 2024-06-03 | Stop reason: HOSPADM

## 2024-05-30 RX ORDER — GLUCAGON 1 MG
1 KIT INJECTION
Status: DISCONTINUED | OUTPATIENT
Start: 2024-05-30 | End: 2024-06-03 | Stop reason: HOSPADM

## 2024-05-30 RX ORDER — NALOXONE HCL 0.4 MG/ML
0.02 VIAL (ML) INJECTION
Status: DISCONTINUED | OUTPATIENT
Start: 2024-05-30 | End: 2024-06-03 | Stop reason: HOSPADM

## 2024-05-30 RX ORDER — HEPARIN SODIUM 5000 [USP'U]/ML
5000 INJECTION, SOLUTION INTRAVENOUS; SUBCUTANEOUS EVERY 12 HOURS
Status: DISCONTINUED | OUTPATIENT
Start: 2024-05-30 | End: 2024-06-03 | Stop reason: HOSPADM

## 2024-05-30 RX ORDER — SODIUM CHLORIDE 0.9 % (FLUSH) 0.9 %
10 SYRINGE (ML) INJECTION
Status: DISCONTINUED | OUTPATIENT
Start: 2024-05-30 | End: 2024-05-30

## 2024-05-30 RX ORDER — DULOXETIN HYDROCHLORIDE 30 MG/1
30 CAPSULE, DELAYED RELEASE ORAL DAILY
Status: DISCONTINUED | OUTPATIENT
Start: 2024-05-30 | End: 2024-06-03 | Stop reason: HOSPADM

## 2024-05-30 RX ORDER — POTASSIUM CHLORIDE 7.45 MG/ML
80 INJECTION INTRAVENOUS
Status: DISCONTINUED | OUTPATIENT
Start: 2024-05-30 | End: 2024-05-30

## 2024-05-30 RX ORDER — DULOXETIN HYDROCHLORIDE 30 MG/1
30 CAPSULE, DELAYED RELEASE ORAL DAILY
Status: DISCONTINUED | OUTPATIENT
Start: 2024-05-30 | End: 2024-05-30

## 2024-05-30 RX ORDER — DEXTROSE MONOHYDRATE AND SODIUM CHLORIDE 5; .45 G/100ML; G/100ML
INJECTION, SOLUTION INTRAVENOUS CONTINUOUS PRN
Status: DISCONTINUED | OUTPATIENT
Start: 2024-05-30 | End: 2024-05-30

## 2024-05-30 RX ORDER — PREGABALIN 50 MG/1
50 CAPSULE ORAL 2 TIMES DAILY
Status: DISCONTINUED | OUTPATIENT
Start: 2024-05-30 | End: 2024-05-30

## 2024-05-30 RX ORDER — ACETAMINOPHEN 500 MG
1000 TABLET ORAL EVERY 6 HOURS PRN
Status: DISCONTINUED | OUTPATIENT
Start: 2024-05-30 | End: 2024-06-03 | Stop reason: HOSPADM

## 2024-05-30 RX ORDER — SODIUM CHLORIDE 0.9 % (FLUSH) 0.9 %
10 SYRINGE (ML) INJECTION
Status: DISCONTINUED | OUTPATIENT
Start: 2024-05-30 | End: 2024-06-03 | Stop reason: HOSPADM

## 2024-05-30 RX ORDER — INSULIN ASPART 100 [IU]/ML
7 INJECTION, SOLUTION INTRAVENOUS; SUBCUTANEOUS
Status: DISCONTINUED | OUTPATIENT
Start: 2024-05-30 | End: 2024-06-03 | Stop reason: HOSPADM

## 2024-05-30 RX ORDER — MUPIROCIN 20 MG/G
OINTMENT TOPICAL 2 TIMES DAILY
Status: DISCONTINUED | OUTPATIENT
Start: 2024-05-30 | End: 2024-06-03 | Stop reason: HOSPADM

## 2024-05-30 RX ORDER — PREGABALIN 50 MG/1
50 CAPSULE ORAL 2 TIMES DAILY
Status: DISCONTINUED | OUTPATIENT
Start: 2024-05-30 | End: 2024-06-03 | Stop reason: HOSPADM

## 2024-05-30 RX ORDER — SODIUM CHLORIDE 9 MG/ML
1000 INJECTION, SOLUTION INTRAVENOUS CONTINUOUS
Status: DISCONTINUED | OUTPATIENT
Start: 2024-05-30 | End: 2024-05-30

## 2024-05-30 RX ORDER — INSULIN GLARGINE 100 [IU]/ML
0.1 INJECTION, SOLUTION SUBCUTANEOUS DAILY
Status: DISCONTINUED | OUTPATIENT
Start: 2024-05-30 | End: 2024-05-31

## 2024-05-30 RX ORDER — TRAZODONE HYDROCHLORIDE 100 MG/1
100 TABLET ORAL NIGHTLY
Status: DISCONTINUED | OUTPATIENT
Start: 2024-05-30 | End: 2024-06-02

## 2024-05-30 RX ORDER — ALUMINUM HYDROXIDE, MAGNESIUM HYDROXIDE, AND SIMETHICONE 1200; 120; 1200 MG/30ML; MG/30ML; MG/30ML
30 SUSPENSION ORAL
Status: DISCONTINUED | OUTPATIENT
Start: 2024-05-30 | End: 2024-05-30

## 2024-05-30 RX ADMIN — INSULIN ASPART 7 UNITS: 100 INJECTION, SOLUTION INTRAVENOUS; SUBCUTANEOUS at 05:05

## 2024-05-30 RX ADMIN — TRAZODONE HYDROCHLORIDE 100 MG: 100 TABLET ORAL at 10:05

## 2024-05-30 RX ADMIN — INSULIN ASPART 7 UNITS: 100 INJECTION, SOLUTION INTRAVENOUS; SUBCUTANEOUS at 12:05

## 2024-05-30 RX ADMIN — HEPARIN SODIUM 5000 UNITS: 5000 INJECTION, SOLUTION INTRAVENOUS; SUBCUTANEOUS at 10:05

## 2024-05-30 RX ADMIN — MAGNESIUM SULFATE HEPTAHYDRATE 2 G: 40 INJECTION, SOLUTION INTRAVENOUS at 01:05

## 2024-05-30 RX ADMIN — BUPROPION HYDROCHLORIDE 300 MG: 150 TABLET, EXTENDED RELEASE ORAL at 08:05

## 2024-05-30 RX ADMIN — INSULIN GLARGINE 7 UNITS: 100 INJECTION, SOLUTION SUBCUTANEOUS at 07:05

## 2024-05-30 RX ADMIN — MORPHINE SULFATE 4 MG: 2 INJECTION, SOLUTION INTRAMUSCULAR; INTRAVENOUS at 01:05

## 2024-05-30 RX ADMIN — PREGABALIN 50 MG: 50 CAPSULE ORAL at 10:05

## 2024-05-30 RX ADMIN — DULOXETINE HYDROCHLORIDE 30 MG: 30 CAPSULE, DELAYED RELEASE ORAL at 05:05

## 2024-05-30 RX ADMIN — HEPARIN SODIUM 5000 UNITS: 5000 INJECTION, SOLUTION INTRAVENOUS; SUBCUTANEOUS at 08:05

## 2024-05-30 RX ADMIN — INSULIN HUMAN 0.1 UNITS/KG/HR: 1 INJECTION, SOLUTION INTRAVENOUS at 01:05

## 2024-05-30 RX ADMIN — TRAZODONE HYDROCHLORIDE 100 MG: 100 TABLET ORAL at 01:05

## 2024-05-30 RX ADMIN — SODIUM CHLORIDE 1000 ML: 9 INJECTION, SOLUTION INTRAVENOUS at 01:05

## 2024-05-30 RX ADMIN — SODIUM CHLORIDE 1000 ML: 9 INJECTION, SOLUTION INTRAVENOUS at 12:05

## 2024-05-30 RX ADMIN — DEXTROSE AND SODIUM CHLORIDE: 5; 450 INJECTION, SOLUTION INTRAVENOUS at 04:05

## 2024-05-30 RX ADMIN — PREGABALIN 50 MG: 50 CAPSULE ORAL at 05:05

## 2024-05-30 RX ADMIN — MUPIROCIN: 20 OINTMENT TOPICAL at 10:05

## 2024-05-30 RX ADMIN — SODIUM CHLORIDE, POTASSIUM CHLORIDE, SODIUM LACTATE AND CALCIUM CHLORIDE: 600; 310; 30; 20 INJECTION, SOLUTION INTRAVENOUS at 01:05

## 2024-05-30 RX ADMIN — ONDANSETRON 4 MG: 2 INJECTION INTRAMUSCULAR; INTRAVENOUS at 12:05

## 2024-05-30 NOTE — PLAN OF CARE
05/30/24 1125   Discharge Assessment   Assessment Type Discharge Planning Assessment   Confirmed/corrected address, phone number and insurance Yes   Confirmed Demographics Correct on Facesheet   Source of Information patient;health record   When was your last doctors appointment?   (Antoine Garner)   Reason For Admission Hyperkalemia, Tachycardia, RODOLFO, Chest pain, Diabetic ketoacidosis   People in Home child(jacquelyn), dependent;parent(s)   Facility Arrived From: Home   Do you expect to return to your current living situation? Yes   Do you have help at home or someone to help you manage your care at home? Yes   Who are your caregiver(s) and their phone number(s)? HeatherJake (Father)  [phone]; Mother, Treasure Romero ([phone])   Prior to hospitilization cognitive status: Alert/Oriented   Current cognitive status: Alert/Oriented   Walking or Climbing Stairs Difficulty yes   Walking or Climbing Stairs ambulation difficulty, requires equipment   Mobility Management Cane   Dressing/Bathing Difficulty no   Equipment Currently Used at Home cane, straight   Readmission within 30 days? Yes   Patient currently being followed by outpatient case management? No   Do you currently have service(s) that help you manage your care at home? No   Do you take prescription medications? Yes  ([facility])   Do you have prescription coverage? Yes   Coverage [facility] M/D   Do you have any problems affording any of your prescribed medications? No   Is the patient taking medications as prescribed? yes   Who is going to help you get home at discharge? Parents   How do you get to doctors appointments? car, drives self;family or friend will provide   Are you on dialysis? No   Discharge Plan A   (Substance Abuse Tx facility)   Discharge Plan B Home with family   DME Needed Upon Discharge  none   Discharge Plan discussed with: Patient   Transition of Care Barriers Substance Abuse   OTHER   Name(s) of People in Home  Jake Romero (Father)  [phone]; Mother, Treasure Romero ([phone]); 4 minor children (17, 12, 10 & 5 yrs)     Pt single with 4 minor children; Pt & children reside next door to pt's parents, Jake & Treasure; Pt employed with [employer]; Independent with ADL's; Provided resource list for Substance Abuse Tx facilities; Pt stated he was treated in [place] 7 yrs ago; Pt agreeable to have Arielle ([phone]) with [facility] program discuss possible placement options with pt; Contacted Arielle who will meet with pt this morning.

## 2024-05-30 NOTE — ED PROVIDER NOTES
Encounter Date: 2024       History     Chief Complaint   Patient presents with    Abdominal Pain     Pt. C/o R sided abdominal pain that he states goes down the entire R side of his body. CBG >500 Hx DM1     Patient presents to the emergency department complaining of abdominal pain and nausea.  History of type 1 diabetes.  He states he has been working today and started to feel bad.  He states he  started to have cramping in his right-sided extremities, pain throughout his entire abdomen.  Nauseous but no vomiting.  Blood sugar is above 500.  He reports he has been compliant with his insulin.  No fevers.    The history is provided by the patient.     Review of patient's allergies indicates:   Allergen Reactions    Penicillins Shortness Of Breath    Sulfa (sulfonamide antibiotics)      Past Medical History:   Diagnosis Date    Diabetes mellitus type I     Hypertension      Past Surgical History:   Procedure Laterality Date    CLAVICLE SURGERY      ESOPHAGOGASTRODUODENOSCOPY Left 2023    Procedure: EGD (ESOPHAGOGASTRODUODENOSCOPY);  Surgeon: Daiana Chilel MD;  Location: [facility] ENDOSCOPY;  Service: Gastroenterology;  Laterality: Left;     Family History   Problem Relation Name Age of Onset    Diabetes Mother      Diabetes Father      Heart disease Father       Social History     Tobacco Use    Smoking status: Former     Current packs/day: 0.00     Types: Cigarettes     Quit date: 5/15/2023     Years since quittin.0     Passive exposure: Past    Smokeless tobacco: Never   Substance Use Topics    Alcohol use: Never    Drug use: Never     Review of Systems   Constitutional:  Negative for chills and fever.   HENT:  Negative for congestion and sore throat.    Respiratory:  Negative for cough and shortness of breath.    Cardiovascular:  Positive for chest pain. Negative for palpitations.   Gastrointestinal:  Positive for abdominal pain, nausea and vomiting.   Genitourinary:  Negative for dysuria and  hematuria.   Musculoskeletal:  Negative for arthralgias and myalgias.   Neurological:  Negative for dizziness and weakness.       Physical Exam     Initial Vitals [05/29/24 2328]   BP Pulse Resp Temp SpO2   127/84 (!) 131 17 98.4 °F (36.9 °C) 98 %      MAP       --         Physical Exam    Nursing note and vitals reviewed.  Constitutional: He appears well-developed and well-nourished.   HENT:   Head: Normocephalic and atraumatic.   Eyes: Conjunctivae are normal. Pupils are equal, round, and reactive to light.   Neck: Neck supple.   Normal range of motion.  Cardiovascular:  Normal rate, regular rhythm and normal heart sounds.           Pulmonary/Chest: Breath sounds normal. No respiratory distress.   Abdominal: Abdomen is soft. He exhibits no distension. There is no abdominal tenderness. There is no rebound and no guarding.   Musculoskeletal:         General: No edema. Normal range of motion.      Cervical back: Normal range of motion and neck supple.     Neurological: He is alert and oriented to person, place, and time.   Skin: Skin is warm and dry.         ED Course   Critical Care    Date/Time: 5/30/2024 2:03 AM    Performed by: Cesar Brock MD  Authorized by: Cesar Brock MD  Direct patient critical care time: 19 minutes  Additional history critical care time: 5 minutes  Ordering / reviewing critical care time: 6 minutes  Documentation critical care time: 5 minutes  Consulting other physicians critical care time: 5 minutes  Consult with family critical care time: 0 minutes  Other critical care time: 0 minutes  Total critical care time (exclusive of procedural time) : 40 minutes  Critical care time was exclusive of separately billable procedures and treating other patients and teaching time.  Critical care was necessary to treat or prevent imminent or life-threatening deterioration of the following conditions: endocrine crisis and renal failure.  Critical care was time spent personally by me on the following  activities: blood draw for specimens, development of treatment plan with patient or surrogate, discussions with consultants, interpretation of cardiac output measurements, evaluation of patient's response to treatment, examination of patient, obtaining history from patient or surrogate, ordering and performing treatments and interventions, ordering and review of laboratory studies, ordering and review of radiographic studies, re-evaluation of patient's condition, pulse oximetry and review of old charts.        Labs Reviewed   COMPREHENSIVE METABOLIC PANEL - Abnormal; Notable for the following components:       Result Value    Sodium 130 (*)     Potassium 5.6 (*)     Chloride 89 (*)     CO2 15 (*)     Glucose 676 (*)     Blood Urea Nitrogen 45.0 (*)     Creatinine 2.08 (*)     Calcium 10.3 (*)     Globulin 3.6 (*)     All other components within normal limits   LACTIC ACID, PLASMA - Abnormal; Notable for the following components:    Lactic Acid Level 2.3 (*)     All other components within normal limits   BLOOD GAS - Abnormal; Notable for the following components:    pH, Blood gas 7.410 (*)     pCO2, Blood gas 28.0 (*)     All other components within normal limits   CBC WITH DIFFERENTIAL - Abnormal; Notable for the following components:    MPV 10.7 (*)     IG# 0.05 (*)     All other components within normal limits   POCT GLUCOSE - Abnormal; Notable for the following components:    POCT Glucose >500 (*)     All other components within normal limits   POCT GLUCOSE - Abnormal; Notable for the following components:    POCT Glucose >500 (*)     All other components within normal limits   MAGNESIUM - Normal   LIPASE - Normal   TROPONIN I - Normal   CBC W/ AUTO DIFFERENTIAL    Narrative:     The following orders were created for panel order CBC auto differential.  Procedure                               Abnormality         Status                     ---------                               -----------         ------                      CBC with Differential[[id number]]       Abnormal            Final result                 Please view results for these tests on the individual orders.   EXTRA TUBES    Narrative:     The following orders were created for panel order EXTRA TUBES.  Procedure                               Abnormality         Status                     ---------                               -----------         ------                     Light Blue Top Hold[[id number]]                             Final result               Lavender Top Hold[[id number]]                               Final result               Gold Top Hold[[id number]]                                   Final result                 Please view results for these tests on the individual orders.   LIGHT BLUE TOP HOLD   LAVENDER TOP HOLD   GOLD TOP HOLD   URINALYSIS, REFLEX TO URINE CULTURE   BETA - HYDROXYBUTYRATE, SERUM   LACTIC ACID, PLASMA   BASIC METABOLIC PANEL   POCT GLUCOSE MONITORING CONTINUOUS     EKG Readings: (Independently Interpreted)   Initial Reading: No STEMI. Rhythm: Sinus Tachycardia. Heart Rate: 133. Ectopy: No Ectopy. Conduction: Normal. Axis: Right Axis Deviation.       Imaging Results              X-Ray Chest AP Portable (Preliminary result)  Result time 05/30/24 01:01:04      Wet Read by Cesar Brock MD (05/30/24 01:01:04, [facility] - Emergency Dept, Emergency Medicine)    No acute pulmonary process                                     Medications   dextrose 5 % and 0.45 % NaCl infusion (has no administration in time range)   insulin regular in 0.9 % NaCl 100 unit/100 mL (1 unit/mL) infusion (0.1 Units/kg/hr × 70 kg Intravenous New Bag 5/30/24 0100)   sodium chloride 0.9% flush 10 mL (has no administration in time range)   dextrose 10 % infusion (has no administration in time range)   dextrose 10 % infusion (has no administration in time range)   lactated ringers infusion ( Intravenous New Bag 5/30/24 0148)   potassium chloride 10  mEq in 100 mL IVPB (has no administration in time range)     And   potassium chloride 10 mEq in 100 mL IVPB (has no administration in time range)     And   potassium chloride 10 mEq in 100 mL IVPB (has no administration in time range)   magnesium sulfate 2g in water 50mL IVPB (premix) (2 g Intravenous New Bag 5/30/24 0148)   potassium bicarbonate disintegrating tablet 50 mEq (50 mEq Oral Not Given 5/30/24 0230)   dextrose 10% bolus 125 mL 125 mL (has no administration in time range)   dextrose 10% bolus 250 mL 250 mL (has no administration in time range)   albuterol inhaler 2 puff (has no administration in time range)   buPROPion TB24 tablet 300 mg (has no administration in time range)   DULoxetine DR capsule 30 mg (has no administration in time range)   sucralfate 100 mg/mL suspension 1 g (has no administration in time range)   aluminum-magnesium hydroxide-simethicone 200-200-20 mg/5 mL suspension 30 mL (has no administration in time range)   pregabalin capsule 50 mg (has no administration in time range)   traZODone tablet 100 mg (100 mg Oral Given 5/30/24 0148)   sodium chloride 0.9% flush 10 mL (has no administration in time range)   naloxone 0.4 mg/mL injection 0.02 mg (has no administration in time range)   glucose chewable tablet 16 g (has no administration in time range)   glucose chewable tablet 24 g (has no administration in time range)   glucagon (human recombinant) injection 1 mg (has no administration in time range)   sodium chloride 0.9% bolus 1,000 mL 1,000 mL (0 mLs Intravenous Stopped 5/30/24 0105)   sodium chloride 0.9% bolus 1,000 mL 1,000 mL (0 mLs Intravenous Stopped 5/30/24 0105)   ondansetron injection 4 mg (4 mg Intravenous Given 5/30/24 0004)   insulin regular bolus from bag/infusion 7 Units 7 mL (7 Units Intravenous Bolus from Bag 5/30/24 0100)   morphine injection 4 mg (4 mg Intravenous Given 5/30/24 0116)     Medical Decision Making  Otherwise vital signs are stable.  Started on IV fluids.   EKG without concerning acute ischemic changes.  CBC reassuring.  CMP with evidence of a high anion gap metabolic acidosis with significant hyperglycemia consistent with DKA.  Hyperkalemia to 5.6 as well.  Started on DKA insulin protocol.  Chest x-ray is clear on my review.  Discussed the patient was Internal Medicine who will admit further evaluation.    Amount and/or Complexity of Data Reviewed  Labs: ordered. Decision-making details documented in ED Course.  Radiology: ordered and independent interpretation performed. Decision-making details documented in ED Course.  ECG/medicine tests: ordered and independent interpretation performed. Decision-making details documented in ED Course.    Risk  Prescription drug management.  Decision regarding hospitalization.      Additional MDM:   Differential Diagnosis:   Appendicitis, Diverticulitis, Pancreatitis, Pyelonephritis, AAA, Dissection, MI, Gastric Ulcer, Peptic Ulcer, Urinary retention, among others                                       Clinical Impression:  Final diagnoses:  [R00.0] Tachycardia  [E10.10] Diabetic ketoacidosis without coma associated with type 1 diabetes mellitus (Primary)  [N17.9] RODOLFO (acute kidney injury)  [E87.5] Hyperkalemia          ED Disposition Condition    Admit Stable                Cesar Brock MD  05/30/24 2887

## 2024-05-30 NOTE — H&P
[facility] - ICU    History & Physical      Patient Name: Jose Francisco Romero  MRN: [id number]  Admission Date: 5/29/2024  Attending Physician: Олег Lakhani Jr., MD,*   Primary Care Provider: Antoine Garner MD         Patient information was obtained from patient and ER records.     Subjective:     Principal Problem:Diabetic ketoacidosis without coma associated with type 1 diabetes mellitus    Chief Complaint:   Chief Complaint   Patient presents with    Abdominal Pain     Pt. C/o R sided abdominal pain that he states goes down the entire R side of his body. CBG >500 Hx DM1        HPI: Patient is a 43 year old male with PMH of Type 1 diabetes mellitus, HTN, history of drug abuse presented to the [facility] ED with complaints of nausea, generalized aches/pain and elevated blood sugar. He mentions that he did not eat today, but admitted to smoking Meth. Denies any dysphagia, GERD or nausea.   Reports he had a ground level fall onto his back two days ago. Denies hitting his head or loss of consciousness. Does not believe right sided pain resulted from fall.    Patient recently discharged from [facility] on 5.26.24 for DKA.     In the ED, patient tachycardic but afebrile.  CBC unremarkable.  CMP notable for hyponatremia, hyperkalemia, anion gap metabolic acidosis with anion gap of 26.  Elevated renal indices with BUN/creatinine at 45/2.  LFTs within normal limits.  Beta hydroxybutyrate noted at 8.30.  Lactic Acid and troponin within normal limits.  VBG remarkable for 7.40/28/38/17. Urinalysis pending. Medicine consulted for admission and management of DKA.     Past Medical History:   Diagnosis Date    Diabetes mellitus type I     Hypertension        Past Surgical History:   Procedure Laterality Date    CLAVICLE SURGERY      ESOPHAGOGASTRODUODENOSCOPY Left 9/1/2023    Procedure: EGD (ESOPHAGOGASTRODUODENOSCOPY);  Surgeon: Daiana Chilel MD;  Location: [facility] ENDOSCOPY;  Service: Gastroenterology;  Laterality: Left;        Review of patient's allergies indicates:   Allergen Reactions    Penicillins Shortness Of Breath    Sulfa (sulfonamide antibiotics)        No current facility-administered medications on file prior to encounter.     Current Outpatient Medications on File Prior to Encounter   Medication Sig    albuterol (PROVENTIL/VENTOLIN HFA) 90 mcg/actuation inhaler Inhale into the lungs.    amLODIPine (NORVASC) 10 MG tablet Take 1 tablet (10 mg total) by mouth once daily.    blood sugar diagnostic Strp To check BG 4 times daily, to use with insurance preferred meter    blood sugar diagnostic Strp To check BG 4 times daily, to use with insurance preferred meter    blood-glucose meter kit To check BG 4 times daily, to use with insurance preferred meter    blood-glucose meter,continuous (DEXCOM G7 ) Misc Dexcom G7  uses directed    blood-glucose sensor (DEXCOM G7 SENSOR) Trinh Use every 10 days as directed    buPROPion (WELLBUTRIN XL) 300 MG 24 hr tablet Take 300 mg by mouth once daily.    clonazePAM (KLONOPIN) 2 MG Tab Take 2 mg by mouth every evening.    DULoxetine (CYMBALTA) 30 MG capsule Take 1 capsule (30 mg total) by mouth once daily.    ibuprofen (ADVIL,MOTRIN) 600 MG tablet Take 1 tablet (600 mg total) by mouth every 6 (six) hours as needed for Pain.    insulin (LANTUS SOLOSTAR U-100 INSULIN) glargine 100 units/mL SubQ pen Inject 25 Units into the skin once daily.    insulin aspart U-100 (NOVOLOG FLEXPEN U-100 INSULIN) 100 unit/mL (3 mL) InPn pen 7 units in the am, then 12 units lunch and supper; sliding scale with correction 1:50 >150 Max dose 50 units    LIDOcaine (XYLOCAINE) 5 % Oint ointment Apply 2 g topically 2 (two) times daily as needed.    lisinopriL 10 MG tablet Take 1 tablet (10 mg total) by mouth once daily.    mupirocin (BACTROBAN) 2 % ointment Apply topically 3 (three) times daily.    pantoprazole (PROTONIX) 40 MG tablet Take 1 tablet (40 mg total) by mouth once daily.    pen needle,  "diabetic 31 gauge x 3/16" Ndle Use 4 times a day for insulin injection (BD Mini)    pregabalin (LYRICA) 50 MG capsule Take 50 mg by mouth 2 (two) times daily.    traZODone (DESYREL) 100 MG tablet Take 100 mg by mouth every evening.    TRUEPLUS LANCETS 28 gauge Misc Apply topically 4 (four) times daily.     Family History       Problem Relation (Age of Onset)    Diabetes Mother, Father    Heart disease Father          Tobacco Use    Smoking status: Former     Current packs/day: 0.00     Types: Cigarettes     Quit date: 5/15/2023     Years since quittin.0     Passive exposure: Past    Smokeless tobacco: Never   Substance and Sexual Activity    Alcohol use: Never    Drug use: Never    Sexual activity: Yes     Partners: Female     Birth control/protection: Condom     Review of Systems   Constitutional:  Positive for chills and fatigue.   Respiratory:  Negative for shortness of breath.    Gastrointestinal:  Negative for nausea and vomiting.     Objective:     Vital Signs (Most Recent):  Temp: 98.4 °F (36.9 °C) (24 2328)  Pulse: (!) 126 (24 0158)  Resp: (!) 22 (24 0158)  BP: (!) 139/91 (24 0158)  SpO2: 99 % (24 0158) Vital Signs (24h Range):  Temp:  [98.4 °F (36.9 °C)] 98.4 °F (36.9 °C)  Pulse:  [121-131] 126  Resp:  [15-24] 22  SpO2:  [98 %-100 %] 99 %  BP: (115-155)/(67-92) 139/91     Weight: 70 kg (154 lb 5.2 oz)  Body mass index is 20.36 kg/m².    Physical Exam  Constitutional:       General: He is not in acute distress.     Appearance: He is ill-appearing. He is not toxic-appearing or diaphoretic.   HENT:      Head: Atraumatic.      Mouth/Throat:      Mouth: Mucous membranes are dry.   Cardiovascular:      Rate and Rhythm: Tachycardia present.   Pulmonary:      Effort: Pulmonary effort is normal.      Breath sounds: Normal breath sounds. No wheezing or rhonchi.   Abdominal:      General: Bowel sounds are normal.      Palpations: Abdomen is soft.   Musculoskeletal:         General: " Normal range of motion.   Skin:     General: Skin is warm.      Comments: No observable abrasions, lacerations or lesions on exposed skin.   Neurological:      Mental Status: He is alert.      Comments: Speech clear and understandable.            Significant Labs: All pertinent labs within the past 24 hours have been reviewed.  Recent Lab Results  (Last 5 results in the past 24 hours)        05/30/24  0146   05/30/24  0144   05/30/24  0058   05/29/24  2357   05/29/24  2354        Allens Test         N/A       Appearance, UA Clear               Bacteria, UA None Seen               Bilirubin, UA Negative               Color, UA Colorless               Drawn by         RN       Glucose, UA 4+               Extra Tube       Hold for add-ons.  Comment: Auto resulted.                   Hold for add-ons.  Comment: Auto resulted.                   Hold for add-ons.  Comment: Auto resulted.            Hyaline Casts, UA None Seen               Ketones, UA 3+               Lactic Acid Level   1.8             Leukocyte Esterase, UA Negative               NITRITE UA Negative               O2 Hb, Blood Gas         71.2       Blood, UA Negative               pH, UA 5.5               Base Excess, Blood gas         -5.40       CO Hgb         2.8       POC HCO3         17.7       Met Hgb         1.0       POC PCO2         28.0       POC PH         7.410       POC PO2         38.0       POCT Glucose     >500           Protein, UA Negative               RBC, UA 0-5               Sample Type         Venous Blood       sO2, Blood gas         74.0       Specific Gravity,UA 1.022               Squamous Epithelial Cells, UA None Seen               TOC2, Blood gas         18.6       THb, Blood gas         14.8       Urobilinogen, UA Normal               WBC, UA 0-5                                      Significant Imaging: I have reviewed all pertinent imaging results/findings within the past 24 hours.  Imaging Results              X-Ray  Chest AP Portable (Preliminary result)  Result time 24 01:01:04      Wet Read by Cesar Brock MD (24 01:01:04, [facility] - Emergency Dept, Emergency Medicine)    No acute pulmonary process                                      Assessment/Plan:     Active Diagnoses:    Diagnosis Date Noted POA    PRINCIPAL PROBLEM:  Diabetic ketoacidosis without coma associated with type 1 diabetes mellitus [E10.10] 2023 Yes      Problems Resolved During this Admission:     Diabetic ketoacidosis with hyperglycemia likely with medication non compliance  High anion gap metabolic acidosis  SIRS (2/4)   Acute Kidney Injury  Hyponatremia  Hyperkalemia  Hypertension  Chest pain  Generalized abdominal pain  Chronic lumbar back pain        Plan  Admitted to ICU for DKA  On arrival: CB Urine Ketones: pending   Bicarb: 15  Gap: 26  BHB: 9.30  pH: 7.4 (on VBG)  Likely 2/2 medication non compliance   Home DM regimen: Lantus 25 units nightly and Novolog 7 units in the morning and 12 units with lunch and supper  Start insulin gtt per protocol with q1h accuchecks while on the drip.    Transition to SQ insulin and continue the drip for additional 2 hours if able to tolerate PO w/o N/V  Bicarb > 18  Anion gap < 12  Glucose < 250 on 2 consecutive readings.    Start  cc/hr. Once blood sugar reaches 200, transition to D5 1/2 NS @ 150 cc/h  Monitor electrolytes with BMP q4h, replace per DKA protocol. Keep K > 4, Mg > 2, Phos > 3  Bariatric clear liquid diet while on insulin gtt then change to Diabetic diet when initiating subq insulin with accuchecks 4x daily before meals and at bedtime (AC and HS)  Corrected sodium for hyperglycemia of 139  Resume Amlodipine 10 mg once daily when appropriate. PRN antihypertensives ordered.  Pain medication as needed.     DVT Prophylaxis: Heparin   GI Prophylaxis: none     32 minutes of critical care was time spent personally by me on the following activities: development  of treatment plan with patient or surrogate and bedside caregivers, discussions with consultants, evaluation of patient's response to treatment, examination of patient, ordering and performing treatments and interventions, ordering and review of laboratory studies, ordering and review of radiographic studies, pulse oximetry, re-evaluation of patient's condition.  This critical care time did not overlap with that of any other provider or involve time for any procedures.    Jf Quiroz MD  Department of Hospital Medicine   [facility] - ICU

## 2024-05-30 NOTE — CONSULTS
Inpatient Nutrition Assessment    Admit Date: 5/29/2024   Total duration of encounter: 1 day   Patient Age: 43 y.o.    Nutrition Recommendation/Prescription     Will change diet to 2000 diabetic diet  Will order vanilla boost glucose control tid;Boost Glucose Control (provides 190 kcal, 16 g protein per serving)   MVI/fe  Biweekly wt  Pt education on diet/complete  Will monitor nutrition status     Communication of Recommendations: reviewed with nurse and reviewed with patient    Nutrition Assessment     Malnutrition Assessment/Nutrition-Focused Physical Exam    Malnutrition Context: chronic illness (05/30/24 1221)  Malnutrition Level: moderate (05/30/24 1221)  Energy Intake (Malnutrition): less than 75% for greater than or equal to 1 month (05/30/24 1221)  Weight Loss (Malnutrition): greater than 5% in 1 month (05/30/24 1221)              Muscle Mass (Malnutrition): mild depletion (05/30/24 1221)     Clavicle Bone Region (Muscle Loss): mild depletion  Clavicle and Acromion Bone Region (Muscle Loss): mild depletion  Scapular Bone Region (Muscle Loss): mild depletion                       A minimum of two characteristics is recommended for diagnosis of either severe or non-severe malnutrition.    Chart Review    Reason Seen: continuous nutrition monitoring, malnutrition screening tool (MST), and physician consult for diabetes     Malnutrition Screening Tool Results   Have you recently lost weight without trying?: Yes: 2-13 lbs  Have you been eating poorly because of a decreased appetite?: Yes   MST Score: 2   Diagnosis:  DKA/DM, medication non compliance, metabolic acidosis, SIRS< RODOLFO, hyponatremia, hyperkalemia, HTN, CP, abdominal pain     Relevant Medical History: DM, HTN, drug abuse     Scheduled Medications:  buPROPion, 300 mg, Daily  DULoxetine, 30 mg, Daily  heparin (porcine), 5,000 Units, Q12H  insulin aspart U-100, 7 Units, TIDWM  insulin glargine U-100, 0.1 Units/kg, Daily  potassium bicarbonate, 50 mEq,  Once  pregabalin, 50 mg, BID  traZODone, 100 mg, QHS    Continuous Infusions:  dextrose 5 % and 0.45 % NaCl, Last Rate: 125 mL/hr at 05/30/24 0718  insulin regular 1 units/mL infusion orderable (DKA), Last Rate: 0.01 Units/kg/hr (05/30/24 0728)  lactated ringers, Last Rate: 125 mL/hr at 05/30/24 0718    PRN Medications:  acetaminophen, 1,000 mg, Q6H PRN  albuterol, 2 puff, Q6H PRN  dextrose 10 % in water (D10W), , PRN  dextrose 10 % in water (D10W), , PRN  dextrose 10%, 12.5 g, PRN  dextrose 10%, 25 g, PRN  dextrose 5 % and 0.45 % NaCl, , Continuous PRN  glucagon (human recombinant), 1 mg, PRN  glucose, 16 g, PRN  glucose, 24 g, PRN  hydrALAZINE, 10 mg, Q4H PRN  insulin aspart U-100, 0-5 Units, QID (AC + HS) PRN  labetalol, 10 mg, Q4H PRN  naloxone, 0.02 mg, PRN  potassium chloride, 40 mEq, PRN   And  potassium chloride, 40 mEq, PRN   And  potassium chloride, 80 mEq, PRN  sodium chloride 0.9%, 10 mL, PRN  sodium chloride 0.9%, 10 mL, Q12H PRN    Calorie Containing IV Medications: no significant kcals from medications at this time    Recent Labs   Lab 05/23/24  1315 05/23/24  1550 05/23/24  2035 05/23/24  2354 05/24/24  0313 05/24/24  0750 05/25/24  0519 05/26/24  0346 05/26/24  0456 05/29/24  2346 05/30/24  0426 05/30/24  0433 05/30/24  0805   *   < > 137 137 136 135* 134* 137  --  130* 136  --  137   K 6.4*   < > 3.9 4.0 3.9 4.2 4.8 4.4  --  5.6* 3.9  --  4.2   CALCIUM 9.2   < > 9.3 8.6 8.7 9.0 9.0 9.1  --  10.3* 8.8  --  8.8   PHOS 5.4*  --   --   --  3.6  --   --   --   --   --   --   --   --    MG 2.10  --   --   --  1.90  --   --   --   --  2.10  --   --   --    CHLORIDE 95*   < > 107 110* 108* 107 104 102  --  89* 104  --  106   CO2 7*   < > 20* 22 21* 20* 26 27  --  15* 21*  --  25   BUN 59.6*   < > 54.9* 50.6* 48.2* 44.5* 27.4* 19.9  --  45.0* 36.8*  --  32.4*   CREATININE 2.93*   < > 2.18* 1.73* 1.56* 1.48* 1.22* 1.08  --  2.08* 1.50*  --  1.20*   EGFRNORACEVR 27   < > 38 50 57 60 >60 >60  --  40 59   "--  >60   GLUCOSE 795*   < > 261* 123* 117* 117* 298* 290*  --  676* 219*  --  105*   BILITOT 0.8   < > 0.6 0.5 0.5 0.6 0.4 0.4  --  0.7  --   --   --    ALKPHOS 127   < > 108 93 89 93 86 96  --  127  --   --   --    ALT 26   < > 21 19 19 20 16 16  --  29  --   --   --    AST 27   < > 17 14 15 15 13 17  --  31  --   --   --    ALBUMIN 3.9   < > 3.5 3.1* 3.1* 3.3* 3.0* 2.9*  --  4.2  --   --   --    TRIG 229*  --   --   --   --   --   --   --   --   --   --   --   --    LIPASE 6  --   --   --   --   --   --   --   --  9  --   --   --    WBC  --   --   --   --  16.72*  --  8.47  --  4.76 10.73  --  11.79*  --    HGB  --   --   --   --  11.8*  --  12.1*  --  12.1* 14.3  --  12.0*  --    HCT  --   --   --   --  33.7*  --  34.7*  --  35.8* 42.4  --  35.3*  --     < > = values in this interval not displayed.     Nutrition Orders:  Diet diabetic Diabetic; 1800 Calorie      Appetite/Oral Intake: poor/25-50% of meals  Factors Affecting Nutritional Intake: abdominal pain and decreased appetite  Social Needs Impacting Access to Food: none identified  Food/Yazdanism/Cultural Preferences: none reported  Food Allergies: none reported  Last Bowel Movement: 24  Wound(s):  none reported     Comments    () Pt familiar from prior admits; last admit approx week ago; pt remains with poor appetite; 50% or less; hx recent wt loss --6% past month; + muscle wasting; meth use. Pt willing to drink oral supplement; will order. Reinforced diabetic diet. Labs acknowledged.     Anthropometrics    Height: 6' 1" (185.4 cm), Height Method: Stated  Last Weight: 72.3 kg (159 lb 6.3 oz) (24 0119), Weight Method: Standard Scale  BMI (Calculated): 21  BMI Classification: normal (BMI 18.5-24.9)        Ideal Body Weight (IBW), Male: 184 lb     % Ideal Body Weight, Male (lb): 86.63 %                 Usual Body Weight (UBW), k kg  % Usual Body Weight: 94.09     Usual Weight Provided By: patient and EMR weight history    Wt Readings from " Last 5 Encounters:   05/30/24 72.3 kg (159 lb 6.3 oz)   05/23/24 72 kg (158 lb 11.7 oz)   05/04/24 77 kg (169 lb 12.1 oz)   04/20/24 77 kg (169 lb 12.1 oz)   01/11/24 77 kg (169 lb 12.8 oz)     Weight Change(s) Since Admission: 6% recent wt loss   Admit Weight: 70 kg (154 lb 5.2 oz) (05/29/24 2328), Weight Method: Standard Scale    Estimated Needs    Weight Used For Calorie Calculations: 72.3 kg (159 lb 6.3 oz)  Energy Calorie Requirements (kcal): 2016 kcal/d; 28 yanick/kg  Energy Need Method: Kcal/kg  Weight Used For Protein Calculations: 72.3 kg (159 lb 6.3 oz)  Protein Requirements: 86 gm protein/d; 1.2 gm/kg  Fluid Requirements (mL): 2016 ml/d; 1ml/yanick  CHO Requirement: 227 gm CHO/d     Enteral Nutrition     Patient not receiving enteral nutrition at this time.    Parenteral Nutrition     Patient not receiving parenteral nutrition support at this time.    Evaluation of Received Nutrient Intake    Calories: not meeting estimated needs  Protein: not meeting estimated needs    Patient Education     Education Provided: diabetic diet  Teaching Method: explanation and printed materials  Comprehension: verbalizes understanding  Barriers to Learning: desire/motivation  Expected Compliance: fair  Comments: All questions were answered and dietitian's contact information was provided.     Nutrition Diagnosis     PES: Inadequate oral intake related to chronic illness as evidenced by eating 50% or less . (new)     PES: Moderate chronic disease or condition related malnutrition related to chronic illness as evidenced by less than 75% needs met for greater than or equal to 1 month, mild muscle depletion, and greater than 5% weight loss in 1 month. (new)    Nutrition Interventions     Intervention(s): modified composition of meals/snacks, commercial beverage, multivitamin/mineral supplement therapy, purpose of nutrition education, and collaboration with other providers    Goal: Meet greater than 80% of nutritional needs by  follow-up. (new)  Goal: Maintain weight throughout hospitalization. (new)    Nutrition Goals & Monitoring     Dietitian will monitor: food and beverage intake, weight, food/nutrition knowledge skill, and glucose/endocrine profile  Discharge planning: continue diabetic diet with boost glucose control  oral supplements  Nutrition Risk/Follow-Up: moderate (follow-up in 3-5 days)   Please consult if re-assessment needed sooner.

## 2024-05-30 NOTE — ASSESSMENT & PLAN NOTE
Patient meets ASPEN criteria for moderate malnutrition of chronic illness per RD assessment as evidenced by:  Energy Intake (Malnutrition): less than 75% for greater than or equal to 1 month  Weight Loss (Malnutrition): greater than 5% in 1 month     Muscle Mass (Malnutrition): mild depletion           A minimum of two characteristics is recommended for diagnosis of either severe or non-severe malnutrition.

## 2024-05-30 NOTE — PLAN OF CARE
Problem: Adult Inpatient Plan of Care  Goal: Plan of Care Review  Outcome: Progressing  Goal: Patient-Specific Goal (Individualized)  Outcome: Progressing  Flowsheets (Taken 5/30/2024 3117)  Individualized Care Needs: dietary consult  Goal: Absence of Hospital-Acquired Illness or Injury  Outcome: Progressing     Problem: Diabetes Comorbidity  Goal: Blood Glucose Level Within Targeted Range  Outcome: Progressing

## 2024-05-31 LAB
ANION GAP SERPL CALC-SCNC: 7 MEQ/L
BASOPHILS # BLD AUTO: 0.04 X10(3)/MCL
BASOPHILS NFR BLD AUTO: 0.6 %
BUN SERPL-MCNC: 19 MG/DL (ref 8.9–20.6)
CALCIUM SERPL-MCNC: 8.6 MG/DL (ref 8.4–10.2)
CHLORIDE SERPL-SCNC: 100 MMOL/L (ref 98–107)
CO2 SERPL-SCNC: 26 MMOL/L (ref 22–29)
CREAT SERPL-MCNC: 1.04 MG/DL (ref 0.73–1.18)
CREAT/UREA NIT SERPL: 18
EOSINOPHIL # BLD AUTO: 0.67 X10(3)/MCL (ref 0–0.9)
EOSINOPHIL NFR BLD AUTO: 10.9 %
ERYTHROCYTE [DISTWIDTH] IN BLOOD BY AUTOMATED COUNT: 13.5 % (ref 11.5–17)
GFR SERPLBLD CREATININE-BSD FMLA CKD-EPI: >60 ML/MIN/1.73/M2
GLUCOSE SERPL-MCNC: 310 MG/DL (ref 74–100)
HCT VFR BLD AUTO: 34 % (ref 42–52)
HGB BLD-MCNC: 11.7 G/DL (ref 14–18)
HOLD SPECIMEN: NORMAL
IMM GRANULOCYTES # BLD AUTO: 0.01 X10(3)/MCL (ref 0–0.04)
IMM GRANULOCYTES NFR BLD AUTO: 0.2 %
LYMPHOCYTES # BLD AUTO: 2.5 X10(3)/MCL (ref 0.6–4.6)
LYMPHOCYTES NFR BLD AUTO: 40.5 %
MCH RBC QN AUTO: 27.9 PG (ref 27–31)
MCHC RBC AUTO-ENTMCNC: 34.4 G/DL (ref 33–36)
MCV RBC AUTO: 81 FL (ref 80–94)
MONOCYTES # BLD AUTO: 0.31 X10(3)/MCL (ref 0.1–1.3)
MONOCYTES NFR BLD AUTO: 5 %
NEUTROPHILS # BLD AUTO: 2.64 X10(3)/MCL (ref 2.1–9.2)
NEUTROPHILS NFR BLD AUTO: 42.8 %
NRBC BLD AUTO-RTO: 0 %
PLATELET # BLD AUTO: 229 X10(3)/MCL (ref 130–400)
PMV BLD AUTO: 10.6 FL (ref 7.4–10.4)
POCT GLUCOSE: 107 MG/DL (ref 70–110)
POCT GLUCOSE: 120 MG/DL (ref 70–110)
POCT GLUCOSE: 228 MG/DL (ref 70–110)
POCT GLUCOSE: 304 MG/DL (ref 70–110)
POCT GLUCOSE: 327 MG/DL (ref 70–110)
POTASSIUM SERPL-SCNC: 4.5 MMOL/L (ref 3.5–5.1)
RBC # BLD AUTO: 4.2 X10(6)/MCL (ref 4.7–6.1)
SODIUM SERPL-SCNC: 133 MMOL/L (ref 136–145)
WBC # SPEC AUTO: 6.17 X10(3)/MCL (ref 4.5–11.5)

## 2024-05-31 PROCEDURE — 63600175 PHARM REV CODE 636 W HCPCS

## 2024-05-31 PROCEDURE — 99900035 HC TECH TIME PER 15 MIN (STAT)

## 2024-05-31 PROCEDURE — 63600175 PHARM REV CODE 636 W HCPCS: Performed by: STUDENT IN AN ORGANIZED HEALTH CARE EDUCATION/TRAINING PROGRAM

## 2024-05-31 PROCEDURE — 80048 BASIC METABOLIC PNL TOTAL CA: CPT | Performed by: INTERNAL MEDICINE

## 2024-05-31 PROCEDURE — 25000003 PHARM REV CODE 250: Performed by: STUDENT IN AN ORGANIZED HEALTH CARE EDUCATION/TRAINING PROGRAM

## 2024-05-31 PROCEDURE — 85025 COMPLETE CBC W/AUTO DIFF WBC: CPT | Performed by: STUDENT IN AN ORGANIZED HEALTH CARE EDUCATION/TRAINING PROGRAM

## 2024-05-31 PROCEDURE — 21400001 HC TELEMETRY ROOM

## 2024-05-31 PROCEDURE — 94761 N-INVAS EAR/PLS OXIMETRY MLT: CPT

## 2024-05-31 PROCEDURE — 36415 COLL VENOUS BLD VENIPUNCTURE: CPT | Performed by: STUDENT IN AN ORGANIZED HEALTH CARE EDUCATION/TRAINING PROGRAM

## 2024-05-31 RX ORDER — INSULIN GLARGINE 100 [IU]/ML
25 INJECTION, SOLUTION SUBCUTANEOUS NIGHTLY
Status: DISCONTINUED | OUTPATIENT
Start: 2024-05-31 | End: 2024-06-02

## 2024-05-31 RX ORDER — INSULIN ASPART 100 [IU]/ML
5 INJECTION, SOLUTION INTRAVENOUS; SUBCUTANEOUS ONCE
Status: COMPLETED | OUTPATIENT
Start: 2024-05-31 | End: 2024-05-31

## 2024-05-31 RX ORDER — AMLODIPINE BESYLATE 10 MG/1
10 TABLET ORAL DAILY
Status: DISCONTINUED | OUTPATIENT
Start: 2024-05-31 | End: 2024-06-03 | Stop reason: HOSPADM

## 2024-05-31 RX ORDER — INSULIN ASPART 100 [IU]/ML
0-10 INJECTION, SOLUTION INTRAVENOUS; SUBCUTANEOUS
Status: DISCONTINUED | OUTPATIENT
Start: 2024-05-31 | End: 2024-06-03 | Stop reason: HOSPADM

## 2024-05-31 RX ORDER — INSULIN GLARGINE 100 [IU]/ML
10 INJECTION, SOLUTION SUBCUTANEOUS ONCE
Status: COMPLETED | OUTPATIENT
Start: 2024-05-31 | End: 2024-05-31

## 2024-05-31 RX ORDER — LISINOPRIL 10 MG/1
10 TABLET ORAL DAILY
Status: DISCONTINUED | OUTPATIENT
Start: 2024-05-31 | End: 2024-06-03 | Stop reason: HOSPADM

## 2024-05-31 RX ADMIN — HEPARIN SODIUM 5000 UNITS: 5000 INJECTION, SOLUTION INTRAVENOUS; SUBCUTANEOUS at 08:05

## 2024-05-31 RX ADMIN — INSULIN ASPART 7 UNITS: 100 INJECTION, SOLUTION INTRAVENOUS; SUBCUTANEOUS at 12:05

## 2024-05-31 RX ADMIN — INSULIN ASPART 7 UNITS: 100 INJECTION, SOLUTION INTRAVENOUS; SUBCUTANEOUS at 05:05

## 2024-05-31 RX ADMIN — INSULIN ASPART 4 UNITS: 100 INJECTION, SOLUTION INTRAVENOUS; SUBCUTANEOUS at 08:05

## 2024-05-31 RX ADMIN — PREGABALIN 50 MG: 50 CAPSULE ORAL at 08:05

## 2024-05-31 RX ADMIN — AMLODIPINE BESYLATE 10 MG: 10 TABLET ORAL at 08:05

## 2024-05-31 RX ADMIN — BUPROPION HYDROCHLORIDE 300 MG: 150 TABLET, EXTENDED RELEASE ORAL at 08:05

## 2024-05-31 RX ADMIN — INSULIN ASPART 7 UNITS: 100 INJECTION, SOLUTION INTRAVENOUS; SUBCUTANEOUS at 08:05

## 2024-05-31 RX ADMIN — MUPIROCIN: 20 OINTMENT TOPICAL at 08:05

## 2024-05-31 RX ADMIN — DULOXETINE HYDROCHLORIDE 30 MG: 30 CAPSULE, DELAYED RELEASE ORAL at 08:05

## 2024-05-31 RX ADMIN — LISINOPRIL 10 MG: 10 TABLET ORAL at 08:05

## 2024-05-31 RX ADMIN — INSULIN ASPART 5 UNITS: 100 INJECTION, SOLUTION INTRAVENOUS; SUBCUTANEOUS at 10:05

## 2024-05-31 RX ADMIN — TRAZODONE HYDROCHLORIDE 100 MG: 100 TABLET ORAL at 08:05

## 2024-05-31 RX ADMIN — INSULIN GLARGINE 25 UNITS: 100 INJECTION, SOLUTION SUBCUTANEOUS at 08:05

## 2024-05-31 RX ADMIN — INSULIN ASPART 4 UNITS: 100 INJECTION, SOLUTION INTRAVENOUS; SUBCUTANEOUS at 12:05

## 2024-05-31 RX ADMIN — INSULIN GLARGINE 10 UNITS: 100 INJECTION, SOLUTION SUBCUTANEOUS at 08:05

## 2024-05-31 NOTE — PROGRESS NOTES
Spoke with Arielle with [facility], requested clinicals to be faxed to [facility] in [place]. Acceptance pending. Will follow.      What Is The Reason For Today's Visit?: Full Body Skin Examination What Is The Reason For Today's Visit? (Being Monitored For X): concerning skin lesions on an annual basis

## 2024-06-01 LAB
ALBUMIN SERPL-MCNC: 2.9 G/DL (ref 3.5–5)
ALBUMIN/GLOB SERPL: 1.1 RATIO (ref 1.1–2)
ALP SERPL-CCNC: 87 UNIT/L (ref 40–150)
ALT SERPL-CCNC: 26 UNIT/L (ref 0–55)
ANION GAP SERPL CALC-SCNC: 7 MEQ/L
AST SERPL-CCNC: 20 UNIT/L (ref 5–34)
BASOPHILS # BLD AUTO: 0.04 X10(3)/MCL
BASOPHILS NFR BLD AUTO: 0.8 %
BILIRUB SERPL-MCNC: 0.4 MG/DL
BUN SERPL-MCNC: 13 MG/DL (ref 8.9–20.6)
CALCIUM SERPL-MCNC: 9 MG/DL (ref 8.4–10.2)
CHLORIDE SERPL-SCNC: 100 MMOL/L (ref 98–107)
CO2 SERPL-SCNC: 29 MMOL/L (ref 22–29)
CREAT SERPL-MCNC: 0.99 MG/DL (ref 0.73–1.18)
CREAT/UREA NIT SERPL: 13
EOSINOPHIL # BLD AUTO: 0.54 X10(3)/MCL (ref 0–0.9)
EOSINOPHIL NFR BLD AUTO: 10.7 %
ERYTHROCYTE [DISTWIDTH] IN BLOOD BY AUTOMATED COUNT: 13.2 % (ref 11.5–17)
GFR SERPLBLD CREATININE-BSD FMLA CKD-EPI: >60 ML/MIN/1.73/M2
GLOBULIN SER-MCNC: 2.6 GM/DL (ref 2.4–3.5)
GLUCOSE SERPL-MCNC: 139 MG/DL (ref 74–100)
HCT VFR BLD AUTO: 35.7 % (ref 42–52)
HGB BLD-MCNC: 12 G/DL (ref 14–18)
HOLD SPECIMEN: NORMAL
IMM GRANULOCYTES # BLD AUTO: 0.01 X10(3)/MCL (ref 0–0.04)
IMM GRANULOCYTES NFR BLD AUTO: 0.2 %
LYMPHOCYTES # BLD AUTO: 2.49 X10(3)/MCL (ref 0.6–4.6)
LYMPHOCYTES NFR BLD AUTO: 49.4 %
MCH RBC QN AUTO: 27.6 PG (ref 27–31)
MCHC RBC AUTO-ENTMCNC: 33.6 G/DL (ref 33–36)
MCV RBC AUTO: 82.1 FL (ref 80–94)
MONOCYTES # BLD AUTO: 0.33 X10(3)/MCL (ref 0.1–1.3)
MONOCYTES NFR BLD AUTO: 6.5 %
NEUTROPHILS # BLD AUTO: 1.63 X10(3)/MCL (ref 2.1–9.2)
NEUTROPHILS NFR BLD AUTO: 32.4 %
NRBC BLD AUTO-RTO: 0 %
PLATELET # BLD AUTO: 227 X10(3)/MCL (ref 130–400)
PMV BLD AUTO: 9.8 FL (ref 7.4–10.4)
POCT GLUCOSE: 111 MG/DL (ref 70–110)
POCT GLUCOSE: 130 MG/DL (ref 70–110)
POCT GLUCOSE: 184 MG/DL (ref 70–110)
POCT GLUCOSE: 94 MG/DL (ref 70–110)
POTASSIUM SERPL-SCNC: 3.9 MMOL/L (ref 3.5–5.1)
PROT SERPL-MCNC: 5.5 GM/DL (ref 6.4–8.3)
RBC # BLD AUTO: 4.35 X10(6)/MCL (ref 4.7–6.1)
SODIUM SERPL-SCNC: 136 MMOL/L (ref 136–145)
WBC # SPEC AUTO: 5.04 X10(3)/MCL (ref 4.5–11.5)

## 2024-06-01 PROCEDURE — 99900035 HC TECH TIME PER 15 MIN (STAT)

## 2024-06-01 PROCEDURE — 21400001 HC TELEMETRY ROOM

## 2024-06-01 PROCEDURE — 25000003 PHARM REV CODE 250: Performed by: STUDENT IN AN ORGANIZED HEALTH CARE EDUCATION/TRAINING PROGRAM

## 2024-06-01 PROCEDURE — 85025 COMPLETE CBC W/AUTO DIFF WBC: CPT | Performed by: STUDENT IN AN ORGANIZED HEALTH CARE EDUCATION/TRAINING PROGRAM

## 2024-06-01 PROCEDURE — 36415 COLL VENOUS BLD VENIPUNCTURE: CPT

## 2024-06-01 PROCEDURE — 63600175 PHARM REV CODE 636 W HCPCS: Performed by: STUDENT IN AN ORGANIZED HEALTH CARE EDUCATION/TRAINING PROGRAM

## 2024-06-01 PROCEDURE — 94761 N-INVAS EAR/PLS OXIMETRY MLT: CPT

## 2024-06-01 PROCEDURE — 80053 COMPREHEN METABOLIC PANEL: CPT

## 2024-06-01 PROCEDURE — 63600175 PHARM REV CODE 636 W HCPCS

## 2024-06-01 RX ADMIN — HEPARIN SODIUM 5000 UNITS: 5000 INJECTION, SOLUTION INTRAVENOUS; SUBCUTANEOUS at 08:06

## 2024-06-01 RX ADMIN — BUPROPION HYDROCHLORIDE 300 MG: 150 TABLET, EXTENDED RELEASE ORAL at 08:06

## 2024-06-01 RX ADMIN — MUPIROCIN: 20 OINTMENT TOPICAL at 08:06

## 2024-06-01 RX ADMIN — INSULIN ASPART 7 UNITS: 100 INJECTION, SOLUTION INTRAVENOUS; SUBCUTANEOUS at 08:06

## 2024-06-01 RX ADMIN — LISINOPRIL 10 MG: 10 TABLET ORAL at 08:06

## 2024-06-01 RX ADMIN — INSULIN GLARGINE 25 UNITS: 100 INJECTION, SOLUTION SUBCUTANEOUS at 09:06

## 2024-06-01 RX ADMIN — TRAZODONE HYDROCHLORIDE 100 MG: 100 TABLET ORAL at 09:06

## 2024-06-01 RX ADMIN — INSULIN ASPART 7 UNITS: 100 INJECTION, SOLUTION INTRAVENOUS; SUBCUTANEOUS at 05:06

## 2024-06-01 RX ADMIN — HEPARIN SODIUM 5000 UNITS: 5000 INJECTION, SOLUTION INTRAVENOUS; SUBCUTANEOUS at 09:06

## 2024-06-01 RX ADMIN — INSULIN ASPART 7 UNITS: 100 INJECTION, SOLUTION INTRAVENOUS; SUBCUTANEOUS at 12:06

## 2024-06-01 RX ADMIN — PREGABALIN 50 MG: 50 CAPSULE ORAL at 09:06

## 2024-06-01 RX ADMIN — PREGABALIN 50 MG: 50 CAPSULE ORAL at 08:06

## 2024-06-01 RX ADMIN — AMLODIPINE BESYLATE 10 MG: 10 TABLET ORAL at 08:06

## 2024-06-01 RX ADMIN — INSULIN ASPART 1 UNITS: 100 INJECTION, SOLUTION INTRAVENOUS; SUBCUTANEOUS at 09:06

## 2024-06-01 RX ADMIN — DULOXETINE HYDROCHLORIDE 30 MG: 30 CAPSULE, DELAYED RELEASE ORAL at 08:06

## 2024-06-01 NOTE — PROGRESS NOTES
[facility]  Progress Note      Patient Name: Jose Francisco Romero  MRN: [id number]  Admission Date: 5/29/2024  Attending Physician: Emilio Ramirez MD   Primary Care Provider: Antoine Garner MD  Resident: Briana   Intern: [place]      Patient information was obtained from patient and ER records.     Subjective:     Principal Problem:Diabetic ketoacidosis without coma associated with type 1 diabetes mellitus    Chief Complaint:   Chief Complaint   Patient presents with    Abdominal Pain     Pt. C/o R sided abdominal pain that he states goes down the entire R side of his body. CBG >500 Hx DM1        HPI: Patient is a 43 year old male with PMH of Type 1 diabetes mellitus, HTN, history of drug abuse presented to the [facility] ED with complaints of nausea, generalized aches/pain and elevated blood sugar. He mentions that he did not eat today, but admitted to smoking Meth. Denies any dysphagia, GERD or nausea.   Reports he had a ground level fall onto his back two days ago. Denies hitting his head or loss of consciousness. Does not believe right sided pain resulted from fall.    Patient recently discharged from [facility] on 5.26.24 for DKA.     In the ED, patient tachycardic but afebrile.  CBC unremarkable.  CMP notable for hyponatremia, hyperkalemia, anion gap metabolic acidosis with anion gap of 26.  Elevated renal indices with BUN/creatinine at 45/2.  LFTs within normal limits.  Beta hydroxybutyrate noted at 8.30.  Lactic Acid and troponin within normal limits.  VBG remarkable for 7.40/28/38/17. Urinalysis pending. Medicine consulted for admission and management of DKA.       Interval Hx:   5/30/24: Admitted to ICU  5/31: Down-graded to floor    NAEON. CBG well controlled overnight. No new complaints. Tolerating po well. Afebrile. Interested in rehab.    Past Medical History:   Diagnosis Date    Diabetes mellitus type I     Hypertension        Past Surgical History:   Procedure Laterality Date    CLAVICLE  SURGERY      ESOPHAGOGASTRODUODENOSCOPY Left 9/1/2023    Procedure: EGD (ESOPHAGOGASTRODUODENOSCOPY);  Surgeon: Daiana Chilel MD;  Location: [facility] ENDOSCOPY;  Service: Gastroenterology;  Laterality: Left;       Review of patient's allergies indicates:   Allergen Reactions    Penicillins Shortness Of Breath    Sulfa (sulfonamide antibiotics)        No current facility-administered medications on file prior to encounter.     Current Outpatient Medications on File Prior to Encounter   Medication Sig    albuterol (PROVENTIL/VENTOLIN HFA) 90 mcg/actuation inhaler Inhale into the lungs.    amLODIPine (NORVASC) 10 MG tablet Take 1 tablet (10 mg total) by mouth once daily.    blood sugar diagnostic Strp To check BG 4 times daily, to use with insurance preferred meter    blood sugar diagnostic Strp To check BG 4 times daily, to use with insurance preferred meter    blood-glucose meter kit To check BG 4 times daily, to use with insurance preferred meter    blood-glucose meter,continuous (DEXCOM G7 ) Misc Dexcom G7  uses directed    blood-glucose sensor (DEXCOM G7 SENSOR) Trinh Use every 10 days as directed    buPROPion (WELLBUTRIN XL) 300 MG 24 hr tablet Take 300 mg by mouth once daily.    clonazePAM (KLONOPIN) 2 MG Tab Take 2 mg by mouth every evening.    DULoxetine (CYMBALTA) 30 MG capsule Take 1 capsule (30 mg total) by mouth once daily.    ibuprofen (ADVIL,MOTRIN) 600 MG tablet Take 1 tablet (600 mg total) by mouth every 6 (six) hours as needed for Pain.    insulin (LANTUS SOLOSTAR U-100 INSULIN) glargine 100 units/mL SubQ pen Inject 25 Units into the skin once daily.    insulin aspart U-100 (NOVOLOG FLEXPEN U-100 INSULIN) 100 unit/mL (3 mL) InPn pen 7 units in the am, then 12 units lunch and supper; sliding scale with correction 1:50 >150 Max dose 50 units    LIDOcaine (XYLOCAINE) 5 % Oint ointment Apply 2 g topically 2 (two) times daily as needed.    lisinopriL 10 MG tablet Take 1 tablet (10 mg  "total) by mouth once daily.    mupirocin (BACTROBAN) 2 % ointment Apply topically 3 (three) times daily.    pantoprazole (PROTONIX) 40 MG tablet Take 1 tablet (40 mg total) by mouth once daily.    pen needle, diabetic 31 gauge x 3/16" Ndle Use 4 times a day for insulin injection (BD Mini)    pregabalin (LYRICA) 50 MG capsule Take 50 mg by mouth 2 (two) times daily.    traZODone (DESYREL) 100 MG tablet Take 100 mg by mouth every evening.    TRUEPLUS LANCETS 28 gauge Misc Apply topically 4 (four) times daily.     Family History       Problem Relation (Age of Onset)    Diabetes Mother, Father    Heart disease Father          Tobacco Use    Smoking status: Former     Current packs/day: 0.00     Types: Cigarettes     Quit date: 5/15/2023     Years since quittin.0     Passive exposure: Past    Smokeless tobacco: Never   Substance and Sexual Activity    Alcohol use: Never    Drug use: Never    Sexual activity: Yes     Partners: Female     Birth control/protection: Condom     Review of Systems   Constitutional:  Negative for chills, fatigue and fever.   Respiratory:  Negative for shortness of breath.    Cardiovascular:  Negative for chest pain and palpitations.   Gastrointestinal:  Negative for blood in stool, nausea and vomiting.   Endocrine: Negative for polyuria.   Genitourinary:  Negative for difficulty urinating and hematuria.   Musculoskeletal:  Positive for myalgias.   Neurological:  Negative for headaches.     Objective:     Vital Signs (Most Recent):  Temp: 97.8 °F (36.6 °C) (24 1057)  Pulse: 76 (24 1057)  Resp: 18 (24 1057)  BP: 130/79 (24 1057)  SpO2: 99 % (24 1057) Vital Signs (24h Range):  Temp:  [97.6 °F (36.4 °C)-98.1 °F (36.7 °C)] 97.8 °F (36.6 °C)  Pulse:  [67-90] 76  Resp:  [17-20] 18  SpO2:  [97 %-100 %] 99 %  BP: (108-141)/(71-91) 130/79     Weight: 72.3 kg (159 lb 6.3 oz)  Body mass index is 21.03 kg/m².    Physical Exam  Constitutional:       General: He is not in " acute distress.     Appearance: He is not toxic-appearing or diaphoretic.   HENT:      Head: Atraumatic.      Mouth/Throat:      Mouth: Mucous membranes are moist.   Eyes:      Extraocular Movements: Extraocular movements intact.      Conjunctiva/sclera: Conjunctivae normal.   Cardiovascular:      Rate and Rhythm: Normal rate and regular rhythm.   Pulmonary:      Effort: Pulmonary effort is normal.      Breath sounds: Normal breath sounds. No wheezing or rhonchi.   Abdominal:      General: Bowel sounds are normal. There is no distension.      Palpations: Abdomen is soft.      Tenderness: There is no abdominal tenderness.   Musculoskeletal:         General: Normal range of motion.      Right lower leg: No edema.      Left lower leg: No edema.   Skin:     General: Skin is warm.      Comments: No observable abrasions, lacerations or lesions on exposed skin.   Neurological:      General: No focal deficit present.      Mental Status: He is alert and oriented to person, place, and time.      Comments: Speech clear and understandable.   Psychiatric:         Thought Content: Thought content normal.            Significant Labs: All pertinent labs within the past 24 hours have been reviewed.  Recent Lab Results  (Last 5 results in the past 24 hours)        06/01/24  1058   06/01/24  0705   06/01/24  0426   05/31/24  1937   05/31/24  1702        Albumin/Globulin Ratio     1.1           Albumin     2.9           ALP     87           ALT     26           Anion Gap     7.0           AST     20           Baso #     0.04           Basophil %     0.8           BILIRUBIN TOTAL     0.4           BUN     13.0           BUN/CREAT RATIO     13           Calcium     9.0           Chloride     100           CO2     29           Creatinine     0.99           eGFR     >60           Eos #     0.54           Eos %     10.7           Globulin, Total     2.6           Glucose     139           Hematocrit     35.7           Hemoglobin     12.0            Extra Tube     Hold for add-ons.  Comment: Auto resulted.              Immature Grans (Abs)     0.01           Immature Granulocytes     0.2           Lymph #     2.49           LYMPH %     49.4           MCH     27.6           MCHC     33.6           MCV     82.1           Mono #     0.33           Mono %     6.5           MPV     9.8           Neut #     1.63           Neut %     32.4           nRBC     0.0           Platelet Count     227           POCT Glucose 94   130     107   120       Potassium     3.9           PROTEIN TOTAL     5.5           RBC     4.35           RDW     13.2           Sodium     136           WBC     5.04                                  Significant Imaging: I have reviewed all pertinent imaging results/findings within the past 24 hours.  Imaging Results              X-Ray Chest AP Portable (Final result)  Result time 05/30/24 06:04:51      Final result by Gay Hussein MD (05/30/24 06:04:51)                   Impression:      No acute cardiopulmonary abnormality.      Electronically signed by: Gay Hussein  Date:    05/30/2024  Time:    06:04               Narrative:    EXAMINATION:  XR CHEST AP PORTABLE    CLINICAL HISTORY:  Chest pain;    TECHNIQUE:  Single frontal view of the chest was performed.    COMPARISON:  05/23/2024    FINDINGS:  LINES AND TUBES: EKG/telemetry leads overlie the chest.    MEDIASTINUM AND MARGOT: The cardiac silhouette is normal.    LUNGS: No lobar consolidation. No edema.    PLEURA:No pleural effusion. No pneumothorax.    BONES: Prior left clavicle ORIF.  Old left rib and scapula deformities.  Probable low-grade cartilaginous lesion at the proximal right humerus.                        Wet Read by Cesar Brock MD (05/30/24 01:01:04, [facility] Dept, Emergency Medicine)    No acute pulmonary process                                      Assessment/Plan:     Active Diagnoses:    Diagnosis Date Noted POA    PRINCIPAL PROBLEM:   Diabetic ketoacidosis without coma associated with type 1 diabetes mellitus [E10.10] 06/25/2023 Yes    Malnutrition [E46] 05/24/2024 Yes      Problems Resolved During this Admission:     DM Type 1  DKA, resolved  High anion gap metabolic acidosis, resolved  Acute Kidney Injury - resolved  Hyponatremia - resolved  Hyperkalemia - resolved  - Resumed home insulin regimen with levemir 25u qhs and aspart 7u TIDAC.  - cont moderate SSI  - CBG well controlled for past 24h    Hx of HTN  - cont home Amlodipine 10 mg qd and Lisinopril 10mg qd.   - PRN antihypertensives ordered.    Chronic Lumbar Back pain  - Pain medication as needed    Hx of illicit drug use  - Advised to discontinue methamphetamine use     Diet: Diabetic   DVT Prophylaxis: Heparin   GI Prophylaxis: none     Disposition: CBG well controlled. Discussed with CM regarding placement - was told by Arielle at [facility] in [place] that pt requires a facility with attached medical unit. CM to send new referrals.     Alexia Warren MD  [facility]

## 2024-06-01 NOTE — PLAN OF CARE
Problem: Adult Inpatient Plan of Care  Goal: Plan of Care Review  6/1/2024 1814 by Harpreet Vegas LPN  Outcome: Progressing  6/1/2024 0832 by Harpreet Vegas LPN  Outcome: Progressing  Goal: Patient-Specific Goal (Individualized)  6/1/2024 1814 by Harpreet Vegas LPN  Outcome: Progressing  6/1/2024 0832 by Harpreet Vegas LPN  Outcome: Progressing  Goal: Absence of Hospital-Acquired Illness or Injury  6/1/2024 1814 by Harpreet Vegas LPN  Outcome: Progressing  6/1/2024 0832 by Harperet Vegas LPN  Outcome: Progressing  Goal: Optimal Comfort and Wellbeing  6/1/2024 1814 by Harpreet Vegas LPN  Outcome: Progressing  6/1/2024 0832 by Harpreet Vegas LPN  Outcome: Progressing  Goal: Readiness for Transition of Care  6/1/2024 1814 by Harpreet Vegas LPN  Outcome: Progressing  6/1/2024 0832 by Harpreet Vegas LPN  Outcome: Progressing     Problem: Diabetes Comorbidity  Goal: Blood Glucose Level Within Targeted Range  6/1/2024 1814 by Harpreet Vegas LPN  Outcome: Progressing  6/1/2024 0832 by Harpreet Vegas LPN  Outcome: Progressing

## 2024-06-02 LAB
ALBUMIN SERPL-MCNC: 2.9 G/DL (ref 3.5–5)
ALBUMIN/GLOB SERPL: 1.1 RATIO (ref 1.1–2)
ALP SERPL-CCNC: 91 UNIT/L (ref 40–150)
ALT SERPL-CCNC: 26 UNIT/L (ref 0–55)
ANION GAP SERPL CALC-SCNC: 5 MEQ/L
AST SERPL-CCNC: 23 UNIT/L (ref 5–34)
BASOPHILS # BLD AUTO: 0.03 X10(3)/MCL
BASOPHILS NFR BLD AUTO: 0.5 %
BILIRUB SERPL-MCNC: 0.3 MG/DL
BUN SERPL-MCNC: 14.6 MG/DL (ref 8.9–20.6)
CALCIUM SERPL-MCNC: 8.9 MG/DL (ref 8.4–10.2)
CHLORIDE SERPL-SCNC: 103 MMOL/L (ref 98–107)
CO2 SERPL-SCNC: 30 MMOL/L (ref 22–29)
CREAT SERPL-MCNC: 0.86 MG/DL (ref 0.73–1.18)
CREAT/UREA NIT SERPL: 17
EOSINOPHIL # BLD AUTO: 0.43 X10(3)/MCL (ref 0–0.9)
EOSINOPHIL NFR BLD AUTO: 7.7 %
ERYTHROCYTE [DISTWIDTH] IN BLOOD BY AUTOMATED COUNT: 13.2 % (ref 11.5–17)
GFR SERPLBLD CREATININE-BSD FMLA CKD-EPI: >60 ML/MIN/1.73/M2
GLOBULIN SER-MCNC: 2.6 GM/DL (ref 2.4–3.5)
GLUCOSE SERPL-MCNC: 56 MG/DL (ref 74–100)
HCT VFR BLD AUTO: 34.9 % (ref 42–52)
HGB BLD-MCNC: 11.8 G/DL (ref 14–18)
IMM GRANULOCYTES # BLD AUTO: 0.02 X10(3)/MCL (ref 0–0.04)
IMM GRANULOCYTES NFR BLD AUTO: 0.4 %
LYMPHOCYTES # BLD AUTO: 2.37 X10(3)/MCL (ref 0.6–4.6)
LYMPHOCYTES NFR BLD AUTO: 42.5 %
MCH RBC QN AUTO: 27.9 PG (ref 27–31)
MCHC RBC AUTO-ENTMCNC: 33.8 G/DL (ref 33–36)
MCV RBC AUTO: 82.5 FL (ref 80–94)
MONOCYTES # BLD AUTO: 0.4 X10(3)/MCL (ref 0.1–1.3)
MONOCYTES NFR BLD AUTO: 7.2 %
NEUTROPHILS # BLD AUTO: 2.32 X10(3)/MCL (ref 2.1–9.2)
NEUTROPHILS NFR BLD AUTO: 41.7 %
NRBC BLD AUTO-RTO: 0 %
PLATELET # BLD AUTO: 240 X10(3)/MCL (ref 130–400)
PMV BLD AUTO: 9.9 FL (ref 7.4–10.4)
POCT GLUCOSE: 102 MG/DL (ref 70–110)
POCT GLUCOSE: 147 MG/DL (ref 70–110)
POCT GLUCOSE: 236 MG/DL (ref 70–110)
POCT GLUCOSE: 54 MG/DL (ref 70–110)
POCT GLUCOSE: 79 MG/DL (ref 70–110)
POTASSIUM SERPL-SCNC: 3.9 MMOL/L (ref 3.5–5.1)
PROT SERPL-MCNC: 5.5 GM/DL (ref 6.4–8.3)
RBC # BLD AUTO: 4.23 X10(6)/MCL (ref 4.7–6.1)
SODIUM SERPL-SCNC: 138 MMOL/L (ref 136–145)
WBC # SPEC AUTO: 5.57 X10(3)/MCL (ref 4.5–11.5)

## 2024-06-02 PROCEDURE — 63600175 PHARM REV CODE 636 W HCPCS: Performed by: STUDENT IN AN ORGANIZED HEALTH CARE EDUCATION/TRAINING PROGRAM

## 2024-06-02 PROCEDURE — 85025 COMPLETE CBC W/AUTO DIFF WBC: CPT | Performed by: STUDENT IN AN ORGANIZED HEALTH CARE EDUCATION/TRAINING PROGRAM

## 2024-06-02 PROCEDURE — 25000003 PHARM REV CODE 250: Performed by: STUDENT IN AN ORGANIZED HEALTH CARE EDUCATION/TRAINING PROGRAM

## 2024-06-02 PROCEDURE — 36415 COLL VENOUS BLD VENIPUNCTURE: CPT | Performed by: STUDENT IN AN ORGANIZED HEALTH CARE EDUCATION/TRAINING PROGRAM

## 2024-06-02 PROCEDURE — 63600175 PHARM REV CODE 636 W HCPCS

## 2024-06-02 PROCEDURE — 21400001 HC TELEMETRY ROOM

## 2024-06-02 PROCEDURE — 94761 N-INVAS EAR/PLS OXIMETRY MLT: CPT

## 2024-06-02 PROCEDURE — 99900035 HC TECH TIME PER 15 MIN (STAT)

## 2024-06-02 PROCEDURE — 80053 COMPREHEN METABOLIC PANEL: CPT

## 2024-06-02 PROCEDURE — 25000003 PHARM REV CODE 250

## 2024-06-02 RX ORDER — TRAZODONE HYDROCHLORIDE 50 MG/1
50 TABLET ORAL NIGHTLY
Status: DISCONTINUED | OUTPATIENT
Start: 2024-06-02 | End: 2024-06-03 | Stop reason: HOSPADM

## 2024-06-02 RX ORDER — INSULIN GLARGINE 100 [IU]/ML
20 INJECTION, SOLUTION SUBCUTANEOUS NIGHTLY
Status: DISCONTINUED | OUTPATIENT
Start: 2024-06-02 | End: 2024-06-03 | Stop reason: HOSPADM

## 2024-06-02 RX ADMIN — INSULIN ASPART 7 UNITS: 100 INJECTION, SOLUTION INTRAVENOUS; SUBCUTANEOUS at 05:06

## 2024-06-02 RX ADMIN — INSULIN ASPART 7 UNITS: 100 INJECTION, SOLUTION INTRAVENOUS; SUBCUTANEOUS at 08:06

## 2024-06-02 RX ADMIN — INSULIN ASPART 4 UNITS: 100 INJECTION, SOLUTION INTRAVENOUS; SUBCUTANEOUS at 05:06

## 2024-06-02 RX ADMIN — BUPROPION HYDROCHLORIDE 300 MG: 150 TABLET, EXTENDED RELEASE ORAL at 08:06

## 2024-06-02 RX ADMIN — DULOXETINE HYDROCHLORIDE 30 MG: 30 CAPSULE, DELAYED RELEASE ORAL at 08:06

## 2024-06-02 RX ADMIN — INSULIN GLARGINE 20 UNITS: 100 INJECTION, SOLUTION SUBCUTANEOUS at 08:06

## 2024-06-02 RX ADMIN — HEPARIN SODIUM 5000 UNITS: 5000 INJECTION, SOLUTION INTRAVENOUS; SUBCUTANEOUS at 08:06

## 2024-06-02 RX ADMIN — PREGABALIN 50 MG: 50 CAPSULE ORAL at 08:06

## 2024-06-02 RX ADMIN — AMLODIPINE BESYLATE 10 MG: 10 TABLET ORAL at 08:06

## 2024-06-02 RX ADMIN — LISINOPRIL 10 MG: 10 TABLET ORAL at 08:06

## 2024-06-02 RX ADMIN — TRAZODONE HYDROCHLORIDE 50 MG: 50 TABLET ORAL at 08:06

## 2024-06-02 RX ADMIN — MUPIROCIN: 20 OINTMENT TOPICAL at 08:06

## 2024-06-02 NOTE — PROGRESS NOTES
[facility]  Progress Note      Patient Name: Jose Francisco Romero  MRN: [id number]  Admission Date: 5/29/2024  Attending Physician: Emilio Ramirez MD   Primary Care Provider: Antoine Garner MD  Resident: Briana   Intern: [facility]      Patient information was obtained from patient and ER records.     Subjective:     Principal Problem:Diabetic ketoacidosis without coma associated with type 1 diabetes mellitus    Chief Complaint:   Chief Complaint   Patient presents with    Abdominal Pain     Pt. C/o R sided abdominal pain that he states goes down the entire R side of his body. CBG >500 Hx DM1        HPI: Patient is a 43 year old male with PMH of Type 1 diabetes mellitus, HTN, history of drug abuse presented to the [facility] ED with complaints of nausea, generalized aches/pain and elevated blood sugar. He mentions that he did not eat today, but admitted to smoking Meth. Denies any dysphagia, GERD or nausea.   Reports he had a ground level fall onto his back two days ago. Denies hitting his head or loss of consciousness. Does not believe right sided pain resulted from fall.    Patient recently discharged from [facility] on 5.26.24 for DKA.     In the ED, patient tachycardic but afebrile.  CBC unremarkable.  CMP notable for hyponatremia, hyperkalemia, anion gap metabolic acidosis with anion gap of 26.  Elevated renal indices with BUN/creatinine at 45/2.  LFTs within normal limits.  Beta hydroxybutyrate noted at 8.30.  Lactic Acid and troponin within normal limits.  VBG remarkable for 7.40/28/38/17. Urinalysis pending. Medicine consulted for admission and management of DKA.       Interval Hx:   5/30/24: Admitted to ICU  5/31: Down-graded to floor    NAEON. No new complaints. Tolerating po well. Afebrile. Interested in drug rehab.   CBG dropped to 56 at 4am, patient reports no symptoms at the time, CBG now 79.   Patient also reports grogginess in the morning likely due to trazodone and requests to decrease from  home dose of 100mg.       Past Medical History:   Diagnosis Date    Diabetes mellitus type I     Hypertension        Past Surgical History:   Procedure Laterality Date    CLAVICLE SURGERY      ESOPHAGOGASTRODUODENOSCOPY Left 9/1/2023    Procedure: EGD (ESOPHAGOGASTRODUODENOSCOPY);  Surgeon: Daiana Chilel MD;  Location: [facility] ENDOSCOPY;  Service: Gastroenterology;  Laterality: Left;       Review of patient's allergies indicates:   Allergen Reactions    Penicillins Shortness Of Breath    Sulfa (sulfonamide antibiotics)        No current facility-administered medications on file prior to encounter.     Current Outpatient Medications on File Prior to Encounter   Medication Sig    albuterol (PROVENTIL/VENTOLIN HFA) 90 mcg/actuation inhaler Inhale into the lungs.    amLODIPine (NORVASC) 10 MG tablet Take 1 tablet (10 mg total) by mouth once daily.    blood sugar diagnostic Strp To check BG 4 times daily, to use with insurance preferred meter    blood sugar diagnostic Strp To check BG 4 times daily, to use with insurance preferred meter    blood-glucose meter kit To check BG 4 times daily, to use with insurance preferred meter    blood-glucose meter,continuous (DEXCOM G7 ) Misc Dexcom G7  uses directed    blood-glucose sensor (DEXCOM G7 SENSOR) Trinh Use every 10 days as directed    buPROPion (WELLBUTRIN XL) 300 MG 24 hr tablet Take 300 mg by mouth once daily.    clonazePAM (KLONOPIN) 2 MG Tab Take 2 mg by mouth every evening.    DULoxetine (CYMBALTA) 30 MG capsule Take 1 capsule (30 mg total) by mouth once daily.    ibuprofen (ADVIL,MOTRIN) 600 MG tablet Take 1 tablet (600 mg total) by mouth every 6 (six) hours as needed for Pain.    insulin (LANTUS SOLOSTAR U-100 INSULIN) glargine 100 units/mL SubQ pen Inject 25 Units into the skin once daily.    insulin aspart U-100 (NOVOLOG FLEXPEN U-100 INSULIN) 100 unit/mL (3 mL) InPn pen 7 units in the am, then 12 units lunch and supper; sliding scale with  "correction 1:50 >150 Max dose 50 units    LIDOcaine (XYLOCAINE) 5 % Oint ointment Apply 2 g topically 2 (two) times daily as needed.    lisinopriL 10 MG tablet Take 1 tablet (10 mg total) by mouth once daily.    mupirocin (BACTROBAN) 2 % ointment Apply topically 3 (three) times daily.    pantoprazole (PROTONIX) 40 MG tablet Take 1 tablet (40 mg total) by mouth once daily.    pen needle, diabetic 31 gauge x 3/16" Ndle Use 4 times a day for insulin injection (BD Mini)    pregabalin (LYRICA) 50 MG capsule Take 50 mg by mouth 2 (two) times daily.    traZODone (DESYREL) 100 MG tablet Take 100 mg by mouth every evening.    TRUEPLUS LANCETS 28 gauge Misc Apply topically 4 (four) times daily.     Family History       Problem Relation (Age of Onset)    Diabetes Mother, Father    Heart disease Father          Tobacco Use    Smoking status: Former     Current packs/day: 0.00     Types: Cigarettes     Quit date: 5/15/2023     Years since quittin.0     Passive exposure: Past    Smokeless tobacco: Never   Substance and Sexual Activity    Alcohol use: Never    Drug use: Never    Sexual activity: Yes     Partners: Female     Birth control/protection: Condom     Review of Systems   Constitutional:  Negative for chills, fatigue and fever.   Respiratory:  Negative for shortness of breath.    Cardiovascular:  Negative for chest pain and palpitations.   Gastrointestinal:  Negative for blood in stool, nausea and vomiting.   Endocrine: Negative for polyuria.   Genitourinary:  Negative for difficulty urinating and hematuria.   Musculoskeletal:  Positive for myalgias.   Neurological:  Negative for headaches.     Objective:     Vital Signs (Most Recent):  Temp: 98 °F (36.7 °C) (24 07)  Pulse: 89 (24 0708)  Resp: 18 (24 0708)  BP: 120/83 (24 0708)  SpO2: 99 % (24 0800) Vital Signs (24h Range):  Temp:  [97.7 °F (36.5 °C)-98 °F (36.7 °C)] 98 °F (36.7 °C)  Pulse:  [76-93] 89  Resp:  [18] 18  SpO2:  [98 %-100 " %] 99 %  BP: (110-131)/(68-84) 120/83     Weight: 72.3 kg (159 lb 6.3 oz)  Body mass index is 21.03 kg/m².    Physical Exam  Constitutional:       General: He is not in acute distress.     Appearance: He is not toxic-appearing or diaphoretic.   HENT:      Head: Atraumatic.      Mouth/Throat:      Mouth: Mucous membranes are moist.   Eyes:      Extraocular Movements: Extraocular movements intact.      Conjunctiva/sclera: Conjunctivae normal.   Cardiovascular:      Rate and Rhythm: Normal rate and regular rhythm.   Pulmonary:      Effort: Pulmonary effort is normal.      Breath sounds: Normal breath sounds. No wheezing or rhonchi.   Abdominal:      General: Bowel sounds are normal. There is no distension.      Palpations: Abdomen is soft.      Tenderness: There is no abdominal tenderness.   Musculoskeletal:         General: Normal range of motion.      Right lower leg: No edema.      Left lower leg: No edema.   Skin:     General: Skin is warm.      Comments: No observable abrasions, lacerations or lesions on exposed skin.   Neurological:      General: No focal deficit present.      Mental Status: He is alert and oriented to person, place, and time.      Comments: Speech clear and understandable.   Psychiatric:         Thought Content: Thought content normal.            Significant Labs: All pertinent labs within the past 24 hours have been reviewed.  Recent Lab Results  (Last 5 results in the past 24 hours)        06/02/24  0737   06/02/24  0352   06/01/24  1944   06/01/24  1628   06/01/24  1058        Albumin/Globulin Ratio   1.1             Albumin   2.9             ALP   91             ALT   26             Anion Gap   5.0             AST   23             Baso #   0.03             Basophil %   0.5             BILIRUBIN TOTAL   0.3             BUN   14.6             BUN/CREAT RATIO   17             Calcium   8.9             Chloride   103             CO2   30             Creatinine   0.86             eGFR   >60              Eos #   0.43             Eos %   7.7             Globulin, Total   2.6             Glucose   56             Hematocrit   34.9             Hemoglobin   11.8             Immature Grans (Abs)   0.02             Immature Granulocytes   0.4             Lymph #   2.37             LYMPH %   42.5             MCH   27.9             MCHC   33.8             MCV   82.5             Mono #   0.40             Mono %   7.2             MPV   9.9             Neut #   2.32             Neut %   41.7             nRBC   0.0             Platelet Count   240             POCT Glucose 79     184   111   94       Potassium   3.9             PROTEIN TOTAL   5.5             RBC   4.23             RDW   13.2             Sodium   138             WBC   5.57                                    Significant Imaging: I have reviewed all pertinent imaging results/findings within the past 24 hours.  Imaging Results              X-Ray Chest AP Portable (Final result)  Result time 05/30/24 06:04:51      Final result by Gay Hussein MD (05/30/24 06:04:51)                   Impression:      No acute cardiopulmonary abnormality.      Electronically signed by: Gay Hussein  Date:    05/30/2024  Time:    06:04               Narrative:    EXAMINATION:  XR CHEST AP PORTABLE    CLINICAL HISTORY:  Chest pain;    TECHNIQUE:  Single frontal view of the chest was performed.    COMPARISON:  05/23/2024    FINDINGS:  LINES AND TUBES: EKG/telemetry leads overlie the chest.    MEDIASTINUM AND MARGOT: The cardiac silhouette is normal.    LUNGS: No lobar consolidation. No edema.    PLEURA:No pleural effusion. No pneumothorax.    BONES: Prior left clavicle ORIF.  Old left rib and scapula deformities.  Probable low-grade cartilaginous lesion at the proximal right humerus.                        Wet Read by Cesar Brock MD (05/30/24 01:01:04, [facility] Dept, Emergency Medicine)    No acute pulmonary process                                       Assessment/Plan:     Active Diagnoses:    Diagnosis Date Noted POA    PRINCIPAL PROBLEM:  Diabetic ketoacidosis without coma associated with type 1 diabetes mellitus [E10.10] 06/25/2023 Yes    Malnutrition [E46] 05/24/2024 Yes      Problems Resolved During this Admission:     DM Type 1  DKA, resolved  High anion gap metabolic acidosis, resolved  Acute Kidney Injury - resolved  Hyponatremia - resolved  Hyperkalemia - resolved  - Home insulin regimen: levemir 25u qhs and aspart 7u with sliding scale at mealtime.  - Continue home aspart 7u TIDWM  - Moderate SSI  - CBG dropped to 56 at 4am on home dose of Lantus 25u nightly. Will decrease to 20u tonight. Titrate as needed.     Hx of HTN  - Continue home Amlodipine 10 mg qd and Lisinopril 10mg qd.   - PRN antihypertensives ordered.    Chronic Lumbar Back pain  - Pain medication as needed    Insomnia   - On home trazodone 100mg nightly. Decrease to 50mg per patient request. Can return to 100mg dosage if needed     Hx of illicit drug use  - Advised to discontinue methamphetamine use  - Patient interested in drug rehab. CM consulted, inpatient rehab placement pending.        Diet: Diabetic   DVT Prophylaxis: Heparin   GI Prophylaxis: none     Disposition: Per Arielle SÁNCHEZ at [facility] in [place] stated that pt requires a facility with attached medical unit. CM to send new referrals. Patient considering outpatient rehab if unable to obtain inpatient placement.       Milana Akhtar DO  [facility]  [facility]

## 2024-06-03 VITALS
BODY MASS INDEX: 21.12 KG/M2 | WEIGHT: 159.38 LBS | OXYGEN SATURATION: 99 % | HEART RATE: 92 BPM | TEMPERATURE: 97 F | HEIGHT: 73 IN | RESPIRATION RATE: 18 BRPM | SYSTOLIC BLOOD PRESSURE: 117 MMHG | DIASTOLIC BLOOD PRESSURE: 74 MMHG

## 2024-06-03 PROBLEM — N17.9 AKI (ACUTE KIDNEY INJURY): Status: ACTIVE | Noted: 2024-06-03

## 2024-06-03 LAB
ALBUMIN SERPL-MCNC: 2.9 G/DL (ref 3.5–5)
ALBUMIN/GLOB SERPL: 1.1 RATIO (ref 1.1–2)
ALP SERPL-CCNC: 84 UNIT/L (ref 40–150)
ALT SERPL-CCNC: 31 UNIT/L (ref 0–55)
ANION GAP SERPL CALC-SCNC: 5 MEQ/L
AST SERPL-CCNC: 27 UNIT/L (ref 5–34)
BASOPHILS # BLD AUTO: 0.04 X10(3)/MCL
BASOPHILS NFR BLD AUTO: 0.6 %
BILIRUB SERPL-MCNC: 0.3 MG/DL
BUN SERPL-MCNC: 21.5 MG/DL (ref 8.9–20.6)
CALCIUM SERPL-MCNC: 9.2 MG/DL (ref 8.4–10.2)
CHLORIDE SERPL-SCNC: 102 MMOL/L (ref 98–107)
CO2 SERPL-SCNC: 30 MMOL/L (ref 22–29)
CREAT SERPL-MCNC: 1.04 MG/DL (ref 0.73–1.18)
CREAT/UREA NIT SERPL: 21
EOSINOPHIL # BLD AUTO: 0.43 X10(3)/MCL (ref 0–0.9)
EOSINOPHIL NFR BLD AUTO: 7 %
ERYTHROCYTE [DISTWIDTH] IN BLOOD BY AUTOMATED COUNT: 13.8 % (ref 11.5–17)
GFR SERPLBLD CREATININE-BSD FMLA CKD-EPI: >60 ML/MIN/1.73/M2
GLOBULIN SER-MCNC: 2.7 GM/DL (ref 2.4–3.5)
GLUCOSE SERPL-MCNC: 98 MG/DL (ref 74–100)
HCT VFR BLD AUTO: 35.2 % (ref 42–52)
HGB BLD-MCNC: 11.5 G/DL (ref 14–18)
IMM GRANULOCYTES # BLD AUTO: 0.01 X10(3)/MCL (ref 0–0.04)
IMM GRANULOCYTES NFR BLD AUTO: 0.2 %
LYMPHOCYTES # BLD AUTO: 2.65 X10(3)/MCL (ref 0.6–4.6)
LYMPHOCYTES NFR BLD AUTO: 42.9 %
MCH RBC QN AUTO: 27.4 PG (ref 27–31)
MCHC RBC AUTO-ENTMCNC: 32.7 G/DL (ref 33–36)
MCV RBC AUTO: 83.8 FL (ref 80–94)
MONOCYTES # BLD AUTO: 0.5 X10(3)/MCL (ref 0.1–1.3)
MONOCYTES NFR BLD AUTO: 8.1 %
NEUTROPHILS # BLD AUTO: 2.55 X10(3)/MCL (ref 2.1–9.2)
NEUTROPHILS NFR BLD AUTO: 41.2 %
NRBC BLD AUTO-RTO: 0 %
PLATELET # BLD AUTO: 235 X10(3)/MCL (ref 130–400)
PMV BLD AUTO: 10.2 FL (ref 7.4–10.4)
POCT GLUCOSE: 133 MG/DL (ref 70–110)
POCT GLUCOSE: 247 MG/DL (ref 70–110)
POCT GLUCOSE: 250 MG/DL (ref 70–110)
POCT GLUCOSE: 259 MG/DL (ref 70–110)
POCT GLUCOSE: 45 MG/DL (ref 70–110)
POCT GLUCOSE: 46 MG/DL (ref 70–110)
POCT GLUCOSE: 67 MG/DL (ref 70–110)
POCT GLUCOSE: 99 MG/DL (ref 70–110)
POTASSIUM SERPL-SCNC: 4.7 MMOL/L (ref 3.5–5.1)
PROT SERPL-MCNC: 5.6 GM/DL (ref 6.4–8.3)
RBC # BLD AUTO: 4.2 X10(6)/MCL (ref 4.7–6.1)
SODIUM SERPL-SCNC: 137 MMOL/L (ref 136–145)
WBC # SPEC AUTO: 6.18 X10(3)/MCL (ref 4.5–11.5)

## 2024-06-03 PROCEDURE — 36415 COLL VENOUS BLD VENIPUNCTURE: CPT

## 2024-06-03 PROCEDURE — 63600175 PHARM REV CODE 636 W HCPCS: Performed by: STUDENT IN AN ORGANIZED HEALTH CARE EDUCATION/TRAINING PROGRAM

## 2024-06-03 PROCEDURE — 99900035 HC TECH TIME PER 15 MIN (STAT)

## 2024-06-03 PROCEDURE — 80053 COMPREHEN METABOLIC PANEL: CPT

## 2024-06-03 PROCEDURE — 25000003 PHARM REV CODE 250: Performed by: STUDENT IN AN ORGANIZED HEALTH CARE EDUCATION/TRAINING PROGRAM

## 2024-06-03 PROCEDURE — 94761 N-INVAS EAR/PLS OXIMETRY MLT: CPT

## 2024-06-03 PROCEDURE — 63600175 PHARM REV CODE 636 W HCPCS

## 2024-06-03 PROCEDURE — 85025 COMPLETE CBC W/AUTO DIFF WBC: CPT | Performed by: STUDENT IN AN ORGANIZED HEALTH CARE EDUCATION/TRAINING PROGRAM

## 2024-06-03 RX ORDER — INSULIN ASPART 100 [IU]/ML
INJECTION, SOLUTION INTRAVENOUS; SUBCUTANEOUS
Qty: 15 ML | Refills: 10 | Status: SHIPPED | OUTPATIENT
Start: 2024-06-03 | End: 2024-06-03

## 2024-06-03 RX ORDER — INSULIN GLARGINE 100 [IU]/ML
20 INJECTION, SOLUTION SUBCUTANEOUS NIGHTLY
Qty: 6 ML | Refills: 2 | Status: SHIPPED | OUTPATIENT
Start: 2024-06-03 | End: 2024-09-01

## 2024-06-03 RX ORDER — INSULIN ASPART 100 [IU]/ML
INJECTION, SOLUTION INTRAVENOUS; SUBCUTANEOUS
Qty: 15 ML | Refills: 10 | Status: SHIPPED | OUTPATIENT
Start: 2024-06-03

## 2024-06-03 RX ADMIN — DULOXETINE HYDROCHLORIDE 30 MG: 30 CAPSULE, DELAYED RELEASE ORAL at 08:06

## 2024-06-03 RX ADMIN — INSULIN ASPART 4 UNITS: 100 INJECTION, SOLUTION INTRAVENOUS; SUBCUTANEOUS at 04:06

## 2024-06-03 RX ADMIN — INSULIN ASPART 7 UNITS: 100 INJECTION, SOLUTION INTRAVENOUS; SUBCUTANEOUS at 04:06

## 2024-06-03 RX ADMIN — PREGABALIN 50 MG: 50 CAPSULE ORAL at 08:06

## 2024-06-03 RX ADMIN — LISINOPRIL 10 MG: 10 TABLET ORAL at 08:06

## 2024-06-03 RX ADMIN — AMLODIPINE BESYLATE 10 MG: 10 TABLET ORAL at 08:06

## 2024-06-03 RX ADMIN — MUPIROCIN: 20 OINTMENT TOPICAL at 08:06

## 2024-06-03 RX ADMIN — BUPROPION HYDROCHLORIDE 300 MG: 150 TABLET, EXTENDED RELEASE ORAL at 08:06

## 2024-06-03 RX ADMIN — INSULIN ASPART 7 UNITS: 100 INJECTION, SOLUTION INTRAVENOUS; SUBCUTANEOUS at 08:06

## 2024-06-03 RX ADMIN — HEPARIN SODIUM 5000 UNITS: 5000 INJECTION, SOLUTION INTRAVENOUS; SUBCUTANEOUS at 08:06

## 2024-06-03 NOTE — DISCHARGE SUMMARY
[facility] Discharge Summary    Admitting Physician: Munira Linn MD  Attending Physician: Emilio Ramirez MD  Date of Admit: 5/29/2024  Date of Discharge: 6/3/2024    Condition: Stable  Outcome: Condition has improved and patient is now ready for discharge.  DISPOSITION: Home or Self Care          Discharge Diagnoses     Patient Active Problem List   Diagnosis    Diabetic ketoacidosis without coma associated with type 1 diabetes mellitus    Diabetic polyneuropathy    Severe malnutrition    Neuropathy    Moderate malnutrition    Melena    Diarrhea    Encounter for diabetic foot exam    Pain    Hypertension    Chronic midline low back pain without sciatica    Malnutrition    RODOLFO (acute kidney injury)       Principal Problem:  Diabetic ketoacidosis without coma associated with type 1 diabetes mellitus    Consultants and Procedures     Consultants:  IP CONSULT TO INTERNAL MEDICINE  IP CONSULT TO SOCIAL WORK/CASE MANAGEMENT  IP CONSULT TO REGISTERED DIETITIAN/NUTRITIONIST  IP CONSULT TO SOCIAL WORK/CASE MANAGEMENT    Procedures:   * No surgery found *     Brief Admission History      43 year old male with PMH of Type 1 diabetes mellitus, HTN, history of drug abuse presented to the [facility] ED 5/29/24 with complaints of nausea, generalized aches/pain and elevated blood sugar. Decreased PO intake. Admitted to smoking Meth. Denied any dysphagia, GERD or nausea. Patient recently discharged from [facility] on 5.26.24 for DKA.     In the ED, patient tachycardic but afebrile.  CBC unremarkable.  CMP notable for hyponatremia, hyperkalemia, anion gap metabolic acidosis with anion gap of 26.  Elevated renal indices with BUN/creatinine at 45/2.  LFTs within normal limits.  Beta hydroxybutyrate noted at 8.30.  Lactic Acid and troponin within normal limits.  VBG remarkable for 7.40/28/38/17. Urinalysis pending. Medicine consulted for admission and management of DKA.     Hospital Course with Pertinent Findings     Admitted to  "ICU 5/30/24 for DKA with high anion gap MA, hyponatremia, hyperkalemia, RODOLFO. DKA protocol initiated with insulin drip and IVF. Subsequently transitioned to SQ insulin with improvement in acidosis, CBGs, electrolytes and renal indices. Downgraded to floor on 5/31/24. Had episode of hypoglycemia with CBG of 56; insulin regimen adjusted to Levemir 20qhs, aspart 7u TID with meals and moderate SSI; improvement in CBGs to within acceptable range. Patient expressed interest in drug rehab. [place] to contact to establish outpatient rehab upon hospital discharge. On day of discharge, afebrile and VSS. CBGs 100s. Labs improved to baseline and electrolyte derangements/RODOLFO resolved. No complaints. Insulin regimen adjusted as stated below. Stable for discharge with pt to follow up with PCP for long term diabetes management.       Discharge physical exam:  Vitals  BP: 107/70  Temp: 98.4 °F (36.9 °C)  Temp Source: Oral  Pulse: 66  Resp: 18  SpO2: (!) 94 %  Height: 6' 1" (185.4 cm)  Weight: 72.3 kg (159 lb 6.3 oz)    Physical Exam  Vitals reviewed.   Constitutional:       General: He is not in acute distress.  HENT:      Head: Normocephalic and atraumatic.      Mouth/Throat:      Mouth: Mucous membranes are moist.   Eyes:      Extraocular Movements: Extraocular movements intact.   Cardiovascular:      Rate and Rhythm: Normal rate and regular rhythm.      Heart sounds: No murmur heard.  Pulmonary:      Effort: Pulmonary effort is normal. No respiratory distress.      Breath sounds: Normal breath sounds. No wheezing, rhonchi or rales.   Abdominal:      General: Abdomen is flat. Bowel sounds are normal. There is no distension.      Palpations: Abdomen is soft.      Tenderness: There is no abdominal tenderness.   Musculoskeletal:      Cervical back: Normal range of motion and neck supple.      Right lower leg: No edema.      Left lower leg: No edema.   Skin:     General: Skin is warm.      Capillary Refill: Capillary refill takes less " than 2 seconds.   Neurological:      Mental Status: He is alert and oriented to person, place, and time.           TIME SPENT ON DISCHARGE: 60 minutes    Discharge Medications        Medication List        CHANGE how you take these medications      insulin aspart U-100 100 unit/mL (3 mL) Inpn pen  Commonly known as: NovoLOG Flexpen U-100 Insulin  5 units in the am, 7 units with lunch and 7 units with supper. Sliding scale with correction 1:50 >150 Max dose 50 units.  What changed: additional instructions     LANTUS SOLOSTAR U-100 INSULIN 100 unit/mL (3 mL) Inpn pen  Generic drug: insulin glargine U-100 (Lantus)  Inject 20 Units into the skin every evening.  What changed:   how much to take  when to take this            CONTINUE taking these medications      albuterol 90 mcg/actuation inhaler  Commonly known as: PROVENTIL/VENTOLIN HFA     amLODIPine 10 MG tablet  Commonly known as: NORVASC  Take 1 tablet (10 mg total) by mouth once daily.     * blood sugar diagnostic Strp  To check BG 4 times daily, to use with insurance preferred meter     * blood sugar diagnostic Strp  To check BG 4 times daily, to use with insurance preferred meter     blood-glucose meter kit  To check BG 4 times daily, to use with insurance preferred meter     buPROPion 300 MG 24 hr tablet  Commonly known as: WELLBUTRIN XL     clonazePAM 2 MG Tab  Commonly known as: KlonoPIN     DEXCOM G7  Misc  Generic drug: blood-glucose meter,continuous  Dexcom G7  uses directed     DEXCOM G7 SENSOR Trinh  Generic drug: blood-glucose sensor  Use every 10 days as directed     DULoxetine 30 MG capsule  Commonly known as: CYMBALTA  Take 1 capsule (30 mg total) by mouth once daily.     ibuprofen 600 MG tablet  Commonly known as: ADVIL,MOTRIN  Take 1 tablet (600 mg total) by mouth every 6 (six) hours as needed for Pain.     LIDOcaine 5 % Oint ointment  Commonly known as: XYLOCAINE     lisinopriL 10 MG tablet  Take 1 tablet (10 mg total) by mouth once  "daily.     mupirocin 2 % ointment  Commonly known as: BACTROBAN  Apply topically 3 (three) times daily.     pantoprazole 40 MG tablet  Commonly known as: PROTONIX  Take 1 tablet (40 mg total) by mouth once daily.     pen needle, diabetic 31 gauge x 3/16" Ndle  Use 4 times a day for insulin injection (BD Mini)     pregabalin 50 MG capsule  Commonly known as: LYRICA     traZODone 100 MG tablet  Commonly known as: DESYREL     TRUEPLUS LANCETS 28 gauge Misc  Generic drug: lancets           * This list has 2 medication(s) that are the same as other medications prescribed for you. Read the directions carefully, and ask your doctor or other care provider to review them with you.                   Where to Get Your Medications        These medications were sent to [address] [address]      Phone: [phone]   insulin aspart U-100 100 unit/mL (3 mL) Inpn pen  LANTUS SOLOSTAR U-100 INSULIN 100 unit/mL (3 mL) Inpn pen         Discharge Information:     Patient instructed to adjust insulin regimen as follows:  Lantus 20 units q.h.s. and aspart 5u with breakfast, 7u with lunch and 7u with dinner with sliding scale.  As outlined above in med rec. Counseled on diabetic diet.   Patient to follow-up with PCP for further diabetic management upon discharge. [place] to contact patient to set up outpatient rehabilitation services upon discharge. Patient verbalized understanding of plan of care.  Stable for discharge at this time.        Khushboo Crowe MD  [facility], [facility]        "

## 2024-06-03 NOTE — PROGRESS NOTES
Received call from Arielle Stewart, only accepting facility is in [place] & pt does not want to go that far. She will set pt up with outpatient therapy & follow up as needed.

## 2024-07-11 ENCOUNTER — TELEPHONE (OUTPATIENT)
Dept: ENDOCRINOLOGY | Facility: CLINIC | Age: 44
End: 2024-07-11

## 2024-07-11 ENCOUNTER — OFFICE VISIT (OUTPATIENT)
Dept: ENDOCRINOLOGY | Facility: CLINIC | Age: 44
End: 2024-07-11
Payer: MEDICAID

## 2024-07-11 VITALS
TEMPERATURE: 98 F | RESPIRATION RATE: 16 BRPM | HEART RATE: 73 BPM | WEIGHT: 172 LBS | DIASTOLIC BLOOD PRESSURE: 83 MMHG | SYSTOLIC BLOOD PRESSURE: 138 MMHG | BODY MASS INDEX: 22.8 KG/M2 | HEIGHT: 73 IN

## 2024-07-11 DIAGNOSIS — E10.65 UNCONTROLLED TYPE 1 DIABETES MELLITUS WITH HYPERGLYCEMIA: Primary | ICD-10-CM

## 2024-07-11 DIAGNOSIS — G62.9 NEUROPATHY: ICD-10-CM

## 2024-07-11 PROCEDURE — 3079F DIAST BP 80-89 MM HG: CPT | Mod: CPTII,,, | Performed by: NURSE PRACTITIONER

## 2024-07-11 PROCEDURE — 99215 OFFICE O/P EST HI 40 MIN: CPT | Mod: PBBFAC | Performed by: NURSE PRACTITIONER

## 2024-07-11 PROCEDURE — 3046F HEMOGLOBIN A1C LEVEL >9.0%: CPT | Mod: CPTII,,, | Performed by: NURSE PRACTITIONER

## 2024-07-11 PROCEDURE — 3075F SYST BP GE 130 - 139MM HG: CPT | Mod: CPTII,,, | Performed by: NURSE PRACTITIONER

## 2024-07-11 PROCEDURE — 3066F NEPHROPATHY DOC TX: CPT | Mod: CPTII,,, | Performed by: NURSE PRACTITIONER

## 2024-07-11 PROCEDURE — 3062F POS MACROALBUMINURIA REV: CPT | Mod: CPTII,,, | Performed by: NURSE PRACTITIONER

## 2024-07-11 PROCEDURE — 3008F BODY MASS INDEX DOCD: CPT | Mod: CPTII,,, | Performed by: NURSE PRACTITIONER

## 2024-07-11 PROCEDURE — 1159F MED LIST DOCD IN RCRD: CPT | Mod: CPTII,,, | Performed by: NURSE PRACTITIONER

## 2024-07-11 PROCEDURE — 1160F RVW MEDS BY RX/DR IN RCRD: CPT | Mod: CPTII,,, | Performed by: NURSE PRACTITIONER

## 2024-07-11 PROCEDURE — 99214 OFFICE O/P EST MOD 30 MIN: CPT | Mod: S$PBB,,, | Performed by: NURSE PRACTITIONER

## 2024-07-11 PROCEDURE — 4010F ACE/ARB THERAPY RXD/TAKEN: CPT | Mod: CPTII,,, | Performed by: NURSE PRACTITIONER

## 2024-07-11 RX ORDER — PEN NEEDLE, DIABETIC 30 GX3/16"
NEEDLE, DISPOSABLE MISCELLANEOUS
Qty: 120 EACH | Refills: 11 | Status: SHIPPED | OUTPATIENT
Start: 2024-07-11

## 2024-07-11 RX ORDER — BLOOD-GLUCOSE SENSOR
EACH MISCELLANEOUS
Qty: 3 EACH | Refills: 5 | Status: SHIPPED | OUTPATIENT
Start: 2024-07-11

## 2024-07-11 RX ORDER — INSULIN GLARGINE 100 [IU]/ML
26 INJECTION, SOLUTION SUBCUTANEOUS NIGHTLY
Qty: 15 ML | Refills: 6 | Status: SHIPPED | OUTPATIENT
Start: 2024-07-11

## 2024-07-11 RX ORDER — FLUOXETINE HYDROCHLORIDE 40 MG/1
40 CAPSULE ORAL
COMMUNITY
Start: 2024-05-24

## 2024-07-11 RX ORDER — INSULIN ASPART 100 [IU]/ML
INJECTION, SOLUTION INTRAVENOUS; SUBCUTANEOUS
Qty: 15 ML | Refills: 10 | Status: SHIPPED | OUTPATIENT
Start: 2024-07-11

## 2024-07-11 NOTE — PROGRESS NOTES
Subjective     Patient ID: Jose Francisco Romero is a 43 y.o. male.    Chief Complaint: Diabetes    Endocrine clinic note 04/12/2023:  42 year male scheduled today as new patient referral to endocrine clinic for history of uncontrolled diabetes type 1 with multiple episodes of DKA.  Patient was recently hospitalized with DKA one-week ago and reports to clinic today as new patient referral.  Patient states he was diagnosed with type 1 diabetes about 5 years ago when he went in the hospital with DKA patient has never been establish with an endocrinologist in his currently on basal insulin with a sliding scale with prandial insulin that he checks his sugar after eating and gives insulin after blood glucoses elevated.  Patient's PCP had written a Dexcom patient did not know how to use patient has not with him today Dexcom was placed on patient educated on placement by Noemí bee LPN.  Patient has not had formal education on type 1 diabetes or insulin.  Patient states he checks his CBGS 3 to 4 times a day and states his lowest blood glucoses 80 in the morning blood sugars range as high as the 400s.  He states occasionally forgets his basal insulin at night.  Discussed with patient in depth today that we will check a C-peptide and becky 65 also be referring him to [facility] for insulin medication will adjust his regimen today.      Endocrine clinic note 05/22/2023:  42 year male scheduled today as one-month follow-up for endocrine clinic for insulin titration for history of uncontrolled type 1 diabetes with multiple episodes of DKA.  Previously patient's regimen was adjusted and Dexcom was restarted. Dexcom interpretation 05/06/2023-05/19/2023.  Days with CGM data 100% 14/14 day.  Insulin data is lacking limiting interpretation.  Previously patient was having volatile blood sugars and treating glucose after eating patient's since has been giving insulin before eating and has had more stable blood sugars at times he  has prandial spikes at night patient states he is eating higher carb meals with rice and potatoes at night.  Patient has 2 episodes of hypoglycemia around lunchtime he states he does not eat a large meal like at nighttime.  Decrease lunchtime insulin to 10 units patient has appointment with [facility] tomorrow will start carb counting and will work towards a pump.      Endocrine clinic note 07/18/2023:  42-year-old male scheduled today for endocrine clinic follow-up.  History of uncontrolled type 1 diabetes with recent DKA hospitalized for sepsis of unknown origin.  Patient was hospitalized with a white blood cell count on 06/29/2023 of 38.21 with sepsis of unknown origin.  Lumbar puncture was attempted unsuccessful. Pt returned for out pt lumbar puncture CSF containing protein 102.8 high in glucose 100 high indicating CIDP.  On admit white blood cell 38.2.  Patient states he is lost over 30 lb and also his extreme pain through his extremities and is weak and having to walk with a cane.  Patient has weakness difficulty walking and 20-30 pound weight loss and severe fatigue unable to sleep due to pain.   Call was made Alexia Jacinto NP case was reviewed possible CIDP also discussed since pt is extremely symptomatic patient may need to be admitted IVIG treatment.  Type 1 diabetes recent A1c 10.7.  Patient is not on a Dexcom due to shipping issues started on G7 but G6 shipped. Patient was given 2 samples of Dexcom G7 today until order is figured out. Medicine team was called patient was admitted the hospital.      Endocrine clinic note 1/11/2024:  43-year-old male scheduled today for endocrine clinic follow-up.  History of uncontrolled type 1 diabetes on a Dexcom G7.  Uncontrolled type 1 diabetes current A1C 7.4 Previous A1C 10.7.  Patient is currently on [facility] and has an appointment today. Patient has a Dexcom interpretation 12/09/2023-12/22/2023.  Days with CGM data 100% 14/14 days.  Average glucose  197.  Time in range 27% very high, 25% high, 43% in range, 3% low, 2% very low.  Insulin data is lacking limiting interpretation at times.  Patient has hypoglycemia in the early morning hours and at time after prandial dosing indicating at times patient basal rate is too high.  On other days patient has prandial spikes for multiple hours getting prandial insulin on days where patient forgets prandial insulin patient does not have hypoglycemia hypoglycemia resolved patient had reduced basal insulin. Patient will meet with [facility] today and will start carb counting and will work through towards an insulin pump.  Neuropathy patient is currently established in [facility] seen by Dr. 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  Foot exam performed today see documentation patient reports chronic neuropathic pain and pain throughout his whole-body.     Endocrine clinic note 07/11/2024:  43-year-old male scheduled today for endocrine clinic follow-up.  History of uncontrolled type 1 diabetes on a Dexcom G7.  Recent A1c 9.6.  Patient was hospitalized one-month ago DKA.  Patient today and has not been wearing his Dexcom in over 2 months.  He states he had a bad batch was unable to connect his .  Patient has a Dexcom G7 today applied on patient was patient was connected to his  successfully.  Patient has not been checking CBGS do it he denies any symptoms of hypoglycemia.  will restart patient's Dexcom G7 today patient was asking to returned to [facility] to work on getting his blood sugar under control.  Will contact [facility] to reestablish patient's care.                     Review of Systems   Constitutional:  Negative for activity change, appetite change, chills and fatigue.   HENT: Negative.  Negative for dental problem, hearing loss, rhinorrhea, sneezing and goiter.    Eyes: Negative.  Negative for photophobia and  visual disturbance.   Respiratory: Negative.  Negative for cough, chest tightness and shortness of breath.    Cardiovascular:  Negative for chest pain, palpitations and leg swelling.   Gastrointestinal: Negative.  Negative for abdominal distention, abdominal pain, change in bowel habit, constipation, diarrhea, nausea and vomiting.   Endocrine: Negative.  Negative for cold intolerance, heat intolerance, polydipsia, polyphagia and polyuria.   Genitourinary: Negative.  Negative for difficulty urinating and erectile dysfunction.   Musculoskeletal:  Negative for back pain, joint swelling, leg pain, myalgias and joint deformity.   Integumentary:  Negative for color change, pallor and rash. Negative.   Allergic/Immunologic: Negative.  Negative for environmental allergies, food allergies and frequent infections.   Neurological: Negative.  Negative for tremors, syncope, headaches and coordination difficulties.   Hematological: Negative.  Does not bruise/bleed easily.   Psychiatric/Behavioral:  Negative for agitation and behavioral problems. The patient is not nervous/anxious and is not hyperactive.           Objective     Physical Exam  Constitutional:       General: He is not in acute distress.     Appearance: Normal appearance. He is normal weight. He is not ill-appearing.   HENT:      Head: Normocephalic.      Right Ear: External ear normal.      Left Ear: External ear normal.      Nose: No congestion or rhinorrhea.      Mouth/Throat:      Mouth: Mucous membranes are moist.      Pharynx: Oropharynx is clear.   Eyes:      Conjunctiva/sclera: Conjunctivae normal.      Pupils: Pupils are equal, round, and reactive to light.   Neck:      Thyroid: No thyroid mass, thyromegaly or thyroid tenderness.   Cardiovascular:      Rate and Rhythm: Normal rate and regular rhythm.      Pulses: Normal pulses.      Heart sounds: Normal heart sounds. No murmur heard.  Pulmonary:      Effort: Pulmonary effort is normal. No respiratory  distress.      Breath sounds: Normal breath sounds.   Abdominal:      General: Abdomen is flat. Bowel sounds are normal. There is no distension.      Palpations: Abdomen is soft.      Tenderness: There is no abdominal tenderness.   Genitourinary:     Testes: Normal.   Musculoskeletal:         General: Normal range of motion.      Cervical back: Normal range of motion and neck supple.      Right lower leg: No edema.      Left lower leg: No edema.   Feet:      Right foot:      Skin integrity: Skin integrity normal.      Left foot:      Skin integrity: Skin integrity normal.   Lymphadenopathy:      Cervical: No cervical adenopathy.   Skin:     General: Skin is warm and dry.      Coloration: Skin is not jaundiced or pale.   Neurological:      General: No focal deficit present.      Mental Status: He is alert and oriented to person, place, and time.      Motor: No weakness.      Coordination: Coordination normal.      Gait: Gait normal.   Psychiatric:         Mood and Affect: Mood normal.         Behavior: Behavior normal.         Thought Content: Thought content normal.         Judgment: Judgment normal.            Assessment and Plan     1. Uncontrolled type 1 diabetes mellitus with hyperglycemia  Previous A1C 9.6    A1C goal <7.0   Medications: Lantus 26 units once  day, NovoLog 7 units TID with meals w/ a correction scale 1:50 > 150 Changes: restart Dexcom today, scheduled with [facility] to adjust insulin   Follow ADA diet, avoid soda, simple sweets, and limit rice, breads and starches  Yearly Foot Exam: 01/11/2024   Yearly Eye Exam: 01/11/2024  Component Ref Range & Units 2 mo ago  (4/20/24) 6 mo ago  (12/22/23) 1 yr ago  (6/24/23) 2 yr ago  (12/1/21) 3 yr ago  (3/2/21) 3 yr ago  (10/26/20) 3 yr ago  (7/23/20)   Hemoglobin A1c <=7.0 % 9.6 High  7.4 High  10.4 High  11.6 High  R 11.8 High  R 11.4 High  R 10.3 High  R   -     insulin glargine U-100, Lantus, (LANTUS SOLOSTAR U-100 INSULIN) 100 unit/mL (3 mL) InPn  "pen; Inject 26 Units into the skin every evening.  -     insulin aspart U-100 (NOVOLOG FLEXPEN U-100 INSULIN) 100 unit/mL (3 mL) InPn pen; 7 units in the am, 7 units with lunch and 7 units with supper. Sliding scale with correction 1:50 >150 Max dose 50 units.  -     pen needle, diabetic 32 gauge x 5/32" Ndle; Use BD boaz needle 4 times a day for insulin injection  -     blood-glucose sensor (DEXCOM G7 SENSOR) Trinh; Use every 10 days as directed    Neuropathy  Currently on Lyrica              Follow up in about 4 months (around 11/11/2024) for type 1 diabetes, w/ Dexcom.    "

## 2024-08-05 RX ORDER — GLUCOSAM/CHON-MSM1/C/MANG/BOSW 500-416.6
TABLET ORAL
Qty: 100 EACH | Refills: 11 | Status: SHIPPED | OUTPATIENT
Start: 2024-08-05

## 2024-09-02 PROBLEM — N17.9 AKI (ACUTE KIDNEY INJURY): Status: RESOLVED | Noted: 2024-06-03 | Resolved: 2024-09-02

## 2024-09-02 PROBLEM — E10.10 DIABETIC KETOACIDOSIS WITHOUT COMA ASSOCIATED WITH TYPE 1 DIABETES MELLITUS: Status: RESOLVED | Noted: 2023-06-25 | Resolved: 2024-09-02

## 2024-09-09 ENCOUNTER — LAB VISIT (OUTPATIENT)
Dept: LAB | Facility: HOSPITAL | Age: 44
End: 2024-09-09
Attending: NURSE PRACTITIONER
Payer: MEDICAID

## 2024-09-09 ENCOUNTER — OFFICE VISIT (OUTPATIENT)
Dept: GASTROENTEROLOGY | Facility: CLINIC | Age: 44
End: 2024-09-09
Payer: MEDICAID

## 2024-09-09 VITALS
HEART RATE: 74 BPM | DIASTOLIC BLOOD PRESSURE: 86 MMHG | SYSTOLIC BLOOD PRESSURE: 134 MMHG | HEIGHT: 73 IN | TEMPERATURE: 98 F | RESPIRATION RATE: 12 BRPM | WEIGHT: 183.63 LBS | BODY MASS INDEX: 24.34 KG/M2

## 2024-09-09 DIAGNOSIS — Z83.719 FAMILY HISTORY OF COLON POLYPS, UNSPECIFIED: ICD-10-CM

## 2024-09-09 DIAGNOSIS — K29.50 CHRONIC GASTRITIS WITHOUT BLEEDING, UNSPECIFIED GASTRITIS TYPE: Primary | ICD-10-CM

## 2024-09-09 DIAGNOSIS — K59.00 CONSTIPATION, UNSPECIFIED CONSTIPATION TYPE: ICD-10-CM

## 2024-09-09 DIAGNOSIS — K29.70 GASTRITIS, PRESENCE OF BLEEDING UNSPECIFIED, UNSPECIFIED CHRONICITY, UNSPECIFIED GASTRITIS TYPE: ICD-10-CM

## 2024-09-09 DIAGNOSIS — Z80.0 FAMILY HISTORY OF COLON CANCER REQUIRING SCREENING COLONOSCOPY: ICD-10-CM

## 2024-09-09 DIAGNOSIS — K21.9 GASTROESOPHAGEAL REFLUX DISEASE, UNSPECIFIED WHETHER ESOPHAGITIS PRESENT: ICD-10-CM

## 2024-09-09 LAB
BASOPHILS # BLD AUTO: 0.1 X10(3)/MCL
BASOPHILS NFR BLD AUTO: 1.4 %
EOSINOPHIL # BLD AUTO: 0.87 X10(3)/MCL (ref 0–0.9)
EOSINOPHIL NFR BLD AUTO: 12 %
ERYTHROCYTE [DISTWIDTH] IN BLOOD BY AUTOMATED COUNT: 12.9 % (ref 11.5–17)
FERRITIN SERPL-MCNC: 54.51 NG/ML (ref 21.81–274.66)
HCT VFR BLD AUTO: 42 % (ref 42–52)
HGB BLD-MCNC: 13.7 G/DL (ref 14–18)
IMM GRANULOCYTES # BLD AUTO: 0.01 X10(3)/MCL (ref 0–0.04)
IMM GRANULOCYTES NFR BLD AUTO: 0.1 %
IRON SATN MFR SERPL: 28 % (ref 20–50)
IRON SERPL-MCNC: 73 UG/DL (ref 65–175)
LYMPHOCYTES # BLD AUTO: 2.1 X10(3)/MCL (ref 0.6–4.6)
LYMPHOCYTES NFR BLD AUTO: 29.1 %
MCH RBC QN AUTO: 26.8 PG (ref 27–31)
MCHC RBC AUTO-ENTMCNC: 32.6 G/DL (ref 33–36)
MCV RBC AUTO: 82.2 FL (ref 80–94)
MONOCYTES # BLD AUTO: 0.39 X10(3)/MCL (ref 0.1–1.3)
MONOCYTES NFR BLD AUTO: 5.4 %
NEUTROPHILS # BLD AUTO: 3.75 X10(3)/MCL (ref 2.1–9.2)
NEUTROPHILS NFR BLD AUTO: 52 %
NRBC BLD AUTO-RTO: 0 %
PLATELET # BLD AUTO: 259 X10(3)/MCL (ref 130–400)
PMV BLD AUTO: 9.9 FL (ref 7.4–10.4)
RBC # BLD AUTO: 5.11 X10(6)/MCL (ref 4.7–6.1)
TIBC SERPL-MCNC: 190 UG/DL (ref 69–240)
TIBC SERPL-MCNC: 263 UG/DL (ref 250–450)
TRANSFERRIN SERPL-MCNC: 245 MG/DL (ref 174–364)
WBC # BLD AUTO: 7.22 X10(3)/MCL (ref 4.5–11.5)

## 2024-09-09 PROCEDURE — 3075F SYST BP GE 130 - 139MM HG: CPT | Mod: CPTII,,, | Performed by: NURSE PRACTITIONER

## 2024-09-09 PROCEDURE — 36415 COLL VENOUS BLD VENIPUNCTURE: CPT

## 2024-09-09 PROCEDURE — 3008F BODY MASS INDEX DOCD: CPT | Mod: CPTII,,, | Performed by: NURSE PRACTITIONER

## 2024-09-09 PROCEDURE — 1160F RVW MEDS BY RX/DR IN RCRD: CPT | Mod: CPTII,,, | Performed by: NURSE PRACTITIONER

## 2024-09-09 PROCEDURE — 3066F NEPHROPATHY DOC TX: CPT | Mod: CPTII,,, | Performed by: NURSE PRACTITIONER

## 2024-09-09 PROCEDURE — 99214 OFFICE O/P EST MOD 30 MIN: CPT | Mod: S$PBB,,, | Performed by: NURSE PRACTITIONER

## 2024-09-09 PROCEDURE — 83540 ASSAY OF IRON: CPT

## 2024-09-09 PROCEDURE — 1159F MED LIST DOCD IN RCRD: CPT | Mod: CPTII,,, | Performed by: NURSE PRACTITIONER

## 2024-09-09 PROCEDURE — 3046F HEMOGLOBIN A1C LEVEL >9.0%: CPT | Mod: CPTII,,, | Performed by: NURSE PRACTITIONER

## 2024-09-09 PROCEDURE — 83550 IRON BINDING TEST: CPT

## 2024-09-09 PROCEDURE — 82728 ASSAY OF FERRITIN: CPT

## 2024-09-09 PROCEDURE — 3062F POS MACROALBUMINURIA REV: CPT | Mod: CPTII,,, | Performed by: NURSE PRACTITIONER

## 2024-09-09 PROCEDURE — 99215 OFFICE O/P EST HI 40 MIN: CPT | Mod: PBBFAC | Performed by: NURSE PRACTITIONER

## 2024-09-09 PROCEDURE — 85025 COMPLETE CBC W/AUTO DIFF WBC: CPT

## 2024-09-09 PROCEDURE — 3079F DIAST BP 80-89 MM HG: CPT | Mod: CPTII,,, | Performed by: NURSE PRACTITIONER

## 2024-09-09 PROCEDURE — 4010F ACE/ARB THERAPY RXD/TAKEN: CPT | Mod: CPTII,,, | Performed by: NURSE PRACTITIONER

## 2024-09-09 RX ORDER — POLYETHYLENE GLYCOL 3350 17 G/17G
17 POWDER, FOR SOLUTION ORAL DAILY PRN
Qty: 510 G | Refills: 3 | Status: SHIPPED | OUTPATIENT
Start: 2024-09-09

## 2024-09-09 RX ORDER — TADALAFIL 10 MG/1
10 TABLET ORAL DAILY PRN
COMMUNITY
Start: 2024-06-16

## 2024-09-09 RX ORDER — PANTOPRAZOLE SODIUM 40 MG/1
40 TABLET, DELAYED RELEASE ORAL DAILY
Qty: 90 TABLET | Refills: 1 | Status: SHIPPED | OUTPATIENT
Start: 2024-09-09

## 2024-09-09 NOTE — PROGRESS NOTES
Subjective:       Patient ID: Jose Francisco Romero is a 43 y.o. male.    Chief Complaint: Gastritis    43 year old male with history of uncontrolled diabetes, chronic inflammatory demyelinating polyradiculopathy (CIDP), HTN and polysubstance abuse who presents to GI Clinic unaccompanied after being referred for evaluation of gastritis.     He was initially seen by GI service in August of 2023 while admitted with DKA.  Prior to presentation to ED, he was having nausea, vomiting, diarrhea, dark colored emesis and dark stools per patient.  Symptoms resolved with treatment of DKA and there was no further evidence of overt GIB.  Hgb 12.7 g/dL on admission with slight downtrend to 11.5 g/dL. At that time he reported use of ibuprofen as an outpatient. EGD completed on 9/1/2023 revealed two superficial, non-bleeding esophageal ulcers at the GE junction and a small non-bleeding erosion at the pre-pyloric region of the stomach. Biopsies taken showed mild chronic gastritis, negative for h. Pylori and dysplasia. Recommendations for PPI daily and diabetes control.     He currently has reflux symptoms 1-2 times per week depending on what he eats especially tomato based foods or brown gravy. He reports worsening of reflux symptoms during his most recent hospitalization in May/June 2024.  He is no longer taking pantoprazole or any other reflux medications and symptoms resolve without intervention. He has a good appetite not but previously had a decreased appetite while hospitalized. Since then his appetite has increased and he has gained weight. He denies nausea, vomiting, hematemesis. He typically has 2-3 formed bowel movements daily. He reports some constipation and describes the urge to have a bowel movement but is unable to and when he does eventually does produce a BM, stools are hard. This occurs approximately 1-2 times per month. He is not currently taking any medications for constipation but he has taken Miralax in the past  which has been effective. He denies black or bloody stools, diarrhea and abdominal pain.     Denies ever having colonoscopy done. Denies regular NSAID use.  Maternal grandfather had colon polyps and colon cancer unknown age. Paternal grandfather had colon polyps.     Former smoker previously smoking 1 ppd but quit 4-5 months ago. Previous alcohol use consisted of drinking 5-6 drinks of crown per week for approximately 5-6 years. He stopped drinking 7 years ago. He has a history of IVDU (meth and heroin) which he stopped 7 years ago. He relapsed approximately 3 months ago and used meth. He was previously taking Klonopin which helped to subside the cravings for meth but the provider previously prescribing Klonopin is now no longer able to prescribe it according to patient. He started seeing another provider but in order to see that provider he must pay cash and this is becoming too costly and he is now unable to afford it. Advised patient to contact PCP, Dr. Antoine Rodriguez.       Previous endoscopy:    EGD on 9/1/2023 for coffee-ground emesis and melena per Dr. Daiana Chilel:  Two superficial esophageal ulcers with no bleeding and no stigmata of recent bleeding found at the GE junction with the largest lesion being 7 mm in dimension.  A single localized small erosion with no bleeding and no stigmata of recent bleeding found in the pre-pyloric region of the stomach.  Biopsies were taken for histology.  Recommendations: Await pathology results.  Use pantoprazole 40 mg p.o. daily.  Path:  Gastric biopsy: Mild chronic gastritis.  Negative for H.pylori and negative for dysplasia.        Review of patient's allergies indicates:   Allergen Reactions    Penicillins Shortness Of Breath    Sulfa (sulfonamide antibiotics)        Past Medical History:   Diagnosis Date    Diabetes mellitus type I     Drug addiction     clean since 2017; 2 day relapse 6/2024, clean 3 months    History of heart attack     2017    Hypertension         Past Surgical History:   Procedure Laterality Date    CLAVICLE SURGERY      ESOPHAGOGASTRODUODENOSCOPY Left 9/1/2023    Procedure: EGD (ESOPHAGOGASTRODUODENOSCOPY);  Surgeon: Daiana Chilel MD;  Location: [facility] ENDOSCOPY;  Service: Gastroenterology;  Laterality: Left;       Family History:   family history includes Colon cancer in his maternal grandfather; Colon polyps in his paternal grandfather; Diabetes in his father and mother; Heart disease in his father.    Social History:    reports that he quit smoking about 16 months ago. His smoking use included cigarettes. He has been exposed to tobacco smoke. He has never used smokeless tobacco. He reports that he does not currently use drugs after having used the following drugs: Heroin and Methamphetamines. He reports that he does not drink alcohol.    Review of Systems   Constitutional:  Negative for appetite change, fatigue and unexpected weight change.   HENT:  Negative for trouble swallowing.    Gastrointestinal:  Positive for constipation and reflux. Negative for abdominal distention, blood in stool, diarrhea, nausea and vomiting.   Genitourinary:  Negative for dysuria and hematuria.   Integumentary:  Negative for color change and pallor.   Neurological:  Negative for dizziness, weakness and light-headedness.           Objective:      Physical Exam  Vitals and nursing note reviewed.   Constitutional:       General: He is awake.   HENT:      Mouth/Throat:      Mouth: Mucous membranes are moist.   Eyes:      General: No scleral icterus.     Extraocular Movements: Extraocular movements intact.      Conjunctiva/sclera: Conjunctivae normal.      Pupils: Pupils are equal, round, and reactive to light.   Cardiovascular:      Rate and Rhythm: Normal rate.      Pulses: Normal pulses.   Pulmonary:      Effort: Pulmonary effort is normal.      Breath sounds: Normal breath sounds.   Abdominal:      General: Bowel sounds are normal. There is no distension.       "Palpations: Abdomen is soft.      Tenderness: There is no abdominal tenderness.   Musculoskeletal:      Cervical back: Normal range of motion.      Right lower leg: No edema.      Left lower leg: No edema.   Skin:     General: Skin is warm and dry.      Capillary Refill: Capillary refill takes less than 2 seconds.      Coloration: Skin is not jaundiced or pale.      Findings: No rash.   Neurological:      General: No focal deficit present.      Mental Status: He is alert and oriented to person, place, and time.   Psychiatric:         Mood and Affect: Mood normal.         Behavior: Behavior normal. Behavior is cooperative.         Thought Content: Thought content normal.         Judgment: Judgment normal.           Home Medications:     Current Outpatient Medications   Medication Sig    albuterol (PROVENTIL/VENTOLIN HFA) 90 mcg/actuation inhaler Inhale into the lungs.    blood sugar diagnostic Strp To check BG 4 times daily, to use with insurance preferred meter    blood sugar diagnostic Strp To check BG 4 times daily, to use with insurance preferred meter    blood-glucose meter kit To check BG 4 times daily, to use with insurance preferred meter    blood-glucose meter,continuous (DEXCOM G7 ) Misc Dexcom G7  uses directed    blood-glucose sensor (DEXCOM G7 SENSOR) Trinh Use every 10 days as directed    insulin aspart U-100 (NOVOLOG FLEXPEN U-100 INSULIN) 100 unit/mL (3 mL) InPn pen 7 units in the am, 7 units with lunch and 7 units with supper. Sliding scale with correction 1:50 >150 Max dose 50 units.    insulin glargine U-100, Lantus, (LANTUS SOLOSTAR U-100 INSULIN) 100 unit/mL (3 mL) InPn pen Inject 26 Units into the skin every evening.    lancets (TRUEPLUS LANCETS) 28 gauge [facility] CHEWCK BLOOD GLUCOSE LEVEL FOUR TIMES DAILY    lisinopriL 10 MG tablet Take 1 tablet (10 mg total) by mouth once daily.    pen needle, diabetic 31 gauge x 3/16" Ndle Use 4 times a day for insulin injection (BD Mini)    pen " "needle, diabetic 32 gauge x 5/32" Ndle Use BD boaz needle 4 times a day for insulin injection    tadalafiL (CIALIS) 10 MG tablet Take 10 mg by mouth daily as needed.    traZODone (DESYREL) 100 MG tablet Take 100 mg by mouth every evening.    mupirocin (BACTROBAN) 2 % ointment Apply topically 3 (three) times daily. (Patient not taking: Reported on 9/9/2024)    pantoprazole (PROTONIX) 40 MG tablet Take 1 tablet (40 mg total) by mouth once daily.    polyethylene glycol (GLYCOLAX) 17 gram/dose powder Take 17 g by mouth daily as needed for Constipation.     No current facility-administered medications for this visit.       Laboratory Results:     Recent Results (from the past 5 weeks)   Iron and TIBC    Collection Time: 09/09/24 10:30 AM   Result Value Ref Range    Iron Binding Capacity Unsaturated 190 69 - 240 ug/dL    Iron Level 73 65 - 175 ug/dL    Transferrin 245 174 - 364 mg/dL    Iron Binding Capacity Total 263 250 - 450 ug/dL    Iron Saturation 28 20 - 50 %   Ferritin    Collection Time: 09/09/24 10:30 AM   Result Value Ref Range    Ferritin Level 54.51 21.81 - 274.66 ng/mL   CBC with Differential    Collection Time: 09/09/24 10:30 AM   Result Value Ref Range    WBC 7.22 4.50 - 11.50 x10(3)/mcL    RBC 5.11 4.70 - 6.10 x10(6)/mcL    Hgb 13.7 (L) 14.0 - 18.0 g/dL    Hct 42.0 42.0 - 52.0 %    MCV 82.2 80.0 - 94.0 fL    MCH 26.8 (L) 27.0 - 31.0 pg    MCHC 32.6 (L) 33.0 - 36.0 g/dL    RDW 12.9 11.5 - 17.0 %    Platelet 259 130 - 400 x10(3)/mcL    MPV 9.9 7.4 - 10.4 fL    Neut % 52.0 %    Lymph % 29.1 %    Mono % 5.4 %    Eos % 12.0 %    Basophil % 1.4 %    Lymph # 2.10 0.6 - 4.6 x10(3)/mcL    Neut # 3.75 2.1 - 9.2 x10(3)/mcL    Mono # 0.39 0.1 - 1.3 x10(3)/mcL    Eos # 0.87 0 - 0.9 x10(3)/mcL    Baso # 0.10 <=0.2 x10(3)/mcL    IG# 0.01 0 - 0.04 x10(3)/mcL    IG% 0.1 %    NRBC% 0.0 %       Assessment/Plan:     Problem List Items Addressed This Visit       Chronic gastritis without bleeding - Primary     -EGD on " 9/1/2023: two superficial, non-bleeding esophageal ulcers at the GE junction and a small non-bleeding erosion at the pre-pyloric region of the stomach. Biopsies: mild chronic gastritis, negative for h. Pylori and dysplasia. Recommendations for PPI daily and diabetes control.  -Will restart pantoprazole 40 mg po daily.   -Avoid NSAID use.   -Avoid alcohol.   -No overt GI bleeding.   -Labs today: CBC, Iron/TIBC, ferritin          Relevant Orders    CBC Auto Differential (Completed)    Iron and TIBC (Completed)    Ferritin (Completed)    Gastroesophageal reflux disease     -Will restart pantoprazole 40 mg po daily.   -Instructed patient to take PPI 30 minutes before eating  -Advised about diet and lifestyle modifications related to reflux   -Avoid NSAID use.   -Avoid alcohol.   -Reflux precautions             Relevant Medications    polyethylene glycol (GLYCOLAX) 17 gram/dose powder    Constipation     -Start Miralax as needed for constipation.   -Increase fiber in diet.   -Start daily soluble fiber supplement such as metamucil or Benefiber.              Relevant Medications    pantoprazole (PROTONIX) 40 MG tablet    Family history of colon cancer requiring screening colonoscopy     -Discussed need for colonoscopy due to having a second degree relative (maternal grandfather) with colon cancer and a second degree relative (paternal grandfather) with colon polyps.   -Patient amendable to scheduling colonoscopy.          Relevant Orders    Case Request Endoscopy: COLONOSCOPY (Completed)    Family history of colon polyps, unspecified     -Discussed need for colonoscopy due to having a second degree relative (maternal grandfather) with colon cancer and a second degree relative (paternal grandfather) with colon polyps.   -Patient amendable to scheduling colonoscopy.          Relevant Orders    Case Request Endoscopy: COLONOSCOPY (Completed)

## 2024-09-13 ENCOUNTER — TELEPHONE (OUTPATIENT)
Dept: GASTROENTEROLOGY | Facility: CLINIC | Age: 44
End: 2024-09-13
Payer: MEDICAID

## 2024-10-09 PROBLEM — Z83.719 FAMILY HISTORY OF COLON POLYPS, UNSPECIFIED: Status: ACTIVE | Noted: 2024-10-09

## 2024-10-09 PROBLEM — K21.9 GASTROESOPHAGEAL REFLUX DISEASE: Status: ACTIVE | Noted: 2024-10-09

## 2024-10-09 PROBLEM — K59.00 CONSTIPATION: Status: ACTIVE | Noted: 2024-10-09

## 2024-10-09 PROBLEM — Z80.0 FAMILY HISTORY OF COLON CANCER REQUIRING SCREENING COLONOSCOPY: Status: ACTIVE | Noted: 2024-10-09

## 2024-10-09 NOTE — ASSESSMENT & PLAN NOTE
-Start Miralax as needed for constipation.   -Increase fiber in diet.   -Start daily soluble fiber supplement such as metamucil or Benefiber.

## 2024-10-09 NOTE — ASSESSMENT & PLAN NOTE
-Will restart pantoprazole 40 mg po daily.   -Instructed patient to take PPI 30 minutes before eating  -Advised about diet and lifestyle modifications related to reflux   -Avoid NSAID use.   -Avoid alcohol.   -Reflux precautions

## 2024-10-09 NOTE — ASSESSMENT & PLAN NOTE
-Discussed need for colonoscopy due to having a second degree relative (maternal grandfather) with colon cancer and a second degree relative (paternal grandfather) with colon polyps.   -Patient amendable to scheduling colonoscopy.

## 2024-10-09 NOTE — ASSESSMENT & PLAN NOTE
-EGD on 9/1/2023: two superficial, non-bleeding esophageal ulcers at the GE junction and a small non-bleeding erosion at the pre-pyloric region of the stomach. Biopsies: mild chronic gastritis, negative for h. Pylori and dysplasia. Recommendations for PPI daily and diabetes control.  -Will restart pantoprazole 40 mg po daily.   -Avoid NSAID use.   -Avoid alcohol.   -No overt GI bleeding.   -Labs today: CBC, Iron/TIBC, ferritin

## 2024-11-19 ENCOUNTER — OFFICE VISIT (OUTPATIENT)
Dept: ENDOCRINOLOGY | Facility: CLINIC | Age: 44
End: 2024-11-19
Payer: MEDICAID

## 2024-11-19 VITALS
TEMPERATURE: 97 F | HEART RATE: 72 BPM | DIASTOLIC BLOOD PRESSURE: 84 MMHG | HEIGHT: 73 IN | BODY MASS INDEX: 25.38 KG/M2 | SYSTOLIC BLOOD PRESSURE: 139 MMHG | WEIGHT: 191.5 LBS | RESPIRATION RATE: 18 BRPM

## 2024-11-19 DIAGNOSIS — G62.9 NEUROPATHY: ICD-10-CM

## 2024-11-19 DIAGNOSIS — E10.65 UNCONTROLLED TYPE 1 DIABETES MELLITUS WITH HYPERGLYCEMIA: Primary | ICD-10-CM

## 2024-11-19 PROCEDURE — 3046F HEMOGLOBIN A1C LEVEL >9.0%: CPT | Mod: CPTII,,, | Performed by: NURSE PRACTITIONER

## 2024-11-19 PROCEDURE — 4010F ACE/ARB THERAPY RXD/TAKEN: CPT | Mod: CPTII,,, | Performed by: NURSE PRACTITIONER

## 2024-11-19 PROCEDURE — 3075F SYST BP GE 130 - 139MM HG: CPT | Mod: CPTII,,, | Performed by: NURSE PRACTITIONER

## 2024-11-19 PROCEDURE — 95251 CONT GLUC MNTR ANALYSIS I&R: CPT | Mod: ,,, | Performed by: NURSE PRACTITIONER

## 2024-11-19 PROCEDURE — 3066F NEPHROPATHY DOC TX: CPT | Mod: CPTII,,, | Performed by: NURSE PRACTITIONER

## 2024-11-19 PROCEDURE — 3008F BODY MASS INDEX DOCD: CPT | Mod: CPTII,,, | Performed by: NURSE PRACTITIONER

## 2024-11-19 PROCEDURE — 99214 OFFICE O/P EST MOD 30 MIN: CPT | Mod: S$PBB,,, | Performed by: NURSE PRACTITIONER

## 2024-11-19 PROCEDURE — 3062F POS MACROALBUMINURIA REV: CPT | Mod: CPTII,,, | Performed by: NURSE PRACTITIONER

## 2024-11-19 PROCEDURE — 1159F MED LIST DOCD IN RCRD: CPT | Mod: CPTII,,, | Performed by: NURSE PRACTITIONER

## 2024-11-19 PROCEDURE — 3079F DIAST BP 80-89 MM HG: CPT | Mod: CPTII,,, | Performed by: NURSE PRACTITIONER

## 2024-11-19 PROCEDURE — 1160F RVW MEDS BY RX/DR IN RCRD: CPT | Mod: CPTII,,, | Performed by: NURSE PRACTITIONER

## 2024-11-19 PROCEDURE — 99213 OFFICE O/P EST LOW 20 MIN: CPT | Mod: PBBFAC | Performed by: NURSE PRACTITIONER

## 2024-11-19 RX ORDER — INSULIN GLARGINE 100 [IU]/ML
26 INJECTION, SOLUTION SUBCUTANEOUS NIGHTLY
Qty: 15 ML | Refills: 11 | Status: SHIPPED | OUTPATIENT
Start: 2024-11-19

## 2024-11-19 RX ORDER — BLOOD-GLUCOSE SENSOR
EACH MISCELLANEOUS
Qty: 3 EACH | Refills: 11 | Status: SHIPPED | OUTPATIENT
Start: 2024-11-19

## 2024-11-19 RX ORDER — PEN NEEDLE, DIABETIC 30 GX3/16"
NEEDLE, DISPOSABLE MISCELLANEOUS
Qty: 120 EACH | Refills: 11 | Status: SHIPPED | OUTPATIENT
Start: 2024-11-19

## 2024-11-19 RX ORDER — INSULIN ASPART 100 [IU]/ML
INJECTION, SOLUTION INTRAVENOUS; SUBCUTANEOUS
Qty: 15 ML | Refills: 11 | Status: SHIPPED | OUTPATIENT
Start: 2024-11-19

## 2024-11-19 NOTE — PROGRESS NOTES
Subjective     Patient ID: Jose Francisco Romero is a 44 y.o. male.    Chief Complaint: Diabetes    Endocrine clinic note 04/12/2023:  42 year male scheduled today as new patient referral to endocrine clinic for history of uncontrolled diabetes type 1 with multiple episodes of DKA.  Patient was recently hospitalized with DKA one-week ago and reports to clinic today as new patient referral.  Patient states he was diagnosed with type 1 diabetes about 5 years ago when he went in the hospital with DKA patient has never been establish with an endocrinologist in his currently on basal insulin with a sliding scale with prandial insulin that he checks his sugar after eating and gives insulin after blood glucoses elevated.  Patient's PCP had written a Dexcom patient did not know how to use patient has not with him today Dexcom was placed on patient educated on placement by Noemí bee LPN.  Patient has not had formal education on type 1 diabetes or insulin.  Patient states he checks his CBGS 3 to 4 times a day and states his lowest blood glucoses 80 in the morning blood sugars range as high as the 400s.  He states occasionally forgets his basal insulin at night.  Discussed with patient in depth today that we will check a C-peptide and becky 65 also be referring him to [facility] for insulin medication will adjust his regimen today.      Endocrine clinic note 05/22/2023:  42 year male scheduled today as one-month follow-up for endocrine clinic for insulin titration for history of uncontrolled type 1 diabetes with multiple episodes of DKA.  Previously patient's regimen was adjusted and Dexcom was restarted. Dexcom interpretation 05/06/2023-05/19/2023.  Days with CGM data 100% 14/14 day.  Insulin data is lacking limiting interpretation.  Previously patient was having volatile blood sugars and treating glucose after eating patient's since has been giving insulin before eating and has had more stable blood sugars at times he  has prandial spikes at night patient states he is eating higher carb meals with rice and potatoes at night.  Patient has 2 episodes of hypoglycemia around lunchtime he states he does not eat a large meal like at nighttime.  Decrease lunchtime insulin to 10 units patient has appointment with [facility] tomorrow will start carb counting and will work towards a pump.      Endocrine clinic note 07/18/2023:  42-year-old male scheduled today for endocrine clinic follow-up.  History of uncontrolled type 1 diabetes with recent DKA hospitalized for sepsis of unknown origin.  Patient was hospitalized with a white blood cell count on 06/29/2023 of 38.21 with sepsis of unknown origin.  Lumbar puncture was attempted unsuccessful. Pt returned for out pt lumbar puncture CSF containing protein 102.8 high in glucose 100 high indicating CIDP.  On admit white blood cell 38.2.  Patient states he is lost over 30 lb and also his extreme pain through his extremities and is weak and having to walk with a cane.  Patient has weakness difficulty walking and 20-30 pound weight loss and severe fatigue unable to sleep due to pain.   Call was made Alexia Jacinto NP case was reviewed possible CIDP also discussed since pt is extremely symptomatic patient may need to be admitted IVIG treatment.  Type 1 diabetes recent A1c 10.7.  Patient is not on a Dexcom due to shipping issues started on G7 but G6 shipped. Patient was given 2 samples of Dexcom G7 today until order is figured out. Medicine team was called patient was admitted the hospital.      Endocrine clinic note 1/11/2024:  43-year-old male scheduled today for endocrine clinic follow-up.  History of uncontrolled type 1 diabetes on a Dexcom G7.  Uncontrolled type 1 diabetes current A1C 7.4 Previous A1C 10.7.  Patient is currently on [facility] and has an appointment today. Patient has a Dexcom interpretation 12/09/2023-12/22/2023.  Days with CGM data 100% 14/14 days.  Average glucose  197.  Time in range 27% very high, 25% high, 43% in range, 3% low, 2% very low.  Insulin data is lacking limiting interpretation at times.  Patient has hypoglycemia in the early morning hours and at time after prandial dosing indicating at times patient basal rate is too high.  On other days patient has prandial spikes for multiple hours getting prandial insulin on days where patient forgets prandial insulin patient does not have hypoglycemia hypoglycemia resolved patient had reduced basal insulin. Patient will meet with Diabetes [facility] today and will start carb counting and will work through towards an insulin pump.  Neuropathy patient is currently established in [facility] seen by Dr. 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  Foot exam performed today see documentation patient reports chronic neuropathic pain and pain throughout his whole-body.     Endocrine clinic note 11/19/2024:  44-year-old male scheduled today for endocrine clinic follow-up.  History of uncontrolled type 1 diabetes on a Dexcom G7.  Uncontrolled type 1 diabetes current A1c increased to 8.7 from previous 7.4.    Patient has a Dexcom interpretation 11/06/2024-11/19/2024.  Days with CGM data 93% 13/14 days.  Average glucose 198.  Time in range 26% very high, 26% high, 45% in range, 3% low, less than 1% very low.  GM I 8.1.  On patient's best day average glucose 168 60% in range.  Days patient has better glucose control occasional prandial spikes indicating he is not giving enough prandial insulin.  On the weekends patient will have elevation at mealtimes for multiple hours indicating he was not given prandial insulin with elevation throughout the weekends.  Insulin data is lacking limiting interpretation no significant hypoglycemia.    Discussed with the patient working on giving prandial insulin with all meals especially on weekends.  Neuropathy patient was reported improvement in  his symptoms he continues with neuropathy symptoms have improved.              Review of Systems   Constitutional:  Negative for activity change, appetite change, chills and fatigue.   HENT: Negative.  Negative for dental problem, hearing loss, rhinorrhea, sneezing and goiter.    Eyes: Negative.  Negative for photophobia and visual disturbance.   Respiratory: Negative.  Negative for cough, chest tightness and shortness of breath.    Cardiovascular:  Negative for chest pain, palpitations and leg swelling.   Gastrointestinal: Negative.  Negative for abdominal distention, abdominal pain, change in bowel habit, constipation, diarrhea, nausea and vomiting.   Endocrine: Negative.  Negative for cold intolerance, heat intolerance, polydipsia, polyphagia and polyuria.   Genitourinary: Negative.  Negative for difficulty urinating and erectile dysfunction.   Musculoskeletal:  Negative for back pain, joint swelling, leg pain, myalgias and joint deformity.   Integumentary:  Negative for color change, pallor and rash. Negative.   Allergic/Immunologic: Negative.  Negative for environmental allergies, food allergies and frequent infections.   Neurological: Negative.  Negative for tremors, syncope, headaches and coordination difficulties.   Hematological: Negative.  Does not bruise/bleed easily.   Psychiatric/Behavioral:  Negative for agitation and behavioral problems. The patient is not nervous/anxious and is not hyperactive.           Objective     Physical Exam  Constitutional:       General: He is not in acute distress.     Appearance: Normal appearance. He is normal weight. He is not ill-appearing.   HENT:      Head: Normocephalic.      Right Ear: External ear normal.      Left Ear: External ear normal.      Nose: No congestion or rhinorrhea.      Mouth/Throat:      Mouth: Mucous membranes are moist.      Pharynx: Oropharynx is clear.   Eyes:      Conjunctiva/sclera: Conjunctivae normal.      Pupils: Pupils are equal, round,  and reactive to light.   Neck:      Thyroid: No thyroid mass, thyromegaly or thyroid tenderness.   Cardiovascular:      Rate and Rhythm: Normal rate and regular rhythm.      Pulses: Normal pulses.      Heart sounds: Normal heart sounds. No murmur heard.  Pulmonary:      Effort: Pulmonary effort is normal. No respiratory distress.      Breath sounds: Normal breath sounds.   Abdominal:      General: Abdomen is flat. Bowel sounds are normal. There is no distension.      Palpations: Abdomen is soft.      Tenderness: There is no abdominal tenderness.   Genitourinary:     Testes: Normal.   Musculoskeletal:         General: Normal range of motion.      Cervical back: Normal range of motion and neck supple.      Right lower leg: No edema.      Left lower leg: No edema.   Feet:      Right foot:      Skin integrity: Skin integrity normal.      Left foot:      Skin integrity: Skin integrity normal.   Lymphadenopathy:      Cervical: No cervical adenopathy.   Skin:     General: Skin is warm and dry.      Coloration: Skin is not jaundiced or pale.   Neurological:      General: No focal deficit present.      Mental Status: He is alert and oriented to person, place, and time.      Motor: No weakness.      Coordination: Coordination normal.      Gait: Gait normal.   Psychiatric:         Mood and Affect: Mood normal.         Behavior: Behavior normal.         Thought Content: Thought content normal.         Judgment: Judgment normal.            Assessment and Plan     1. Uncontrolled type 1 diabetes mellitus with hyperglycemia  Current A1C 8.7    A1C goal <7.0   Medications: Lantus 26 once a day, NovoLog 7 units with breakfast 12 units with lunch 12 units with dinner with a correction scale 1:50 > 150 Changes:  Patient was to take prandial insulin with all meals if hypoglycemia develops decrease Lantus by 2 units   Follow ADA diet, avoid soda, simple sweets, and limit rice, breads and starches  Yearly Foot Exam: 01/11/2024   Yearly  Eye Exam: 01/11/2024    2. Neuropathy  Chronic inflammatory Demyelinating polyneuropathy   See below           Follow up in about 4 months (around 3/19/2025) for Type 2 diabetes, w/ Dexcom.    I spent a total of 30 minutes on the day of the visit.  This includes face to face time and non-face to face time preparing to see the patient (eg, review of tests), obtaining and/or reviewing separately obtained history, documenting clinical information in the electronic or other health record, independently interpreting results and communicating results to the patient/family/caregiver, or care coordinator.

## 2025-01-18 ENCOUNTER — HOSPITAL ENCOUNTER (EMERGENCY)
Facility: HOSPITAL | Age: 45
Discharge: HOME | End: 2025-01-18
Attending: INTERNAL MEDICINE
Payer: MEDICAID

## 2025-01-18 VITALS
HEART RATE: 71 BPM | SYSTOLIC BLOOD PRESSURE: 150 MMHG | OXYGEN SATURATION: 98 % | HEIGHT: 73 IN | WEIGHT: 199.31 LBS | BODY MASS INDEX: 26.42 KG/M2 | RESPIRATION RATE: 20 BRPM | TEMPERATURE: 98 F | DIASTOLIC BLOOD PRESSURE: 79 MMHG

## 2025-01-18 DIAGNOSIS — J06.9 VIRAL URI WITH COUGH: Primary | ICD-10-CM

## 2025-01-18 LAB
FLUAV AG UPPER RESP QL IA.RAPID: NOT DETECTED
FLUBV AG UPPER RESP QL IA.RAPID: NOT DETECTED
RSV A 5' UTR RNA NPH QL NAA+PROBE: NOT DETECTED
SARS-COV-2 RNA RESP QL NAA+PROBE: NOT DETECTED
STREP A PCR (OHS): NOT DETECTED

## 2025-01-18 PROCEDURE — 87651 STREP A DNA AMP PROBE: CPT

## 2025-01-18 PROCEDURE — 93005 ELECTROCARDIOGRAM TRACING: CPT

## 2025-01-18 PROCEDURE — 0241U COVID/RSV/FLU A&B PCR: CPT

## 2025-01-18 PROCEDURE — 99284 EMERGENCY DEPT VISIT MOD MDM: CPT | Mod: 25

## 2025-01-18 RX ORDER — PROMETHAZINE HYDROCHLORIDE AND DEXTROMETHORPHAN HYDROBROMIDE 6.25; 15 MG/5ML; MG/5ML
5 SYRUP ORAL EVERY 6 HOURS PRN
Qty: 118 ML | Refills: 0 | Status: SHIPPED | OUTPATIENT
Start: 2025-01-18 | End: 2025-01-25

## 2025-01-18 NOTE — Clinical Note
"Jose Francisco"Anne Romero was seen and treated in our emergency department on 1/18/2025.  He may return to work on 01/21/2025.       If you have any questions or concerns, please don't hesitate to call.      Jef Melton MD"

## 2025-01-18 NOTE — ED PROVIDER NOTES
Encounter Date: 1/18/2025       History     Chief Complaint   Patient presents with    Sore Throat    Cough     C/o sore throat, productive green/yellow mucous cough x 2 days. C/o right lower lung pain with coughing.      A 44 y.o. male patient with a history of diabetes, hypertension presents to the ED with sore throat, productive cough, and chest pain with coughing. The onset is 2 days ago. Location is generalized. It is characterized as malaise. Associated symptoms:  None. It is constant. Alleviating factors:  None. Aggravating factors:  None. Severity is mild. Risk factors include denies sick contacts. Denies fever, chills, nausea, vomiting, shortness of breath, constant chest pain.       The history is provided by the patient.     Review of patient's allergies indicates:   Allergen Reactions    Penicillins Shortness Of Breath    Sulfa (sulfonamide antibiotics)      Past Medical History:   Diagnosis Date    Diabetes mellitus type I     Drug addiction     clean since 2017; 2 day relapse 6/2024, clean 3 months    History of heart attack     2017    Hypertension      Past Surgical History:   Procedure Laterality Date    CLAVICLE SURGERY      ESOPHAGOGASTRODUODENOSCOPY Left 9/1/2023    Procedure: EGD (ESOPHAGOGASTRODUODENOSCOPY);  Surgeon: Daiana Chilel MD;  Location: [facility] ENDOSCOPY;  Service: Gastroenterology;  Laterality: Left;     Family History   Problem Relation Name Age of Onset    Diabetes Mother      Diabetes Father      Heart disease Father      Colon cancer Maternal Grandfather      Colon polyps Paternal Grandfather       Social History     Tobacco Use    Smoking status: Every Day     Current packs/day: 0.50     Average packs/day: 0.5 packs/day     Types: Cigarettes     Start date: 2025     Last attempt to quit: 5/15/2023     Passive exposure: Past    Smokeless tobacco: Never   Substance Use Topics    Alcohol use: Never    Drug use: Not Currently     Types: Heroin, Methamphetamines     Comment:  clean 3 m     Review of Systems   Constitutional:  Negative for chills and fever.   HENT:  Positive for congestion, postnasal drip, rhinorrhea and sore throat. Negative for voice change.    Eyes:  Negative for visual disturbance.   Respiratory:  Positive for cough. Negative for shortness of breath.    Cardiovascular:  Negative for chest pain.   Gastrointestinal:  Negative for nausea and vomiting.   Genitourinary:  Negative for difficulty urinating and dysuria.   Musculoskeletal:  Negative for arthralgias.   Skin:  Negative for color change and rash.   Neurological:  Negative for weakness and headaches.   Hematological:  Does not bruise/bleed easily.   Psychiatric/Behavioral:  Negative for confusion.    All other systems reviewed and are negative.      Physical Exam     Initial Vitals [01/18/25 1358]   BP Pulse Resp Temp SpO2   (!) 150/79 71 20 97.5 °F (36.4 °C) 98 %      MAP       --         Physical Exam    Nursing note and vitals reviewed.  Constitutional: He appears well-developed and well-nourished.   HENT:   Head: Normocephalic and atraumatic.   Right Ear: Tympanic membrane, external ear and ear canal normal.   Left Ear: Tympanic membrane, external ear and ear canal normal.   Nose: Nose normal. No rhinorrhea or sinus tenderness. Mouth/Throat: Uvula is midline and mucous membranes are normal. Posterior oropharyngeal erythema present. No oropharyngeal exudate.   Eyes: Conjunctivae and EOM are normal.   Neck: Neck supple.   Cardiovascular:  Normal rate, regular rhythm and normal heart sounds.     Exam reveals no gallop and no friction rub.       No murmur heard.  Pulmonary/Chest: Breath sounds normal. No respiratory distress. He has no wheezes. He has no rhonchi. He has no rales. He exhibits no tenderness.   Abdominal: He exhibits no distension.   Musculoskeletal:      Cervical back: Neck supple.     Neurological: He is alert and oriented to person, place, and time.   Skin: Skin is warm and dry. Capillary refill  takes less than 2 seconds.         ED Course   Procedures  Labs Reviewed   STREP GROUP A BY PCR - Normal       Result Value    STREP A PCR ([facility]) Not Detected      Narrative:     The Xpert Xpress Strep A test is a rapid, qualitative in vitro diagnostic test for the detection of Streptococcus pyogenes (Group A ß-hemolytic Streptococcus, Strep A) in throat swab specimens from patients with signs and symptoms of pharyngitis.     COVID/RSV/FLU A&B PCR - Normal    Influenza A PCR Not Detected      Influenza B PCR Not Detected      Respiratory Syncytial Virus PCR Not Detected      SARS-CoV-2 PCR Not Detected      Narrative:     The Xpert Xpress SARS-CoV-2/FLU/RSV plus is a rapid, multiplexed real-time PCR test intended for the simultaneous qualitative detection and differentiation of SARS-CoV-2, Influenza A, Influenza B, and respiratory syncytial virus (RSV) viral RNA in either nasopharyngeal swab or nasal swab specimens.           EKG Readings: (Independently Interpreted)   Rhythm: Normal Sinus Rhythm. Heart Rate: 73. Ectopy: No Ectopy. Conduction: Normal. ST Segments: Normal ST Segments. T Waves: Normal. Clinical Impression: Normal Sinus Rhythm     ECG Results              EKG 12-lead (Chest Pain) Age >30 (In process)        Collection Time Result Time QRS Duration [facility] QTC Calculation    01/18/25 13:51:23 01/18/25 13:55:55 92 403                     In process by Interface, Lab In [facility] (01/18/25 13:55:58)                   Narrative:    Test Reason : R07.9,    Vent. Rate :  73 BPM     Atrial Rate :  73 BPM     P-R Int : 136 ms          QRS Dur :  92 ms      QT Int : 366 ms       P-R-T Axes :  33  75  71 degrees    QTcB Int : 403 ms    Normal sinus rhythm  Normal ECG  When compared with ECG of 29-May-2024 23:40,  Vent. rate has decreased by  60 bpm    Referred By:            Confirmed By:                                   Imaging Results              X-Ray Chest PA And Lateral (Final result)  Result time 01/18/25  15:04:09      Final result by Jose Elias Buck MD (01/18/25 15:04:09)                   Impression:      No acute cardiopulmonary process.      Electronically signed by: Jose Elias Buck  Date:    01/18/2025  Time:    15:04               Narrative:    EXAMINATION:  XR CHEST PA AND LATERAL    CLINICAL HISTORY:  Cough, unspecified    TECHNIQUE:  Two views of the chest    COMPARISON:  05/30/2024    FINDINGS:  No focal opacification, pleural effusion, or pneumothorax.    The cardiomediastinal silhouette is within normal limits.    Remote posttraumatic changes of the left hemithorax with no acute fracture appreciated                                       Medications - No data to display  Medical Decision Making  A 44 y.o. male patient with a history of diabetes, hypertension presents to the ED with sore throat, productive cough, and chest pain with coughing. The onset is 2 days ago. Location is generalized. It is characterized as malaise. Associated symptoms:  None. It is constant. Alleviating factors:  None. Aggravating factors:  None. Severity is mild. Risk factors include denies sick contacts. Denies fever, chills, nausea, vomiting, shortness of breath, constant chest pain.       The history is provided by the patient.       Pertinent findings:  Swabs negative, chest x-ray no acute cardiopulmonary abnormality.     Clinical diagnosis:  Viral URI with cough (Primary)    Patient stable for DC with ED Prescriptions     Medication Sig Dispense Start Date End Date Auth. Provider    promethazine-dextromethorphan (PROMETHAZINE-DM) 6.25-15 mg/5 mL Syrp Take   5 mLs by mouth every 6 (six) hours as needed (cough). 118 mL 1/18/2025 1/25/2025 Maty Rahman PA         Referrals: f/u with PCP    Strict follow-up precautions given. Patient verbalizes understanding of treatment plan and ED return precautions.       Amount and/or Complexity of Data Reviewed  Labs:  Decision-making details documented in ED Course.  Radiology:  ordered. Decision-making details documented in ED Course.    Risk  Prescription drug management.  Risk Details: Given strict ED return precautions. I have spoken with the patient and/or caregivers. I have explained the patient's condition, diagnoses and treatment plan based on the information available to me at this time. I have answered the patient's and/or caregiver's questions and addressed any concerns. The patient and/or caregivers have as good an understanding of the patient's diagnosis, condition and treatment plan as can be expected at this point. The vital signs have been stable. The patient's condition is stable and appropriate for discharge from the emergency department.      The patient will pursue further outpatient evaluation with the primary care physician or other designated or consulting physician as outlined in the discharge instructions. The patient and/or caregivers are agreeable to this plan of care and follow-up instructions have been explained in detail. The patient and/or caregivers have received these instructions in written format and have expressed an understanding of the discharge instructions. The patient and/or caregivers are aware that any significant change in condition or worsening of symptoms should prompt an immediate return to this or the closest emergency department or a call to 911               Additional MDM:   Differential Diagnosis:   Other: The following diagnoses were also considered and will be evaluated: URI, COVID and Flu.            ED Course as of 01/18/25 1828   Sat Jan 18, 2025   1512 X-Ray Chest PA And Lateral  No acute cardiopulmonary process.   [AG]   1512 STREP A PCR ([facility]): Not Detected [AG]   1513 Influenza A, Molecular: Not Detected [AG]   1513 Influenza B, Molecular: Not Detected [AG]   1513 RSV Ag by Molecular Method: Not Detected [AG]   1513 SARS-CoV2 (COVID-19) Qualitative PCR: Not Detected [AG]      ED Course User Index  [AG] Maty Rahman, PA                            Clinical Impression:  Final diagnoses:  [J06.9] Viral URI with cough (Primary)          ED Disposition Condition    Discharge Stable          ED Prescriptions       Medication Sig Dispense Start Date End Date Auth. Provider    promethazine-dextromethorphan (PROMETHAZINE-DM) 6.25-15 mg/5 mL Syrp Take 5 mLs by mouth every 6 (six) hours as needed (cough). 118 mL 1/18/2025 1/25/2025 Maty Rahman PA          Follow-up Information       Follow up With Specialties Details Why Contact Info    Antoine Garner MD Internal Medicine In 3 days  [address] [facility] [id number] [phone]      [facility] Emergency Medicine Go to  If symptoms worsen, As needed [address] [facility] [id number] [phone]             Maty Rahman PA  01/18/25 1600       Maty Rahman PA  01/18/25 [id number]

## 2025-01-20 LAB
OHS QRS DURATION: 92 MS
OHS QTC CALCULATION: 403 MS

## 2025-01-24 ENCOUNTER — HOSPITAL ENCOUNTER (EMERGENCY)
Facility: HOSPITAL | Age: 45
Discharge: HOME | End: 2025-01-24
Attending: INTERNAL MEDICINE
Payer: MEDICAID

## 2025-01-24 VITALS
OXYGEN SATURATION: 96 % | SYSTOLIC BLOOD PRESSURE: 164 MMHG | HEIGHT: 73 IN | TEMPERATURE: 98 F | DIASTOLIC BLOOD PRESSURE: 97 MMHG | WEIGHT: 185 LBS | RESPIRATION RATE: 19 BRPM | BODY MASS INDEX: 24.52 KG/M2 | HEART RATE: 86 BPM

## 2025-01-24 DIAGNOSIS — R07.9 CHEST PAIN: ICD-10-CM

## 2025-01-24 DIAGNOSIS — R73.9 HYPERGLYCEMIA: Primary | ICD-10-CM

## 2025-01-24 DIAGNOSIS — E10.65 UNCONTROLLED TYPE 1 DIABETES MELLITUS WITH HYPERGLYCEMIA: ICD-10-CM

## 2025-01-24 DIAGNOSIS — J40 BRONCHITIS: ICD-10-CM

## 2025-01-24 LAB
ALBUMIN SERPL-MCNC: 3.4 G/DL (ref 3.5–5)
ALBUMIN/GLOB SERPL: 0.9 RATIO (ref 1.1–2)
ALP SERPL-CCNC: 115 UNIT/L (ref 40–150)
ALT SERPL-CCNC: 14 UNIT/L (ref 0–55)
ANION GAP SERPL CALC-SCNC: 10 MEQ/L
AST SERPL-CCNC: 15 UNIT/L (ref 5–34)
B-OH-BUTYR SERPL-MCNC: 1.1 MMOL/L
BASOPHILS # BLD AUTO: 0.03 X10(3)/MCL
BASOPHILS NFR BLD AUTO: 0.6 %
BILIRUB SERPL-MCNC: 0.4 MG/DL
BUN SERPL-MCNC: 20.2 MG/DL (ref 8.9–20.6)
CALCIUM SERPL-MCNC: 9.4 MG/DL (ref 8.4–10.2)
CHLORIDE SERPL-SCNC: 96 MMOL/L (ref 98–107)
CO2 SERPL-SCNC: 25 MMOL/L (ref 22–29)
CREAT SERPL-MCNC: 1.33 MG/DL (ref 0.72–1.25)
CREAT/UREA NIT SERPL: 15
EOSINOPHIL # BLD AUTO: 0.06 X10(3)/MCL (ref 0–0.9)
EOSINOPHIL NFR BLD AUTO: 1.2 %
ERYTHROCYTE [DISTWIDTH] IN BLOOD BY AUTOMATED COUNT: 13.1 % (ref 11.5–17)
GFR SERPLBLD CREATININE-BSD FMLA CKD-EPI: >60 ML/MIN/1.73/M2
GLOBULIN SER-MCNC: 3.8 GM/DL (ref 2.4–3.5)
GLUCOSE SERPL-MCNC: 426 MG/DL (ref 74–100)
HCT VFR BLD AUTO: 43.3 % (ref 42–52)
HGB BLD-MCNC: 14.6 G/DL (ref 14–18)
IMM GRANULOCYTES # BLD AUTO: 0.01 X10(3)/MCL (ref 0–0.04)
IMM GRANULOCYTES NFR BLD AUTO: 0.2 %
LYMPHOCYTES # BLD AUTO: 1.44 X10(3)/MCL (ref 0.6–4.6)
LYMPHOCYTES NFR BLD AUTO: 28.6 %
MCH RBC QN AUTO: 27.7 PG (ref 27–31)
MCHC RBC AUTO-ENTMCNC: 33.7 G/DL (ref 33–36)
MCV RBC AUTO: 82.2 FL (ref 80–94)
MONOCYTES # BLD AUTO: 0.66 X10(3)/MCL (ref 0.1–1.3)
MONOCYTES NFR BLD AUTO: 13.1 %
NEUTROPHILS # BLD AUTO: 2.84 X10(3)/MCL (ref 2.1–9.2)
NEUTROPHILS NFR BLD AUTO: 56.3 %
NRBC BLD AUTO-RTO: 0 %
OHS QRS DURATION: 84 MS
OHS QTC CALCULATION: 409 MS
PLATELET # BLD AUTO: 232 X10(3)/MCL (ref 130–400)
PMV BLD AUTO: 10 FL (ref 7.4–10.4)
POCT GLUCOSE: 267 MG/DL (ref 70–110)
POCT GLUCOSE: 395 MG/DL (ref 70–110)
POCT GLUCOSE: 422 MG/DL (ref 70–110)
POCT GLUCOSE: 423 MG/DL (ref 70–110)
POTASSIUM SERPL-SCNC: 4.9 MMOL/L (ref 3.5–5.1)
PROT SERPL-MCNC: 7.2 GM/DL (ref 6.4–8.3)
RBC # BLD AUTO: 5.27 X10(6)/MCL (ref 4.7–6.1)
SODIUM SERPL-SCNC: 131 MMOL/L (ref 136–145)
TROPONIN I SERPL-MCNC: <0.01 NG/ML (ref 0–0.04)
WBC # BLD AUTO: 5.04 X10(3)/MCL (ref 4.5–11.5)

## 2025-01-24 PROCEDURE — 63600175 PHARM REV CODE 636 W HCPCS: Performed by: INTERNAL MEDICINE

## 2025-01-24 PROCEDURE — 25000003 PHARM REV CODE 250: Performed by: INTERNAL MEDICINE

## 2025-01-24 PROCEDURE — 82010 KETONE BODYS QUAN: CPT | Performed by: INTERNAL MEDICINE

## 2025-01-24 PROCEDURE — 96372 THER/PROPH/DIAG INJ SC/IM: CPT | Performed by: INTERNAL MEDICINE

## 2025-01-24 PROCEDURE — 80053 COMPREHEN METABOLIC PANEL: CPT | Performed by: INTERNAL MEDICINE

## 2025-01-24 PROCEDURE — 84484 ASSAY OF TROPONIN QUANT: CPT | Performed by: INTERNAL MEDICINE

## 2025-01-24 PROCEDURE — 85025 COMPLETE CBC W/AUTO DIFF WBC: CPT | Performed by: INTERNAL MEDICINE

## 2025-01-24 PROCEDURE — 93005 ELECTROCARDIOGRAM TRACING: CPT

## 2025-01-24 PROCEDURE — 96361 HYDRATE IV INFUSION ADD-ON: CPT

## 2025-01-24 PROCEDURE — 82962 GLUCOSE BLOOD TEST: CPT

## 2025-01-24 PROCEDURE — 96374 THER/PROPH/DIAG INJ IV PUSH: CPT

## 2025-01-24 PROCEDURE — 99285 EMERGENCY DEPT VISIT HI MDM: CPT | Mod: 25

## 2025-01-24 RX ORDER — INSULIN GLARGINE 100 [IU]/ML
30 INJECTION, SOLUTION SUBCUTANEOUS NIGHTLY
Qty: 15 ML | Refills: 11 | Status: SHIPPED | OUTPATIENT
Start: 2025-01-24

## 2025-01-24 RX ORDER — CHLORPHENIRAMINE MALEATE AND DEXTROMETHORPHAN HYDROBROMIDE 4; 30 MG/1; MG/1
1 TABLET, FILM COATED ORAL
Qty: 30 EACH | Refills: 0 | Status: SHIPPED | OUTPATIENT
Start: 2025-01-24 | End: 2025-02-03

## 2025-01-24 RX ORDER — INSULIN ASPART 100 [IU]/ML
8 INJECTION, SOLUTION INTRAVENOUS; SUBCUTANEOUS
Status: COMPLETED | OUTPATIENT
Start: 2025-01-24 | End: 2025-01-24

## 2025-01-24 RX ORDER — GUAIFENESIN 100 MG/5ML
200 SOLUTION ORAL ONCE
Status: COMPLETED | OUTPATIENT
Start: 2025-01-24 | End: 2025-01-24

## 2025-01-24 RX ADMIN — SODIUM CHLORIDE, POTASSIUM CHLORIDE, SODIUM LACTATE AND CALCIUM CHLORIDE 1000 ML: 600; 310; 30; 20 INJECTION, SOLUTION INTRAVENOUS at 01:01

## 2025-01-24 RX ADMIN — GUAIFENESIN 200 MG: 100 SOLUTION ORAL at 04:01

## 2025-01-24 RX ADMIN — HUMAN INSULIN 5 UNITS: 100 INJECTION, SOLUTION SUBCUTANEOUS at 02:01

## 2025-01-24 RX ADMIN — INSULIN ASPART 8 UNITS: 100 INJECTION, SOLUTION INTRAVENOUS; SUBCUTANEOUS at 01:01

## 2025-01-24 NOTE — Clinical Note
"Jose Francisco Romero (Kyle) was seen and treated in our emergency department on 1/24/2025.  He may return to work on 01/27/2025.       If you have any questions or concerns, please don't hesitate to call.      VIDAL Veras RN    "

## 2025-01-24 NOTE — ED PROVIDER NOTES
Encounter Date: 1/24/2025       History     Chief Complaint   Patient presents with    Hyperglycemia    Chest Pain     Pt. Arrives via EMS for chest pain and hyperglycemia.  per EMS.     Patient with past medical history of type 1 diabetes mellitus, hypertension, drug addiction came with chief complaints of chest pain and high blood glucose levels at home.  Patient reports reproducible right lower rib chest pain, TTP, able to point with finger, nonradiating.  Associated with nausea, productive cough and mild SOB.  Patient reports having high blood sugar of 600 this morning, despite of taking 4/5 times NovoLog 7 units his recent blood sugar was 470.  Patient reports strict medication/insulin regimen adherence.  Denies vomiting, abdominal pain, diarrhea, headache, LOC.        Review of patient's allergies indicates:   Allergen Reactions    Penicillins Shortness Of Breath    Sulfa (sulfonamide antibiotics)      Past Medical History:   Diagnosis Date    Diabetes mellitus type I     Drug addiction     clean since 2017; 2 day relapse 6/2024, clean 3 months    History of heart attack     2017    Hypertension      Past Surgical History:   Procedure Laterality Date    CLAVICLE SURGERY      ESOPHAGOGASTRODUODENOSCOPY Left 9/1/2023    Procedure: EGD (ESOPHAGOGASTRODUODENOSCOPY);  Surgeon: Daiana Chilel MD;  Location: [facility] ENDOSCOPY;  Service: Gastroenterology;  Laterality: Left;     Family History   Problem Relation Name Age of Onset    Diabetes Mother      Diabetes Father      Heart disease Father      Colon cancer Maternal Grandfather      Colon polyps Paternal Grandfather       Social History     Tobacco Use    Smoking status: Every Day     Current packs/day: 0.50     Average packs/day: 0.5 packs/day for 0.1 years     Types: Cigarettes     Start date: 2025     Last attempt to quit: 5/15/2023     Passive exposure: Past    Smokeless tobacco: Never   Substance Use Topics    Alcohol use: Never    Drug use: Not  Currently     Types: Heroin, Methamphetamines     Comment: clean 3 m     Review of Systems   Constitutional:  Negative for activity change, appetite change, diaphoresis, fatigue and fever.   HENT:  Positive for sore throat. Negative for congestion, drooling, ear pain, hearing loss, postnasal drip, rhinorrhea and sneezing.    Eyes:  Negative for pain and redness.   Respiratory:  Positive for cough and shortness of breath. Negative for apnea, choking, wheezing and stridor.    Cardiovascular:  Positive for chest pain. Negative for palpitations and leg swelling.   Gastrointestinal:  Positive for nausea. Negative for abdominal distention, abdominal pain, blood in stool, diarrhea and vomiting.   Genitourinary:  Negative for dysuria and penile discharge.   Musculoskeletal:  Negative for arthralgias and neck pain.   Skin:  Negative for rash.   Neurological:  Negative for dizziness and headaches.   Hematological:  Negative for adenopathy.       Physical Exam     Initial Vitals [01/24/25 0028]   BP Pulse Resp Temp SpO2   (!) 178/87 87 20 98 °F (36.7 °C) 98 %      MAP       --         Physical Exam    Nursing note and vitals reviewed.  Constitutional: He appears well-developed and well-nourished.   HENT:   Head: Normocephalic. Mouth/Throat: Oropharynx is clear and moist.   Eyes: Conjunctivae and EOM are normal. Pupils are equal, round, and reactive to light.   Neck:   Normal range of motion.  Cardiovascular:  Normal rate, regular rhythm, normal heart sounds and intact distal pulses.           Pulmonary/Chest: Breath sounds normal. No respiratory distress. He has no wheezes. He exhibits tenderness.   Abdominal: Abdomen is soft. He exhibits no distension and no mass. There is no abdominal tenderness.   Musculoskeletal:         General: No edema. Normal range of motion.      Cervical back: Normal range of motion.     Lymphadenopathy:     He has no cervical adenopathy.   Neurological: He is alert and oriented to person, place, and  time.   Skin: Skin is warm. No rash noted.   Psychiatric: Thought content normal.         ED Course   Procedures  Labs Reviewed   COMPREHENSIVE METABOLIC PANEL - Abnormal       Result Value    Sodium 131 (*)     Potassium 4.9      Chloride 96 (*)     CO2 25      Glucose 426 (*)     Blood Urea Nitrogen 20.2      Creatinine 1.33 (*)     Calcium 9.4      Protein Total 7.2      Albumin 3.4 (*)     Globulin 3.8 (*)     Albumin/Globulin Ratio 0.9 (*)     Bilirubin Total 0.4            ALT 14      AST 15      eGFR >60      Anion Gap 10.0      BUN/Creatinine Ratio 15     BETA - HYDROXYBUTYRATE, SERUM - Abnormal    Beta Hydroxybutyrate 1.10 (*)    POCT GLUCOSE - Abnormal    POCT Glucose 422 (*)    POCT GLUCOSE - Abnormal    POCT Glucose 423 (*)    POCT GLUCOSE - Abnormal    POCT Glucose 395 (*)    POCT GLUCOSE - Abnormal    POCT Glucose 267 (*)    TROPONIN I - Normal    Troponin-I <0.010     CBC W/ AUTO DIFFERENTIAL    Narrative:     The following orders were created for panel order CBC auto differential.  Procedure                               Abnormality         Status                     ---------                               -----------         ------                     CBC with Differential[[id number]]                           Final result                 Please view results for these tests on the individual orders.   CBC WITH DIFFERENTIAL    WBC 5.04      RBC 5.27      Hgb 14.6      Hct 43.3      MCV 82.2      MCH 27.7      MCHC 33.7      RDW 13.1      Platelet 232      MPV 10.0      Neut % 56.3      Lymph % 28.6      Mono % 13.1      Eos % 1.2      Basophil % 0.6      Imm Grans % 0.2      Neut # 2.84      Lymph # 1.44      Mono # 0.66      Eos # 0.06      Baso # 0.03      Imm Gran # 0.01      NRBC% 0.0     POCT GLUCOSE, HAND-HELD DEVICE   POCT GLUCOSE MONITORING CONTINUOUS     EKG Readings: (Independently Interpreted)   Initial Reading: No STEMI. Rhythm: Normal Sinus Rhythm. Heart Rate: 96 bpm. Ectopy: No  Ectopy. Conduction: Normal. ST Segments: Normal ST Segments. T Waves: Normal. Clinical Impression: Normal Sinus Rhythm       Imaging Results              X-Ray Chest PA And Lateral (In process)                   X-Rays:   Independently Interpreted Readings:   Other Readings:  Old healed traumatic left clavicular and rib injuries.  Normal chest x-ray compared to previous.    Medications   insulin aspart U-100 injection 8 Units (8 Units Subcutaneous Given 1/24/25 0132)   lactated ringers bolus 1,000 mL (0 mLs Intravenous Stopped 1/24/25 1723)   insulin regular injection 5 Units 0.05 mL (5 Units Intravenous Given 1/24/25 0250)   guaiFENesin 100 mg/5 ml syrup 200 mg (200 mg Oral Given 1/24/25 5876)     Medical Decision Making  Amount and/or Complexity of Data Reviewed  External Data Reviewed: labs, radiology and ECG.  Labs: ordered. Decision-making details documented in ED Course.  Radiology: ordered and independent interpretation performed. Decision-making details documented in ED Course.  ECG/medicine tests: independent interpretation performed. Decision-making details documented in ED Course.    Risk  OTC drugs.  Prescription drug management.      Additional MDM:   Differential Diagnosis:   Other: The following diagnoses were also considered and will be evaluated: DKA, Viral bronchitis and ACS. Hyperglycemia            Attending Attestation:   Physician Attestation Statement for Resident:  As the supervising MD   Physician Attestation Statement: I have personally seen and examined this patient.   I agree with the above history.  -:   As the supervising MD I agree with the above PE.     As the supervising MD I agree with the above treatment, course, plan, and disposition.    I have reviewed and agree with the residents interpretation of the following: lab data, x-rays and EKG.  I have reviewed the following: old records at this facility.                      Myron Abdalla MD  1/24/2025                Clinical Impression:  Patient came with chief complaints of high blood sugars, chest pain, nasal congestion and cough.  EKG-normal sinus rhythm, chest x-ray-within normal limits.  Ordered troponin, CMP, with beta hydroxybutyrate-WNL, treated with NovoLog insulin, and guaifenesin discharging patient continuing NovoLog, increasing Lantus from 26 to 30 units.  Final diagnoses:  [R07.9] Chest pain  [R73.9] Hyperglycemia (Primary)  [J40] Bronchitis          ED Disposition Condition    Discharge Stable          ED Prescriptions       Medication Sig Dispense Start Date End Date Auth. Provider    insulin glargine U-100, Lantus, (LANTUS SOLOSTAR U-100 INSULIN) 100 unit/mL (3 mL) InPn pen Inject 30 Units into the skin every evening. 15 mL 1/24/2025 -- Celestino Daniel MD    chlorpheniramine-dextromethorp (CORICIDIN HBP COUGH AND COLD) 4-30 mg Tab Take 1 tablet by mouth 3 (three) times daily before meals. for 10 days 30 each 1/24/2025 2/3/2025 Celestino Daniel MD          Follow-up Information       Follow up With Specialties Details Why Contact Info    Antoine Garner MD Internal Medicine Schedule an appointment as soon as possible for a visit in 1 week  [address] [place] [id number]  [phone]      [facility] Emergency Medicine  If symptoms worsen [address] [facility] [address] [phone]             Celestino Daniel MD  Resident  01/24/25 7914       Celestino Daniel MD  Resident  01/24/25 5935       Myron Quiroz MD  01/24/25 0898

## 2025-01-27 ENCOUNTER — HOSPITAL ENCOUNTER (EMERGENCY)
Facility: HOSPITAL | Age: 45
Discharge: HOME | End: 2025-01-27
Attending: INTERNAL MEDICINE
Payer: MEDICAID

## 2025-01-27 VITALS
HEIGHT: 73 IN | DIASTOLIC BLOOD PRESSURE: 95 MMHG | WEIGHT: 184.94 LBS | SYSTOLIC BLOOD PRESSURE: 159 MMHG | HEART RATE: 63 BPM | TEMPERATURE: 98 F | RESPIRATION RATE: 24 BRPM | OXYGEN SATURATION: 99 % | BODY MASS INDEX: 24.51 KG/M2

## 2025-01-27 DIAGNOSIS — R73.9 HYPERGLYCEMIA: Primary | ICD-10-CM

## 2025-01-27 DIAGNOSIS — J06.9 URI WITH COUGH AND CONGESTION: ICD-10-CM

## 2025-01-27 LAB
ALBUMIN SERPL-MCNC: 3.6 G/DL (ref 3.5–5)
ALBUMIN/GLOB SERPL: 0.9 RATIO (ref 1.1–2)
ALP SERPL-CCNC: 115 UNIT/L (ref 40–150)
ALT SERPL-CCNC: 17 UNIT/L (ref 0–55)
ANION GAP SERPL CALC-SCNC: 9 MEQ/L
AST SERPL-CCNC: 18 UNIT/L (ref 5–34)
BASOPHILS # BLD AUTO: 0.02 X10(3)/MCL
BASOPHILS NFR BLD AUTO: 0.2 %
BILIRUB SERPL-MCNC: 0.5 MG/DL
BNP BLD-MCNC: <10 PG/ML
BUN SERPL-MCNC: 37.6 MG/DL (ref 8.9–20.6)
CALCIUM SERPL-MCNC: 9.4 MG/DL (ref 8.4–10.2)
CHLORIDE SERPL-SCNC: 96 MMOL/L (ref 98–107)
CO2 SERPL-SCNC: 26 MMOL/L (ref 22–29)
CREAT SERPL-MCNC: 1.71 MG/DL (ref 0.72–1.25)
CREAT/UREA NIT SERPL: 22
EOSINOPHIL # BLD AUTO: 0.06 X10(3)/MCL (ref 0–0.9)
EOSINOPHIL NFR BLD AUTO: 0.7 %
ERYTHROCYTE [DISTWIDTH] IN BLOOD BY AUTOMATED COUNT: 13 % (ref 11.5–17)
GFR SERPLBLD CREATININE-BSD FMLA CKD-EPI: 50 ML/MIN/1.73/M2
GLOBULIN SER-MCNC: 4.1 GM/DL (ref 2.4–3.5)
GLUCOSE SERPL-MCNC: 409 MG/DL (ref 74–100)
HCT VFR BLD AUTO: 46.5 % (ref 42–52)
HGB BLD-MCNC: 15.4 G/DL (ref 14–18)
HOLD SPECIMEN: NORMAL
IMM GRANULOCYTES # BLD AUTO: 0.02 X10(3)/MCL (ref 0–0.04)
IMM GRANULOCYTES NFR BLD AUTO: 0.2 %
LYMPHOCYTES # BLD AUTO: 1.28 X10(3)/MCL (ref 0.6–4.6)
LYMPHOCYTES NFR BLD AUTO: 15.8 %
MAGNESIUM SERPL-MCNC: 1.9 MG/DL (ref 1.6–2.6)
MCH RBC QN AUTO: 26.6 PG (ref 27–31)
MCHC RBC AUTO-ENTMCNC: 33.1 G/DL (ref 33–36)
MCV RBC AUTO: 80.4 FL (ref 80–94)
MONOCYTES # BLD AUTO: 0.26 X10(3)/MCL (ref 0.1–1.3)
MONOCYTES NFR BLD AUTO: 3.2 %
NEUTROPHILS # BLD AUTO: 6.48 X10(3)/MCL (ref 2.1–9.2)
NEUTROPHILS NFR BLD AUTO: 79.9 %
NRBC BLD AUTO-RTO: 0 %
OHS QRS DURATION: 94 MS
OHS QTC CALCULATION: 442 MS
PHOSPHATE SERPL-MCNC: 3.5 MG/DL (ref 2.3–4.7)
PLATELET # BLD AUTO: 244 X10(3)/MCL (ref 130–400)
PMV BLD AUTO: 10.4 FL (ref 7.4–10.4)
POCT GLUCOSE: 314 MG/DL (ref 70–110)
POCT GLUCOSE: 373 MG/DL (ref 70–110)
POCT GLUCOSE: 417 MG/DL (ref 70–110)
POCT GLUCOSE: 423 MG/DL (ref 70–110)
POTASSIUM SERPL-SCNC: 5 MMOL/L (ref 3.5–5.1)
PROT SERPL-MCNC: 7.7 GM/DL (ref 6.4–8.3)
RBC # BLD AUTO: 5.78 X10(6)/MCL (ref 4.7–6.1)
SODIUM SERPL-SCNC: 131 MMOL/L (ref 136–145)
TROPONIN I SERPL-MCNC: <0.01 NG/ML (ref 0–0.04)
WBC # BLD AUTO: 8.12 X10(3)/MCL (ref 4.5–11.5)

## 2025-01-27 PROCEDURE — 96372 THER/PROPH/DIAG INJ SC/IM: CPT

## 2025-01-27 PROCEDURE — 84100 ASSAY OF PHOSPHORUS: CPT

## 2025-01-27 PROCEDURE — 99285 EMERGENCY DEPT VISIT HI MDM: CPT | Mod: 25

## 2025-01-27 PROCEDURE — 83735 ASSAY OF MAGNESIUM: CPT

## 2025-01-27 PROCEDURE — 96360 HYDRATION IV INFUSION INIT: CPT

## 2025-01-27 PROCEDURE — 93005 ELECTROCARDIOGRAM TRACING: CPT

## 2025-01-27 PROCEDURE — 84484 ASSAY OF TROPONIN QUANT: CPT

## 2025-01-27 PROCEDURE — 80053 COMPREHEN METABOLIC PANEL: CPT

## 2025-01-27 PROCEDURE — 83880 ASSAY OF NATRIURETIC PEPTIDE: CPT

## 2025-01-27 PROCEDURE — 82962 GLUCOSE BLOOD TEST: CPT

## 2025-01-27 PROCEDURE — 85025 COMPLETE CBC W/AUTO DIFF WBC: CPT

## 2025-01-27 PROCEDURE — 63600175 PHARM REV CODE 636 W HCPCS

## 2025-01-27 RX ORDER — INSULIN ASPART 100 [IU]/ML
3 INJECTION, SOLUTION INTRAVENOUS; SUBCUTANEOUS
Status: COMPLETED | OUTPATIENT
Start: 2025-01-27 | End: 2025-01-27

## 2025-01-27 RX ORDER — PROMETHAZINE HYDROCHLORIDE AND DEXTROMETHORPHAN HYDROBROMIDE 6.25; 15 MG/5ML; MG/5ML
5 SYRUP ORAL EVERY 6 HOURS PRN
Qty: 118 ML | Refills: 0 | Status: SHIPPED | OUTPATIENT
Start: 2025-01-27 | End: 2025-02-03

## 2025-01-27 RX ORDER — INSULIN ASPART 100 [IU]/ML
10 INJECTION, SOLUTION INTRAVENOUS; SUBCUTANEOUS
Status: COMPLETED | OUTPATIENT
Start: 2025-01-27 | End: 2025-01-27

## 2025-01-27 RX ADMIN — SODIUM CHLORIDE, POTASSIUM CHLORIDE, SODIUM LACTATE AND CALCIUM CHLORIDE 1000 ML: 600; 310; 30; 20 INJECTION, SOLUTION INTRAVENOUS at 02:01

## 2025-01-27 RX ADMIN — INSULIN ASPART 3 UNITS: 100 INJECTION, SOLUTION INTRAVENOUS; SUBCUTANEOUS at 04:01

## 2025-01-27 RX ADMIN — INSULIN ASPART 10 UNITS: 100 INJECTION, SOLUTION INTRAVENOUS; SUBCUTANEOUS at 05:01

## 2025-01-28 NOTE — ED PROVIDER NOTES
Encounter Date: 1/27/2025       History     Chief Complaint   Patient presents with    Hyperglycemia    Shortness of Breath    Fatigue    Chest Pain     C/o right lower rib and body pain with fatigue, cough, sob, hyperglycemia since last week. Cbg 314. States general malaise. Productive white mucous cough     44-year-old male with past medical history of diabetes, hypertension presents to the ER with hyperglycemia, cough, congestion, fatigue, chest pain with cough, some shortness of breath, and white mucus with the cough.  Patient states the onset is 1 week ago.  It is characterized as a productive cough with white mucus and generalized malaise.  Denies nausea, vomiting, dizziness, constant chest pain.    The history is provided by the patient.     Review of patient's allergies indicates:   Allergen Reactions    Penicillins Shortness Of Breath    Sulfa (sulfonamide antibiotics)      Past Medical History:   Diagnosis Date    Diabetes mellitus type I     Drug addiction     clean since 2017; 2 day relapse 6/2024, clean 3 months    History of heart attack     2017    Hypertension      Past Surgical History:   Procedure Laterality Date    CLAVICLE SURGERY      ESOPHAGOGASTRODUODENOSCOPY Left 9/1/2023    Procedure: EGD (ESOPHAGOGASTRODUODENOSCOPY);  Surgeon: Daiana Chilel MD;  Location: [facility] ENDOSCOPY;  Service: Gastroenterology;  Laterality: Left;     Family History   Problem Relation Name Age of Onset    Diabetes Mother      Diabetes Father      Heart disease Father      Colon cancer Maternal Grandfather      Colon polyps Paternal Grandfather       Social History     Tobacco Use    Smoking status: Every Day     Current packs/day: 0.50     Average packs/day: 0.5 packs/day for 0.1 years     Types: Cigarettes     Start date: 2025     Last attempt to quit: 5/15/2023     Passive exposure: Past    Smokeless tobacco: Never   Substance Use Topics    Alcohol use: Never    Drug use: Not Currently     Types: Heroin,  Methamphetamines     Comment: clean 3 m     Review of Systems   Constitutional:  Positive for fatigue. Negative for chills and fever.   HENT:  Positive for congestion, postnasal drip and rhinorrhea. Negative for sore throat.    Eyes:  Negative for visual disturbance.   Respiratory:  Positive for cough and shortness of breath. Negative for wheezing and stridor.    Cardiovascular:  Positive for chest pain (With coughing). Negative for palpitations and leg swelling.   Gastrointestinal:  Negative for nausea and vomiting.   Genitourinary:  Negative for difficulty urinating and dysuria.   Musculoskeletal:  Negative for arthralgias.   Skin:  Negative for color change and rash.   Neurological:  Negative for weakness and headaches.   Hematological:  Does not bruise/bleed easily.   Psychiatric/Behavioral:  Negative for confusion.    All other systems reviewed and are negative.      Physical Exam     Initial Vitals [01/27/25 1209]   BP Pulse Resp Temp SpO2   (!) 147/93 94 (!) 24 97.8 °F (36.6 °C) 100 %      MAP       --         Physical Exam    Nursing note and vitals reviewed.  Constitutional: He appears well-developed and well-nourished.   HENT:   Head: Normocephalic and atraumatic.   Right Ear: Tympanic membrane, external ear and ear canal normal.   Left Ear: Tympanic membrane, external ear and ear canal normal.   Nose: Rhinorrhea present. No sinus tenderness. Mouth/Throat: Uvula is midline, oropharynx is clear and moist and mucous membranes are normal. No oropharyngeal exudate or posterior oropharyngeal erythema.   Eyes: Conjunctivae and EOM are normal.   Neck: Neck supple.   Cardiovascular:  Normal rate, regular rhythm and normal heart sounds.     Exam reveals no gallop and no friction rub.       No murmur heard.  Pulmonary/Chest: Breath sounds normal. No respiratory distress. He has no wheezes. He has no rhonchi. He has no rales. He exhibits no tenderness.   Abdominal: He exhibits no distension.   Musculoskeletal:       Cervical back: Neck supple.     Neurological: He is alert and oriented to person, place, and time.   Skin: Skin is warm and dry. Capillary refill takes less than 2 seconds.         ED Course   Procedures  Labs Reviewed   COMPREHENSIVE METABOLIC PANEL - Abnormal       Result Value    Sodium 131 (*)     Potassium 5.0      Chloride 96 (*)     CO2 26      Glucose 409 (*)     Blood Urea Nitrogen 37.6 (*)     Creatinine 1.71 (*)     Calcium 9.4      Protein Total 7.7      Albumin 3.6      Globulin 4.1 (*)     Albumin/Globulin Ratio 0.9 (*)     Bilirubin Total 0.5            ALT 17      AST 18      eGFR 50      Anion Gap 9.0      BUN/Creatinine Ratio 22     CBC WITH DIFFERENTIAL - Abnormal    WBC 8.12      RBC 5.78      Hgb 15.4      Hct 46.5      MCV 80.4      MCH 26.6 (*)     MCHC 33.1      RDW 13.0      Platelet 244      MPV 10.4      Neut % 79.9      Lymph % 15.8      Mono % 3.2      Eos % 0.7      Basophil % 0.2      Imm Grans % 0.2      Neut # 6.48      Lymph # 1.28      Mono # 0.26      Eos # 0.06      Baso # 0.02      Imm Gran # 0.02      NRBC% 0.0     POCT GLUCOSE - Abnormal    POCT Glucose 314 (*)    POCT GLUCOSE - Abnormal    POCT Glucose 417 (*)    POCT GLUCOSE - Abnormal    POCT Glucose 423 (*)    POCT GLUCOSE - Abnormal    POCT Glucose 373 (*)    B-TYPE NATRIURETIC PEPTIDE - Normal    Natriuretic Peptide <10.0     MAGNESIUM - Normal    Magnesium Level 1.90     PHOSPHORUS - Normal    Phosphorus Level 3.5     TROPONIN I - Normal    Troponin-I <0.010     CBC W/ AUTO DIFFERENTIAL    Narrative:     The following orders were created for panel order CBC Auto Differential.  Procedure                               Abnormality         Status                     ---------                               -----------         ------                     CBC with Differential[[id number]]       Abnormal            Final result                 Please view results for these tests on the individual orders.   EXTRA TUBES     Narrative:     The following orders were created for panel order EXTRA TUBES.  Procedure                               Abnormality         Status                     ---------                               -----------         ------                     Light Blue Top Hold[[id number]]                             Final result               Gold Top Hold[[id number]]                                   Final result               Pink Top Hold[[id number]]                                   Final result                 Please view results for these tests on the individual orders.   LIGHT BLUE TOP HOLD    Extra Tube Hold for add-ons.     GOLD TOP HOLD    Extra Tube Hold for add-ons.     PINK TOP HOLD    Extra Tube Hold for add-ons.       EKG Readings: (Independently Interpreted)   Rhythm: Normal Sinus Rhythm. Heart Rate: 89. Ectopy: No Ectopy. Conduction: Normal. ST Segments: Normal ST Segments. T Waves: Normal. Clinical Impression: Normal Sinus Rhythm Other Impression: [facility]     ECG Results              EKG 12-lead (Final result)        Collection Time Result Time QRS Duration [facility] QTC Calculation    01/27/25 12:21:45 01/27/25 12:38:47 94 442                     Final result by Interface, Lab In [facility] (01/27/25 12:38:51)                   Narrative:    Test Reason : R07.9,    Vent. Rate :  89 BPM     Atrial Rate :  89 BPM     P-R Int : 122 ms          QRS Dur :  94 ms      QT Int : 364 ms       P-R-T Axes :  81  89  59 degrees    QTcB Int : 442 ms    Normal sinus rhythm  Right atrial enlargement  Borderline Abnormal ECG  When compared with ECG of 24-Jan-2025 00:36,  No significant change was found  Confirmed by Solitario Stapleton ([id number]) on 1/27/2025 12:38:40 PM    Referred By:            Confirmed By: Solitario Stapleton                                  Imaging Results              X-Ray Chest PA And Lateral (Final result)  Result time 01/27/25 13:38:51      Final result by Julian Vegas MD (01/27/25 13:38:51)                    Impression:      No acute pulmonary process appreciated.      Electronically signed by: Julian Vegas  Date:    01/27/2025  Time:    13:38               Narrative:    EXAMINATION:  XR CHEST PA AND LATERAL    CLINICAL HISTORY:  Chest pain;    TECHNIQUE:  Frontal and lateral radiographs of the chest    COMPARISON:  Radiography 01/24/2025    FINDINGS:  Normal cardiac silhouette. The lungs are well-inflated. No consolidation identified. No pleural effusion or discernible pneumothorax.                                       Medications   lactated ringers bolus 1,000 mL (0 mLs Intravenous Stopped 1/27/25 1558)   insulin aspart U-100 injection 3 Units (3 Units Subcutaneous Given 1/27/25 1610)   insulin aspart U-100 injection 10 Units (10 Units Subcutaneous Given 1/27/25 1745)     Medical Decision Making  44-year-old male with past medical history of diabetes, hypertension presents to the ER with hyperglycemia, cough, congestion, fatigue, chest pain with cough, some shortness of breath, and white mucus with the cough.  Patient states the onset is 1 week ago.  It is characterized as a productive cough with white mucus and generalized malaise.  Denies nausea, vomiting, dizziness, constant chest pain.    The history is provided by the patient.     Pertinent findings:  Patient's glucose 409 upon arrival.  After 1 L of fluids glucose is 417. After 3 units of insulin glucose is 423.  After an additional 10 units of insulin glucose was 370.  Chest x-ray negative, all other labs normal.  Will treat patient for viral URI and instructed to follow up with PCP for uncontrolled diabetes.      Clinical diagnosis:  Hyperglycemia (Primary)  URI with cough and congestion    Patient stable for DC with ED Prescriptions     Medication Sig Dispense Start Date End Date Auth. Provider    promethazine-dextromethorphan (PROMETHAZINE-DM) 6.25-15 mg/5 mL Syrp Take   5 mLs by mouth every 6 (six) hours as needed (cough). 118 mL 1/27/2025   2/3/2025  Maty Rahman PA         Referrals: f/u with PCP    Strict follow-up precautions given. Patient verbalizes understanding of treatment plan and ED return precautions.       Amount and/or Complexity of Data Reviewed  Labs: ordered. Decision-making details documented in ED Course.  Radiology: ordered. Decision-making details documented in ED Course.    Risk  Prescription drug management.  Risk Details: Given strict ED return precautions. I have spoken with the patient and/or caregivers. I have explained the patient's condition, diagnoses and treatment plan based on the information available to me at this time. I have answered the patient's and/or caregiver's questions and addressed any concerns. The patient and/or caregivers have as good an understanding of the patient's diagnosis, condition and treatment plan as can be expected at this point. The vital signs have been stable. The patient's condition is stable and appropriate for discharge from the emergency department.      The patient will pursue further outpatient evaluation with the primary care physician or other designated or consulting physician as outlined in the discharge instructions. The patient and/or caregivers are agreeable to this plan of care and follow-up instructions have been explained in detail. The patient and/or caregivers have received these instructions in written format and have expressed an understanding of the discharge instructions. The patient and/or caregivers are aware that any significant change in condition or worsening of symptoms should prompt an immediate return to this or the closest emergency department or a call to 911               Additional MDM:   Differential Diagnosis:   Other: The following diagnoses were also considered and will be evaluated: DKA, Pneumonia and Bronchitis.            ED Course as of 01/27/25 2100 Mon Jan 27, 2025   1408 Creatinine(!): 1.71  Elevated [AG]   1408 Glucose(!): 409 [AG]   1409 Anion Gap:  9.0 [AG]   1409 X-Ray Chest PA And Lateral  No acute pulmonary process appreciated.   [AG]   1558 POCT Glucose(!): 417 [AG]   1715 POCT Glucose(!): 423  After 1L fluid bolus and 3 U aspart [AG]      ED Course User Index  [AG] Maty Rahman PA                           Clinical Impression:  Final diagnoses:  [R73.9] Hyperglycemia (Primary)  [J06.9] URI with cough and congestion          ED Disposition Condition    Discharge Stable          ED Prescriptions       Medication Sig Dispense Start Date End Date Auth. Provider    promethazine-dextromethorphan (PROMETHAZINE-DM) 6.25-15 mg/5 mL Syrp Take 5 mLs by mouth every 6 (six) hours as needed (cough). 118 mL 1/27/2025 2/3/2025 Maty Rahman PA          Follow-up Information       Follow up With Specialties Details Why Contact Info    [facility] Emergency Medicine Go to  If symptoms worsen, As needed [address] [facility] [address] [phone]    Antoine Garner MD Internal Medicine In 3 days  [address] [address] [phone]               Maty Rahman PA  01/27/25 2100

## 2025-03-06 DIAGNOSIS — K59.00 CONSTIPATION, UNSPECIFIED CONSTIPATION TYPE: ICD-10-CM

## 2025-03-06 DIAGNOSIS — Z80.0 FAMILY HISTORY OF COLON CANCER REQUIRING SCREENING COLONOSCOPY: Primary | ICD-10-CM

## 2025-03-06 RX ORDER — POLYETHYLENE GLYCOL 3350, SODIUM SULFATE, SODIUM CHLORIDE, POTASSIUM CHLORIDE, SODIUM ASCORBATE, AND ASCORBIC ACID 7.5-2.691G
2000 KIT ORAL SEE ADMIN INSTRUCTIONS
Qty: 1 KIT | Refills: 0 | Status: SHIPPED | OUTPATIENT
Start: 2025-03-06

## 2025-03-06 NOTE — TELEPHONE ENCOUNTER
----- Message from Becky sent at 10/11/2024  2:56 PM CDT -----  Regarding: prep perez 4/16/25  Moviprep [facility]

## 2025-03-20 ENCOUNTER — OFFICE VISIT (OUTPATIENT)
Dept: ENDOCRINOLOGY | Facility: CLINIC | Age: 45
End: 2025-03-20
Payer: MEDICAID

## 2025-03-20 ENCOUNTER — RESULTS FOLLOW-UP (OUTPATIENT)
Dept: ENDOCRINOLOGY | Facility: CLINIC | Age: 45
End: 2025-03-20

## 2025-03-20 ENCOUNTER — CLINICAL SUPPORT (OUTPATIENT)
Dept: ENDOCRINOLOGY | Facility: CLINIC | Age: 45
End: 2025-03-20
Payer: MEDICAID

## 2025-03-20 VITALS
HEIGHT: 73 IN | RESPIRATION RATE: 18 BRPM | SYSTOLIC BLOOD PRESSURE: 138 MMHG | TEMPERATURE: 98 F | DIASTOLIC BLOOD PRESSURE: 88 MMHG | WEIGHT: 194.13 LBS | HEART RATE: 96 BPM | BODY MASS INDEX: 25.73 KG/M2

## 2025-03-20 DIAGNOSIS — N28.9 NEPHROPATHY: ICD-10-CM

## 2025-03-20 DIAGNOSIS — G62.9 NEUROPATHY: ICD-10-CM

## 2025-03-20 DIAGNOSIS — E10.65 UNCONTROLLED TYPE 1 DIABETES MELLITUS WITH HYPERGLYCEMIA: Primary | ICD-10-CM

## 2025-03-20 DIAGNOSIS — L84 CALLUS OF FOOT: ICD-10-CM

## 2025-03-20 LAB
CREAT UR-MCNC: 227 MG/DL (ref 63–166)
HBA1C MFR BLD: 9.2 %
MICROALBUMIN UR-MCNC: 3245.1 UG/ML
MICROALBUMIN/CREAT RATIO PNL UR: 1429.6 MG/GM CR (ref 0–30)

## 2025-03-20 PROCEDURE — 99215 OFFICE O/P EST HI 40 MIN: CPT | Mod: PBBFAC | Performed by: NURSE PRACTITIONER

## 2025-03-20 PROCEDURE — 82043 UR ALBUMIN QUANTITATIVE: CPT | Performed by: NURSE PRACTITIONER

## 2025-03-20 PROCEDURE — 83036 HEMOGLOBIN GLYCOSYLATED A1C: CPT | Mod: PBBFAC | Performed by: NURSE PRACTITIONER

## 2025-03-20 PROCEDURE — 92228 IMG RTA DETC/MNTR DS PHY/QHP: CPT | Mod: PBBFAC

## 2025-03-20 PROCEDURE — 2024F 7 FLD RTA PHOTO EVC RTNOPTHY: CPT | Mod: CPTII,,, | Performed by: OPTOMETRIST

## 2025-03-20 PROCEDURE — 92228 IMG RTA DETC/MNTR DS PHY/QHP: CPT | Mod: PBBFAC | Performed by: INTERNAL MEDICINE

## 2025-03-20 PROCEDURE — 92228 IMG RTA DETC/MNTR DS PHY/QHP: CPT | Mod: 26,S$PBB,, | Performed by: OPTOMETRIST

## 2025-03-20 RX ORDER — INSULIN GLARGINE-YFGN 100 [IU]/ML
INJECTION, SOLUTION SUBCUTANEOUS
COMMUNITY
Start: 2024-11-20

## 2025-03-20 RX ORDER — AMLODIPINE BESYLATE 10 MG/1
10 TABLET ORAL
COMMUNITY
Start: 2024-10-30

## 2025-03-20 NOTE — PROGRESS NOTES
Jose Francisco Romero is a 44 y.o. male here for a diabetic eye screening with non-dilated fundus photos per Janiya.    Patient cooperative?: Yes  Small pupils?: Yes  Last eye exam:1/11/2024      For exam results, see Encounter Report.

## 2025-03-20 NOTE — PROGRESS NOTES
Subjective     Patient ID: Jose Francisco Romero is a 44 y.o. male.    Chief Complaint: Follow-up (Type 1 diabetes )    Endocrine clinic note 04/12/2023:  42 year male scheduled today as new patient referral to endocrine clinic for history of uncontrolled diabetes type 1 with multiple episodes of DKA.  Patient was recently hospitalized with DKA one-week ago and reports to clinic today as new patient referral.  Patient states he was diagnosed with type 1 diabetes about 5 years ago when he went in the hospital with DKA patient has never been establish with an endocrinologist in his currently on basal insulin with a sliding scale with prandial insulin that he checks his sugar after eating and gives insulin after blood glucoses elevated.  Patient's PCP had written a Dexcom patient did not know how to use patient has not with him today Dexcom was placed on patient educated on placement by Noemí bee LPN.  Patient has not had formal education on type 1 diabetes or insulin.  Patient states he checks his CBGS 3 to 4 times a day and states his lowest blood glucoses 80 in the morning blood sugars range as high as the 400s.  He states occasionally forgets his basal insulin at night.  Discussed with patient in depth today that we will check a C-peptide and becky 65 also be referring him to [facility] for insulin medication will adjust his regimen today.      Endocrine clinic note 05/22/2023:  42 year male scheduled today as one-month follow-up for endocrine clinic for insulin titration for history of uncontrolled type 1 diabetes with multiple episodes of DKA.  Previously patient's regimen was adjusted and Dexcom was restarted. Dexcom interpretation 05/06/2023-05/19/2023.  Days with CGM data 100% 14/14 day.  Insulin data is lacking limiting interpretation.  Previously patient was having volatile blood sugars and treating glucose after eating patient's since has been giving insulin before eating and has had more stable blood  sugars at times he has prandial spikes at night patient states he is eating higher carb meals with rice and potatoes at night.  Patient has 2 episodes of hypoglycemia around lunchtime he states he does not eat a large meal like at nighttime.  Decrease lunchtime insulin to 10 units patient has appointment with [facility] tomorrow will start carb counting and will work towards a pump.      Endocrine clinic note 07/18/2023:  42-year-old male scheduled today for endocrine clinic follow-up.  History of uncontrolled type 1 diabetes with recent DKA hospitalized for sepsis of unknown origin.  Patient was hospitalized with a white blood cell count on 06/29/2023 of 38.21 with sepsis of unknown origin.  Lumbar puncture was attempted unsuccessful. Pt returned for out pt lumbar puncture CSF containing protein 102.8 high in glucose 100 high indicating CIDP.  On admit white blood cell 38.2.  Patient states he is lost over 30 lb and also his extreme pain through his extremities and is weak and having to walk with a cane.  Patient has weakness difficulty walking and 20-30 pound weight loss and severe fatigue unable to sleep due to pain.   Call was made Alexia Jacinto NP case was reviewed possible CIDP also discussed since pt is extremely symptomatic patient may need to be admitted IVIG treatment.  Type 1 diabetes recent A1c 10.7.  Patient is not on a Dexcom due to shipping issues started on G7 but G6 shipped. Patient was given 2 samples of Dexcom G7 today until order is figured out. Medicine team was called patient was admitted the hospital.      Endocrine clinic note 1/11/2024:  43-year-old male scheduled today for endocrine clinic follow-up.  History of uncontrolled type 1 diabetes on a Dexcom G7.  Uncontrolled type 1 diabetes current A1C 7.4 Previous A1C 10.7.  Patient is currently on [facility] and has an appointment today. Patient has a Dexcom interpretation 12/09/2023-12/22/2023.  Days with CGM data 100% 14/14  days.  Average glucose 197.  Time in range 27% very high, 25% high, 43% in range, 3% low, 2% very low.  Insulin data is lacking limiting interpretation at times.  Patient has hypoglycemia in the early morning hours and at time after prandial dosing indicating at times patient basal rate is too high.  On other days patient has prandial spikes for multiple hours getting prandial insulin on days where patient forgets prandial insulin patient does not have hypoglycemia hypoglycemia resolved patient had reduced basal insulin. Patient will meet with [facility] today and will start carb counting and will work through towards an insulin pump.  Neuropathy patient is currently established in [facility] seen by Dr. 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  Foot exam performed today see documentation patient reports chronic neuropathic pain and pain throughout his whole-body.      Endocrine clinic note 11/19/2024:  44-year-old male scheduled today for endocrine clinic follow-up.  History of uncontrolled type 1 diabetes on a Dexcom G7.  Uncontrolled type 1 diabetes current A1c increased to 8.7 from previous 7.4.     Patient has a Dexcom interpretation 11/06/2024-11/19/2024.  Days with CGM data 93% 13/14 days.  Average glucose 198.  Time in range 26% very high, 26% high, 45% in range, 3% low, less than 1% very low.  GM I 8.1.  On patient's best day average glucose 168 60% in range.  Days patient has better glucose control occasional prandial spikes indicating he is not giving enough prandial insulin.  On the weekends patient will have elevation at mealtimes for multiple hours indicating he was not given prandial insulin with elevation throughout the weekends.  Insulin data is lacking limiting interpretation no significant hypoglycemia. Discussed with the patient working on giving prandial insulin with all meals especially on weekends.  Neuropathy patient was  reported improvement in his symptoms he continues with neuropathy symptoms have improved.      Endocrine clinic note 03/20/2025:  44-year-old male scheduled today for endocrine clinic follow-up.  History of uncontrolled type 1 diabetes on a Dexcom.  Uncontrolled type 1 diabetes current A1c increased to 9.2 from previous 8.7 and 7.4.    Patient has a Dexcom interpretation 03/07/2025-03/20/2025.  Days with CGM data 13/14 days.  Average glucose 237.  GM I 9.0.  Time in range 45% very high, 23% high, 30% in range, 2% low, less than 1% very low.  On patient's best day average glucose 160 with 62% in range patient has prandial spikes with no evidence of giving prandial insulin and at times after multiple hours sudden drops in the hypoglycemia indicating patient is taking too much prandial insulin or could be due to late dosing.  Patient has hypoglycemia with over-correction to 400s indicating patient has over correcting hypoglycemic episodes.  Patient has evidence of skipping prandial insulin with multiple meals   Foot exam performed today see documentation.  Patient has family fungal toenails of the bilateral great toes day states previously was cut in monitor surgery.  He has a callus to the left great toe that is that go with bruising states is from his work boots.  Talked with the patient not to try to remove the callus I will refer him for diabetic foot care.              Review of Systems   Constitutional:  Negative for activity change, appetite change, chills and fatigue.   HENT: Negative.  Negative for dental problem, hearing loss, rhinorrhea, sneezing and goiter.    Eyes: Negative.  Negative for photophobia and visual disturbance.   Respiratory: Negative.  Negative for cough, chest tightness and shortness of breath.    Cardiovascular:  Negative for chest pain, palpitations and leg swelling.   Gastrointestinal: Negative.  Negative for abdominal distention, abdominal pain, change in bowel habit, constipation,  diarrhea, nausea and vomiting.   Endocrine: Negative.  Negative for cold intolerance, heat intolerance, polydipsia, polyphagia and polyuria.   Genitourinary: Negative.  Negative for difficulty urinating and erectile dysfunction.   Musculoskeletal:  Negative for back pain, joint swelling, leg pain, myalgias and joint deformity.   Integumentary:  Negative for color change, pallor and rash. Negative.   Allergic/Immunologic: Negative.  Negative for environmental allergies, food allergies and frequent infections.   Neurological: Negative.  Negative for tremors, syncope, headaches and coordination difficulties.   Hematological: Negative.  Does not bruise/bleed easily.   Psychiatric/Behavioral:  Negative for agitation and behavioral problems. The patient is not nervous/anxious and is not hyperactive.           Objective     Physical Exam  Constitutional:       General: He is not in acute distress.     Appearance: Normal appearance. He is normal weight. He is not ill-appearing.   HENT:      Head: Normocephalic.      Right Ear: External ear normal.      Left Ear: External ear normal.      Nose: No congestion or rhinorrhea.      Mouth/Throat:      Mouth: Mucous membranes are moist.      Pharynx: Oropharynx is clear.   Eyes:      Conjunctiva/sclera: Conjunctivae normal.      Pupils: Pupils are equal, round, and reactive to light.   Neck:      Thyroid: No thyroid mass, thyromegaly or thyroid tenderness.   Cardiovascular:      Rate and Rhythm: Normal rate and regular rhythm.      Pulses:           Dorsalis pedis pulses are 1+ on the right side and 1+ on the left side.        Posterior tibial pulses are 1+ on the right side and 1+ on the left side.      Heart sounds: Normal heart sounds. No murmur heard.  Pulmonary:      Effort: Pulmonary effort is normal. No respiratory distress.      Breath sounds: Normal breath sounds.   Abdominal:      General: Abdomen is flat. Bowel sounds are normal. There is no distension.      Palpations:  Abdomen is soft.      Tenderness: There is no abdominal tenderness.   Genitourinary:     Testes: Normal.   Musculoskeletal:         General: Normal range of motion.      Cervical back: Normal range of motion and neck supple.      Right lower leg: No edema.      Left lower leg: No edema.      Right foot: Prominent metatarsal heads present.      Left foot: Prominent metatarsal heads present.   Feet:      Right foot:      Protective Sensation: 5 sites tested.  5 sites sensed.      Skin integrity: Skin breakdown, callus and dry skin present.      Toenail Condition: Right toenails are abnormally thick. Fungal disease present.     Left foot:      Protective Sensation: 5 sites tested.  5 sites sensed.      Skin integrity: Skin breakdown, callus and dry skin present.      Toenail Condition: Left toenails are abnormally thick. Fungal disease present.  Lymphadenopathy:      Cervical: No cervical adenopathy.   Skin:     General: Skin is warm and dry.      Coloration: Skin is not jaundiced or pale.   Neurological:      General: No focal deficit present.      Mental Status: He is alert and oriented to person, place, and time.      Motor: No weakness.      Coordination: Coordination normal.      Gait: Gait normal.   Psychiatric:         Mood and Affect: Mood normal.         Behavior: Behavior normal.         Thought Content: Thought content normal.         Judgment: Judgment normal.            Assessment and Plan     1. Uncontrolled type 1 diabetes mellitus with hyperglycemia  Current A1C 9.2     A1C goal <7.0   Medications: Lantus 28 once a day, NovoLog 7 units with breakfast 12 units with lunch 12 units with dinner with a correction scale 1:50 > 150 Changes: Take 2 units with coffee, Taking 7 with lunch increase to 10 units    Follow ADA diet, avoid soda, simple sweets, and limit rice, breads and starches  Yearly Foot Exam: 03/20/2025  Yearly Eye Exam: 03/202/2025   -     Hemoglobin A1C, POCT  -     Microalbumin/Creatinine  Ratio, Urine  -     Ambulatory referral/consult to Wound Clinic; Future; Expected date: 03/27/2025  -     Microalbumin/Creatinine Ratio, Urine  -     Comprehensive Metabolic Panel; Future; Expected date: 03/20/2025    2. Neuropathy  Chronic inflammatory Demyelinating polyneuropathy     3. Callus of foot  Referral for diabetes foot care  -     Ambulatory referral/consult to Wound Clinic; Future; Expected date: 03/27/2025            Follow up in about 4 months (around 7/20/2025) for w/ Dexcom, type 1 diabetes.

## 2025-04-07 ENCOUNTER — TELEPHONE (OUTPATIENT)
Dept: ENDOCRINOLOGY | Facility: CLINIC | Age: 45
End: 2025-04-07
Payer: MEDICAID

## 2025-04-07 NOTE — TELEPHONE ENCOUNTER
----- Message from Luiza sent at 4/7/2025 10:49 AM CDT -----  Pt ryan Dickerson requesting a call back.Pt call back number  [phone] advise

## 2025-07-25 ENCOUNTER — OFFICE VISIT (OUTPATIENT)
Dept: ENDOCRINOLOGY | Facility: CLINIC | Age: 45
End: 2025-07-25
Payer: MEDICAID

## 2025-07-25 VITALS
TEMPERATURE: 98 F | HEART RATE: 71 BPM | BODY MASS INDEX: 24.78 KG/M2 | DIASTOLIC BLOOD PRESSURE: 85 MMHG | SYSTOLIC BLOOD PRESSURE: 132 MMHG | HEIGHT: 73 IN | RESPIRATION RATE: 18 BRPM | WEIGHT: 187 LBS

## 2025-07-25 DIAGNOSIS — H35.00 RETINOPATHY: ICD-10-CM

## 2025-07-25 DIAGNOSIS — N28.9 NEPHROPATHY: ICD-10-CM

## 2025-07-25 DIAGNOSIS — E10.65 UNCONTROLLED TYPE 1 DIABETES MELLITUS WITH HYPERGLYCEMIA: Primary | ICD-10-CM

## 2025-07-25 DIAGNOSIS — G62.9 NEUROPATHY: ICD-10-CM

## 2025-07-25 LAB — HBA1C MFR BLD: 9.8 %

## 2025-07-25 PROCEDURE — 99213 OFFICE O/P EST LOW 20 MIN: CPT | Mod: PBBFAC | Performed by: NURSE PRACTITIONER

## 2025-07-25 RX ORDER — INSULIN ASPART 100 [IU]/ML
INJECTION, SOLUTION INTRAVENOUS; SUBCUTANEOUS
Qty: 15 ML | Refills: 11 | Status: SHIPPED | OUTPATIENT
Start: 2025-07-25 | End: 2025-07-25 | Stop reason: SDUPTHER

## 2025-07-25 RX ORDER — PEN NEEDLE, DIABETIC 30 GX3/16"
NEEDLE, DISPOSABLE MISCELLANEOUS
Qty: 120 EACH | Refills: 11 | Status: SHIPPED | OUTPATIENT
Start: 2025-07-25

## 2025-07-25 RX ORDER — INSULIN GLARGINE 100 [IU]/ML
40 INJECTION, SOLUTION SUBCUTANEOUS NIGHTLY
Qty: 15 ML | Refills: 11 | Status: SHIPPED | OUTPATIENT
Start: 2025-07-25

## 2025-07-25 RX ORDER — PEN NEEDLE, DIABETIC 30 GX3/16"
NEEDLE, DISPOSABLE MISCELLANEOUS
Qty: 120 EACH | Refills: 11 | Status: SHIPPED | OUTPATIENT
Start: 2025-07-25 | End: 2025-07-25 | Stop reason: SDUPTHER

## 2025-07-25 RX ORDER — BLOOD-GLUCOSE SENSOR
EACH MISCELLANEOUS
Qty: 3 EACH | Refills: 11 | Status: SHIPPED | OUTPATIENT
Start: 2025-07-25 | End: 2025-07-25 | Stop reason: SDUPTHER

## 2025-07-25 RX ORDER — INSULIN PUMP SYRINGE, 3 ML
EACH MISCELLANEOUS
Qty: 1 EACH | Refills: 0 | Status: SHIPPED | OUTPATIENT
Start: 2025-07-25 | End: 2025-07-25 | Stop reason: SDUPTHER

## 2025-07-25 RX ORDER — BLOOD-GLUCOSE SENSOR
EACH MISCELLANEOUS
Qty: 3 EACH | Refills: 11 | Status: SHIPPED | OUTPATIENT
Start: 2025-07-25

## 2025-07-25 RX ORDER — INSULIN ASPART 100 [IU]/ML
INJECTION, SOLUTION INTRAVENOUS; SUBCUTANEOUS
Qty: 15 ML | Refills: 11 | Status: SHIPPED | OUTPATIENT
Start: 2025-07-25

## 2025-07-25 RX ORDER — INSULIN PUMP SYRINGE, 3 ML
EACH MISCELLANEOUS
Qty: 1 EACH | Refills: 0 | Status: SHIPPED | OUTPATIENT
Start: 2025-07-25

## 2025-07-25 RX ORDER — LANCETS
EACH MISCELLANEOUS
Qty: 50 EACH | Refills: 11 | Status: SHIPPED | OUTPATIENT
Start: 2025-07-25

## 2025-07-25 RX ORDER — INSULIN GLARGINE 100 [IU]/ML
40 INJECTION, SOLUTION SUBCUTANEOUS NIGHTLY
Qty: 15 ML | Refills: 11 | Status: SHIPPED | OUTPATIENT
Start: 2025-07-25 | End: 2025-07-25 | Stop reason: SDUPTHER

## 2025-07-25 NOTE — PROGRESS NOTES
Subjective     Patient ID: Jose Francisco Romero is a 44 y.o. male.    Chief Complaint: Diabetes    Endocrine clinic note 04/12/2023:  42 year male scheduled today as new patient referral to endocrine clinic for history of uncontrolled diabetes type 1 with multiple episodes of DKA.  Patient was recently hospitalized with DKA one-week ago and reports to clinic today as new patient referral.  Patient states he was diagnosed with type 1 diabetes about 5 years ago when he went in the hospital with DKA patient has never been establish with an endocrinologist in his currently on basal insulin with a sliding scale with prandial insulin that he checks his sugar after eating and gives insulin after blood glucoses elevated.  Patient's PCP had written a Dexcom patient did not know how to use patient has not with him today Dexcom was placed on patient educated on placement by Noemí bee LPN.  Patient has not had formal education on type 1 diabetes or insulin.  Patient states he checks his CBGS 3 to 4 times a day and states his lowest blood glucoses 80 in the morning blood sugars range as high as the 400s.  He states occasionally forgets his basal insulin at night.  Discussed with patient in depth today that we will check a C-peptide and becky 65 also be referring him to Diabetes Education for insulin medication will adjust his regimen today.      Endocrine clinic note 05/22/2023:  42 year male scheduled today as one-month follow-up for endocrine clinic for insulin titration for history of uncontrolled type 1 diabetes with multiple episodes of DKA.  Previously patient's regimen was adjusted and Dexcom was restarted. Dexcom interpretation 05/06/2023-05/19/2023.  Days with CGM data 100% 14/14 day.  Insulin data is lacking limiting interpretation.  Previously patient was having volatile blood sugars and treating glucose after eating patient's since has been giving insulin before eating and has had more stable blood sugars at times he  has prandial spikes at night patient states he is eating higher carb meals with rice and potatoes at night.  Patient has 2 episodes of hypoglycemia around lunchtime he states he does not eat a large meal like at nighttime.  Decrease lunchtime insulin to 10 units patient has appointment with Diabetes Education tomorrow will start carb counting and will work towards a pump.      Endocrine clinic note 07/18/2023:  42-year-old male scheduled today for endocrine clinic follow-up.  History of uncontrolled type 1 diabetes with recent DKA hospitalized for sepsis of unknown origin.  Patient was hospitalized with a white blood cell count on 06/29/2023 of 38.21 with sepsis of unknown origin.  Lumbar puncture was attempted unsuccessful. Pt returned for out pt lumbar puncture CSF containing protein 102.8 high in glucose 100 high indicating CIDP.  On admit white blood cell 38.2.  Patient states he is lost over 30 lb and also his extreme pain through his extremities and is weak and having to walk with a cane.  Patient has weakness difficulty walking and 20-30 pound weight loss and severe fatigue unable to sleep due to pain.   Call was made Alexia Jacinto NP case was reviewed possible CIDP also discussed since pt is extremely symptomatic patient may need to be admitted IVIG treatment.  Type 1 diabetes recent A1c 10.7.  Patient is not on a Dexcom due to shipping issues started on G7 but G6 shipped. Patient was given 2 samples of Dexcom G7 today until order is figured out. Medicine team was called patient was admitted the hospital.      Endocrine clinic note 1/11/2024:  43-year-old male scheduled today for endocrine clinic follow-up.  History of uncontrolled type 1 diabetes on a Dexcom G7.  Uncontrolled type 1 diabetes current A1C 7.4 Previous A1C 10.7.  Patient is currently on Diabetes Education and has an appointment today. Patient has a Dexcom interpretation 12/09/2023-12/22/2023.  Days with CGM data 100% 14/14 days.  Average glucose  197.  Time in range 27% very high, 25% high, 43% in range, 3% low, 2% very low.  Insulin data is lacking limiting interpretation at times.  Patient has hypoglycemia in the early morning hours and at time after prandial dosing indicating at times patient basal rate is too high.  On other days patient has prandial spikes for multiple hours getting prandial insulin on days where patient forgets prandial insulin patient does not have hypoglycemia hypoglycemia resolved patient had reduced basal insulin. Patient will meet with Diabetes Education today and will start carb counting and will work through towards an insulin pump.  Neuropathy patient is currently established in [facility] seen by Dr. 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  Foot exam performed today see documentation patient reports chronic neuropathic pain and pain throughout his whole-body.      Endocrine clinic note 11/19/2024:  44-year-old male scheduled today for endocrine clinic follow-up.  History of uncontrolled type 1 diabetes on a Dexcom G7.  Uncontrolled type 1 diabetes current A1c increased to 8.7 from previous 7.4. Patient has a Dexcom interpretation 11/06/2024-11/19/2024.  Days with CGM data 93% 13/14 days.  Average glucose 198.  Time in range 26% very high, 26% high, 45% in range, 3% low, less than 1% very low.  GM I 8.1.  On patient's best day average glucose 168 60% in range.  Days patient has better glucose control occasional prandial spikes indicating he is not giving enough prandial insulin.  On the weekends patient will have elevation at mealtimes for multiple hours indicating he was not given prandial insulin with elevation throughout the weekends.  Insulin data is lacking limiting interpretation no significant hypoglycemia. Discussed with the patient working on giving prandial insulin with all meals especially on weekends.  Neuropathy patient was reported improvement in his  symptoms he continues with neuropathy symptoms have improved.      Endocrine clinic note 03/20/2025:  44-year-old male scheduled today for endocrine clinic follow-up.  History of uncontrolled type 1 diabetes on a Dexcom.  Uncontrolled type 1 diabetes current A1c increased to 9.2 from previous 8.7 and 7.4. Patient has a Dexcom interpretation 03/07/2025-03/20/2025.  Days with CGM data 13/14 days.  Average glucose 237.  GM I 9.0.  Time in range 45% very high, 23% high, 30% in range, 2% low, less than 1% very low.  On patient's best day average glucose 160 with 62% in range patient has prandial spikes with no evidence of giving prandial insulin and at times after multiple hours sudden drops in the hypoglycemia indicating patient is taking too much prandial insulin or could be due to late dosing.  Patient has hypoglycemia with over-correction to 400s indicating patient has over correcting hypoglycemic episodes.  Patient has evidence of skipping prandial insulin with multiple meals.  Foot exam performed today see documentation.  Patient has family fungal toenails of the bilateral great toes day states previously was cut in monitor surgery.  He has a callus to the left great toe that is that go with bruising states is from his work boots.  Talked with the patient not to try to remove the callus I will refer him for diabetic foot care.       Endocrine clinic note 07/25/2025:  44-year-old male scheduled today for endocrine clinic follow-up.  History of uncontrolled type 1 diabetes.  Uncontrolled type 1 diabetes current A1c increased to 9.8.  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  On today's visit patient was given skin tack and a new Dexcom was placed on him and was showing has a you skin tack.  Discussed with the patient to you skin tack and also he can get tough skin off of [employer] help hold his Dexcom.  Patient states his PCP  had recently sent in refills and he was unable to get his insulin frequent enough and had ran out of NovoLog multiple times due to the way the PCP had written the prescription.  Patient was sent a new prescription to last a full 30 days at his current dosing.  Nephropathy previous urine micro elevated patient was started on an ACE inhibitor.  Neuropathy patient states it has improved and does not have any complaints today.  Retinopathy patient recently saw external ophthalmology we will request records.                      Review of Systems   Constitutional:  Negative for activity change, appetite change, chills and fatigue.   HENT: Negative.  Negative for dental problem, hearing loss, rhinorrhea, sneezing and goiter.    Eyes: Negative.  Negative for photophobia and visual disturbance.   Respiratory: Negative.  Negative for cough, chest tightness and shortness of breath.    Cardiovascular:  Negative for chest pain, palpitations and leg swelling.   Gastrointestinal: Negative.  Negative for abdominal distention, abdominal pain, change in bowel habit, constipation, diarrhea, nausea and vomiting.   Endocrine: Negative.  Negative for cold intolerance, heat intolerance, polydipsia, polyphagia and polyuria.   Genitourinary: Negative.  Negative for difficulty urinating and erectile dysfunction.   Musculoskeletal:  Negative for back pain, joint swelling, leg pain, myalgias and joint deformity.   Integumentary:  Negative for color change, pallor and rash. Negative.   Allergic/Immunologic: Negative.  Negative for environmental allergies, food allergies and frequent infections.   Neurological: Negative.  Negative for tremors, syncope, headaches and coordination difficulties.   Hematological: Negative.  Does not bruise/bleed easily.   Psychiatric/Behavioral:  Negative for agitation and behavioral problems. The patient is not nervous/anxious and is not hyperactive.           Objective     Physical Exam  Constitutional:        General: He is not in acute distress.     Appearance: Normal appearance. He is normal weight. He is not ill-appearing.   HENT:      Head: Normocephalic.      Right Ear: External ear normal.      Left Ear: External ear normal.      Nose: No congestion or rhinorrhea.      Mouth/Throat:      Mouth: Mucous membranes are moist.      Pharynx: Oropharynx is clear.   Eyes:      Conjunctiva/sclera: Conjunctivae normal.      Pupils: Pupils are equal, round, and reactive to light.   Neck:      Thyroid: No thyroid mass, thyromegaly or thyroid tenderness.   Cardiovascular:      Rate and Rhythm: Normal rate and regular rhythm.      Pulses: Normal pulses.      Heart sounds: Normal heart sounds. No murmur heard.  Pulmonary:      Effort: Pulmonary effort is normal. No respiratory distress.      Breath sounds: Normal breath sounds.   Abdominal:      General: Abdomen is flat. Bowel sounds are normal. There is no distension.      Palpations: Abdomen is soft.      Tenderness: There is no abdominal tenderness.   Genitourinary:     Testes: Normal.   Musculoskeletal:         General: Normal range of motion.      Cervical back: Normal range of motion and neck supple.      Right lower leg: No edema.      Left lower leg: No edema.   Feet:      Right foot:      Skin integrity: Skin integrity normal.      Left foot:      Skin integrity: Skin integrity normal.   Lymphadenopathy:      Cervical: No cervical adenopathy.   Skin:     General: Skin is warm and dry.      Coloration: Skin is not jaundiced or pale.   Neurological:      General: No focal deficit present.      Mental Status: He is alert and oriented to person, place, and time.      Motor: No weakness.      Coordination: Coordination normal.      Gait: Gait normal.   Psychiatric:         Mood and Affect: Mood normal.         Behavior: Behavior normal.         Thought Content: Thought content normal.         Judgment: Judgment normal.            Assessment and Plan   1. Uncontrolled type 1  "diabetes mellitus with hyperglycemia  Current A1C 9.8   A1C goal <7.0   Medications: Lantus 28 once a day, NovoLog 7 units with breakfast 7 units with lunch 10  units with dinner (2 units with coffee)  with a correction scale 1:50 > 150 Changes:    Yearly Foot Exam: 03/20/2025  Yearly Eye Exam: 03/202/2025        Component  Ref Range & Units (hover) 4 mo ago 2 yr ago   Hemoglobin A1C, POC 9.2 Abnormal  10.7   Uncontrolled type 1 diabetes mellitus with hyperglycemia  -     POCT HEMOGLOBIN A1C  -     lancets Misc; To check BG 2 times daily, to use with insurance preferred meter  Dispense: 50 each; Refill: 11  -     miscellaneous medical supply Pack; Skin Tac use every 10 days on skin for dexcom placement  Dispense: 50 packet; Refill: 1  -     insulin glargine U-100, Lantus, (LANTUS SOLOSTAR U-100 INSULIN) 100 unit/mL (3 mL) InPn pen; Inject 40 Units into the skin every evening.  Dispense: 15 mL; Refill: 11  -     insulin aspart U-100 (NOVOLOG FLEXPEN U-100 INSULIN) 100 unit/mL (3 mL) InPn pen; 7 units in the am, 7 units with lunch and 7 units with supper. Sliding scale with correction 1:50 >150 Max dose 50 units.  Dispense: 15 mL; Refill: 11  -     blood-glucose sensor (DEXCOM G7 SENSOR) Trinh; Use every 10 days as directed  Dispense: 3 each; Refill: 11  -     blood-glucose meter kit; To check BG 2 times daily, to use with insurance preferred meter  Dispense: 1 each; Refill: 0  -     blood sugar diagnostic Strp; To check BG 2 times daily, to use with insurance preferred meter  Dispense: 50 strip; Refill: 11  -     pen needle, diabetic 32 gauge x 5/32" Ndle; Use BD boaz needle 4 times a day for insulin injection  Dispense: 120 each; Refill: 11    2. Nephropathy  Urine micro elevated on 03/20/2025   On an ACE   Component  Ref Range & Units (hover) 4 mo ago  (3/20/25) 1 yr ago  (1/11/24) 2 yr ago  (4/12/23) 4 yr ago  (3/2/21) 6 yr ago  (1/24/19) 7 yr ago  (4/17/18) 7 yr ago  (4/16/18)   Urine Microalbumin 3,245.1 High  " 566.8 High  R 275.6 High  R    2.4 R   Urine Creatinine 227.0 High  158.2 128.1 149.1 R 239.0 R 128.0 R 99.0 R   Microalbumin Creatinine Ratio 1,429.6 High  358.3 High  215.1 High     24.2     3. Neuropathy  Chronic inflammatory Demyelinating polyneuropathy      4. Retinopathy  Appt with Ophthalmology 08/11/2025         Follow up in about 4 months (around 11/25/2025) for w/ Dexcom, type 1 diabetes.

## 2025-07-30 DIAGNOSIS — R79.89 HIGH CREATININE: Primary | ICD-10-CM

## 2025-08-07 ENCOUNTER — DOCUMENTATION ONLY (OUTPATIENT)
Dept: NEPHROLOGY | Facility: CLINIC | Age: 45
End: 2025-08-07
Payer: MEDICAID
